# Patient Record
Sex: FEMALE | Race: WHITE | NOT HISPANIC OR LATINO | Employment: OTHER | URBAN - METROPOLITAN AREA
[De-identification: names, ages, dates, MRNs, and addresses within clinical notes are randomized per-mention and may not be internally consistent; named-entity substitution may affect disease eponyms.]

---

## 2017-01-02 ENCOUNTER — APPOINTMENT (OUTPATIENT)
Dept: LAB | Age: 57
End: 2017-01-02
Payer: MEDICARE

## 2017-01-02 DIAGNOSIS — M25.562 PAIN IN LEFT KNEE: ICD-10-CM

## 2017-01-02 DIAGNOSIS — M70.52 OTHER BURSITIS OF KNEE, LEFT KNEE: ICD-10-CM

## 2017-01-02 LAB
ABO GROUP BLD: NORMAL
ALBUMIN SERPL BCP-MCNC: 3.9 G/DL (ref 3.5–5)
ALP SERPL-CCNC: 56 U/L (ref 46–116)
ALT SERPL W P-5'-P-CCNC: 21 U/L (ref 12–78)
ANION GAP SERPL CALCULATED.3IONS-SCNC: 7 MMOL/L (ref 4–13)
AST SERPL W P-5'-P-CCNC: 13 U/L (ref 5–45)
BASOPHILS # BLD AUTO: 0.02 THOUSANDS/ΜL (ref 0–0.1)
BASOPHILS NFR BLD AUTO: 0 % (ref 0–1)
BILIRUB SERPL-MCNC: 0.77 MG/DL (ref 0.2–1)
BILIRUB UR QL STRIP: NEGATIVE
BLD GP AB SCN SERPL QL: NEGATIVE
BUN SERPL-MCNC: 18 MG/DL (ref 5–25)
CALCIUM SERPL-MCNC: 8.8 MG/DL (ref 8.3–10.1)
CHLORIDE SERPL-SCNC: 103 MMOL/L (ref 100–108)
CLARITY UR: NORMAL
CO2 SERPL-SCNC: 31 MMOL/L (ref 21–32)
COLOR UR: YELLOW
CREAT SERPL-MCNC: 0.88 MG/DL (ref 0.6–1.3)
EOSINOPHIL # BLD AUTO: 0.15 THOUSAND/ΜL (ref 0–0.61)
EOSINOPHIL NFR BLD AUTO: 3 % (ref 0–6)
ERYTHROCYTE [DISTWIDTH] IN BLOOD BY AUTOMATED COUNT: 13.6 % (ref 11.6–15.1)
EST. AVERAGE GLUCOSE BLD GHB EST-MCNC: 105 MG/DL
GFR SERPL CREATININE-BSD FRML MDRD: >60 ML/MIN/1.73SQ M
GLUCOSE SERPL-MCNC: 100 MG/DL (ref 65–140)
GLUCOSE UR STRIP-MCNC: NEGATIVE MG/DL
HBA1C MFR BLD: 5.3 % (ref 4.2–6.3)
HCT VFR BLD AUTO: 43.1 % (ref 34.8–46.1)
HGB BLD-MCNC: 14.8 G/DL (ref 11.5–15.4)
HGB UR QL STRIP.AUTO: NEGATIVE
KETONES UR STRIP-MCNC: NEGATIVE MG/DL
LEUKOCYTE ESTERASE UR QL STRIP: NEGATIVE
LYMPHOCYTES # BLD AUTO: 1.81 THOUSANDS/ΜL (ref 0.6–4.47)
LYMPHOCYTES NFR BLD AUTO: 31 % (ref 14–44)
MCH RBC QN AUTO: 30.1 PG (ref 26.8–34.3)
MCHC RBC AUTO-ENTMCNC: 34.3 G/DL (ref 31.4–37.4)
MCV RBC AUTO: 88 FL (ref 82–98)
MONOCYTES # BLD AUTO: 0.4 THOUSAND/ΜL (ref 0.17–1.22)
MONOCYTES NFR BLD AUTO: 7 % (ref 4–12)
NEUTROPHILS # BLD AUTO: 3.49 THOUSANDS/ΜL (ref 1.85–7.62)
NEUTS SEG NFR BLD AUTO: 59 % (ref 43–75)
NITRITE UR QL STRIP: NEGATIVE
NRBC BLD AUTO-RTO: 0 /100 WBCS
PH UR STRIP.AUTO: 6 [PH] (ref 4.5–8)
PLATELET # BLD AUTO: 140 THOUSANDS/UL (ref 149–390)
PMV BLD AUTO: 12.3 FL (ref 8.9–12.7)
POTASSIUM SERPL-SCNC: 3.3 MMOL/L (ref 3.5–5.3)
PROT SERPL-MCNC: 6.7 G/DL (ref 6.4–8.2)
PROT UR STRIP-MCNC: NEGATIVE MG/DL
RBC # BLD AUTO: 4.92 MILLION/UL (ref 3.81–5.12)
RH BLD: NEGATIVE
SODIUM SERPL-SCNC: 141 MMOL/L (ref 136–145)
SP GR UR STRIP.AUTO: 1.02 (ref 1–1.03)
UROBILINOGEN UR QL STRIP.AUTO: 0.2 E.U./DL
WBC # BLD AUTO: 5.88 THOUSAND/UL (ref 4.31–10.16)

## 2017-01-02 PROCEDURE — 85025 COMPLETE CBC W/AUTO DIFF WBC: CPT

## 2017-01-02 PROCEDURE — 86900 BLOOD TYPING SEROLOGIC ABO: CPT

## 2017-01-02 PROCEDURE — 36415 COLL VENOUS BLD VENIPUNCTURE: CPT

## 2017-01-02 PROCEDURE — 86850 RBC ANTIBODY SCREEN: CPT

## 2017-01-02 PROCEDURE — 81003 URINALYSIS AUTO W/O SCOPE: CPT

## 2017-01-02 PROCEDURE — 80053 COMPREHEN METABOLIC PANEL: CPT

## 2017-01-02 PROCEDURE — 83036 HEMOGLOBIN GLYCOSYLATED A1C: CPT

## 2017-01-02 PROCEDURE — 86901 BLOOD TYPING SEROLOGIC RH(D): CPT

## 2017-01-06 DIAGNOSIS — R79.9 ABNORMAL FINDING OF BLOOD CHEMISTRY: ICD-10-CM

## 2017-01-06 DIAGNOSIS — M25.562 PAIN IN LEFT KNEE: ICD-10-CM

## 2017-01-09 ENCOUNTER — TRANSCRIBE ORDERS (OUTPATIENT)
Dept: URGENT CARE | Age: 57
End: 2017-01-09

## 2017-01-09 ENCOUNTER — APPOINTMENT (OUTPATIENT)
Dept: LAB | Age: 57
End: 2017-01-09
Payer: MEDICARE

## 2017-01-09 DIAGNOSIS — M25.562 PAIN IN BOTH KNEES, UNSPECIFIED CHRONICITY: ICD-10-CM

## 2017-01-09 DIAGNOSIS — M25.561 PAIN IN BOTH KNEES, UNSPECIFIED CHRONICITY: ICD-10-CM

## 2017-01-09 DIAGNOSIS — R79.9 ABNORMAL BLOOD CHEMISTRY: ICD-10-CM

## 2017-01-09 DIAGNOSIS — R79.9 ABNORMAL BLOOD CHEMISTRY: Primary | ICD-10-CM

## 2017-01-09 LAB
HCT VFR BLD AUTO: 45 % (ref 34.8–46.1)
HGB BLD-MCNC: 15.8 G/DL (ref 11.5–15.4)
POTASSIUM SERPL-SCNC: 3.8 MMOL/L (ref 3.5–5.3)

## 2017-01-09 PROCEDURE — 84132 ASSAY OF SERUM POTASSIUM: CPT

## 2017-01-09 PROCEDURE — 36415 COLL VENOUS BLD VENIPUNCTURE: CPT

## 2017-01-09 PROCEDURE — 85014 HEMATOCRIT: CPT

## 2017-01-09 PROCEDURE — 85018 HEMOGLOBIN: CPT

## 2017-01-10 ENCOUNTER — GENERIC CONVERSION - ENCOUNTER (OUTPATIENT)
Dept: OTHER | Facility: OTHER | Age: 57
End: 2017-01-10

## 2017-01-17 ENCOUNTER — ANESTHESIA EVENT (OUTPATIENT)
Dept: PERIOP | Facility: HOSPITAL | Age: 57
DRG: 470 | End: 2017-01-17
Payer: MEDICARE

## 2017-01-18 ENCOUNTER — ALLSCRIPTS OFFICE VISIT (OUTPATIENT)
Dept: OTHER | Facility: OTHER | Age: 57
End: 2017-01-18

## 2017-01-23 ENCOUNTER — APPOINTMENT (OUTPATIENT)
Dept: LAB | Age: 57
End: 2017-01-23
Payer: MEDICARE

## 2017-01-23 ENCOUNTER — GENERIC CONVERSION - ENCOUNTER (OUTPATIENT)
Dept: OTHER | Facility: OTHER | Age: 57
End: 2017-01-23

## 2017-01-23 DIAGNOSIS — M25.562 PAIN IN LEFT KNEE: ICD-10-CM

## 2017-01-23 DIAGNOSIS — R79.9 ABNORMAL FINDING OF BLOOD CHEMISTRY: ICD-10-CM

## 2017-01-23 LAB
HCT VFR BLD AUTO: 44.2 % (ref 34.8–46.1)
HGB BLD-MCNC: 15.2 G/DL (ref 11.5–15.4)
PLATELET # BLD AUTO: 175 THOUSANDS/UL (ref 149–390)
PMV BLD AUTO: 12.4 FL (ref 8.9–12.7)

## 2017-01-23 PROCEDURE — 85014 HEMATOCRIT: CPT

## 2017-01-23 PROCEDURE — 85018 HEMOGLOBIN: CPT

## 2017-01-23 PROCEDURE — 85049 AUTOMATED PLATELET COUNT: CPT

## 2017-01-23 PROCEDURE — 36415 COLL VENOUS BLD VENIPUNCTURE: CPT

## 2017-02-08 ENCOUNTER — ANESTHESIA (OUTPATIENT)
Dept: PERIOP | Facility: HOSPITAL | Age: 57
DRG: 470 | End: 2017-02-08
Payer: MEDICARE

## 2017-02-08 ENCOUNTER — HOSPITAL ENCOUNTER (INPATIENT)
Facility: HOSPITAL | Age: 57
LOS: 3 days | Discharge: NURSING, REHAB OR LONG-TERM CARE | DRG: 470 | End: 2017-02-11
Attending: ORTHOPAEDIC SURGERY | Admitting: ORTHOPAEDIC SURGERY
Payer: MEDICARE

## 2017-02-08 DIAGNOSIS — I15.9 SECONDARY HYPERTENSION: Primary | ICD-10-CM

## 2017-02-08 DIAGNOSIS — K59.00 CONSTIPATION, UNSPECIFIED CONSTIPATION TYPE: ICD-10-CM

## 2017-02-08 DIAGNOSIS — F41.9 ANXIETY: ICD-10-CM

## 2017-02-08 LAB
ABO GROUP BLD: NORMAL
BLD GP AB SCN SERPL QL: NEGATIVE
RH BLD: NEGATIVE

## 2017-02-08 PROCEDURE — C1776 JOINT DEVICE (IMPLANTABLE): HCPCS | Performed by: ORTHOPAEDIC SURGERY

## 2017-02-08 PROCEDURE — 86900 BLOOD TYPING SEROLOGIC ABO: CPT | Performed by: ORTHOPAEDIC SURGERY

## 2017-02-08 PROCEDURE — 86850 RBC ANTIBODY SCREEN: CPT | Performed by: ORTHOPAEDIC SURGERY

## 2017-02-08 PROCEDURE — 0SRD0J9 REPLACEMENT OF LEFT KNEE JOINT WITH SYNTHETIC SUBSTITUTE, CEMENTED, OPEN APPROACH: ICD-10-PCS | Performed by: ORTHOPAEDIC SURGERY

## 2017-02-08 PROCEDURE — 86901 BLOOD TYPING SEROLOGIC RH(D): CPT | Performed by: ORTHOPAEDIC SURGERY

## 2017-02-08 PROCEDURE — C1713 ANCHOR/SCREW BN/BN,TIS/BN: HCPCS | Performed by: ORTHOPAEDIC SURGERY

## 2017-02-08 DEVICE — ATTUNE KNEE SYSTEM TIBIAL BASE FIXED BEARING SIZE 3 CEMENTED
Type: IMPLANTABLE DEVICE | Site: KNEE | Status: FUNCTIONAL
Brand: ATTUNE

## 2017-02-08 DEVICE — ATTUNE KNEE SYSTEM TIBIAL INSERT FIXED BEARING POSTERIOR STABILIZED 4 6MM AOX
Type: IMPLANTABLE DEVICE | Site: KNEE | Status: FUNCTIONAL
Brand: ATTUNE

## 2017-02-08 DEVICE — ATTUNE PATELLA MEDIALIZED DOME 32MM CEMENTED AOX
Type: IMPLANTABLE DEVICE | Site: KNEE | Status: FUNCTIONAL
Brand: ATTUNE

## 2017-02-08 DEVICE — SMARTSET HV HIGH VISCOSITY BONE CEMENT 40G
Type: IMPLANTABLE DEVICE | Site: KNEE | Status: FUNCTIONAL
Brand: SMARTSET

## 2017-02-08 DEVICE — ATTUNE KNEE SYSTEM FEMORAL POSTERIOR STABILIZED NARROW SIZE 4N LEFT CEMENTED
Type: IMPLANTABLE DEVICE | Site: KNEE | Status: FUNCTIONAL
Brand: ATTUNE

## 2017-02-08 RX ORDER — SENNOSIDES 8.6 MG
2 TABLET ORAL DAILY
Status: DISCONTINUED | OUTPATIENT
Start: 2017-02-08 | End: 2017-02-11 | Stop reason: HOSPADM

## 2017-02-08 RX ORDER — METOCLOPRAMIDE HYDROCHLORIDE 5 MG/ML
10 INJECTION INTRAMUSCULAR; INTRAVENOUS ONCE AS NEEDED
Status: DISCONTINUED | OUTPATIENT
Start: 2017-02-08 | End: 2017-02-08 | Stop reason: HOSPADM

## 2017-02-08 RX ORDER — LORAZEPAM 0.5 MG/1
0.5 TABLET ORAL EVERY 8 HOURS PRN
Status: DISCONTINUED | OUTPATIENT
Start: 2017-02-08 | End: 2017-02-11 | Stop reason: HOSPADM

## 2017-02-08 RX ORDER — ASCORBIC ACID 500 MG
500 TABLET ORAL 2 TIMES DAILY
COMMUNITY
End: 2018-02-16

## 2017-02-08 RX ORDER — FENTANYL CITRATE/PF 50 MCG/ML
25 SYRINGE (ML) INJECTION
Status: DISCONTINUED | OUTPATIENT
Start: 2017-02-08 | End: 2017-02-08 | Stop reason: HOSPADM

## 2017-02-08 RX ORDER — SODIUM CHLORIDE, SODIUM LACTATE, POTASSIUM CHLORIDE, CALCIUM CHLORIDE 600; 310; 30; 20 MG/100ML; MG/100ML; MG/100ML; MG/100ML
75 INJECTION, SOLUTION INTRAVENOUS CONTINUOUS
Status: DISCONTINUED | OUTPATIENT
Start: 2017-02-08 | End: 2017-02-11 | Stop reason: HOSPADM

## 2017-02-08 RX ORDER — CLINDAMYCIN PHOSPHATE 900 MG/50ML
900 INJECTION INTRAVENOUS EVERY 8 HOURS
Status: COMPLETED | OUTPATIENT
Start: 2017-02-08 | End: 2017-02-08

## 2017-02-08 RX ORDER — OXYCODONE HYDROCHLORIDE 10 MG/1
10 TABLET ORAL EVERY 4 HOURS PRN
Status: DISCONTINUED | OUTPATIENT
Start: 2017-02-08 | End: 2017-02-09

## 2017-02-08 RX ORDER — ACETAMINOPHEN 325 MG/1
975 TABLET ORAL ONCE
Status: COMPLETED | OUTPATIENT
Start: 2017-02-08 | End: 2017-02-08

## 2017-02-08 RX ORDER — FOLIC ACID 1 MG/1
1 TABLET ORAL DAILY
Status: DISCONTINUED | OUTPATIENT
Start: 2017-02-08 | End: 2017-02-11 | Stop reason: HOSPADM

## 2017-02-08 RX ORDER — PROPOFOL 10 MG/ML
INJECTION, EMULSION INTRAVENOUS CONTINUOUS PRN
Status: DISCONTINUED | OUTPATIENT
Start: 2017-02-08 | End: 2017-02-08 | Stop reason: SURG

## 2017-02-08 RX ORDER — ONDANSETRON 2 MG/ML
4 INJECTION INTRAMUSCULAR; INTRAVENOUS ONCE AS NEEDED
Status: DISCONTINUED | OUTPATIENT
Start: 2017-02-08 | End: 2017-02-08 | Stop reason: HOSPADM

## 2017-02-08 RX ORDER — MAGNESIUM HYDROXIDE 1200 MG/15ML
LIQUID ORAL AS NEEDED
Status: DISCONTINUED | OUTPATIENT
Start: 2017-02-08 | End: 2017-02-08 | Stop reason: HOSPADM

## 2017-02-08 RX ORDER — SODIUM CHLORIDE, SODIUM LACTATE, POTASSIUM CHLORIDE, CALCIUM CHLORIDE 600; 310; 30; 20 MG/100ML; MG/100ML; MG/100ML; MG/100ML
INJECTION, SOLUTION INTRAVENOUS CONTINUOUS PRN
Status: DISCONTINUED | OUTPATIENT
Start: 2017-02-08 | End: 2017-02-08 | Stop reason: SURG

## 2017-02-08 RX ORDER — ONDANSETRON 2 MG/ML
4 INJECTION INTRAMUSCULAR; INTRAVENOUS EVERY 4 HOURS PRN
Status: DISCONTINUED | OUTPATIENT
Start: 2017-02-08 | End: 2017-02-11 | Stop reason: HOSPADM

## 2017-02-08 RX ORDER — ACETAMINOPHEN 325 MG/1
650 TABLET ORAL EVERY 6 HOURS SCHEDULED
Status: DISCONTINUED | OUTPATIENT
Start: 2017-02-08 | End: 2017-02-11 | Stop reason: HOSPADM

## 2017-02-08 RX ORDER — CELECOXIB 200 MG/1
400 CAPSULE ORAL ONCE
Status: COMPLETED | OUTPATIENT
Start: 2017-02-08 | End: 2017-02-08

## 2017-02-08 RX ORDER — FOLIC ACID 1 MG/1
1 TABLET ORAL DAILY
COMMUNITY
End: 2018-02-16

## 2017-02-08 RX ORDER — ASCORBIC ACID 500 MG
500 TABLET ORAL 2 TIMES DAILY
Status: DISCONTINUED | OUTPATIENT
Start: 2017-02-08 | End: 2017-02-11 | Stop reason: HOSPADM

## 2017-02-08 RX ORDER — CLINDAMYCIN PHOSPHATE 900 MG/50ML
900 INJECTION INTRAVENOUS ONCE
Status: COMPLETED | OUTPATIENT
Start: 2017-02-08 | End: 2017-02-08

## 2017-02-08 RX ORDER — TRIAMTERENE AND HYDROCHLOROTHIAZIDE 37.5; 25 MG/1; MG/1
1 TABLET ORAL DAILY
Status: DISCONTINUED | OUTPATIENT
Start: 2017-02-09 | End: 2017-02-11 | Stop reason: HOSPADM

## 2017-02-08 RX ORDER — POLYETHYLENE GLYCOL 3350 17 G/17G
17 POWDER, FOR SOLUTION ORAL DAILY
Status: DISCONTINUED | OUTPATIENT
Start: 2017-02-08 | End: 2017-02-09

## 2017-02-08 RX ORDER — PROMETHAZINE HYDROCHLORIDE 25 MG/ML
25 INJECTION, SOLUTION INTRAMUSCULAR; INTRAVENOUS ONCE AS NEEDED
Status: DISCONTINUED | OUTPATIENT
Start: 2017-02-08 | End: 2017-02-08 | Stop reason: HOSPADM

## 2017-02-08 RX ORDER — TRAMADOL HYDROCHLORIDE 50 MG/1
50 TABLET ORAL EVERY 6 HOURS SCHEDULED
Status: DISCONTINUED | OUTPATIENT
Start: 2017-02-08 | End: 2017-02-09

## 2017-02-08 RX ORDER — BUPIVACAINE HYDROCHLORIDE 7.5 MG/ML
INJECTION, SOLUTION EPIDURAL; RETROBULBAR AS NEEDED
Status: DISCONTINUED | OUTPATIENT
Start: 2017-02-08 | End: 2017-02-08 | Stop reason: SURG

## 2017-02-08 RX ORDER — SODIUM CHLORIDE, SODIUM LACTATE, POTASSIUM CHLORIDE, CALCIUM CHLORIDE 600; 310; 30; 20 MG/100ML; MG/100ML; MG/100ML; MG/100ML
125 INJECTION, SOLUTION INTRAVENOUS CONTINUOUS
Status: DISCONTINUED | OUTPATIENT
Start: 2017-02-08 | End: 2017-02-11 | Stop reason: HOSPADM

## 2017-02-08 RX ORDER — FENTANYL CITRATE 50 UG/ML
INJECTION, SOLUTION INTRAMUSCULAR; INTRAVENOUS
Status: COMPLETED
Start: 2017-02-08 | End: 2017-02-08

## 2017-02-08 RX ORDER — MIDAZOLAM HYDROCHLORIDE 1 MG/ML
INJECTION INTRAMUSCULAR; INTRAVENOUS
Status: COMPLETED
Start: 2017-02-08 | End: 2017-02-08

## 2017-02-08 RX ORDER — OXYCODONE HYDROCHLORIDE 5 MG/1
TABLET ORAL
Qty: 60 TABLET | Refills: 0 | Status: SHIPPED | OUTPATIENT
Start: 2017-02-08 | End: 2018-02-16

## 2017-02-08 RX ORDER — POTASSIUM CHLORIDE 750 MG/1
10 TABLET, FILM COATED, EXTENDED RELEASE ORAL 2 TIMES DAILY
Status: DISCONTINUED | OUTPATIENT
Start: 2017-02-08 | End: 2017-02-08

## 2017-02-08 RX ORDER — POTASSIUM CHLORIDE 750 MG/1
10 TABLET, EXTENDED RELEASE ORAL 2 TIMES DAILY
Status: DISCONTINUED | OUTPATIENT
Start: 2017-02-08 | End: 2017-02-11 | Stop reason: HOSPADM

## 2017-02-08 RX ORDER — PANTOPRAZOLE SODIUM 40 MG/1
40 TABLET, DELAYED RELEASE ORAL
Status: DISCONTINUED | OUTPATIENT
Start: 2017-02-09 | End: 2017-02-11 | Stop reason: HOSPADM

## 2017-02-08 RX ORDER — PROPOFOL 10 MG/ML
INJECTION, EMULSION INTRAVENOUS AS NEEDED
Status: DISCONTINUED | OUTPATIENT
Start: 2017-02-08 | End: 2017-02-08 | Stop reason: SURG

## 2017-02-08 RX ORDER — LEVOTHYROXINE SODIUM 0.07 MG/1
75 TABLET ORAL
Status: DISCONTINUED | OUTPATIENT
Start: 2017-02-09 | End: 2017-02-11 | Stop reason: HOSPADM

## 2017-02-08 RX ORDER — GABAPENTIN 300 MG/1
300 CAPSULE ORAL ONCE
Status: COMPLETED | OUTPATIENT
Start: 2017-02-08 | End: 2017-02-08

## 2017-02-08 RX ORDER — OXYCODONE HYDROCHLORIDE 5 MG/1
5 TABLET ORAL EVERY 4 HOURS PRN
Status: DISCONTINUED | OUTPATIENT
Start: 2017-02-08 | End: 2017-02-09

## 2017-02-08 RX ADMIN — PROPOFOL 50 MG: 10 INJECTION, EMULSION INTRAVENOUS at 08:44

## 2017-02-08 RX ADMIN — OXYCODONE HYDROCHLORIDE 10 MG: 10 TABLET ORAL at 18:14

## 2017-02-08 RX ADMIN — LORAZEPAM 0.5 MG: 0.5 TABLET ORAL at 21:52

## 2017-02-08 RX ADMIN — HYDROMORPHONE HYDROCHLORIDE 1 MG: 1 INJECTION, SOLUTION INTRAMUSCULAR; INTRAVENOUS; SUBCUTANEOUS at 19:34

## 2017-02-08 RX ADMIN — BUPIVACAINE HYDROCHLORIDE 1.6 ML: 7.5 INJECTION, SOLUTION EPIDURAL; RETROBULBAR at 08:41

## 2017-02-08 RX ADMIN — TRAMADOL HYDROCHLORIDE 50 MG: 50 TABLET, COATED ORAL at 19:34

## 2017-02-08 RX ADMIN — ACETAMINOPHEN 650 MG: 325 TABLET ORAL at 23:24

## 2017-02-08 RX ADMIN — GABAPENTIN 300 MG: 300 CAPSULE ORAL at 07:04

## 2017-02-08 RX ADMIN — CLINDAMYCIN PHOSPHATE 900 MG: 18 INJECTION, SOLUTION INTRAMUSCULAR; INTRAVENOUS at 15:53

## 2017-02-08 RX ADMIN — CELECOXIB 400 MG: 200 CAPSULE ORAL at 07:14

## 2017-02-08 RX ADMIN — POTASSIUM CHLORIDE 10 MEQ: 750 TABLET, EXTENDED RELEASE ORAL at 19:38

## 2017-02-08 RX ADMIN — OXYCODONE HYDROCHLORIDE 5 MG: 5 TABLET ORAL at 14:20

## 2017-02-08 RX ADMIN — MIDAZOLAM HYDROCHLORIDE 2 MG: 1 INJECTION, SOLUTION INTRAMUSCULAR; INTRAVENOUS at 07:30

## 2017-02-08 RX ADMIN — ACETAMINOPHEN 650 MG: 325 TABLET ORAL at 19:34

## 2017-02-08 RX ADMIN — FENTANYL CITRATE 50 MCG: 50 INJECTION INTRAMUSCULAR; INTRAVENOUS at 08:46

## 2017-02-08 RX ADMIN — SODIUM CHLORIDE, SODIUM LACTATE, POTASSIUM CHLORIDE, AND CALCIUM CHLORIDE: .6; .31; .03; .02 INJECTION, SOLUTION INTRAVENOUS at 09:25

## 2017-02-08 RX ADMIN — OXYCODONE HYDROCHLORIDE AND ACETAMINOPHEN 500 MG: 500 TABLET ORAL at 21:52

## 2017-02-08 RX ADMIN — FENTANYL CITRATE 25 MCG: 50 INJECTION INTRAMUSCULAR; INTRAVENOUS at 10:59

## 2017-02-08 RX ADMIN — SODIUM CHLORIDE, SODIUM LACTATE, POTASSIUM CHLORIDE, AND CALCIUM CHLORIDE: .6; .31; .03; .02 INJECTION, SOLUTION INTRAVENOUS at 08:11

## 2017-02-08 RX ADMIN — PROPOFOL 100 MCG/KG/MIN: 10 INJECTION, EMULSION INTRAVENOUS at 08:45

## 2017-02-08 RX ADMIN — HYDROMORPHONE HYDROCHLORIDE 1 MG: 1 INJECTION, SOLUTION INTRAMUSCULAR; INTRAVENOUS; SUBCUTANEOUS at 23:24

## 2017-02-08 RX ADMIN — MIDAZOLAM HYDROCHLORIDE 2 MG: 1 INJECTION, SOLUTION INTRAMUSCULAR; INTRAVENOUS at 08:20

## 2017-02-08 RX ADMIN — HYDROMORPHONE HYDROCHLORIDE 1 MG: 1 INJECTION, SOLUTION INTRAMUSCULAR; INTRAVENOUS; SUBCUTANEOUS at 15:44

## 2017-02-08 RX ADMIN — CLINDAMYCIN PHOSPHATE 900 MG: 18 INJECTION, SOLUTION INTRAMUSCULAR; INTRAVENOUS at 23:24

## 2017-02-08 RX ADMIN — FENTANYL CITRATE 100 MCG: 50 INJECTION INTRAMUSCULAR; INTRAVENOUS at 07:30

## 2017-02-08 RX ADMIN — ACETAMINOPHEN 975 MG: 325 TABLET, FILM COATED ORAL at 07:04

## 2017-02-08 RX ADMIN — LORAZEPAM 0.5 MG: 0.5 TABLET ORAL at 13:41

## 2017-02-08 RX ADMIN — TRAMADOL HYDROCHLORIDE 50 MG: 50 TABLET, COATED ORAL at 23:24

## 2017-02-08 RX ADMIN — SODIUM CHLORIDE, SODIUM LACTATE, POTASSIUM CHLORIDE, AND CALCIUM CHLORIDE 125 ML/HR: .6; .31; .03; .02 INJECTION, SOLUTION INTRAVENOUS at 13:36

## 2017-02-08 RX ADMIN — CLINDAMYCIN PHOSPHATE 900 MG: 18 INJECTION, SOLUTION INTRAVENOUS at 08:44

## 2017-02-09 LAB
ANION GAP SERPL CALCULATED.3IONS-SCNC: 9 MMOL/L (ref 4–13)
BUN SERPL-MCNC: 16 MG/DL (ref 5–25)
CALCIUM SERPL-MCNC: 8.3 MG/DL (ref 8.3–10.1)
CHLORIDE SERPL-SCNC: 101 MMOL/L (ref 100–108)
CO2 SERPL-SCNC: 29 MMOL/L (ref 21–32)
CREAT SERPL-MCNC: 0.84 MG/DL (ref 0.6–1.3)
ERYTHROCYTE [DISTWIDTH] IN BLOOD BY AUTOMATED COUNT: 13.5 % (ref 11.6–15.1)
GFR SERPL CREATININE-BSD FRML MDRD: >60 ML/MIN/1.73SQ M
GLUCOSE SERPL-MCNC: 122 MG/DL (ref 65–140)
HCT VFR BLD AUTO: 35.5 % (ref 34.8–46.1)
HGB BLD-MCNC: 12 G/DL (ref 11.5–15.4)
MCH RBC QN AUTO: 29.5 PG (ref 26.8–34.3)
MCHC RBC AUTO-ENTMCNC: 33.8 G/DL (ref 31.4–37.4)
MCV RBC AUTO: 87 FL (ref 82–98)
PLATELET # BLD AUTO: 111 THOUSANDS/UL (ref 149–390)
PMV BLD AUTO: 11.2 FL (ref 8.9–12.7)
POTASSIUM SERPL-SCNC: 3.5 MMOL/L (ref 3.5–5.3)
RBC # BLD AUTO: 4.07 MILLION/UL (ref 3.81–5.12)
SODIUM SERPL-SCNC: 139 MMOL/L (ref 136–145)
WBC # BLD AUTO: 6.36 THOUSAND/UL (ref 4.31–10.16)

## 2017-02-09 PROCEDURE — 80048 BASIC METABOLIC PNL TOTAL CA: CPT | Performed by: ORTHOPAEDIC SURGERY

## 2017-02-09 PROCEDURE — G8988 SELF CARE GOAL STATUS: HCPCS

## 2017-02-09 PROCEDURE — G8979 MOBILITY GOAL STATUS: HCPCS

## 2017-02-09 PROCEDURE — 97163 PT EVAL HIGH COMPLEX 45 MIN: CPT

## 2017-02-09 PROCEDURE — 97530 THERAPEUTIC ACTIVITIES: CPT

## 2017-02-09 PROCEDURE — 85027 COMPLETE CBC AUTOMATED: CPT | Performed by: ORTHOPAEDIC SURGERY

## 2017-02-09 PROCEDURE — G8978 MOBILITY CURRENT STATUS: HCPCS

## 2017-02-09 PROCEDURE — 97166 OT EVAL MOD COMPLEX 45 MIN: CPT

## 2017-02-09 PROCEDURE — G8987 SELF CARE CURRENT STATUS: HCPCS

## 2017-02-09 RX ORDER — OXYCODONE HYDROCHLORIDE 10 MG/1
10 TABLET ORAL EVERY 4 HOURS PRN
Status: DISCONTINUED | OUTPATIENT
Start: 2017-02-09 | End: 2017-02-10

## 2017-02-09 RX ORDER — METHOCARBAMOL 750 MG/1
750 TABLET, FILM COATED ORAL EVERY 6 HOURS SCHEDULED
Status: DISCONTINUED | OUTPATIENT
Start: 2017-02-09 | End: 2017-02-11 | Stop reason: HOSPADM

## 2017-02-09 RX ORDER — POLYETHYLENE GLYCOL 3350 17 G/17G
17 POWDER, FOR SOLUTION ORAL 2 TIMES DAILY
Status: DISCONTINUED | OUTPATIENT
Start: 2017-02-09 | End: 2017-02-11 | Stop reason: HOSPADM

## 2017-02-09 RX ORDER — GABAPENTIN 300 MG/1
300 CAPSULE ORAL
Status: DISCONTINUED | OUTPATIENT
Start: 2017-02-09 | End: 2017-02-11 | Stop reason: HOSPADM

## 2017-02-09 RX ADMIN — OXYCODONE HYDROCHLORIDE 15 MG: 5 TABLET ORAL at 13:13

## 2017-02-09 RX ADMIN — OXYCODONE HYDROCHLORIDE AND ACETAMINOPHEN 500 MG: 500 TABLET ORAL at 09:11

## 2017-02-09 RX ADMIN — LEVOTHYROXINE SODIUM 75 MCG: 75 TABLET ORAL at 05:35

## 2017-02-09 RX ADMIN — TRAMADOL HYDROCHLORIDE 50 MG: 50 TABLET, COATED ORAL at 05:35

## 2017-02-09 RX ADMIN — POTASSIUM CHLORIDE 10 MEQ: 750 TABLET, EXTENDED RELEASE ORAL at 17:10

## 2017-02-09 RX ADMIN — METHOCARBAMOL 750 MG: 750 TABLET ORAL at 17:10

## 2017-02-09 RX ADMIN — POLYETHYLENE GLYCOL 3350 17 G: 17 POWDER, FOR SOLUTION ORAL at 09:17

## 2017-02-09 RX ADMIN — SENNOSIDES 17.2 MG: 8.6 TABLET, FILM COATED ORAL at 09:11

## 2017-02-09 RX ADMIN — OXYCODONE HYDROCHLORIDE 15 MG: 5 TABLET ORAL at 09:11

## 2017-02-09 RX ADMIN — POLYETHYLENE GLYCOL 3350 17 G: 17 POWDER, FOR SOLUTION ORAL at 17:10

## 2017-02-09 RX ADMIN — OXYCODONE HYDROCHLORIDE 15 MG: 5 TABLET ORAL at 20:42

## 2017-02-09 RX ADMIN — HYDROMORPHONE HYDROCHLORIDE 1 MG: 1 INJECTION, SOLUTION INTRAMUSCULAR; INTRAVENOUS; SUBCUTANEOUS at 17:10

## 2017-02-09 RX ADMIN — HYDROMORPHONE HYDROCHLORIDE 1 MG: 1 INJECTION, SOLUTION INTRAMUSCULAR; INTRAVENOUS; SUBCUTANEOUS at 07:15

## 2017-02-09 RX ADMIN — METHOCARBAMOL 750 MG: 750 TABLET ORAL at 23:53

## 2017-02-09 RX ADMIN — ONDANSETRON 4 MG: 2 INJECTION INTRAMUSCULAR; INTRAVENOUS at 12:36

## 2017-02-09 RX ADMIN — ACETAMINOPHEN 650 MG: 325 TABLET ORAL at 23:53

## 2017-02-09 RX ADMIN — HYDROMORPHONE HYDROCHLORIDE 1 MG: 1 INJECTION, SOLUTION INTRAMUSCULAR; INTRAVENOUS; SUBCUTANEOUS at 03:16

## 2017-02-09 RX ADMIN — PSYLLIUM HUSK 1 PACKET: 6 GRANULE ORAL at 09:17

## 2017-02-09 RX ADMIN — HYDROMORPHONE HYDROCHLORIDE 1 MG: 1 INJECTION, SOLUTION INTRAMUSCULAR; INTRAVENOUS; SUBCUTANEOUS at 11:04

## 2017-02-09 RX ADMIN — ACETAMINOPHEN 650 MG: 325 TABLET ORAL at 05:35

## 2017-02-09 RX ADMIN — TRAMADOL HYDROCHLORIDE 50 MG: 50 TABLET, COATED ORAL at 11:04

## 2017-02-09 RX ADMIN — OXYCODONE HYDROCHLORIDE 10 MG: 10 TABLET ORAL at 04:31

## 2017-02-09 RX ADMIN — OXYCODONE HYDROCHLORIDE 10 MG: 10 TABLET ORAL at 00:28

## 2017-02-09 RX ADMIN — GABAPENTIN 300 MG: 300 CAPSULE ORAL at 21:44

## 2017-02-09 RX ADMIN — SERTRALINE HYDROCHLORIDE 50 MG: 50 TABLET, FILM COATED ORAL at 09:14

## 2017-02-09 RX ADMIN — OXYCODONE HYDROCHLORIDE AND ACETAMINOPHEN 500 MG: 500 TABLET ORAL at 17:10

## 2017-02-09 RX ADMIN — TRIAMTERENE AND HYDROCHLOROTHIAZIDE 1 TABLET: 37.5; 25 TABLET ORAL at 11:04

## 2017-02-09 RX ADMIN — ACETAMINOPHEN 650 MG: 325 TABLET ORAL at 17:10

## 2017-02-09 RX ADMIN — OXYCODONE HYDROCHLORIDE 15 MG: 5 TABLET ORAL at 16:15

## 2017-02-09 RX ADMIN — HYDROMORPHONE HYDROCHLORIDE 1 MG: 1 INJECTION, SOLUTION INTRAMUSCULAR; INTRAVENOUS; SUBCUTANEOUS at 14:04

## 2017-02-09 RX ADMIN — ACETAMINOPHEN 650 MG: 325 TABLET ORAL at 11:04

## 2017-02-09 RX ADMIN — ENOXAPARIN SODIUM 40 MG: 40 INJECTION SUBCUTANEOUS at 09:11

## 2017-02-09 RX ADMIN — SODIUM CHLORIDE, SODIUM LACTATE, POTASSIUM CHLORIDE, AND CALCIUM CHLORIDE 125 ML/HR: .6; .31; .03; .02 INJECTION, SOLUTION INTRAVENOUS at 05:36

## 2017-02-09 RX ADMIN — FOLIC ACID 1 MG: 1 TABLET ORAL at 09:11

## 2017-02-09 RX ADMIN — PANTOPRAZOLE SODIUM 40 MG: 40 TABLET, DELAYED RELEASE ORAL at 05:35

## 2017-02-09 RX ADMIN — METHOCARBAMOL 750 MG: 750 TABLET ORAL at 14:04

## 2017-02-09 RX ADMIN — POTASSIUM CHLORIDE 10 MEQ: 750 TABLET, EXTENDED RELEASE ORAL at 09:11

## 2017-02-10 PROBLEM — Z96.659 S/P KNEE REPLACEMENT: Status: ACTIVE | Noted: 2017-02-10

## 2017-02-10 PROCEDURE — 97530 THERAPEUTIC ACTIVITIES: CPT

## 2017-02-10 PROCEDURE — 97116 GAIT TRAINING THERAPY: CPT

## 2017-02-10 RX ORDER — METOCLOPRAMIDE HYDROCHLORIDE 5 MG/ML
10 INJECTION INTRAMUSCULAR; INTRAVENOUS EVERY 6 HOURS PRN
Status: DISCONTINUED | OUTPATIENT
Start: 2017-02-10 | End: 2017-02-11 | Stop reason: HOSPADM

## 2017-02-10 RX ORDER — OXYCODONE HYDROCHLORIDE 10 MG/1
10 TABLET ORAL
Status: DISCONTINUED | OUTPATIENT
Start: 2017-02-10 | End: 2017-02-11 | Stop reason: HOSPADM

## 2017-02-10 RX ORDER — BISACODYL 10 MG
10 SUPPOSITORY, RECTAL RECTAL DAILY
Status: DISCONTINUED | OUTPATIENT
Start: 2017-02-10 | End: 2017-02-11 | Stop reason: HOSPADM

## 2017-02-10 RX ORDER — POLYETHYLENE GLYCOL 3350 17 G/17G
17 POWDER, FOR SOLUTION ORAL DAILY PRN
Status: DISCONTINUED | OUTPATIENT
Start: 2017-02-10 | End: 2017-02-11 | Stop reason: HOSPADM

## 2017-02-10 RX ORDER — OXYCODONE HYDROCHLORIDE 5 MG/1
5 TABLET ORAL
Status: DISCONTINUED | OUTPATIENT
Start: 2017-02-10 | End: 2017-02-11 | Stop reason: HOSPADM

## 2017-02-10 RX ADMIN — METHOCARBAMOL 750 MG: 750 TABLET ORAL at 17:57

## 2017-02-10 RX ADMIN — PANTOPRAZOLE SODIUM 40 MG: 40 TABLET, DELAYED RELEASE ORAL at 05:23

## 2017-02-10 RX ADMIN — ACETAMINOPHEN 650 MG: 325 TABLET ORAL at 05:23

## 2017-02-10 RX ADMIN — SERTRALINE HYDROCHLORIDE 50 MG: 50 TABLET, FILM COATED ORAL at 10:15

## 2017-02-10 RX ADMIN — ACETAMINOPHEN 650 MG: 325 TABLET ORAL at 17:57

## 2017-02-10 RX ADMIN — POLYETHYLENE GLYCOL 3350 17 G: 17 POWDER, FOR SOLUTION ORAL at 16:05

## 2017-02-10 RX ADMIN — POTASSIUM CHLORIDE 10 MEQ: 750 TABLET, EXTENDED RELEASE ORAL at 10:15

## 2017-02-10 RX ADMIN — METOCLOPRAMIDE 10 MG: 5 INJECTION, SOLUTION INTRAMUSCULAR; INTRAVENOUS at 09:22

## 2017-02-10 RX ADMIN — OXYCODONE HYDROCHLORIDE 15 MG: 5 TABLET ORAL at 05:23

## 2017-02-10 RX ADMIN — GABAPENTIN 300 MG: 300 CAPSULE ORAL at 21:27

## 2017-02-10 RX ADMIN — FOLIC ACID 1 MG: 1 TABLET ORAL at 10:15

## 2017-02-10 RX ADMIN — POLYETHYLENE GLYCOL 3350 17 G: 17 POWDER, FOR SOLUTION ORAL at 17:57

## 2017-02-10 RX ADMIN — SENNOSIDES 17.2 MG: 8.6 TABLET, FILM COATED ORAL at 10:15

## 2017-02-10 RX ADMIN — PSYLLIUM HUSK 1 PACKET: 6 GRANULE ORAL at 10:15

## 2017-02-10 RX ADMIN — ACETAMINOPHEN 650 MG: 325 TABLET ORAL at 23:54

## 2017-02-10 RX ADMIN — TRIAMTERENE AND HYDROCHLOROTHIAZIDE 1 TABLET: 37.5; 25 TABLET ORAL at 10:16

## 2017-02-10 RX ADMIN — METHOCARBAMOL 750 MG: 750 TABLET ORAL at 23:54

## 2017-02-10 RX ADMIN — METHOCARBAMOL 750 MG: 750 TABLET ORAL at 12:47

## 2017-02-10 RX ADMIN — BISACODYL 10 MG: 10 SUPPOSITORY RECTAL at 12:52

## 2017-02-10 RX ADMIN — ENOXAPARIN SODIUM 40 MG: 40 INJECTION SUBCUTANEOUS at 10:15

## 2017-02-10 RX ADMIN — OXYCODONE HYDROCHLORIDE 10 MG: 10 TABLET ORAL at 10:16

## 2017-02-10 RX ADMIN — OXYCODONE HYDROCHLORIDE AND ACETAMINOPHEN 500 MG: 500 TABLET ORAL at 17:57

## 2017-02-10 RX ADMIN — POLYETHYLENE GLYCOL 3350 17 G: 17 POWDER, FOR SOLUTION ORAL at 10:15

## 2017-02-10 RX ADMIN — METHOCARBAMOL 750 MG: 750 TABLET ORAL at 05:23

## 2017-02-10 RX ADMIN — OXYCODONE HYDROCHLORIDE AND ACETAMINOPHEN 500 MG: 500 TABLET ORAL at 10:15

## 2017-02-10 RX ADMIN — POTASSIUM CHLORIDE 10 MEQ: 750 TABLET, EXTENDED RELEASE ORAL at 17:57

## 2017-02-10 RX ADMIN — LEVOTHYROXINE SODIUM 75 MCG: 75 TABLET ORAL at 05:23

## 2017-02-10 RX ADMIN — ONDANSETRON 4 MG: 2 INJECTION INTRAMUSCULAR; INTRAVENOUS at 12:47

## 2017-02-10 RX ADMIN — ACETAMINOPHEN 650 MG: 325 TABLET ORAL at 12:46

## 2017-02-10 RX ADMIN — ONDANSETRON 4 MG: 2 INJECTION INTRAMUSCULAR; INTRAVENOUS at 05:28

## 2017-02-11 VITALS
SYSTOLIC BLOOD PRESSURE: 100 MMHG | RESPIRATION RATE: 18 BRPM | BODY MASS INDEX: 37.21 KG/M2 | TEMPERATURE: 99.5 F | DIASTOLIC BLOOD PRESSURE: 51 MMHG | OXYGEN SATURATION: 95 % | HEART RATE: 100 BPM | HEIGHT: 63 IN | WEIGHT: 210 LBS

## 2017-02-11 PROCEDURE — G8978 MOBILITY CURRENT STATUS: HCPCS

## 2017-02-11 PROCEDURE — G8979 MOBILITY GOAL STATUS: HCPCS

## 2017-02-11 PROCEDURE — G8980 MOBILITY D/C STATUS: HCPCS

## 2017-02-11 PROCEDURE — 97110 THERAPEUTIC EXERCISES: CPT

## 2017-02-11 PROCEDURE — 97530 THERAPEUTIC ACTIVITIES: CPT

## 2017-02-11 RX ORDER — METHOCARBAMOL 750 MG/1
750 TABLET, FILM COATED ORAL EVERY 6 HOURS SCHEDULED
Qty: 120 TABLET | Refills: 0
Start: 2017-02-11 | End: 2018-02-16

## 2017-02-11 RX ORDER — OXYCODONE HYDROCHLORIDE 5 MG/1
5 TABLET ORAL EVERY 4 HOURS PRN
Qty: 30 TABLET | Refills: 0
Start: 2017-02-11 | End: 2017-02-21

## 2017-02-11 RX ADMIN — PANTOPRAZOLE SODIUM 40 MG: 40 TABLET, DELAYED RELEASE ORAL at 05:07

## 2017-02-11 RX ADMIN — PSYLLIUM HUSK 1 PACKET: 6 GRANULE ORAL at 08:15

## 2017-02-11 RX ADMIN — ACETAMINOPHEN 650 MG: 325 TABLET ORAL at 11:55

## 2017-02-11 RX ADMIN — METHOCARBAMOL 750 MG: 750 TABLET ORAL at 11:55

## 2017-02-11 RX ADMIN — LEVOTHYROXINE SODIUM 75 MCG: 75 TABLET ORAL at 05:07

## 2017-02-11 RX ADMIN — POLYETHYLENE GLYCOL 3350 17 G: 17 POWDER, FOR SOLUTION ORAL at 08:11

## 2017-02-11 RX ADMIN — ONDANSETRON 4 MG: 2 INJECTION INTRAMUSCULAR; INTRAVENOUS at 05:04

## 2017-02-11 RX ADMIN — POTASSIUM CHLORIDE 10 MEQ: 750 TABLET, EXTENDED RELEASE ORAL at 08:11

## 2017-02-11 RX ADMIN — METHOCARBAMOL 750 MG: 750 TABLET ORAL at 05:07

## 2017-02-11 RX ADMIN — TRIAMTERENE AND HYDROCHLOROTHIAZIDE 1 TABLET: 37.5; 25 TABLET ORAL at 08:12

## 2017-02-11 RX ADMIN — SENNOSIDES 17.2 MG: 8.6 TABLET, FILM COATED ORAL at 08:11

## 2017-02-11 RX ADMIN — ENOXAPARIN SODIUM 40 MG: 40 INJECTION SUBCUTANEOUS at 08:11

## 2017-02-11 RX ADMIN — ACETAMINOPHEN 650 MG: 325 TABLET ORAL at 05:07

## 2017-02-11 RX ADMIN — OXYCODONE HYDROCHLORIDE AND ACETAMINOPHEN 500 MG: 500 TABLET ORAL at 08:11

## 2017-02-11 RX ADMIN — SERTRALINE HYDROCHLORIDE 50 MG: 50 TABLET, FILM COATED ORAL at 08:11

## 2017-02-11 RX ADMIN — FOLIC ACID 1 MG: 1 TABLET ORAL at 08:11

## 2017-02-13 ENCOUNTER — APPOINTMENT (EMERGENCY)
Dept: RADIOLOGY | Facility: HOSPITAL | Age: 57
End: 2017-02-13
Payer: MEDICARE

## 2017-02-13 ENCOUNTER — HOSPITAL ENCOUNTER (EMERGENCY)
Facility: HOSPITAL | Age: 57
Discharge: NURSING, REHAB OR LONG-TERM CARE | End: 2017-02-14
Attending: EMERGENCY MEDICINE | Admitting: EMERGENCY MEDICINE
Payer: MEDICARE

## 2017-02-13 ENCOUNTER — GENERIC CONVERSION - ENCOUNTER (OUTPATIENT)
Dept: OTHER | Facility: OTHER | Age: 57
End: 2017-02-13

## 2017-02-13 VITALS
DIASTOLIC BLOOD PRESSURE: 63 MMHG | WEIGHT: 210 LBS | TEMPERATURE: 98.7 F | OXYGEN SATURATION: 97 % | SYSTOLIC BLOOD PRESSURE: 117 MMHG | RESPIRATION RATE: 18 BRPM | HEART RATE: 97 BPM | BODY MASS INDEX: 37.2 KG/M2

## 2017-02-13 DIAGNOSIS — M25.569 KNEE PAIN: Primary | ICD-10-CM

## 2017-02-13 DIAGNOSIS — M25.562 LEFT KNEE PAIN: ICD-10-CM

## 2017-02-13 LAB
ANION GAP SERPL CALCULATED.3IONS-SCNC: 8 MMOL/L (ref 4–13)
BASOPHILS # BLD AUTO: 0.01 THOUSANDS/ΜL (ref 0–0.1)
BASOPHILS NFR BLD AUTO: 0 % (ref 0–1)
BUN SERPL-MCNC: 16 MG/DL (ref 5–25)
CALCIUM SERPL-MCNC: 8.8 MG/DL (ref 8.3–10.1)
CHLORIDE SERPL-SCNC: 101 MMOL/L (ref 100–108)
CO2 SERPL-SCNC: 29 MMOL/L (ref 21–32)
CREAT SERPL-MCNC: 0.84 MG/DL (ref 0.6–1.3)
CRP SERPL QL: >90 MG/L
EOSINOPHIL # BLD AUTO: 0.19 THOUSAND/ΜL (ref 0–0.61)
EOSINOPHIL NFR BLD AUTO: 3 % (ref 0–6)
ERYTHROCYTE [DISTWIDTH] IN BLOOD BY AUTOMATED COUNT: 13.3 % (ref 11.6–15.1)
ERYTHROCYTE [SEDIMENTATION RATE] IN BLOOD: 81 MM/HOUR (ref 0–20)
GFR SERPL CREATININE-BSD FRML MDRD: >60 ML/MIN/1.73SQ M
GLUCOSE SERPL-MCNC: 116 MG/DL (ref 65–140)
HCT VFR BLD AUTO: 30.5 % (ref 34.8–46.1)
HGB BLD-MCNC: 10.4 G/DL (ref 11.5–15.4)
LYMPHOCYTES # BLD AUTO: 1.41 THOUSANDS/ΜL (ref 0.6–4.47)
LYMPHOCYTES NFR BLD AUTO: 21 % (ref 14–44)
MCH RBC QN AUTO: 29.3 PG (ref 26.8–34.3)
MCHC RBC AUTO-ENTMCNC: 34.1 G/DL (ref 31.4–37.4)
MCV RBC AUTO: 86 FL (ref 82–98)
MONOCYTES # BLD AUTO: 0.48 THOUSAND/ΜL (ref 0.17–1.22)
MONOCYTES NFR BLD AUTO: 7 % (ref 4–12)
NEUTROPHILS # BLD AUTO: 4.65 THOUSANDS/ΜL (ref 1.85–7.62)
NEUTS SEG NFR BLD AUTO: 69 % (ref 43–75)
NRBC BLD AUTO-RTO: 0 /100 WBCS
PLATELET # BLD AUTO: 182 THOUSANDS/UL (ref 149–390)
PMV BLD AUTO: 10.7 FL (ref 8.9–12.7)
POTASSIUM SERPL-SCNC: 4.2 MMOL/L (ref 3.5–5.3)
RBC # BLD AUTO: 3.55 MILLION/UL (ref 3.81–5.12)
SODIUM SERPL-SCNC: 138 MMOL/L (ref 136–145)
WBC # BLD AUTO: 6.82 THOUSAND/UL (ref 4.31–10.16)

## 2017-02-13 PROCEDURE — 86140 C-REACTIVE PROTEIN: CPT | Performed by: EMERGENCY MEDICINE

## 2017-02-13 PROCEDURE — 96376 TX/PRO/DX INJ SAME DRUG ADON: CPT

## 2017-02-13 PROCEDURE — 36415 COLL VENOUS BLD VENIPUNCTURE: CPT | Performed by: EMERGENCY MEDICINE

## 2017-02-13 PROCEDURE — 85025 COMPLETE CBC W/AUTO DIFF WBC: CPT | Performed by: ORTHOPAEDIC SURGERY

## 2017-02-13 PROCEDURE — 80048 BASIC METABOLIC PNL TOTAL CA: CPT | Performed by: EMERGENCY MEDICINE

## 2017-02-13 PROCEDURE — 96375 TX/PRO/DX INJ NEW DRUG ADDON: CPT

## 2017-02-13 PROCEDURE — 85652 RBC SED RATE AUTOMATED: CPT | Performed by: EMERGENCY MEDICINE

## 2017-02-13 PROCEDURE — 96374 THER/PROPH/DIAG INJ IV PUSH: CPT

## 2017-02-13 PROCEDURE — 93971 EXTREMITY STUDY: CPT

## 2017-02-13 RX ORDER — LORAZEPAM 2 MG/ML
0.5 INJECTION INTRAMUSCULAR ONCE
Status: COMPLETED | OUTPATIENT
Start: 2017-02-13 | End: 2017-02-13

## 2017-02-13 RX ORDER — KETOROLAC TROMETHAMINE 30 MG/ML
15 INJECTION, SOLUTION INTRAMUSCULAR; INTRAVENOUS ONCE
Status: COMPLETED | OUTPATIENT
Start: 2017-02-13 | End: 2017-02-13

## 2017-02-13 RX ADMIN — LORAZEPAM 0.5 MG: 2 INJECTION INTRAMUSCULAR; INTRAVENOUS at 22:27

## 2017-02-13 RX ADMIN — LORAZEPAM 0.5 MG: 2 INJECTION INTRAMUSCULAR; INTRAVENOUS at 19:00

## 2017-02-13 RX ADMIN — KETOROLAC TROMETHAMINE 15 MG: 30 INJECTION, SOLUTION INTRAMUSCULAR at 18:59

## 2017-02-14 PROCEDURE — 99284 EMERGENCY DEPT VISIT MOD MDM: CPT

## 2017-02-16 ENCOUNTER — ALLSCRIPTS OFFICE VISIT (OUTPATIENT)
Dept: OTHER | Facility: OTHER | Age: 57
End: 2017-02-16

## 2017-02-16 ENCOUNTER — HOSPITAL ENCOUNTER (OUTPATIENT)
Dept: RADIOLOGY | Facility: HOSPITAL | Age: 57
Discharge: HOME | End: 2017-02-16
Attending: ORTHOPAEDIC SURGERY
Payer: COMMERCIAL

## 2017-02-16 ENCOUNTER — GENERIC CONVERSION - ENCOUNTER (OUTPATIENT)
Dept: OTHER | Facility: OTHER | Age: 57
End: 2017-02-16

## 2017-02-16 DIAGNOSIS — Z47.1 AFTERCARE FOLLOWING JOINT REPLACEMENT SURGERY: ICD-10-CM

## 2017-02-16 PROCEDURE — 73560 X-RAY EXAM OF KNEE 1 OR 2: CPT

## 2017-02-23 ENCOUNTER — ALLSCRIPTS OFFICE VISIT (OUTPATIENT)
Dept: OTHER | Facility: OTHER | Age: 57
End: 2017-02-23

## 2017-03-03 ENCOUNTER — GENERIC CONVERSION - ENCOUNTER (OUTPATIENT)
Dept: OTHER | Facility: OTHER | Age: 57
End: 2017-03-03

## 2017-03-13 ENCOUNTER — APPOINTMENT (OUTPATIENT)
Dept: PHYSICAL THERAPY | Age: 57
End: 2017-03-13
Payer: MEDICARE

## 2017-03-13 ENCOUNTER — GENERIC CONVERSION - ENCOUNTER (OUTPATIENT)
Dept: OTHER | Facility: OTHER | Age: 57
End: 2017-03-13

## 2017-03-13 DIAGNOSIS — Z47.1 AFTERCARE FOLLOWING JOINT REPLACEMENT SURGERY: ICD-10-CM

## 2017-03-13 PROCEDURE — G8990 OTHER PT/OT CURRENT STATUS: HCPCS

## 2017-03-13 PROCEDURE — 97162 PT EVAL MOD COMPLEX 30 MIN: CPT

## 2017-03-13 PROCEDURE — G8991 OTHER PT/OT GOAL STATUS: HCPCS

## 2017-03-16 ENCOUNTER — APPOINTMENT (OUTPATIENT)
Dept: PHYSICAL THERAPY | Age: 57
End: 2017-03-16
Payer: MEDICARE

## 2017-03-16 PROCEDURE — 97140 MANUAL THERAPY 1/> REGIONS: CPT

## 2017-03-16 PROCEDURE — 97110 THERAPEUTIC EXERCISES: CPT

## 2017-03-20 ENCOUNTER — APPOINTMENT (OUTPATIENT)
Dept: PHYSICAL THERAPY | Age: 57
End: 2017-03-20
Payer: MEDICARE

## 2017-03-20 PROCEDURE — 97140 MANUAL THERAPY 1/> REGIONS: CPT

## 2017-03-20 PROCEDURE — 97110 THERAPEUTIC EXERCISES: CPT

## 2017-03-22 ENCOUNTER — APPOINTMENT (OUTPATIENT)
Dept: PHYSICAL THERAPY | Age: 57
End: 2017-03-22
Payer: MEDICARE

## 2017-03-22 PROCEDURE — 97140 MANUAL THERAPY 1/> REGIONS: CPT

## 2017-03-22 PROCEDURE — 97010 HOT OR COLD PACKS THERAPY: CPT

## 2017-03-22 PROCEDURE — 97110 THERAPEUTIC EXERCISES: CPT

## 2017-03-24 ENCOUNTER — ALLSCRIPTS OFFICE VISIT (OUTPATIENT)
Dept: OTHER | Facility: OTHER | Age: 57
End: 2017-03-24

## 2017-03-27 ENCOUNTER — APPOINTMENT (OUTPATIENT)
Dept: PHYSICAL THERAPY | Age: 57
End: 2017-03-27
Payer: MEDICARE

## 2017-03-27 PROCEDURE — 97140 MANUAL THERAPY 1/> REGIONS: CPT

## 2017-03-27 PROCEDURE — 97110 THERAPEUTIC EXERCISES: CPT

## 2017-03-29 ENCOUNTER — APPOINTMENT (OUTPATIENT)
Dept: PHYSICAL THERAPY | Age: 57
End: 2017-03-29
Payer: MEDICARE

## 2017-03-29 PROCEDURE — 97140 MANUAL THERAPY 1/> REGIONS: CPT

## 2017-03-29 PROCEDURE — 97110 THERAPEUTIC EXERCISES: CPT

## 2017-03-31 DIAGNOSIS — Z12.31 ENCOUNTER FOR SCREENING MAMMOGRAM FOR MALIGNANT NEOPLASM OF BREAST: ICD-10-CM

## 2017-03-31 DIAGNOSIS — Z12.39 ENCOUNTER FOR OTHER SCREENING FOR MALIGNANT NEOPLASM OF BREAST: ICD-10-CM

## 2017-04-03 ENCOUNTER — APPOINTMENT (OUTPATIENT)
Dept: PHYSICAL THERAPY | Age: 57
End: 2017-04-03
Payer: MEDICARE

## 2017-04-03 PROCEDURE — 97140 MANUAL THERAPY 1/> REGIONS: CPT

## 2017-04-03 PROCEDURE — 97110 THERAPEUTIC EXERCISES: CPT

## 2017-04-05 ENCOUNTER — APPOINTMENT (OUTPATIENT)
Dept: PHYSICAL THERAPY | Age: 57
End: 2017-04-05
Payer: MEDICARE

## 2017-04-05 PROCEDURE — 97110 THERAPEUTIC EXERCISES: CPT

## 2017-04-05 PROCEDURE — 97140 MANUAL THERAPY 1/> REGIONS: CPT

## 2017-04-10 ENCOUNTER — APPOINTMENT (OUTPATIENT)
Dept: PHYSICAL THERAPY | Age: 57
End: 2017-04-10
Payer: MEDICARE

## 2017-04-10 PROCEDURE — 97140 MANUAL THERAPY 1/> REGIONS: CPT

## 2017-04-10 PROCEDURE — 97110 THERAPEUTIC EXERCISES: CPT

## 2017-04-12 ENCOUNTER — APPOINTMENT (OUTPATIENT)
Dept: PHYSICAL THERAPY | Age: 57
End: 2017-04-12
Payer: MEDICARE

## 2017-04-12 ENCOUNTER — GENERIC CONVERSION - ENCOUNTER (OUTPATIENT)
Dept: OTHER | Facility: OTHER | Age: 57
End: 2017-04-12

## 2017-04-12 PROCEDURE — 97110 THERAPEUTIC EXERCISES: CPT

## 2017-04-12 PROCEDURE — G8991 OTHER PT/OT GOAL STATUS: HCPCS

## 2017-04-12 PROCEDURE — 97140 MANUAL THERAPY 1/> REGIONS: CPT

## 2017-04-12 PROCEDURE — G8990 OTHER PT/OT CURRENT STATUS: HCPCS

## 2017-04-13 ENCOUNTER — LAB REQUISITION (OUTPATIENT)
Dept: LAB | Facility: HOSPITAL | Age: 57
End: 2017-04-13
Payer: MEDICARE

## 2017-04-13 ENCOUNTER — ALLSCRIPTS OFFICE VISIT (OUTPATIENT)
Dept: OTHER | Facility: OTHER | Age: 57
End: 2017-04-13

## 2017-04-13 DIAGNOSIS — Z01.419 ENCOUNTER FOR GYNECOLOGICAL EXAMINATION WITHOUT ABNORMAL FINDING: ICD-10-CM

## 2017-04-13 PROCEDURE — G0145 SCR C/V CYTO,THINLAYER,RESCR: HCPCS | Performed by: PHYSICIAN ASSISTANT

## 2017-04-19 LAB
LAB AP GYN PRIMARY INTERPRETATION: NORMAL
Lab: NORMAL

## 2017-04-20 ENCOUNTER — APPOINTMENT (OUTPATIENT)
Dept: PHYSICAL THERAPY | Age: 57
End: 2017-04-20
Payer: MEDICARE

## 2017-04-20 PROCEDURE — 97140 MANUAL THERAPY 1/> REGIONS: CPT

## 2017-04-20 PROCEDURE — 97110 THERAPEUTIC EXERCISES: CPT

## 2017-04-24 ENCOUNTER — APPOINTMENT (OUTPATIENT)
Dept: PHYSICAL THERAPY | Age: 57
End: 2017-04-24
Payer: MEDICARE

## 2017-04-24 PROCEDURE — 97140 MANUAL THERAPY 1/> REGIONS: CPT

## 2017-04-24 PROCEDURE — 97110 THERAPEUTIC EXERCISES: CPT

## 2017-04-26 ENCOUNTER — APPOINTMENT (OUTPATIENT)
Dept: PHYSICAL THERAPY | Age: 57
End: 2017-04-26
Payer: MEDICARE

## 2017-04-26 PROCEDURE — 97140 MANUAL THERAPY 1/> REGIONS: CPT

## 2017-04-26 PROCEDURE — 97110 THERAPEUTIC EXERCISES: CPT

## 2017-04-27 ENCOUNTER — HOSPITAL ENCOUNTER (OUTPATIENT)
Dept: RADIOLOGY | Age: 57
Discharge: HOME | End: 2017-04-27
Payer: MEDICARE

## 2017-04-27 DIAGNOSIS — Z12.31 ENCOUNTER FOR SCREENING MAMMOGRAM FOR MALIGNANT NEOPLASM OF BREAST: ICD-10-CM

## 2017-04-27 PROCEDURE — G0202 SCR MAMMO BI INCL CAD: HCPCS

## 2017-05-01 ENCOUNTER — APPOINTMENT (OUTPATIENT)
Dept: PHYSICAL THERAPY | Age: 57
End: 2017-05-01
Payer: MEDICARE

## 2017-05-01 PROCEDURE — 97110 THERAPEUTIC EXERCISES: CPT

## 2017-05-01 PROCEDURE — 97140 MANUAL THERAPY 1/> REGIONS: CPT

## 2017-05-02 ENCOUNTER — ALLSCRIPTS OFFICE VISIT (OUTPATIENT)
Dept: OTHER | Facility: OTHER | Age: 57
End: 2017-05-02

## 2017-05-02 DIAGNOSIS — M25.569 PAIN IN KNEE: ICD-10-CM

## 2017-05-02 DIAGNOSIS — D64.9 ANEMIA: ICD-10-CM

## 2017-05-03 ENCOUNTER — APPOINTMENT (OUTPATIENT)
Dept: PHYSICAL THERAPY | Age: 57
End: 2017-05-03
Payer: MEDICARE

## 2017-05-03 ENCOUNTER — GENERIC CONVERSION - ENCOUNTER (OUTPATIENT)
Dept: OTHER | Facility: OTHER | Age: 57
End: 2017-05-03

## 2017-05-03 PROCEDURE — G8992 OTHER PT/OT  D/C STATUS: HCPCS

## 2017-05-03 PROCEDURE — G8991 OTHER PT/OT GOAL STATUS: HCPCS

## 2017-05-03 PROCEDURE — 97010 HOT OR COLD PACKS THERAPY: CPT

## 2017-05-03 PROCEDURE — 97110 THERAPEUTIC EXERCISES: CPT

## 2017-05-05 ENCOUNTER — ALLSCRIPTS OFFICE VISIT (OUTPATIENT)
Dept: OTHER | Facility: OTHER | Age: 57
End: 2017-05-05

## 2017-05-05 ENCOUNTER — HOSPITAL ENCOUNTER (OUTPATIENT)
Dept: RADIOLOGY | Facility: MEDICAL CENTER | Age: 57
Discharge: HOME | End: 2017-05-05
Payer: MEDICARE

## 2017-05-05 ENCOUNTER — GENERIC CONVERSION - ENCOUNTER (OUTPATIENT)
Dept: OTHER | Facility: OTHER | Age: 57
End: 2017-05-05

## 2017-05-05 DIAGNOSIS — M25.569 PAIN IN KNEE: ICD-10-CM

## 2017-05-05 PROCEDURE — 73560 X-RAY EXAM OF KNEE 1 OR 2: CPT

## 2017-05-23 ENCOUNTER — APPOINTMENT (OUTPATIENT)
Dept: LAB | Age: 57
End: 2017-05-23
Payer: MEDICARE

## 2017-05-23 ENCOUNTER — GENERIC CONVERSION - ENCOUNTER (OUTPATIENT)
Dept: OTHER | Facility: OTHER | Age: 57
End: 2017-05-23

## 2017-05-23 ENCOUNTER — TRANSCRIBE ORDERS (OUTPATIENT)
Dept: ADMINISTRATIVE | Age: 57
End: 2017-05-23

## 2017-05-23 DIAGNOSIS — E03.9 UNSPECIFIED HYPOTHYROIDISM: ICD-10-CM

## 2017-05-23 DIAGNOSIS — D64.9 ANEMIA: ICD-10-CM

## 2017-05-23 DIAGNOSIS — E03.9 UNSPECIFIED HYPOTHYROIDISM: Primary | ICD-10-CM

## 2017-05-23 LAB
ALBUMIN SERPL BCP-MCNC: 4 G/DL (ref 3.5–5)
ALP SERPL-CCNC: 71 U/L (ref 46–116)
ALT SERPL W P-5'-P-CCNC: 24 U/L (ref 12–78)
ANION GAP SERPL CALCULATED.3IONS-SCNC: 6 MMOL/L (ref 4–13)
AST SERPL W P-5'-P-CCNC: 15 U/L (ref 5–45)
BILIRUB SERPL-MCNC: 0.56 MG/DL (ref 0.2–1)
BUN SERPL-MCNC: 14 MG/DL (ref 5–25)
CALCIUM SERPL-MCNC: 9.3 MG/DL (ref 8.3–10.1)
CHLORIDE SERPL-SCNC: 103 MMOL/L (ref 100–108)
CO2 SERPL-SCNC: 30 MMOL/L (ref 21–32)
CREAT SERPL-MCNC: 0.8 MG/DL (ref 0.6–1.3)
ERYTHROCYTE [DISTWIDTH] IN BLOOD BY AUTOMATED COUNT: 13.4 % (ref 11.6–15.1)
FERRITIN SERPL-MCNC: 24 NG/ML (ref 8–388)
GFR SERPL CREATININE-BSD FRML MDRD: >60 ML/MIN/1.73SQ M
GLUCOSE P FAST SERPL-MCNC: 116 MG/DL (ref 65–99)
HCT VFR BLD AUTO: 44.7 % (ref 34.8–46.1)
HGB BLD-MCNC: 14.8 G/DL (ref 11.5–15.4)
IRON SERPL-MCNC: 66 UG/DL (ref 50–170)
MCH RBC QN AUTO: 27.7 PG (ref 26.8–34.3)
MCHC RBC AUTO-ENTMCNC: 33.1 G/DL (ref 31.4–37.4)
MCV RBC AUTO: 84 FL (ref 82–98)
PLATELET # BLD AUTO: 179 THOUSANDS/UL (ref 149–390)
PMV BLD AUTO: 12.6 FL (ref 8.9–12.7)
POTASSIUM SERPL-SCNC: 3.5 MMOL/L (ref 3.5–5.3)
PROT SERPL-MCNC: 6.9 G/DL (ref 6.4–8.2)
RBC # BLD AUTO: 5.34 MILLION/UL (ref 3.81–5.12)
SODIUM SERPL-SCNC: 139 MMOL/L (ref 136–145)
TSH SERPL DL<=0.05 MIU/L-ACNC: 1.75 UIU/ML (ref 0.36–3.74)
WBC # BLD AUTO: 5.96 THOUSAND/UL (ref 4.31–10.16)

## 2017-05-23 PROCEDURE — 82728 ASSAY OF FERRITIN: CPT

## 2017-05-23 PROCEDURE — 83540 ASSAY OF IRON: CPT

## 2017-05-23 PROCEDURE — 36415 COLL VENOUS BLD VENIPUNCTURE: CPT

## 2017-05-23 PROCEDURE — 85027 COMPLETE CBC AUTOMATED: CPT

## 2017-05-23 PROCEDURE — 80053 COMPREHEN METABOLIC PANEL: CPT

## 2017-05-23 PROCEDURE — 84443 ASSAY THYROID STIM HORMONE: CPT

## 2017-05-24 ENCOUNTER — GENERIC CONVERSION - ENCOUNTER (OUTPATIENT)
Dept: OTHER | Facility: OTHER | Age: 57
End: 2017-05-24

## 2017-08-04 ENCOUNTER — APPOINTMENT (OUTPATIENT)
Dept: RADIOLOGY | Facility: MEDICAL CENTER | Age: 57
End: 2017-08-04
Payer: MEDICARE

## 2017-08-04 ENCOUNTER — ALLSCRIPTS OFFICE VISIT (OUTPATIENT)
Dept: OTHER | Facility: OTHER | Age: 57
End: 2017-08-04

## 2017-08-04 DIAGNOSIS — Z47.1 AFTERCARE FOLLOWING JOINT REPLACEMENT SURGERY: ICD-10-CM

## 2017-08-04 PROCEDURE — 73560 X-RAY EXAM OF KNEE 1 OR 2: CPT

## 2017-09-19 ENCOUNTER — GENERIC CONVERSION - ENCOUNTER (OUTPATIENT)
Dept: OTHER | Facility: OTHER | Age: 57
End: 2017-09-19

## 2017-10-12 ENCOUNTER — ALLSCRIPTS OFFICE VISIT (OUTPATIENT)
Dept: OTHER | Facility: OTHER | Age: 57
End: 2017-10-12

## 2017-10-12 DIAGNOSIS — M25.552 PAIN IN LEFT HIP: ICD-10-CM

## 2017-10-12 DIAGNOSIS — Z00.00 ENCOUNTER FOR GENERAL ADULT MEDICAL EXAMINATION WITHOUT ABNORMAL FINDINGS: ICD-10-CM

## 2017-10-12 DIAGNOSIS — E03.9 HYPOTHYROIDISM: ICD-10-CM

## 2017-10-12 DIAGNOSIS — E66.9 OBESITY: ICD-10-CM

## 2017-10-12 DIAGNOSIS — E87.6 HYPOKALEMIA: ICD-10-CM

## 2017-10-14 ENCOUNTER — APPOINTMENT (OUTPATIENT)
Dept: RADIOLOGY | Age: 57
End: 2017-10-14
Payer: MEDICARE

## 2017-10-14 ENCOUNTER — TRANSCRIBE ORDERS (OUTPATIENT)
Dept: ADMINISTRATIVE | Age: 57
End: 2017-10-14

## 2017-10-14 DIAGNOSIS — M25.552 PAIN IN LEFT HIP: ICD-10-CM

## 2017-10-14 PROCEDURE — 73502 X-RAY EXAM HIP UNI 2-3 VIEWS: CPT

## 2017-10-17 ENCOUNTER — APPOINTMENT (OUTPATIENT)
Dept: LAB | Age: 57
End: 2017-10-17
Payer: MEDICARE

## 2017-10-17 ENCOUNTER — GENERIC CONVERSION - ENCOUNTER (OUTPATIENT)
Dept: OTHER | Facility: OTHER | Age: 57
End: 2017-10-17

## 2017-10-17 ENCOUNTER — TRANSCRIBE ORDERS (OUTPATIENT)
Dept: ADMINISTRATIVE | Age: 57
End: 2017-10-17

## 2017-10-17 DIAGNOSIS — E03.9 HYPOTHYROIDISM: ICD-10-CM

## 2017-10-17 DIAGNOSIS — E87.6 HYPOKALEMIA: ICD-10-CM

## 2017-10-17 DIAGNOSIS — E66.9 OBESITY: ICD-10-CM

## 2017-10-17 DIAGNOSIS — Z00.00 ENCOUNTER FOR GENERAL ADULT MEDICAL EXAMINATION WITHOUT ABNORMAL FINDINGS: ICD-10-CM

## 2017-10-17 LAB
CHOLEST SERPL-MCNC: 187 MG/DL (ref 50–200)
HDLC SERPL-MCNC: 64 MG/DL (ref 40–60)
LDLC SERPL CALC-MCNC: 101 MG/DL (ref 0–100)
TRIGL SERPL-MCNC: 109 MG/DL

## 2017-10-17 PROCEDURE — 36415 COLL VENOUS BLD VENIPUNCTURE: CPT

## 2017-10-17 PROCEDURE — 80061 LIPID PANEL: CPT

## 2017-10-18 ENCOUNTER — GENERIC CONVERSION - ENCOUNTER (OUTPATIENT)
Dept: OTHER | Facility: OTHER | Age: 57
End: 2017-10-18

## 2017-10-29 ENCOUNTER — OFFICE VISIT (OUTPATIENT)
Dept: URGENT CARE | Age: 57
End: 2017-10-29
Payer: MEDICARE

## 2017-10-29 PROCEDURE — 99213 OFFICE O/P EST LOW 20 MIN: CPT

## 2017-10-29 PROCEDURE — G0463 HOSPITAL OUTPT CLINIC VISIT: HCPCS

## 2017-10-31 ENCOUNTER — GENERIC CONVERSION - ENCOUNTER (OUTPATIENT)
Dept: OTHER | Facility: OTHER | Age: 57
End: 2017-10-31

## 2017-10-31 NOTE — PROGRESS NOTES
Assessment  1  Encounter for preventive health examination (V70 0) (Z00 00)   2  Arthralgia of hip, left (719 45) (U70 031)    Plan   Anxiety    · Sertraline HCl - 50 MG Oral Tablet  Constipation    · MiraLax Oral Powder (Polyethylene Glycol 3350)  Esophageal reflux    · Omeprazole 20 MG Oral Capsule Delayed Release  Need for influenza vaccination    · Fluzone Quadrivalent 0 5 ML Intramuscular Suspension Prefilled Syringe    * XR HIP/PELV 2-3 VWS RIGHT W PELVIS IF PERFORMED; Status:Resulted - Requires Verification;   Done: 98NBH6934 12:00AM  Due:12Oct2018; Ordered; For:Arthralgia of hip, left; Ordered By:Janak Grider;   (1) LIPID PANEL FASTING W DIRECT LDL REFLEX; Status:Resulted - Requires Verification;   Done: 61UPZ8209 12:00AM  Due:12Oct2018; Ordered;    For:Health Maintenance, Hypokalemia, Hypothyroidism, Obesity; Ordered By:Janak Grider;      Discussion/Summary  Discussion Summary:   Rto in six monthsorderedof hipshot given today  Chief Complaint  Chief Complaint Chronic Condition [facility]: Patient is here today for follow up of chronic conditions described in HPI  History of Present Illness  HPI: patient is here for a regular follow uppressure under controlprotonix for gastritis but not working as well as priloseccolonoscopy  left hip painboth knees replacedright hip pain      Review of Systems  Complete-Female:   Constitutional: no fever,-- not feeling poorly,-- no chills-- and-- not feeling tired  Eyes: no eye pain,-- no eyesight problems,-- eyes not red-- and-- no purulent discharge from the eyes  ENT: no earache,-- no nosebleeds,-- no hearing loss-- and-- no nasal discharge  Cardiovascular: the heart rate was not slow,-- no chest pain,-- the heart rate was not fast-- and-- no palpitations  Respiratory: no shortness of breath,-- no cough,-- no wheezing-- and-- no shortness of breath during exertion  Gastrointestinal: no abdominal pain,-- no nausea,-- no constipation-- and-- no diarrhea  Integumentary: no rashes,-- no itching,-- no skin lesions-- and-- no skin wound  Neurological: no headache,-- no numbness,-- no confusion-- and-- no dizziness  Psychiatric: no anxiety,-- no sleep disturbances-- and-- no depression  Endocrine: no proptosis-- and-- no muscle weakness  Hematologic/Lymphatic: no swollen glands-- and-- no tendency for easy bleeding  Active Problems  1  Abnormal blood chemistry (790 6) (R79 9)   2  Acute medial meniscal tear, left, initial encounter (836 0) (S83 242A)   3  Aftercare following left knee joint replacement surgery (V54 81,V43 65) (Z47 1,Z96 652)   4  Anemia (285 9) (D64 9)   5  Anxiety (300 00) (F41 9)   6  Arthralgia of hip, left (719 45) (M25 552)   7  Atrophic vaginitis (627 3) (N95 2)   8  Benign essential hypertension (401 1) (I10)   9  Bursitis of knee (726 60) (M70 50)   10  Bursitis of left hip (726 5) (M70 72)   11  Bursitis of left knee (726 60) (M70 52)   12  Constipation (564 00) (K59 00)   13  Depression (311) (F32 9)   14  Diarrhea (787 91) (R19 7)   15  Eczema (692 9) (L30 9)   16  Edema (782 3) (R60 9)   17  Encounter for other screening for malignant neoplasm of breast (V76 19) (Z12 39)   18  Encounter for routine gynecological examination (V72 31) (Z01 419)   19  Epidermal inclusion cyst (706 2) (L72 0)   20  Esophageal reflux (530 81) (K21 9)   21  Facet arthritis of lumbosacral region (721 3) (M46 97)   22  Hypokalemia (276 8) (E87 6)   23  Hypothyroidism (244 9) (E03 9)   24  Iliotibial band syndrome of left side (728 89) (M76 32)   25  Iliotibial band tendinitis of left side (728 89) (M76 32)   26  Joint Pain In Both Knees (719 46)   27  Knee pain (719 46) (M25 569)   28  Lateral joint line tenderness of knee (719 46) (M25 569)   29  Left knee pain (719 46) (M25 562)   30  Left low back pain (724 2) (M54 5)   31  Low gammaglobulin level (279 00) (D80 1)   32  L-S radiculopathy (724 4) (M54 17)   33   Need for shingles vaccine (V04 89) (Z23)   34  Obesity (278 00) (E66 9)   35  Osteoarthritis of knee (715 36) (M17 10)   36  Osteoporosis screening (V82 81) (Z13 820)   37  Pain of left calf (729 5) (M79 662)   38  Pap smear, as part of routine gynecological examination (V76 2) (Z01 419)   39  Popliteal pain (729 5) (M79 609)   40  Preop cardiovascular exam (V72 81) (Z01 810)   41  Preop examination (V72 84) (Z01 818)   42  Primary localized osteoarthrosis of left hip (715 15) (M16 12)   43  Primary osteoarthritis of left knee (715 16) (M17 12)   44  Primary osteoarthritis of right knee (715 16) (M17 11)   45  Right knee pain (719 46) (M25 561)   46  Status post total left knee replacement (V43 65) (Z96 652)   47  Thrombocytopenia (287 5) (D69 6)   48  Thyroid disease (246 9) (E07 9)   49  Vaginal dryness, menopausal (627 2) (N95 1)   50  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)   51  Visit for screening mammogram (V76 12) (Z12 31)    Past Medical History  1  History of Abnormal electrocardiogram (794 31) (R94 31)   2  History of Acute bacterial conjunctivitis (372 03) (H10 30)   3  History of Acute URI (465 9) (J06 9)   4  History of Cough (786 2) (R05)   5  History of Encounter for routine gynecological examination (V72 31) (Z01 419)   6  History of Encounter for screening mammogram for malignant neoplasm of breast (V76 12) (Z12 31)   7  History of Encounter for screening mammogram for malignant neoplasm of breast (V76 12) (Z12 31)   8  History of acute bronchitis (V12 69) (Z87 09)   9  History of diverticulitis of colon (V12 79) (Z87 19)   10  History of sinusitis (V12 69) (Z87 09)   11  History of streptococcal pharyngitis (V12 09) (Z87 09)   12  History of urinary tract infection (V13 02) (Z87 440)   13  History of Need for influenza vaccination (V04 81) (Z23)   14  History of Other bursitis of left knee (726 60) (M70 52)   15  History of Right knee pain (429 46) (M28 811)   16   History of Upper respiratory infection with cough and congestion (465 9) (J06 9)   17  History of Urinary Tract Infection (V13 02)   18  History of Visit for pre-operative examination (V72 84) (E84 004)  Active Problems And Past Medical History Reviewed: The active problems and past medical history were reviewed and updated today  Surgical History  1  History of Appendectomy   2  History of Biopsy Vulvar   3  History of Knee Replacement   4  History of Neuroplasty Decompression Median Nerve At Carpal Tunnel   5  History of Oral Surgery Tooth Extraction   6  History of Partial Colectomy   7  History of Tonsillectomy With Adenoidectomy  Surgical History Reviewed: The surgical history was reviewed and updated today  Family History  Sister    1  Family history of Breast Cancer (V16 3)  Paternal Grandmother    2  Family history of Breast Cancer (V16 3)  Family History    3  Family history of Arthritis (V17 7)   4  Family history of Breast Cancer (V16 3)   5  Family history of hypertension (V17 49) (Z82 49)   6  Family history of malignant neoplasm (V16 9) (Z80 9)  Family History Reviewed: The family history was reviewed and updated today  Social History   · Caffeine Use   · Daily Tea Consumption (___ Cups/Day)   · Denied: History of Home Environment Domestic Violence   · Never a smoker   · Never Drank Alcohol   · Non-smoker (V49 89) (Z78 9)  Social History Reviewed: The social history was reviewed and updated today  The social history was reviewed and is unchanged  Current Meds   1  Levothyroxine Sodium 75 MCG Oral Tablet; Take 1 tablet daily as directed; Therapy: 23DZL9862 to (PLJZDHAJ:89THM5487)  Requested for: 20HRP6013; Last Rx:18Jan2017   Ordered   2  MiraLax Oral Powder; mix 17 gm in 8 ounces of liquid and drink 1 to 2 times daily for constipation; Therapy: 63ZDL7974 to (Evaluate:40Osv1436) Recorded   3  Omeprazole 20 MG Oral Capsule Delayed Release; take 1 capsule daily;    Therapy: 34URM7961 to [phone])  Requested for: 39OIJ8164; Last Rx:18Jan2017   Ordered   4  Protonix 40 MG Oral Tablet Delayed Release; Therapy: 32WYM0473 to Recorded   5  Sertraline HCl - 50 MG Oral Tablet; Take 1 tablet by mouth  daily; Therapy: 15JJY4309 to (Evaluate:07Jan2018)  Requested for: 12Apr2017; Last Rx:12Apr2017   Ordered   6  Sertraline HCl - 50 MG Oral Tablet; TAKE 1 TABLET DAILY AS DIRECTED; Therapy: 05DVR0324 to (Evaluate:17Jul2017)  Requested for: 23ZMO6660; Last Rx:18Jan2017   Ordered   7  Triamterene-HCTZ 37 5-25 MG Oral Capsule; Take 1 capsule daily as needed; Therapy: 22YUR8184 to (Evaluate:17Jul2017)  Requested for: 26IZA5448; Last Rx:18Jan2017   Ordered  Medication List Reviewed: The medication list was reviewed and updated today  Allergies  1  Penicillins   2  Flagyl CAPS   3  Sulfa Drugs  4  No Known Environmental Allergies   5  No Known Food Allergies    Vitals  Vital Signs    Recorded: 79MQL9891 07:49AM   Heart Rate 73   Respiration 16   Systolic 524   Diastolic 80   Height 5 ft 2 5 in   Weight 210 lb    BMI Calculated 37 8   BSA Calculated 1 96   O2 Saturation 97     Physical Exam    Constitutional   General appearance: No acute distress, well appearing and well nourished  Eyes   Conjunctiva and lids: No swelling, erythema or discharge  Pupils and irises: Equal, round and reactive to light  Ears, Nose, Mouth, and Throat   External inspection of ears and nose: Normal     Otoscopic examination: Tympanic membranes translucent with normal light reflex  Canals patent without erythema  Nasal mucosa, septum, and turbinates: Normal without edema or erythema  Oropharynx: Normal with no erythema, edema, exudate or lesions  Pulmonary   Respiratory effort: No increased work of breathing or signs of respiratory distress  Auscultation of lungs: Clear to auscultation  Cardiovascular   Palpation of heart: Normal PMI, no thrills  Auscultation of heart: Normal rate and rhythm, normal S1 and S2, without murmurs  Examination of extremities for edema and/or varicosities: Normal     Carotid pulses: Normal     Abdomen   Abdomen: Non-tender, no masses  Liver and spleen: No hepatomegaly or splenomegaly  Lymphatic   Palpation of lymph nodes in neck: No lymphadenopathy  Musculoskeletal   Gait and station: Normal     Digits and nails: Normal without clubbing or cyanosis  Inspection/palpation of joints, bones, and muscles: Normal     Skin   Skin and subcutaneous tissue: Normal without rashes or lesions  Psychiatric   Orientation to person, place, and time: Normal     Mood and affect: Normal          Results/Data  PHQ-9 Adult Depression Screening 12Oct2017 07:49AM User, [employer]     Test Name Result Flag Reference   PHQ-9 Adult Depression Score 1     Over the last two weeks, how often have you been bothered by any of the following problems? Little interest or pleasure in doing things: Not at all - 0  Feeling down, depressed, or hopeless: Not at all - 0  Trouble falling or staying asleep, or sleeping too much: Several days - 1  Feeling tired or having little energy: Not at all - 0  Poor appetite or over eating: Not at all - 0  Feeling bad about yourself - or that you are a failure or have let yourself or your family down: Not at all - 0  Trouble concentrating on things, such as reading the newspaper or watching television: Not at all - 0  Moving or speaking so slowly that other people could have noticed  Or the opposite -  being so fidgety or restless that you have been moving around a lot more than usual: Not at all - 0  Thoughts that you would be better off dead, or of hurting yourself in some way: Not at all - 0   PHQ-9 Adult Depression Screening Negative     PHQ-9 Difficulty Level Not difficult at all     PHQ-9 Severity Minimal Depression         Health Management  Health Maintenance   COLONOSCOPY; every 5 years; Last 22GFZ1418; Next Due: 12Mar2019;  Active    Future Appointments    Date/Time Provider Specialty Site 02/02/2018 08:30 AM LUIS Golden   Orthopedic Surgery [facility]   04/16/2018 08:00 AM Jorge Luis Valle DO Internal Medicine [facility]     Signatures   Electronically signed by : GISSELLE Adame; Oct 25 2017  4:33PM EST                       (Author)    Electronically signed by : LUIS Pina ; Oct 30 2017  9:40AM EST                       (Review)

## 2017-11-03 ENCOUNTER — GENERIC CONVERSION - ENCOUNTER (OUTPATIENT)
Dept: OTHER | Facility: OTHER | Age: 57
End: 2017-11-03

## 2018-01-10 NOTE — PROGRESS NOTES
Assessment    1  Bursitis of knee (726 60) (M70 50)   2  Bursitis of left knee (726 60) (M70 52)   3  Left knee pain (719 46) (M25 562)   4  Primary osteoarthritis of left knee (715 16) ()     73-year-old female who has not only osteoarthritis of the left main in addition to this because bursitis an injection of viscoelastic supplementation as indicated for the osteoarthritis an injection of corticosteroid is indicated for the worse these are advised accented and administered I would welcome the opportunity to see her back in the office next week for injection 2 of 3 Orthovisc to the left knee     Plan  Bursitis of left knee    · Betamethasone Sod Phos & Acet 6 (3-3) MG/ML Injection Suspension  (Celestone Soluspan)  Osteoarthritis of knee    · OrthoVisc 30 MG/2ML Intra-articular Solution Prefilled Syringe  Primary osteoarthritis of left knee    · Follow-up visit in 1 week Evaluation and Treatment  Follow-up  Status: Hold For -  Scheduling  Requested for: 93RYP1951    Chief Complaint    1  Knee Pain    History of Present Illness  HPI: 73-year-old female presents for reevaluation she has return of pain in the left knee joint is pain at the level joint pain is made worse weightbearing pain that worsens activities in addition she reports pain that is slightly distal to the medial aspect of the left knee joint in the area of the bursa      Review of Systems    Constitutional: No fever, no chills, feels well, no tiredness, no recent weight gain or loss  Eyes: No complaints of eyesight problems, no red eyes  ENT: no loss of hearing, no nosebleeds, no sore throat  Cardiovascular: No complaints of chest pain, no palpitations, no leg claudication or lower extremity edema  Respiratory: no compliants of shortness of breath, no wheezing, no cough  Gastrointestinal: no complaints of abdominal pain, no constipation, no nausea or diarrhea, no vomiting, no bloody stools     Genitourinary: no complaints of dysuria, no incontinence  Musculoskeletal: as noted in HPI  Integumentary: no complaints of skin rash or lesion, no itching or dry skin, no skin wounds  Neurological: no complaints of headache, no confusion, no numbness or tingling, no dizziness  Endocrine: No complaints of muscle weakness, no feelings of weakness, no frequent urination, no excessive thirst    Psychiatric: No suicidal thoughts, no anxiety, no feelings of depression  Active Problems    1  Abnormal blood chemistry (790 6) (R79 9)   2  Abnormal electrocardiogram (794 31) (R94 31)   3  Acute bronchitis (466 0) (J20 9)   4  Acute URI (465 9) (J06 9)   5  Aftercare following joint replacement surgery (V54 81) (Z47 1)   6  Aftercare following joint replacement surgery (V54 81) (Z47 1)   7  Aftercare following joint replacement surgery (V54 81) (Z47 1)   8  Anxiety (300 00) (F41 9)   9  Arthralgia of hip, left (719 45) (M25 552)   10  Atrophic vaginitis (627 3) (N95 2)   11  Benign essential hypertension (401 1) (I10)   12  Bursitis of knee (726 60) (M70 50)   13  Bursitis of left hip (726 5) (M70 72)   14  Bursitis of left knee (726 60) (M70 52)   15  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)   16  Constipation (564 00) (K59 00)   17  Depression (311) (F32 9)   18  Edema (782 3) (R60 9)   19  Encounter for routine gynecological examination (V72 31) (Z01 419)   20  Encounter for screening mammogram for malignant neoplasm of breast (V76 12)    (Z12 31)   21  Epidermal inclusion cyst (706 2) (L72 0)   22  Esophageal reflux (530 81) (K21 9)   23  Facet arthritis of lumbosacral region (721 3) (M47 817)   24  Hypokalemia (276 8) (E87 6)   25  Hypothyroidism (244 9) (E03 9)   26  Iliotibial band tendinitis of left side (728 89) (M76 32)   27  Joint Pain In Both Knees (719 46)   28  Left knee pain (719 46) (M25 562)   29  Left knee pain (719 46) (M25 562)   30  Low gammaglobulin level (279 00) (D80 1)   31  L-S radiculopathy (724 4) (M54 17)   32  Need for prophylactic vaccination and inoculation against influenza (V04 81) (Z23)   33  Need for shingles vaccine (V04 89) (Z23)   34  Obesity (278 00) (E66 9)   35  Osteoarthritis of knee (715 36) (M17 9)   36  Other bursitis of left knee (726 60) (M70 52)   37  Pap smear, as part of routine gynecological examination (V76 2) (Z12 4)   38  Primary localized osteoarthrosis of left hip (715 15) (M16 12)   39  Primary osteoarthritis of left knee (715 16) (M17 12)   40  Primary osteoarthritis of left knee (715 16) (M17 12)   41  Primary osteoarthritis of right knee (715 16) (M17 11)   42  Right knee pain (719 46) (M25 561)   43  Right knee pain (719 46) (M25 561)   44  Sinusitis (473 9) (J32 9)   45  Thrombocytopenia (287 5) (D69 6)   46  Urinary tract infection (599 0) (N39 0)   47  Visit for pre-operative examination (V72 84) (Z01 818)   48  Visit for screening mammogram (V76 12) (Z12 31)    Past Medical History    · History of Acute bacterial conjunctivitis (372 03) (H10 30)   · Aftercare following joint replacement surgery (V54 81) (Z47 1)   · Aftercare following joint replacement surgery (V54 81) (Z47 1)   · Aftercare following joint replacement surgery (V54 81) (Z47 1)   · History of Cough (786 2) (R05)   · History of Encounter for screening mammogram for malignant neoplasm of breast  (V76 12) (Z12 31)   · History of diverticulitis of colon (V12 79) (Z87 19)   · History of streptococcal pharyngitis (V12 09) (Z87 09)   · Need for prophylactic vaccination and inoculation against influenza (V04 81) (Z23)   · History of Urinary Tract Infection (V13 02)    The active problems and past medical history were reviewed and updated today        Surgical History    · History of Appendectomy   · History of Biopsy Vulvar   · History of Knee Replacement   · History of Neuroplasty Decompression Median Nerve At Carpal Tunnel   · History of Oral Surgery Tooth Extraction   · History of Partial Colectomy   · History of Tonsillectomy With Adenoidectomy    The surgical history was reviewed and updated today  Family History    · Family history of Breast Cancer (V16 3)    · Family history of Breast Cancer (V16 3)    · Family history of Arthritis (V17 7)   · Family history of Breast Cancer (V16 3)   · Family history of hypertension (V17 49) (Z82 49)   · Family history of malignant neoplasm (V16 9) (Z80 9)    Social History    · Caffeine Use   · Daily Tea Consumption (___ Cups/Day)   · Denied: History of Home Environment Domestic Violence   · Never A Smoker   · Never Drank Alcohol    Current Meds   1  Bromfed DM 30-2-10 MG/5ML Oral Syrup; TAKE 5 ML EVERY 4 TO 6 HOURS AS   NEEDED; Therapy: 50RJL3328 to (Evaluate:11Jan2016)  Requested for: 70OAF1886; Last   Rx:07Jan2016 Ordered   2  Estrace 0 1 MG/GM Vaginal Cream; APPLY A PEA-SIZED AMOUNT TO VAGINAL   OPENING EVERY MONDAY, WEDNESDAY, AND FRIDAY EVENING; Therapy: 16Cet0450 to (Evaluate:20Oct2015) Recorded   3  Guaifenesin-Codeine 100-10 MG/5ML Oral Syrup; TAKE 5 ML DAILY AT BEDTIME; Therapy: 87VLX7255 to (Evaluate:12Jan2016); Last WY:55OJN9215 Ordered   4  Klor-Con 10 10 MEQ Oral Tablet Extended Release; TAKE 1 TABLET TWICE DAILY; Therapy: 34PAX7026 to (Evaluate:18Jun2016)  Requested for: 33SIJ4007; Last   Rx:24Udd1744 Ordered   5  Levothyroxine Sodium 75 MCG Oral Tablet (Synthroid); Take 1 tablet daily as directed; Therapy: 12DMR6734 to (Gary Slay)  Requested for: 24Qdx8042; Last   Rx:83Tln3050 Ordered   6  Metamucil 0 52 GM Oral Capsule; TAKE 1 CAPSULE Daily; Therapy: (Recorded:20Nov2014) to Recorded   7  Omeprazole 20 MG Oral Capsule Delayed Release (PriLOSEC); take 1 capsule daily; Therapy: 44OGY1949 to (Patrizia Cervantes)  Requested for: 92ESV6584; Last   Rx:34Tpi1038 Ordered   8  Sertraline HCl - 100 MG Oral Tablet; Take 1 tablet daily as directed; Therapy: 38RRG6541 to (Itzel Valladares)  Requested for: 00UOD2475; Last   Rx:30Nov2015 Ordered   9   Triamcinolone Acetonide 0 1 % External Cream;   Therapy: 92CJN0872 to (ERZCCWGT:87ROP8369) Recorded   10  Triamterene-HCTZ 37 5-25 MG Oral Capsule; Take 1 capsule daily as needed; Therapy: 97THR1542 to (Jessica Magana)  Requested for: 24GWS6532; Last    Rx:30Nov2015 Ordered   11  Zoster (Zostavax) (Zoster (Zostavax)); as directed; Therapy: 23Tye7404 to (Last Rx:21Aug2015) Ordered    Allergies    1  Penicillins   2  Flagyl CAPS   3  Sulfa Drugs    4  No Known Environmental Allergies   5  No Known Food Allergies    Vitals  Signs [Data Includes: Current Encounter]    Heart Rate: 89  Systolic: 040  Diastolic: 74  Height: 5 ft 2 in  Weight: 219 lb   BMI Calculated: 40 06  BSA Calculated: 1 99    Physical Exam   Gait pattern is without significant antalgia or left eye is devoid of atrophy left knee is in varus there is on enlargement tenderness medially there is no effusion there is tenderness palpation the proximal medial tibia this occurs without erythema occurs the ecchymotic standing        Procedure    Procedure: Injection of the left knee joint  Indication:  Joint pain and Osteoarthritis  Risk, benefits and alternatives were discussed with the patient  Verbal consent was obtained prior to the procedure  Alcohol was used to prep the area  ethyl chloride spray was used as a topical anesthetic  Using sterile technique, the aspiration/injection needle was then directed from a Anterolateral aspect  A 22-gauge was used to inject 2mL Orthovisc   A bandage was applied  the patient tolerated the procedure well  Complications: none  Follow-up in the office in 1 week(s)  With her permission follow sterile prep and drape      Future Appointments    Date/Time Provider Specialty Site   01/29/2016 08:30 AM LUIS Wu  Orthopedic Surgery [facility]   02/05/2016 08:45 AM LUIS Wu   Orthopedic Surgery [facility]   02/15/2016 08:00 AM Chapincito Zapata DO Internal Medicine [facility]     Signatures   Electronically signed by : LUIS Villalobos ; Jan 22 2016 10:24AM EST                       (Author)

## 2018-01-10 NOTE — MISCELLANEOUS
Chief Complaint  Chief Complaint Free Text Note Form: No TABATHA was made for this patient because she was discharged to a skilled nursing facility  Active Problems     1  Abnormal blood chemistry (790 6) (R79 9)   2  Acute medial meniscal tear, left, initial encounter (836 0) (S83 242A)   3  Anxiety (300 00) (F41 9)   4  Arthralgia of hip, left (719 45) (M25 552)   5  Atrophic vaginitis (627 3) (N95 2)   6  Benign essential hypertension (401 1) (I10)   7  Bursitis of knee (726 60) (M70 50)   8  Bursitis of left hip (726 5) (M70 72)   9  Bursitis of left knee (726 60) (M70 52)   10  Constipation (564 00) (K59 00)   11  Depression (311) (F32 9)   12  Diarrhea (787 91) (R19 7)   13  Eczema (692 9) (L30 9)   14  Edema (782 3) (R60 9)   15  Epidermal inclusion cyst (706 2) (L72 0)   16  Esophageal reflux (530 81) (K21 9)   17  Facet arthritis of lumbosacral region (721 3) (M46 97)   18  Hypokalemia (276 8) (E87 6)   19  Hypothyroidism (244 9) (E03 9)   20  Iliotibial band tendinitis of left side (728 89) (M76 32)   21  Joint Pain In Both Knees (719 46)   22  Lateral joint line tenderness of knee (719 46) (M25 569)   23  Left low back pain (724 2) (M54 5)   24  Low gammaglobulin level (279 00) (D80 1)   25  L-S radiculopathy (724 4) (M54 17)   26  Need for shingles vaccine (V04 89) (Z23)   27  Obesity (278 00) (E66 9)   28  Osteoarthritis of knee (715 36) (M17 9)   29  Osteoporosis screening (V82 81) (Z13 820)   30  Pain of left calf (729 5) (M79 662)   31  Pap smear, as part of routine gynecological examination (V76 2) (Z01 419)   32  Popliteal pain (729 5) (M79 609)   33  Preop cardiovascular exam (V72 81) (Z01 810)   34  Preop examination (V72 84) (Z01 818)   35  Primary localized osteoarthrosis of left hip (715 15) (M16 12)   36  Primary osteoarthritis of right knee (715 16) (M17 11)   37  Right knee pain (719 46) (M25 561)   38  Thrombocytopenia (287 5) (D69 6)   39  Thyroid disease (246 9) (E07 9)   40   Vaginal dryness, menopausal (627 2) (N95 1)   41  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)   42  Visit for screening mammogram (V76 12) (Z12 31)    Left knee pain (719 46) (M25 562)       Primary osteoarthritis of left knee (715 16) (M17 12)       Aftercare following joint replacement surgery (V54 81) (Z47 1)       Aftercare following joint replacement surgery Right (V54 81) (Z47 1)       Aftercare following joint replacement surgery (V54 81) (Z47 1)       Left knee pain (719 46) (M25 562)       Primary osteoarthritis of left knee (715 16) (M17 12)          Past Medical History     1  History of Abnormal electrocardiogram (794 31) (R94 31)   2  History of Acute bacterial conjunctivitis (372 03) (H10 30)   3  History of Acute URI (465 9) (J06 9)   4  History of Cough (786 2) (R05)   5  History of Encounter for routine gynecological examination (V72 31) (Z01 419)   6  History of Encounter for screening mammogram for malignant neoplasm of breast   (V76 12) (Z12 31)   7  History of Encounter for screening mammogram for malignant neoplasm of breast   (V76 12) (Z12 31)   8  History of acute bronchitis (V12 69) (Z87 09)   9  History of diverticulitis of colon (V12 79) (Z87 19)   10  History of sinusitis (V12 69) (Z87 09)   11  History of streptococcal pharyngitis (V12 09) (Z87 09)   12  History of urinary tract infection (V13 02) (Z87 440)   13  History of Need for influenza vaccination (V04 81) (Z23)   14  History of Other bursitis of left knee (726 60) (M70 52)   15  History of Right knee pain (719 46) (M25 561)   16  History of Upper respiratory infection with cough and congestion (465 9) (J06 9)   17  History of Urinary Tract Infection (V13 02)   18   History of Visit for pre-operative examination (V72 84) (U78 347)    Aftercare following joint replacement surgery (V54 81) (Z47 1)       Aftercare following joint replacement surgery Right (V54 81) (Z47 1)       Aftercare following joint replacement surgery (V54 81) (Z47 1)          Surgical History    1  History of Appendectomy   2  History of Biopsy Vulvar   3  History of Knee Replacement   4  History of Neuroplasty Decompression Median Nerve At Carpal Tunnel   5  History of Oral Surgery Tooth Extraction   6  History of Partial Colectomy   7  History of Tonsillectomy With Adenoidectomy    Family History  Sister    1  Family history of Breast Cancer (V16 3)  Paternal Grandmother    2  Family history of Breast Cancer (V16 3)  Family History    3  Family history of Arthritis (V17 7)   4  Family history of Breast Cancer (V16 3)   5  Family history of hypertension (V17 49) (Z82 49)   6  Family history of malignant neoplasm (V16 9) (Z80 9)    Social History    · Caffeine Use   · Daily Tea Consumption (___ Cups/Day)   · Denied: History of Home Environment Domestic Violence   · Never Drank Alcohol   · Non-smoker (V49 89) (Z78 9)    Current Meds   1  Levothyroxine Sodium 75 MCG Oral Tablet; Take 1 tablet daily as directed; Therapy: 10TNC6923 to ([facility]:36ITE1140)  Requested for: 59YQH9937; Last   Rx:18Jan2017 Ordered   2  MiraLax Oral Powder; mix 17 gm in 8 ounces of liquid and drink 1 to 2 times daily for   constipation; Therapy: 08TTZ6737 to (Evaluate:67Jvu4095) Recorded   3  Mupirocin 2 % External Ointment; Therapy: 79KGS8933 to Recorded   4  Omeprazole 20 MG Oral Capsule Delayed Release; take 1 capsule daily; Therapy: 86SKI0771 to (21 )  Requested for: 22RLL5560; Last   Rx:18Jan2017 Ordered   5  PriLOSEC OTC 20 MG Oral Tablet Delayed Release; TAKE 1 TABLET DAILY; Therapy: 78QSR5548 to Recorded   6  Proctosol HC 2 5 % Rectal Cream;   Therapy: 77IGP3396 to Recorded   7  Sertraline HCl - 50 MG Oral Tablet; Take 1 tablet by mouth  daily; Therapy: 73ACA3713 to (Evaluate:15Mar2017)  Requested for: 84JEU4276; Last   Rx:63Evd2533 Ordered   8  Sertraline HCl - 50 MG Oral Tablet; TAKE 1 TABLET DAILY AS DIRECTED;    Therapy: 69HWH8701 to (Evaluate:63Smj4153) Requested for: 65QOC0325; Last   Rx:18Jan2017 Ordered   9  Triamterene-HCTZ 37 5-25 MG Oral Capsule; Take 1 capsule daily as needed; Therapy: 27POU9698 to (Evaluate:63Lvm8780)  Requested for: 65POL9629; Last   Rx:18Jan2017 Ordered    Allergies    1  Penicillins   2  Flagyl CAPS   3  Sulfa Drugs    4  No Known Environmental Allergies   5  No Known Food Allergies    Health Management  Health Maintenance   COLONOSCOPY; every 5 years; Last 65FFJ5159; Next Due: 12Mar2019; Active    Future Appointments    Date/Time Provider Specialty Site   02/23/2017 01:00 PM LUIS Diana   Orthopedic Surgery [address]   04/11/2017 09:00 AM Nabor Lee [facility] Ob[facility]etric[facility]Gynecology [address] [place] [address] OB/GYN   02/22/2017 11:00 AM Liv Mckeon DO [facility]   04/25/2017 09:00 AM Liv Mckeon DO [facility]     Signatures   Electronically signed by : Joaquina Perdomo, ; Feb 13 2017  8:17AM EST                       (Author)    Electronically signed by : Sunday Melendrez DO; Feb 16 2017  5:54PM EST                       (Author)

## 2018-01-11 NOTE — RESULT NOTES
Verified Results  * XR HIP/PELV 2-3 VWS RIGHT W PELVIS IF PERFORMED 14Oct2017 01:06PM Michelle Ashford   TW Order Number: [id number]     Test Name Result Flag Reference   * XR HIP/PELV 2-3 VWS RIGHT (Report)     RIGHT HIP     INDICATION: Right hip pain  COMPARISON: None     VIEWS: AP pelvis and 2 coned down views of the hip     IMAGES: 3     FINDINGS:     There is no acute fracture or dislocation  There is no significant right hip joint space narrowing  There is arthritis of the SI joints, right greater than left  There is rotatory levoscoliosis of the lumbar spine, and hypertrophic facet joint osteoarthritis at L5-S1 on the right  No lytic or blastic lesions are seen  Soft tissues are unremarkable  IMPRESSION:       1   No acute osseous abnormality or significant right hip joint space narrowing   2  SI joint osteoarthritis right greater than left, right L5-S1 facet joint osteoarthritis and rotatory levoscoliosis of lumbar spine       Workstation performed: [id number]     Signed by:   Linda Masters MD   10/17/17

## 2018-01-12 VITALS
HEART RATE: 87 BPM | OXYGEN SATURATION: 96 % | BODY MASS INDEX: 40.13 KG/M2 | WEIGHT: 218.05 LBS | HEIGHT: 62 IN | RESPIRATION RATE: 18 BRPM | TEMPERATURE: 98.8 F | SYSTOLIC BLOOD PRESSURE: 110 MMHG | DIASTOLIC BLOOD PRESSURE: 80 MMHG

## 2018-01-12 VITALS
HEIGHT: 63 IN | RESPIRATION RATE: 16 BRPM | SYSTOLIC BLOOD PRESSURE: 120 MMHG | OXYGEN SATURATION: 97 % | WEIGHT: 210 LBS | BODY MASS INDEX: 37.21 KG/M2 | HEART RATE: 73 BPM | DIASTOLIC BLOOD PRESSURE: 80 MMHG

## 2018-01-13 VITALS
WEIGHT: 215.06 LBS | SYSTOLIC BLOOD PRESSURE: 106 MMHG | BODY MASS INDEX: 39.58 KG/M2 | DIASTOLIC BLOOD PRESSURE: 71 MMHG | HEIGHT: 62 IN | HEART RATE: 123 BPM

## 2018-01-13 VITALS
BODY MASS INDEX: 36.86 KG/M2 | WEIGHT: 208 LBS | DIASTOLIC BLOOD PRESSURE: 70 MMHG | SYSTOLIC BLOOD PRESSURE: 112 MMHG | HEIGHT: 63 IN

## 2018-01-13 NOTE — RESULT NOTES
Message   Notify the patient normal platelet and hemoglobin/hematocrit levels follow up as scheduled        Verified Results  (1) PLATELET COUNT 35FII5632 12:44PM Denia Heys Order Number: [id number]     Test Name Result Flag Reference   PLATELET COUNT 984 Thousands/uL  149-390   MPV 12 4 fL  8 9-12 7     (1) HGB AND HCT, BLOOD 03RYY3177 12:44PM Denia Heys Order Number: [id number]     Test Name Result Flag Reference   HEMATOCRIT 44 2 %  34 8-46 1   HEMOGLOBIN 15 2 g/dL  11 5-15 4       Signatures   Electronically signed by : Sunday Melendrez DO; Jan 23 2017  8:12PM EST                       (Author)

## 2018-01-13 NOTE — MISCELLANEOUS
Message  Spoke with patient regarding mammogram results - benign  Follow-up in 1 year        Signatures   Electronically signed by : Alis Brice, [facility]; May  5 2017  1:13PM EST                       (Author)

## 2018-01-13 NOTE — RESULT NOTES
Message   notify the patient mammogram is negative, see OB/GYN for routine examination follow up as scheduled        Verified Results  * MAMMO SCREENING BILATERAL W CAD 21Apr2016 07:24AM Carry Georgina   TW Order Number: [id number]     Test Name Result Flag Reference   MAMMO SCREENING BILATERAL W CAD (Report)     Patient History:   Patient is postmenopausal    Family history of breast cancer in paternal grandmother at age    50, breast cancer in paternal aunt at age 79, and breast cancer    in sister at age 54  Took hormonal contraceptives for 5 years  Taking estrogen for 5    years  Patient has never smoked  Patient's BMI is 39 0  Reason for exam: screening (asymptomatic)  Mammo Screening Bilateral W CAD: April 21, 2016 - Check In #:    [de-identified]   Bilateral CC and MLO view(s) were taken  Technologist: RT Claude(R)(M)   Prior study comparison: April 16, 2015, digital bilateral    screening mammogram performed at [address] [place] [address]  April 3, 2014, digital bilateral screening mammogram performed at   [address] [place] [address]  March 25, 2011, bilateral WB    digitl bilat conrad, performed at [address] [place] [address]  There are scattered fibroglandular densities  No dominant soft tissue mass, architectural distortion or    suspicious calcifications are noted  The skin and nipple    contours are within normal limits  No evidence of malignancy  No significant changes   when compared with prior studies  ASSESSMENT: BiRad:1 - Negative     Recommendation:   Routine screening mammogram of both breasts in 1 year  A reminder letter will be scheduled  Analyzed by CAD     8-10% of cancers will be missed on mammography  Management of a    palpable abnormality must be based on clinical grounds  Patients   will be notified of their results via letter from our facility  Accredited by [employer] of [employer] and FDA       Transcription Location: [address] Signing Station: [id number]     Risk Value(s):   Tyrer-Cuzick 10 Year: 5 672%, Tyrer-Cuzick Lifetime: 15 901%,    Myriad Table: 2 6%, ISAK 5 Year: 2 5%, NCI Lifetime: 14 5%, MRS    : Based on personal and/or family history,    consideration of hereditary risk assessment may be warranted  Signed by:   Audley Najjar, MD   4/21/16       Signatures   Electronically signed by : Marquez Dill DO;  Apr 21 2016  7:54PM EST                       (Author)

## 2018-01-13 NOTE — RESULT NOTES
Verified Results  (1) LIPID PANEL FASTING W DIRECT LDL REFLEX 17Koj2123 07:04AM Vipul Catherine    Order Number: [id number]     Test Name Result Flag Reference   CHOLESTEROL 187 mg/dL     LDL CHOLESTEROL CALCULATED 101 mg/dL H 0-100   Triglyceride:        Normal <150 mg/dl   Borderline High 150-199 mg/dl   High 200-499 mg/dl   Very High >499 mg/dl      Cholesterol:       Desirable <200 mg/dl    Borderline High 200-239 mg/dl    High >239 mg/dl      HDL Cholesterol:       High>59 mg/dL    Low <41 mg/dL      HDL Cholesterol:       High>59 mg/dL    Low <41 mg/dL      This screening LDL is a calculated result  It does not have the accuracy of the Direct Measured LDL in the monitoring of patients with hyperlipidemia and/or statin therapy  Direct Measure LDL (IIN737) must be ordered separately in these patients  TRIGLYCERIDES 109 mg/dL  <=150   Specimen collection should occur prior to N-Acetylcysteine or Metamizole administration due to the potential for falsely depressed results  HDL,DIRECT 64 mg/dL H 40-60   Specimen collection should occur prior to Metamizole administration due to the potential for falsley depressed results

## 2018-01-14 VITALS
WEIGHT: 207 LBS | HEIGHT: 63 IN | DIASTOLIC BLOOD PRESSURE: 73 MMHG | SYSTOLIC BLOOD PRESSURE: 114 MMHG | BODY MASS INDEX: 36.68 KG/M2 | HEART RATE: 76 BPM

## 2018-01-14 VITALS
BODY MASS INDEX: 38.02 KG/M2 | DIASTOLIC BLOOD PRESSURE: 72 MMHG | OXYGEN SATURATION: 95 % | TEMPERATURE: 99.8 F | SYSTOLIC BLOOD PRESSURE: 130 MMHG | HEART RATE: 87 BPM | WEIGHT: 211.25 LBS

## 2018-01-14 VITALS — SYSTOLIC BLOOD PRESSURE: 92 MMHG | HEIGHT: 62 IN | HEART RATE: 98 BPM | DIASTOLIC BLOOD PRESSURE: 66 MMHG

## 2018-01-14 VITALS
HEIGHT: 62 IN | BODY MASS INDEX: 38.65 KG/M2 | DIASTOLIC BLOOD PRESSURE: 81 MMHG | SYSTOLIC BLOOD PRESSURE: 122 MMHG | WEIGHT: 210.06 LBS | HEART RATE: 80 BPM

## 2018-01-14 NOTE — RESULT NOTES
Message   Notify the patient normal CBC except a slightly RBC count, no clinical concern follow up as scheduled to discuss        Verified Results  (1) CBC/ PLT (NO DIFF) 83VYP1447 07:15AM Niurka Ortiz Order Number: [id number]     Test Name Result Flag Reference   HEMATOCRIT 44 7 %  34 8-46 1   HEMOGLOBIN 14 8 g/dL  11 5-15 4   MCHC 33 1 g/dL  31 4-37 4   MCH 27 7 pg  26 8-34 3   MCV 84 fL  82-98   PLATELET COUNT 760 Thousands/uL  149-390   RBC COUNT 5 34 Million/uL H 3 81-5 12   RDW 13 4 %  11 6-15 1   WBC COUNT 5 96 Thousand/uL  4 31-10 16   MPV 12 6 fL  8 9-12 7       Signatures   Electronically signed by : Colton Bella DO; May 23 2017  5:42PM EST                       (Author)

## 2018-01-14 NOTE — PROGRESS NOTES
Assessment    1  Left knee pain (719 46) (M25 562)   2  Primary osteoarthritis of left knee (715 16) (M17 12)    Plan  Aftercare following joint replacement surgery    · Clindamycin HCl - 300 MG Oral Capsule; TAKE 2 CAPSULES 1 HOUR PRIOR TO  DENTAL APPOINTMENT  Osteoarthritis of knee    · OrthoVisc 30 MG/2ML Intra-articular Solution Prefilled Syringe    Discussion/Summary    She has concluded  The 3 shot series  We will see her in 3 additional months in follow-up    She was given additional clindamycin for dental antibody prophylaxis  Chief Complaint    1  Knee Pain  Ongoing series of Orthofix Injections, left knee      History of Present Illness  HPI: A she presents for injection #3 of the 3 shot series to her left knee      Active Problems    1  Abnormal blood chemistry (790 6) (R79 9)   2  Abnormal electrocardiogram (794 31) (R94 31)   3  Acute bronchitis (466 0) (J20 9)   4  Acute URI (465 9) (J06 9)   5  Aftercare following joint replacement surgery (V54 81) (Z47 1)   6  Aftercare following joint replacement surgery (V54 81) (Z47 1)   7  Aftercare following joint replacement surgery (V54 81) (Z47 1)   8  Anxiety (300 00) (F41 9)   9  Arthralgia of hip, left (719 45) (M25 552)   10  Atrophic vaginitis (627 3) (N95 2)   11  Benign essential hypertension (401 1) (I10)   12  Bursitis of knee (726 60) (M70 50)   13  Bursitis of left hip (726 5) (M70 72)   14  Bursitis of left knee (726 60) (M70 52)   15  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)   16  Constipation (564 00) (K59 00)   17  Depression (311) (F32 9)   18  Edema (782 3) (R60 9)   19  Encounter for routine gynecological examination (V72 31) (Z01 419)   20  Encounter for screening mammogram for malignant neoplasm of breast (V76 12)    (Z12 31)   21  Epidermal inclusion cyst (706 2) (L72 0)   22  Esophageal reflux (530 81) (K21 9)   23  Facet arthritis of lumbosacral region (721 3) (M47 817)   24  Hypokalemia (276 8) (E87 6)   25  Hypothyroidism (244 9) (E03 9)   26  Iliotibial band tendinitis of left side (728 89) (M76 32)   27  Joint Pain In Both Knees (719 46)   28  Left knee pain (719 46) (M25 562)   29  Left knee pain (719 46) (M25 562)   30  Low gammaglobulin level (279 00) (D80 1)   31  L-S radiculopathy (724 4) (M54 17)   32  Need for prophylactic vaccination and inoculation against influenza (V04 81) (Z23)   33  Need for shingles vaccine (V04 89) (Z23)   34  Obesity (278 00) (E66 9)   35  Osteoarthritis of knee (715 36) (M17 9)   36  Other bursitis of left knee (726 60) (M70 52)   37  Pap smear, as part of routine gynecological examination (V76 2) (Z12 4)   38  Primary localized osteoarthrosis of left hip (715 15) (M16 12)   39  Primary osteoarthritis of left knee (715 16) (M17 12)   40  Primary osteoarthritis of left knee (715 16) (M17 12)   41  Primary osteoarthritis of right knee (715 16) (M17 11)   42  Right knee pain (719 46) (M25 561)   43  Right knee pain (719 46) (M25 561)   44  Sinusitis (473 9) (J32 9)   45  Thrombocytopenia (287 5) (D69 6)   46  Urinary tract infection (599 0) (N39 0)   47  Visit for pre-operative examination (V72 84) (Z01 818)   48   Visit for screening mammogram (V76 12) (Z12 31)    Past Medical History    · History of Acute bacterial conjunctivitis (372 03) (H10 30)   · Aftercare following joint replacement surgery (V54 81) (Z47 1)   · Aftercare following joint replacement surgery (V54 81) (Z47 1)   · Aftercare following joint replacement surgery (V54 81) (Z47 1)   · History of Cough (786 2) (R05)   · History of Encounter for screening mammogram for malignant neoplasm of breast  (V76 12) (Z12 31)   · History of diverticulitis of colon (V12 79) (Z87 19)   · History of streptococcal pharyngitis (V12 09) (Z87 09)   · Need for prophylactic vaccination and inoculation against influenza (V04 81) (Z23)   · History of Urinary Tract Infection (V13 02)    Surgical History    · History of Appendectomy   · History of Biopsy Vulvar   · History of Knee Replacement   · History of Neuroplasty Decompression Median Nerve At Carpal Tunnel   · History of Oral Surgery Tooth Extraction   · History of Partial Colectomy   · History of Tonsillectomy With Adenoidectomy    Family History    · Family history of Breast Cancer (V16 3)    · Family history of Breast Cancer (V16 3)    · Family history of Arthritis (V17 7)   · Family history of Breast Cancer (V16 3)   · Family history of hypertension (V17 49) (Z82 49)   · Family history of malignant neoplasm (V16 9) (Z80 9)    Social History    · Caffeine Use   · Daily Tea Consumption (___ Cups/Day)   · Denied: History of Home Environment Domestic Violence   · Never A Smoker   · Never Drank Alcohol    Current Meds   1  Bromfed DM 30-2-10 MG/5ML Oral Syrup; TAKE 5 ML EVERY 4 TO 6 HOURS AS   NEEDED; Therapy: 39IDC4540 to (Evaluate:11Jan2016)  Requested for: 50HXV2135; Last   Rx:07Jan2016 Ordered   2  Estrace 0 1 MG/GM Vaginal Cream; APPLY A PEA-SIZED AMOUNT TO VAGINAL   OPENING EVERY MONDAY, WEDNESDAY, AND FRIDAY EVENING; Therapy: 66Ybn1395 to (Evaluate:20Oct2015) Recorded   3  Guaifenesin-Codeine 100-10 MG/5ML Oral Syrup; TAKE 5 ML DAILY AT BEDTIME; Therapy: 83IUZ6439 to (Evaluate:12Jan2016); Last CX:90TED9299 Ordered   4  Klor-Con 10 10 MEQ Oral Tablet Extended Release; TAKE 1 TABLET TWICE DAILY; Therapy: 95ZZU7347 to (Evaluate:18Jun2016)  Requested for: 70WEJ0382; Last   Rx:20Jer2612 Ordered   5  Levothyroxine Sodium 75 MCG Oral Tablet; Take 1 tablet daily as directed; Therapy: 71VRO7599 to (Isidoro Branch)  Requested for: 98Ovf7985; Last   Rx:14Nxz0972 Ordered   6  Metamucil 0 52 GM Oral Capsule; TAKE 1 CAPSULE Daily; Therapy: (Recorded:20Nov2014) to Recorded   7  Omeprazole 20 MG Oral Capsule Delayed Release; take 1 capsule daily; Therapy: 94EUM3845 to (Evaluate:28Oct2016)  Requested for: 79UDA3072; Last   Rx:76Bkp8123 Ordered   8  Sertraline HCl - 100 MG Oral Tablet;  Take 1 tablet daily as directed; Therapy: 32HVO8689 to (Thacker Dopp)  Requested for: 33PGO3964; Last   Rx:30Nov2015 Ordered   9  Triamcinolone Acetonide 0 1 % External Cream;   Therapy: 09XAB3963 to (SFLITINL:01SIQ6701) Recorded   10  Triamterene-HCTZ 37 5-25 MG Oral Capsule; Take 1 capsule daily as needed; Therapy: 26JJW5861 to (Thacker Dopp)  Requested for: 84ALT4973; Last    Rx:30Nov2015 Ordered   11  Zoster (Zostavax); as directed; Therapy: 97Pax1803 to (Last Rx:16Btm6021) Ordered    Allergies    1  Penicillins   2  Flagyl CAPS   3  Sulfa Drugs    4  No Known Environmental Allergies   5  No Known Food Allergies    Vitals  Signs [Data Includes: Current Encounter]    Heart Rate: 84  Systolic: 710  Diastolic: 66  Height: 5 ft 2 in  Weight: 215 lb 4 00 oz  BMI Calculated: 39 37  BSA Calculated: 1 97    Physical Exam   Her left knee has no overlying discoloration or color that would prevent safe administration of medicine  She has no effusion        Procedure    Procedure: Injection knee joint  Were discussed with the patient  Alcohol was used to prep the area  ethyl chloride spray was used as a topical anesthetic  Using sterile technique, the aspiration/injection needle was then directed from a Anterolateral aspect  A 22-gauge was used to inject 5 mL of 1% Lidocaine and 2mL Orthovisc   the patient tolerated the procedure well  Complications: none  Follow-up in the office in 3 month(s)  Attending Note  Collaborating Physician Note: Collaborating Note: I interviewed and examined the patient, I supervised the Advanced Practitioner, I supervised the procedure performed by the Advanced Practitioner and I agree with the Advanced Practitioner note  I discussed the case with the Advanced Practitioner and reviewed the AP note      Future Appointments    Date/Time Provider Specialty Site   05/13/2016 08:30 AM LUIS Kelly   Orthopedic Surgery [facility] [place]   08/12/2016 08:30 AM LUIS Gold  Orthopedic Surgery [facility]   08/19/2016 08:30 AM LUIS Gold  Orthopedic Surgery [facility]   08/26/2016 08:30 AM LUIS Gold   Orthopedic Surgery [facility]   02/15/2016 08:00 AM Cameron Vincent DO Internal Medicine [facility]     Signatures   Electronically signed by : Germania Cruz [facility]; Feb 5 2016  8:37AM EST                       (Author)    Electronically signed by : LUIS Reyes ; Feb 5 2016 11:02AM EST                       (Author)

## 2018-01-14 NOTE — MISCELLANEOUS
History of Present Illness  TCM Communication [facility]: The patient is being contacted for follow-up after hospitalization  Hospital records were not available  She was hospitalized at [facility]  The date of admission: 02/11/2017, date of discharge: 02/18/2017  Diagnosis: Status post total knee replacement  She was discharged to home  Medications were not reviewed today  She scheduled a follow up appointment  The patient is currently asymptomatic  Counseling was provided to patient's caretaker  Kia Maher from [facility]  Communication performed and completed by Elvi Kaufman      Active Problems     1  Abnormal blood chemistry (790 6) (R79 9)   2  Acute medial meniscal tear, left, initial encounter (836 0) (S83 242A)   3  Anxiety (300 00) (F41 9)   4  Arthralgia of hip, left (719 45) (M25 552)   5  Atrophic vaginitis (627 3) (N95 2)   6  Benign essential hypertension (401 1) (I10)   7  Bursitis of knee (726 60) (M70 50)   8  Bursitis of left hip (726 5) (M70 72)   9  Bursitis of left knee (726 60) (M70 52)   10  Constipation (564 00) (K59 00)   11  Depression (311) (F32 9)   12  Diarrhea (787 91) (R19 7)   13  Eczema (692 9) (L30 9)   14  Edema (782 3) (R60 9)   15  Epidermal inclusion cyst (706 2) (L72 0)   16  Esophageal reflux (530 81) (K21 9)   17  Facet arthritis of lumbosacral region (721 3) (M46 97)   18  Hypokalemia (276 8) (E87 6)   19  Hypothyroidism (244 9) (E03 9)   20  Iliotibial band tendinitis of left side (728 89) (M76 32)   21  Joint Pain In Both Knees (719 46)   22  Lateral joint line tenderness of knee (719 46) (M25 569)   23  Left knee pain (719 46) (M25 562)   24  Left low back pain (724 2) (M54 5)   25  Low gammaglobulin level (279 00) (D80 1)   26  L-S radiculopathy (724 4) (M54 17)   27  Need for shingles vaccine (V04 89) (Z23)   28  Obesity (278 00) (E66 9)   29  Osteoarthritis of knee (715 36) (M17 9)   30  Osteoporosis screening (V82 81) (Z13 820)   31   Pain of left calf (729 5) (M79 662)   32  Pap smear, as part of routine gynecological examination (V76 2) (Z01 419)   33  Popliteal pain (729 5) (M79 609)   34  Preop cardiovascular exam (V72 81) (Z01 810)   35  Preop examination (V72 84) (Z01 818)   36  Primary localized osteoarthrosis of left hip (715 15) (M16 12)   37  Primary osteoarthritis of left knee (715 16) (M17 12)   38  Primary osteoarthritis of right knee (715 16) (M17 11)   39  Right knee pain (719 46) (M25 561)   40  Status post total left knee replacement (V43 65) (Z96 652)   41  Thrombocytopenia (287 5) (D69 6)   42  Thyroid disease (246 9) (E07 9)   43  Vaginal dryness, menopausal (627 2) (N95 1)   44  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)   45  Visit for screening mammogram (V76 12) (Z12 31)    Aftercare following left knee joint replacement surgery (V54 81) (Z47 1)          Past Medical History    1  History of Abnormal electrocardiogram (794 31) (R94 31)   2  History of Acute bacterial conjunctivitis (372 03) (H10 30)   3  History of Acute URI (465 9) (J06 9)   4  History of Cough (786 2) (R05)   5  History of Encounter for routine gynecological examination (V72 31) (Z01 419)   6  History of Encounter for screening mammogram for malignant neoplasm of breast   (V76 12) (Z12 31)   7  History of Encounter for screening mammogram for malignant neoplasm of breast   (V76 12) (Z12 31)   8  History of acute bronchitis (V12 69) (Z87 09)   9  History of diverticulitis of colon (V12 79) (Z87 19)   10  History of sinusitis (V12 69) (Z87 09)   11  History of streptococcal pharyngitis (V12 09) (Z87 09)   12  History of urinary tract infection (V13 02) (Z87 440)   13  History of Need for influenza vaccination (V04 81) (Z23)   14  History of Other bursitis of left knee (726 60) (M70 52)   15  History of Right knee pain (719 46) (M25 561)   16  History of Upper respiratory infection with cough and congestion (465 9) (J06 9)   17   History of Urinary Tract Infection (V13 02)   18  History of Visit for pre-operative examination (V72 84) (L4 470)    Surgical History    1  History of Appendectomy   2  History of Biopsy Vulvar   3  History of Knee Replacement   4  History of Neuroplasty Decompression Median Nerve At Carpal Tunnel   5  History of Oral Surgery Tooth Extraction   6  History of Partial Colectomy   7  History of Tonsillectomy With Adenoidectomy    Family History  Sister    1  Family history of Breast Cancer (V16 3)  Paternal Grandmother    2  Family history of Breast Cancer (V16 3)  Family History    3  Family history of Arthritis (V17 7)   4  Family history of Breast Cancer (V16 3)   5  Family history of hypertension (V17 49) (Z82 49)   6  Family history of malignant neoplasm (V16 9) (Z80 9)    Social History    · Caffeine Use   · Daily Tea Consumption (___ Cups/Day)   · Denied: History of Home Environment Domestic Violence   · Never Drank Alcohol   · Non-smoker (V49 89) (Z78 9)    Current Meds   1  Levothyroxine Sodium 75 MCG Oral Tablet; Take 1 tablet daily as directed; Therapy: 84UEV8221 to (PJGTTR:30ABL9772)  Requested for: 92CEJ1683; Last   Rx:18Jan2017 Ordered   2  MiraLax Oral Powder; mix 17 gm in 8 ounces of liquid and drink 1 to 2 times daily for   constipation; Therapy: 11TZQ5576 to (Evaluate:10Dbn0185) Recorded   3  Mupirocin 2 % External Ointment; Therapy: 78RPY8585 to Recorded   4  Omeprazole 20 MG Oral Capsule Delayed Release; take 1 capsule daily; Therapy: 00IJZ6740 to (21 )  Requested for: 67OWL5845; Last   Rx:18Jan2017 Ordered   5  PriLOSEC OTC 20 MG Oral Tablet Delayed Release; TAKE 1 TABLET DAILY; Therapy: 73JES3523 to Recorded   6  Proctosol HC 2 5 % Rectal Cream;   Therapy: 36VRJ8626 to Recorded   7  Sertraline HCl - 50 MG Oral Tablet; Take 1 tablet by mouth  daily; Therapy: 96AYA6580 to (Evaluate:15Mar2017)  Requested for: 79RCF7608; Last   Rx:25Iht6838 Ordered   8   Sertraline HCl - 50 MG Oral Tablet; TAKE 1 TABLET DAILY AS DIRECTED; Therapy: 61EUM1966 to (Evaluate:18Qzo5045)  Requested for: 57ZMI7076; Last   Rx:18Jan2017 Ordered   9  Triamterene-HCTZ 37 5-25 MG Oral Capsule; Take 1 capsule daily as needed; Therapy: 22OAV4875 to (Evaluate:55Yvr3708)  Requested for: 58IMC9519; Last   Rx:18Jan2017 Ordered    Allergies    1  Penicillins   2  Flagyl CAPS   3  Sulfa Drugs    4  No Known Environmental Allergies   5  No Known Food Allergies    Health Management  Health Maintenance   COLONOSCOPY; every 5 years; Last 72CJI8223; Next Due: 12Mar2019; Active    Future Appointments    Date/Time Provider Specialty Site   03/24/2017 09:00 AM LUIS Dobbs   Orthopedic Surgery [place] [address] [address] [facility]   04/13/2017 09:00 AM Kd Mathis [facility] Obstetrics/Gynecology [address] [place] [address] OB/GYN   04/25/2017 09:00 AM Elizabeth Mc DO Internal Medicine [facility]     Signatures   Electronically signed by : Goldy Dobson, ; Feb 16 2017 10:49AM EST                       (Author)    Electronically signed by : Vahe Gomes DO; Mar  8 2017 10:28PM EST                       (Author)

## 2018-01-14 NOTE — CONSULTS
Therapy  Rehabilitation Services Referral:   Patient Status: post-operative  Diagnosis: Status post left total knee replacement  Rehabilitation Services: evaluate and treat patient as needed  Precautions/Comments: Now outpatient PT, left total knee replacement protocol,  Avoid contractures  Frequency: 3 times per week, for 10 weeks  Please send progress report         Signatures   Electronically signed by : Clay Kenney, [facility]; Mar  7 2017 12:18PM EST                       (Author)

## 2018-01-15 VITALS
BODY MASS INDEX: 37.43 KG/M2 | HEART RATE: 92 BPM | DIASTOLIC BLOOD PRESSURE: 78 MMHG | SYSTOLIC BLOOD PRESSURE: 118 MMHG | WEIGHT: 211.25 LBS | HEIGHT: 63 IN

## 2018-01-15 NOTE — RESULT NOTES
Message   Notify the patient normal labs except a mild elevation of the blood sugar in the prediabetic range please have patient reduce carbohydrates and sweets and follow up as scheduled        Verified Results  (1) CBC/ PLT (NO DIFF) 08XHI1760 07:15AM [place] Hernandez Order Number: [id number]     Test Name Result Flag Reference   HEMATOCRIT 44 7 %  34 8-46 1   HEMOGLOBIN 14 8 g/dL  11 5-15 4   MCHC 33 1 g/dL  31 4-37 4   MCH 27 7 pg  26 8-34 3   MCV 84 fL  82-98   PLATELET COUNT 595 Thousands/uL  149-390   RBC COUNT 5 34 Million/uL H 3 81-5 12   RDW 13 4 %  11 6-15 1   WBC COUNT 5 96 Thousand/uL  4 31-10 16   MPV 12 6 fL  8 9-12 7     (1) FERRITIN 66QIQ4633 07:15AM Enoc Hernandez Order Number: [id number]     Test Name Result Flag Reference   FERRITIN 24 ng/mL  8-388     (1) IRON 57AMK8780 07:15AM Appling Cowman Order Number: [id number]     Test Name Result Flag Reference   IRON 66 ug/dL     Patients treated with metal-binding drugs (ie  Deferoxamine) may have depressed iron values  (1) COMPREHENSIVE METABOLIC PANEL 04PMM5506 66:86ZX Gissel Pal   TW Order Number: [id number]     Test Name Result Flag Reference   SODIUM 139 mmol/L  136-145   POTASSIUM 3 5 mmol/L  3 5-5 3   CHLORIDE 103 mmol/L  100-108   CARBON DIOXIDE 30 mmol/L  21-32   ANION GAP (CALC) 6 mmol/L  4-13   BLOOD UREA NITROGEN 14 mg/dL  5-25   CREATININE 0 80 mg/dL  0 60-1 30   Standardized to IDMS reference method   CALCIUM 9 3 mg/dL  8 3-10 1   BILI, TOTAL 0 56 mg/dL  0 20-1 00   ALK PHOSPHATAS 71 U/L     ALT (SGPT) 24 U/L  12-78   AST(SGOT) 15 U/L  5-45   ALBUMIN 4 0 g/dL  3 5-5 0   TOTAL PROTEIN 6 9 g/dL  6 4-8 2   eGFR Non-African American      >60 0 ml/min/1 73sq m   [employer] Disease Education Program recommendations are as follows:  GFR calculation is accurate only with a steady state creatinine  Chronic Kidney disease less than 60 ml/min/1 73 sq  meters  Kidney failure less than 15 ml/min/1 73 sq  meters     GLUCOSE FASTING 116 mg/dL H 65-99       Signatures   Electronically signed by : Milan Oglesby DO; May 23 2017  5:44PM EST                       (Author)

## 2018-01-15 NOTE — RESULT NOTES
Message   notify the pt venous doppler normal f/u as scheduled        Verified Results  VAS LOWER LIMB VENOUS DUPLEX STUDY, UNILATERAL/LIMITED [id number] 11:29AM Edwar Sequeira Order Number: [id number]    - Patient Instructions: To schedule this appointment, please contact Central Scheduling at [phone]   Order Number: [id number]    - Patient Instructions: To schedule this appointment, please contact Central Scheduling at [phone]  Test Name Result Flag Reference   VAS LOWER LIMB VENOUS DUPLEX STUDY, UNILATERAL/LIMITED (Report)     [facility] REPORT   CLINICAL:   Indications: Left Limb Pain [M79 609]  Pt  complaining of pain behind the right knee for the past three months  No   history of DVT  Clinical:         CONCLUSION:   Impression:   RIGHT LOWER LIMB LIMITED: NORMAL   Evaluation shows no evidence of thrombus in the common femoral vein  Doppler evaluation shows a normal response to augmentation maneuvers  LEFT LOWER LIMB: NORMAL   No evidence of acute or chronic deep vein thrombosis   No evidence of superficial thrombophlebitis noted  Doppler evaluation shows a normal response to augmentation maneuvers  Popliteal, posterior tibial and anterior tibial arterial Doppler waveforms are   triphasic  SIGNATURE:   Electronically Signed by: Christie Anderson MD, RPVI on 2016-11-01 03:30:56 PM     * MRI KNEE LEFT  WO CONTRAST 31Oct2016 02:18PM Naeem Agosto     Test Name Result Flag Reference   MRI KNEE LEFT  WO CONTRAST (Report)     This is a summary report  The complete report is available in the patient's medical record  If you cannot access the medical record, please contact the sending organization for a detailed fax or copy  MRI LEFT KNEE     INDICATION: Posterior left knee pain  COMPARISON: Left knee plain films from 8/24/2015       TECHNIQUE:  MR sequences were obtained of the left knee including: Localizer, axial T2 fat sat, coronal T1/T2 fat sat, sagittal PD/T2 fat sat  Images were acquired on a 1 5 Simin unit  Gadolinium was not used  FINDINGS:     SUBCUTANEOUS TISSUES: Normal     JOINT EFFUSION: None  BAKER'S CYST: None  MENISCI: There is a large complex tear of the medial meniscus posterior horn (series 3 images 5 through 10 ) The lateral meniscus is normal      CRUCIATE LIGAMENTS: Intact  EXTENSOR APPARATUS: Intact  COLLATERAL LIGAMENTS: Intact  ARTICULAR SURFACES: There is severe medial tibiofemoral compartment osteoarthritis with full-thickness cartilage loss over large portions of the medial femoral condyle and medial tibial plateau  There are also large marginal osteophytes in this    compartment  There is also moderate patellofemoral compartment and lateral tibiofemoral compartment osteoarthritis  BONE MARROW: Normal signal without fracture  MUSCULATURE: Intact  IMPRESSION:     Tricompartmental osteoarthritis, severe in the medial tibiofemoral compartment, and moderate elsewhere       There is a large complex tear of the medial meniscus posterior horn (series 3 images 5 through 10 )       Workstation performed: [id number]     Signed by:   Selma Moreno MD   11/1/16       Signatures   Electronically signed by : Sunday Melendrez DO; Nov 1 2016  7:09PM EST                       (Author)

## 2018-01-15 NOTE — RESULT NOTES
Message   notify the pt mri shows osteoarthritis and large comlex tear of the medial meniscus posterior horn and have pt see ortho Dr Korey Agosto and f/u to discuss        Verified Results  * MRI KNEE LEFT  WO CONTRAST 31Oct2016 02:18PM Gissel Pal     Test Name Result Flag Reference   MRI KNEE LEFT  WO CONTRAST (Report)     This is a summary report  The complete report is available in the patient's medical record  If you cannot access the medical record, please contact the sending organization for a detailed fax or copy  MRI LEFT KNEE     INDICATION: Posterior left knee pain  COMPARISON: Left knee plain films from 8/24/2015  TECHNIQUE:  MR sequences were obtained of the left knee including: Localizer, axial T2 fat sat, coronal T1/T2 fat sat, sagittal PD/T2 fat sat  Images were acquired on a 1 5 Simin unit  Gadolinium was not used  FINDINGS:     SUBCUTANEOUS TISSUES: Normal     JOINT EFFUSION: None  BAKER'S CYST: None  MENISCI: There is a large complex tear of the medial meniscus posterior horn (series 3 images 5 through 10 ) The lateral meniscus is normal      CRUCIATE LIGAMENTS: Intact  EXTENSOR APPARATUS: Intact  COLLATERAL LIGAMENTS: Intact  ARTICULAR SURFACES: There is severe medial tibiofemoral compartment osteoarthritis with full-thickness cartilage loss over large portions of the medial femoral condyle and medial tibial plateau  There are also large marginal osteophytes in this    compartment  There is also moderate patellofemoral compartment and lateral tibiofemoral compartment osteoarthritis  BONE MARROW: Normal signal without fracture  MUSCULATURE: Intact  IMPRESSION:     Tricompartmental osteoarthritis, severe in the medial tibiofemoral compartment, and moderate elsewhere       There is a large complex tear of the medial meniscus posterior horn (series 3 images 5 through 10 )       Workstation performed: [id number]     Signed by:   Olu Granda Suze Yap MD   11/1/16       Signatures   Electronically signed by : Bernadette Glover DO; Nov 1 2016  7:06PM EST                       (Author)

## 2018-01-15 NOTE — RESULT NOTES
Message   notify the patient x-ray of the spine does show  degenerative and postsurgical changes of the lower spine  along with a levoscoliosis please have the patient see orthopedics Dr Marquis Crain follow up  as scheduled        Verified Results  * XR SPINE LUMBAR MINIMUM 4 VIEWS [id number] 12:38PM Sonu Grayson Order Number: [id number]     Test Name Result Flag Reference   XR SPINE LUMBAR MINIMUM 4 VIEWS (Report)     LUMBAR SPINE     INDICATION: Lower back pain  COMPARISON: None     VIEWS: AP, lateral, bilateral oblique and coned down projections; 5 images     FINDINGS:     Mild lumbar levoscoliosis  Postsurgical changes noted the posterior elements at L45  Surgical sutures are noted to the left of L5  Surgical clips overlie the right iliac crest      There is no radiographic evidence of acute fracture or destructive osseous lesion  Moderate degenerative changes, posterior elements, L4-5 and L5-S1  Anterior marginal osteophyte formation at the superior endplate of L2 and L1  An ingested pill is noted in the stomach  IMPRESSION:     Degenerative and postsurgical changes, lower lumbar spine  Lumbar levoscoliosis         Workstation performed: [id number]     Signed by:   Lien Skelton MD   2/16/16       Signatures   Electronically signed by : Milan Oglesby DO; Feb 16 2016  7:27PM EST                       (Author)

## 2018-01-16 NOTE — RESULT NOTES
Message   Notify the patient the DEXA scan is normal f/u as  scheduled        Verified Results  * DXA BONE DENSITY SPINE HIP AND PELVIS 12Oct2016 07:47AM Lyanne Phalen Order Number: [id number]    - Patient Instructions: To schedule this appointment, please contact Central Scheduling at [phone]   Order Number: [id number]    - Patient Instructions: To schedule this appointment, please contact Central Scheduling at [phone]  Test Name Result Flag Reference   DXA BONE DENSITY SPINE HIP AND PELVIS (Report)     CENTRAL DXA SCAN     CLINICAL HISTORY:  60year old post-menopausal  female with history of hypothyroidism, degenerative arthritis and anemia  There is a stated loss in height of 2 1/4 inches  The patient exercises and does not take calcium or vitamin D    supplements  TECHNIQUE: Bone densitometry was performed using a Hologic Horizon A bone densitometer  Regions of interest appear properly placed  There are degenerative changes of the lumbar spine, associated with levoscoliosis, which can artificially elevate bone    mineral density results  COMPARISON: April 3, 2014 and before     RESULTS:    LUMBAR SPINE: L1-L4:   BMD 1 074 gm/cm2   T-score 0 2   Z-score 1 5     LEFT TOTAL HIP:   BMD 1 089 gm/cm2   T-score 1 2   Z-score 2 1     LEFT FEMORAL NECK:   BMD 0 793 gm/cm2   T-score -0 5   Z-score 0 7             IMPRESSION:   1  Based on the [facility] classification, this study is normal and the patient is considered at low risk for fracture  2  Since the prior study, there has been a significant decrease in BMD in the lumbar spine of 0 036 Gm/cm2 or 3 2%, with no significant change in the left hip  This change exceeds our own least significant change and, therefore, is statistically    significant within 95% confidence level     3  A daily intake of calcium of at least 1200 mg and vitamin D, 800-1000 IU, as well as weight bearing and muscle strengthening exercise, fall prevention and avoidance of tobacco and excessive alcohol intake  4  Repeat DXA in 18 - 24 months, on the same machine, as clinically indicated  The 10 year risk of hip fracture is 0 2%, with the 10 year risk of major osteoporotic fracture being 5 5%, as calculated by the [facility] fracture risk assessment tool (FRAX)  The current NOF guidelines recommend treating patients with FRAX 10 year risk score   of >3% for hip fracture and >20% for major osteoporotic fracture        WHO CLASSIFICATION:   Normal (a T-score of -1 0 or higher)   Low bone mineral density (a T-score of less than -1 0 but higher than -2 5)   Osteoporosis (a T-score of -2 5 or less)   Severe osteoporosis (a T-score of -2 5 or less with a fragility fracture)             Workstation performed: [id number]     Signed by:   Keith Nunn MD   10/12/16       Signatures   Electronically signed by : Pepe Paul DO; Oct 12 2016  8:23PM EST                       (Author)

## 2018-01-16 NOTE — PROGRESS NOTES
Assessment    1  Primary osteoarthritis of left knee (715 16) (M17 12)     Osteoarthritis left knee that requires ongoing viscoelastic supplementation it is advised accepted and administered as outlined above I would welcome the opportunity see her next week for injection 3 if 3 over the visit to the left knee     Plan  Osteoarthritis of knee    · OrthoVisc 30 MG/2ML Intra-articular Solution Prefilled Syringe  Primary osteoarthritis of left knee    · Follow-up visit in 1 week Evaluation and Treatment  Follow-up  Status: Hold For -  Scheduling  Requested for: 25ATL1408    Chief Complaint    1  Knee Pain    Post-Op  HPI: 57-year-old female presents for injection 2 of 3 of Orthofix to the left knee      Active Problems    1  Abnormal blood chemistry (790 6) (R79 9)   2  Abnormal electrocardiogram (794 31) (R94 31)   3  Acute bronchitis (466 0) (J20 9)   4  Acute URI (465 9) (J06 9)   5  Aftercare following joint replacement surgery (V54 81) (Z47 1)   6  Aftercare following joint replacement surgery (V54 81) (Z47 1)   7  Aftercare following joint replacement surgery (V54 81) (Z47 1)   8  Anxiety (300 00) (F41 9)   9  Arthralgia of hip, left (719 45) (M25 552)   10  Atrophic vaginitis (627 3) (N95 2)   11  Benign essential hypertension (401 1) (I10)   12  Bursitis of knee (726 60) (M70 50)   13  Bursitis of left hip (726 5) (M70 72)   14  Bursitis of left knee (726 60) (M70 52)   15  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)   16  Constipation (564 00) (K59 00)   17  Depression (311) (F32 9)   18  Edema (782 3) (R60 9)   19  Encounter for routine gynecological examination (V72 31) (Z01 419)   20  Encounter for screening mammogram for malignant neoplasm of breast (V76 12)    (Z12 31)   21  Epidermal inclusion cyst (706 2) (L72 0)   22  Esophageal reflux (530 81) (K21 9)   23  Facet arthritis of lumbosacral region (721 3) (M47 817)   24  Hypokalemia (276 8) (E87 6)   25  Hypothyroidism (244 9) (E03 9)   26  Iliotibial band tendinitis of left side (728 89) (M76 32)   27  Joint Pain In Both Knees (719 46)   28  Left knee pain (719 46) (M25 562)   29  Left knee pain (719 46) (M25 562)   30  Low gammaglobulin level (279 00) (D80 1)   31  L-S radiculopathy (724 4) (M54 17)   32  Need for prophylactic vaccination and inoculation against influenza (V04 81) (Z23)   33  Need for shingles vaccine (V04 89) (Z23)   34  Obesity (278 00) (E66 9)   35  Osteoarthritis of knee (715 36) (M17 9)   36  Other bursitis of left knee (726 60) (M70 52)   37  Pap smear, as part of routine gynecological examination (V76 2) (Z12 4)   38  Primary localized osteoarthrosis of left hip (715 15) (M16 12)   39  Primary osteoarthritis of left knee (715 16) (M17 12)   40  Primary osteoarthritis of left knee (715 16) (M17 12)   41  Primary osteoarthritis of right knee (715 16) (M17 11)   42  Right knee pain (719 46) (M25 561)   43  Right knee pain (719 46) (M25 561)   44  Sinusitis (473 9) (J32 9)   45  Thrombocytopenia (287 5) (D69 6)   46  Urinary tract infection (599 0) (N39 0)   47  Visit for pre-operative examination (V72 84) (Z01 818)   48  Visit for screening mammogram (V76 12) (Z12 31)    Social History    · Caffeine Use   · Daily Tea Consumption (___ Cups/Day)   · Denied: History of Home Environment Domestic Violence   · Never A Smoker   · Never Drank Alcohol    Current Meds   1  Bromfed DM 30-2-10 MG/5ML Oral Syrup; TAKE 5 ML EVERY 4 TO 6 HOURS AS   NEEDED; Therapy: 79DJU3067 to (Evaluate:11Jan2016)  Requested for: 73KQE7424; Last   Rx:07Jan2016 Ordered   2  Estrace 0 1 MG/GM Vaginal Cream; APPLY A PEA-SIZED AMOUNT TO VAGINAL   OPENING EVERY MONDAY, WEDNESDAY, AND FRIDAY EVENING; Therapy: 57Ocn0179 to (Evaluate:20Oct2015) Recorded   3  Guaifenesin-Codeine 100-10 MG/5ML Oral Syrup; TAKE 5 ML DAILY AT BEDTIME; Therapy: 65VOL3130 to (Evaluate:12Jan2016); Last YH:01SZY7224 Ordered   4   Klor-Con 10 10 MEQ Oral Tablet Extended Release; TAKE 1 TABLET TWICE DAILY; Therapy: 85IWI2700 to (Evaluate:18Jun2016)  Requested for: 73PTI1244; Last   Rx:04Kbl0745 Ordered   5  Levothyroxine Sodium 75 MCG Oral Tablet (Synthroid); Take 1 tablet daily as directed; Therapy: 32SWI8809 to (Silvestre Aba)  Requested for: 13Uig2479; Last   Rx:55Jvi8089 Ordered   6  Metamucil 0 52 GM Oral Capsule; TAKE 1 CAPSULE Daily; Therapy: (Recorded:20Nov2014) to Recorded   7  Omeprazole 20 MG Oral Capsule Delayed Release (PriLOSEC); take 1 capsule daily; Therapy: 17YHM1461 to (June Feast)  Requested for: 55NUB3508; Last   Rx:88Rcz5449 Ordered   8  Sertraline HCl - 100 MG Oral Tablet; Take 1 tablet daily as directed; Therapy: 88HHH8865 to (Suni Clark)  Requested for: 85JGS1872; Last   Rx:30Nov2015 Ordered   9  Triamcinolone Acetonide 0 1 % External Cream;   Therapy: 36YHU8965 to (AGWMJHQ:55HFU8788) Recorded   10  Triamterene-HCTZ 37 5-25 MG Oral Capsule; Take 1 capsule daily as needed; Therapy: 59TTK3488 to (Suni Clark)  Requested for: 99NCF2846; Last    Rx:30Nov2015 Ordered   11  Zoster (Zostavax) (Zoster (Zostavax)); as directed; Therapy: 83Szp6265 to (Last Rx:06Dhk0089) Ordered    Allergies    1  Penicillins   2  Flagyl CAPS   3  Sulfa Drugs    4  No Known Environmental Allergies   5  No Known Food Allergies    Vitals   Recorded: 34RIF1618 08:34AM   Heart Rate 79   Systolic 017   Diastolic 73   Height 5 ft 2 in   Weight 215 lb 4 00 oz   BMI Calculated 39 37   BSA Calculated 1 97     Physical Exam   Left knee in varus is no effusion there is tenderness to palpation along the medial joint line there is crepitation with motion        Procedure    Procedure: Injection of the left knee joint  Indication:  Joint pain and Osteoarthritis  Risk, benefits and alternatives were discussed with the patient  Verbal consent was obtained prior to the procedure  Alcohol was used to prep the area  ethyl chloride spray was used as a topical anesthetic  Using sterile technique, the aspiration/injection needle was then directed from a Anteromedial aspect  A 22-gauge was used to inject 2mL Orthovisc   A bandage was applied  the patient tolerated the procedure well  Complications: none  Follow-up in the office in 1 week(s)  Future Appointments    Date/Time Provider Specialty Site   02/05/2016 08:45 AM LUIS Kelly  Orthopedic Surgery [facility]   05/13/2016 08:30 AM LUIS Kelly  Orthopedic Surgery [facility]   08/12/2016 08:30 AM LUIS Kelly  Orthopedic Surgery [facility]   08/19/2016 08:30 AM LUIS Kelly  Orthopedic Surgery [facility]   08/26/2016 08:30 AM LUIS Kelly   Orthopedic Surgery [facility]   02/15/2016 08:00 AM Bernardo Navarro DO Internal Medicine [facility]     Signatures   Electronically signed by : LUIS Holley ; Jan 29 2016  8:47AM EST                       (Author)

## 2018-01-16 NOTE — RESULT NOTES
Message   Notify the patient normal TSH follow up as scheduled        Verified Results  (1) TSH [id number] 07:15AM Rupa Mcrae     Test Name Result Flag Reference   TSH 1 750 uIU/mL  0 358-3 740   This bloodwork is non-fasting  Please drink two glasses of water morning of  bloodwork  Patients undergoing fluorescein dye angiography may retain small amounts of fluorescein in the body for 48-72 hours post procedure  Samples containing fluorescein can produce falsely depressed TSH values  If the patient had this procedure,a specimen should be resubmitted post fluorescein clearance            The recommended reference ranges for TSH during pregnancy are as follows:  First trimester 0 1 to 2 5 uIU/mL  Second trimester  0 2 to 3 0 uIU/mL  Third trimester 0 3 to 3 0 uIU/m       Signatures   Electronically signed by : Milan Oglesby DO; May 24 2017 10:10PM EST                       (Author)

## 2018-01-17 NOTE — RESULT NOTES
Message      notify the patient normal lipid level f/u as scheduled        Verified Results  (1) LIPID PANEL, FASTING [id number] 07:00AM Moe SCHAFER Order Number: [id number]     Test Name Result Flag Reference   CHOLESTEROL 189 mg/dL     HDL,DIRECT 56 mg/dL  40-60   Specimen collection should occur prior to Metamizole administration due to the potential for falsely depressed results  LDL CHOLESTEROL CALCULATED 109 mg/dL H 0-100   - Patient Instructions: This is a fasting blood test  Water,black tea or black  coffee only after 9:00pm the night before test   Drink 2 glasses of water the morning of test     - Patient Instructions: This is a fasting blood test  Water,black tea or black  coffee only after 9:00pm the night before test Drink 2 glasses of water the morning of test   Triglyceride:         Normal              <150 mg/dl       Borderline High    150-199 mg/dl       High               200-499 mg/dl       Very High          >499 mg/dl  Cholesterol:         Desirable        <200 mg/dl      Borderline High  200-239 mg/dl      High             >239 mg/dl  HDL Cholesterol:        High    >59 mg/dL      Low     <41 mg/dL  LDL CALCULATED:    This screening LDL is a calculated result  It does not have the accuracy of the Direct Measured LDL in the monitoring of patients with hyperlipidemia and/or statin therapy  Direct Measure LDL (WCA237) must be ordered separately in these patients  TRIGLYCERIDES 119 mg/dL  <=150   Specimen collection should occur prior to N-Acetylcysteine or Metamizole administration due to the potential for falsely depressed results         Signatures   Electronically signed by : Candy George DO; Oct  6 2016  7:07PM EST                       (Author)

## 2018-01-17 NOTE — RESULT NOTES
Message   Pamela Leader notify the patient which is mild recheck a hemoglobin level in 2 weeks also normal potassium level please have the patient follow up as scheduled        Verified Results  (1) HGB AND HCT, BLOOD [id number] 10:50AM Martha Salazar     Test Name Result Flag Reference   HEMATOCRIT 45 0 %  34 8-46 1   HEMOGLOBIN 15 8 g/dL H 11 5-15 4     (1) POTASSIUM 19UDI5189 10:50AM Martha Salazar     Test Name Result Flag Reference   POTASSIUM 3 8 mmol/L  3 5-5 3       Signatures   Electronically signed by : Jaron Wheatley DO; Lew 10 2017  7:17PM EST                       (Author)

## 2018-01-17 NOTE — RESULT NOTES
Message   Notify the patient normal labs except a mild elevation of blood sugar in the prediabetic range and also a mild elevation of the cholesterol level please have the patient follow a healthy balanced diet reducing carbohydrates and sweets and follow-up as scheduled        Verified Results  (1) COMPREHENSIVE METABOLIC PANEL 24PKD4070 95:49MO Dominga SCHAFER Order Number: [id number]      National Kidney Disease Education Program recommendations are as follows:  GFR calculation is accurate only with a steady state creatinine  Chronic Kidney disease less than 60 ml/min/1 73 sq  meters  Kidney failure less than 15 ml/min/1 73 sq  meters       Test Name Result Flag Reference   GLUCOSE,RANDM 112 mg/dL     SODIUM 139 mmol/L  136-145   POTASSIUM 3 5 mmol/L  3 5-5 3   CHLORIDE 104 mmol/L  100-108   CARBON DIOXIDE 29 mmol/L  21-32   ANION GAP (CALC) 6 mmol/L  4-13   BLOOD UREA NITROGEN 16 mg/dL  5-25   CREATININE 0 91 mg/dL  0 60-1 30   Standardized to IDMS reference method   CALCIUM 8 6 mg/dL  8 3-10 1   BILI, TOTAL 0 62 mg/dL  0 20-1 00   ALK PHOSPHATAS 66 U/L     ALT (SGPT) 29 U/L  12-78   AST(SGOT) 17 U/L  5-45   ALBUMIN 4 0 g/dL  3 5-5 0   TOTAL PROTEIN 6 6 g/dL  6 4-8 2   eGFR Non-African American      >60 0 ml/min/1 73sq m     (1) LIPID PANEL, FASTING [id number] 06:48AM José Jeanette Order Number: [id number]      Triglyceride:         Normal              <150 mg/dl       Borderline High    150-199 mg/dl       High               200-499 mg/dl       Very High          >499 mg/dl  Cholesterol:         Desirable        <200 mg/dl      Borderline High  200-239 mg/dl      High             >239 mg/dl  HDL Cholesterol:        High    >59 mg/dL      Low     <41 mg/dL     Test Name Result Flag Reference   CHOLESTEROL 212 mg/dL H    HDL,DIRECT 56 mg/dL  40-60   LDL CHOLESTEROL CALCULATED 132 mg/dL H 0-100   TRIGLYCERIDES 120 mg/dL  <=150     (1) CBC/ PLT (NO DIFF) [id number] 06:48AM Komal Lopez Order Number: [id number]     Order Number: [id number]     Test Name Result Flag Reference   HEMATOCRIT 44 0 %  34 8-46 1   HEMOGLOBIN 15 1 g/dL  11 5-15 4   MCHC 34 3 g/dL  31 4-37 4   MCH 29 0 pg  26 8-34 3   MCV 85 fL  82-98   PLATELET COUNT 458 Thousands/uL  149-390   RBC COUNT 5 21 Million/uL H 3 81-5 12   RDW 13 8 %  11 6-15 1   WBC COUNT 5 72 Thousand/uL  4 31-10 16   MPV 12 5 fL  8 9-12 7     (1) TSH [id number] 06:48AM Leticia Abby   Patients undergoing fluorescein dye angiography may retain small amounts of fluorescein in the body for 48-72 hours post procedure  Samples containing fluorescein can produce falsely depressed TSH values  If the patient had this procedure,a specimen should be resubmitted post fluorescein clearance          The recommended reference ranges for TSH during pregnancy are as follows:  First trimester 0 1 to 2 5 uIU/mL  Second trimester  0 2 to 3 0 uIU/mL  Third trimester 0 3 to 3 0 uIU/m     Test Name Result Flag Reference   TSH 1 350 uIU/mL  0 358-3 740       Signatures   Electronically signed by : Nadiya Linn DO; Mar 11 2016  1:11PM EST                       (Author)

## 2018-01-18 NOTE — PROGRESS NOTES
Assessment    1  Acute bronchitis (466 0) (J20 9)    Plan  Acute bronchitis    · Azithromycin 250 MG Oral Tablet (Zithromax Z-Kurtis); TAKE 2 TABLETS ON DAY 1  THEN TAKE 1 TABLET A DAY FOR 4 DAYS   · Promethazine HCl - 6 25 MG/5ML Oral Solution; Take 5 mL every 4-6 hours as  needed for cough  Home use only    Discussion/Summary  Discussion Summary:   Take antibiotic as directed  Recommend daily probiotic or eating yogurt with live culture while on antibiotic  May continue plain Mucinex or Mucinex D 1/2 to 1 tablet twice a day with full glass of fluid  May use cough syrup at bedtime as needed  Rest  Follow with family doctor if not improving over the next 3-5 days  Medication Side Effects Reviewed: Possible side effects of new medications were reviewed with the patient/guardian today  Understands and agrees with treatment plan: The treatment plan was reviewed with the patient/guardian  The patient/guardian understands and agrees with the treatment plan   Counseling Documentation With Imm: The patient was counseled regarding instructions for management  Chief Complaint    1  Cough  Chief Complaint Free Text Note Form: c/o of cough that is keeping her up at [facility]- she is also having some wheezing at time denies any lung disease      History of Present Illness  HPI: 49-year-old female with persistent cough for the last 3-4 days  Worse at night  Unable to sleep  Tried over-the-counter relief without comfort  When she breathes she feels this in her chest  Denies any history of asthma or pneumonia  History of hypogammaglobulinemia  Review of Systems  Focused-Female:   Constitutional: No fever, no chills, feels well, no tiredness, no recent weight gain or loss  ENT: Throat clearing, but no ear ache, no loss of hearing, no nosebleeds or nasal discharge, no sore throat or hoarseness     Cardiovascular: no complaints of slow or fast heart rate, no chest pain, no palpitations, no leg claudication or lower extremity edema  Respiratory: cough and wheezing, but no complaints of shortness of breath, no wheezing, no dyspnea on exertion, no orthopnea or PND  Active Problems    1  Abnormal blood chemistry (790 6) (R79 9)   2  Acute medial meniscal tear, left, initial encounter (836 0) (S83 242A)   3  Aftercare following left knee joint replacement surgery (V54 81,V43 65) (Z47 1,Z96 652)   4  Anemia (285 9) (D64 9)   5  Anxiety (300 00) (F41 9)   6  Arthralgia of hip, left (719 45) (M25 552)   7  Atrophic vaginitis (627 3) (N95 2)   8  Benign essential hypertension (401 1) (I10)   9  Bursitis of knee (726 60) (M70 50)   10  Bursitis of left hip (726 5) (M70 72)   11  Bursitis of left knee (726 60) (M70 52)   12  Constipation (564 00) (K59 00)   13  Depression (311) (F32 9)   14  Diarrhea (787 91) (R19 7)   15  Eczema (692 9) (L30 9)   16  Edema (782 3) (R60 9)   17  Encounter for other screening for malignant neoplasm of breast (V76 19) (Z12 39)   18  Encounter for routine gynecological examination (V72 31) (Z01 419)   19  Epidermal inclusion cyst (706 2) (L72 0)   20  Esophageal reflux (530 81) (K21 9)   21  Facet arthritis of lumbosacral region (721 3) (M46 97)   22  Hypokalemia (276 8) (E87 6)   23  Hypothyroidism (244 9) (E03 9)   24  Iliotibial band syndrome of left side (728 89) (M76 32)   25  Iliotibial band tendinitis of left side (728 89) (M76 32)   26  Joint Pain In Both Knees (719 46)   27  Knee pain (719 46) (M25 569)   28  Lateral joint line tenderness of knee (719 46) (M25 569)   29  Left knee pain (719 46) (M25 562)   30  Left low back pain (724 2) (M54 5)   31  Low gammaglobulin level (279 00) (D80 1)   32  L-S radiculopathy (724 4) (M54 17)   33  Need for influenza vaccination (V04 81) (Z23)   34  Need for shingles vaccine (V04 89) (Z23)   35  Obesity (278 00) (E66 9)   36  Osteoarthritis of knee (715 36) (M17 10)   37  Osteoporosis screening (V82 81) (Z13 820)   38   Pain of left calf (729 5) (M79 662)   39  Pap smear, as part of routine gynecological examination (V76 2) (Z01 419)   40  Popliteal pain (729 5) (M79 609)   41  Preop cardiovascular exam (V72 81) (Z01 810)   42  Preop examination (V72 84) (Z01 818)   43  Primary localized osteoarthrosis of left hip (715 15) (M16 12)   44  Primary osteoarthritis of left knee (715 16) (M17 12)   45  Primary osteoarthritis of right knee (715 16) (M17 11)   46  Right knee pain (719 46) (M25 561)   47  Status post total left knee replacement (V43 65) (Z96 652)   48  Thrombocytopenia (287 5) (D69 6)   49  Thyroid disease (246 9) (E07 9)   50  Vaginal dryness, menopausal (627 2) (N95 1)   51  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)   52  Visit for screening mammogram (V76 12) (Z12 31)    Past Medical History    1  History of Abnormal electrocardiogram (794 31) (R94 31)   2  History of Acute bacterial conjunctivitis (372 03) (H10 30)   3  History of Acute URI (465 9) (J06 9)   4  History of Cough (786 2) (R05)   5  History of Encounter for routine gynecological examination (V72 31) (Z01 419)   6  History of Encounter for screening mammogram for malignant neoplasm of breast   (V76 12) (Z12 31)   7  History of Encounter for screening mammogram for malignant neoplasm of breast   (V76 12) (Z12 31)   8  History of acute bronchitis (V12 69) (Z87 09)   9  History of diverticulitis of colon (V12 79) (Z87 19)   10  History of sinusitis (V12 69) (Z87 09)   11  History of streptococcal pharyngitis (V12 09) (Z87 09)   12  History of urinary tract infection (V13 02) (Z87 440)   13  History of Need for influenza vaccination (V04 81) (Z23)   14  History of Other bursitis of left knee (726 60) (M70 52)   15  History of Right knee pain (719 46) (M25 561)   16  History of Upper respiratory infection with cough and congestion (465 9) (J06 9)   17  History of Urinary Tract Infection (V13 02)   18   History of Visit for pre-operative examination (V72 84) (C31 923)  Active Problems And Past Medical History Reviewed: The active problems and past medical history were reviewed and updated today  Family History  Sister    1  Family history of Breast Cancer (V16 3)  Paternal Grandmother    2  Family history of Breast Cancer (V16 3)  Family History    3  Family history of Arthritis (V17 7)   4  Family history of Breast Cancer (V16 3)   5  Family history of hypertension (V17 49) (Z82 49)   6  Family history of malignant neoplasm (V16 9) (Z80 9)  Family History Reviewed: The family history was reviewed and updated today  Social History    · Caffeine Use   · Daily Tea Consumption (___ Cups/Day)   · Denied: History of Home Environment Domestic Violence   · Never a smoker   · Never Drank Alcohol   · Non-smoker (V49 89) (Z78 9)  Social History Reviewed: The social history was reviewed and updated today  Surgical History    1  History of Appendectomy   2  History of Biopsy Vulvar   3  History of Knee Replacement   4  History of Neuroplasty Decompression Median Nerve At Carpal Tunnel   5  History of Oral Surgery Tooth Extraction   6  History of Partial Colectomy   7  History of Tonsillectomy With Adenoidectomy    Current Meds   1  Levothyroxine Sodium 75 MCG Oral Tablet; Take 1 tablet daily as directed; Therapy: 09JEA3919 to (ANSIUZRE:63VXS3294)  Requested for: 97ENN7243; Last   Rx:18Jan2017 Ordered   2  Protonix 40 MG Oral Tablet Delayed Release; Therapy: 91NRV6701 to Recorded   3  Sertraline HCl - 50 MG Oral Tablet; Take 1 tablet by mouth  daily; Therapy: 36FGD7493 to (Evaluate:07Jan2018)  Requested for: 12Apr2017; Last   Rx:12Apr2017 Ordered   4  Triamterene-HCTZ 37 5-25 MG Oral Capsule; Take 1 capsule daily as needed; Therapy: 77KET2274 to (Evaluate:96Zgm5385)  Requested for: 93WMS9008; Last   Rx:18Jan2017 Ordered  Medication List Reviewed: The medication list was reviewed and updated today  Allergies    1  Penicillins   2  Flagyl CAPS   3  Sulfa Drugs    4  No Known Environmental Allergies   5  No Known Food Allergies    Vitals  Signs   Recorded: 78CBD6066 12:31PM   Temperature: 98 F  Heart Rate: 66  Respiration: 16  Systolic: 146  Diastolic: 66  Height: 5 ft 2 5 in  Weight: 210 lb   BMI Calculated: 37 8  BSA Calculated: 1 96  O2 Saturation: 94  Pain Scale: 5    Physical Exam    Constitutional Well-developed well-nourished female appears mildly ill with occasional cough  Eyes   Conjunctiva and lids: No swelling, erythema or discharge  Pupils and irises: Equal, round and reactive to light  Ears, Nose, Mouth, and Throat   External inspection of ears and nose: Normal     Otoscopic examination: Tympanic membranes translucent with normal light reflex  Canals patent without erythema  Nasal mucosa, septum, and turbinates: Normal without edema or erythema  cobblestoning posterior pharynx  Pulmonary   Respiratory effort: No increased work of breathing or signs of respiratory distress  no wheezes rales or rhonchi but cough with deep breath  Cardiovascular   Palpation of heart: Normal PMI, no thrills  Auscultation of heart: Normal rate and rhythm, normal S1 and S2, without murmurs  Psychiatric   Orientation to person, place, and time: Normal     Mood and affect: Normal        Future Appointments    Date/Time Provider Specialty Site   02/02/2018 08:30 AM LUIS Steinberg  Orthopedic Surgery [facility] INPATIENT [facility]   04/16/2018 08:00 AM Evelyn Pruett DO Internal Medicine [facility]     Signatures   Electronically signed by :  Martha Daniels [facility]; Oct 29 2017  1:17PM EST                       (Author)

## 2018-01-18 NOTE — RESULT NOTES
Message   notify the pt normal TSH f/u as scheduled        Verified Results  (1) TSH [id number] 07:00AM Radha Ty     Test Name Result Flag Reference   TSH 1 300 uIU/mL  0 358-3 740   - Patient Instructions: This is a fasting blood test  Water,black tea or black  coffee only after 9:00pm the night before test Drink 2 glasses of water the morning of test   Patients undergoing fluorescein dye angiography may retain small amounts of fluorescein in the body for 48-72 hours post procedure  Samples containing fluorescein can produce falsely depressed TSH values  If the patient had this procedure,a specimen should be resubmitted post fluorescein clearance            The recommended reference ranges for TSH during pregnancy are as follows:  First trimester 0 1 to 2 5 uIU/mL  Second trimester  0 2 to 3 0 uIU/mL  Third trimester 0 3 to 3 0 uIU/m       Signatures   Electronically signed by : Wicho Rose DO; Oct  9 2016 11:23AM EST                       (Author)

## 2018-01-22 VITALS
WEIGHT: 210.04 LBS | RESPIRATION RATE: 18 BRPM | DIASTOLIC BLOOD PRESSURE: 70 MMHG | SYSTOLIC BLOOD PRESSURE: 122 MMHG | BODY MASS INDEX: 37.21 KG/M2 | HEART RATE: 68 BPM | OXYGEN SATURATION: 98 % | HEIGHT: 63 IN

## 2018-02-16 ENCOUNTER — OFFICE VISIT (OUTPATIENT)
Dept: OBGYN CLINIC | Facility: MEDICAL CENTER | Age: 58
End: 2018-02-16
Payer: MEDICARE

## 2018-02-16 ENCOUNTER — APPOINTMENT (OUTPATIENT)
Dept: RADIOLOGY | Facility: MEDICAL CENTER | Age: 58
End: 2018-02-16
Payer: MEDICARE

## 2018-02-16 VITALS
DIASTOLIC BLOOD PRESSURE: 66 MMHG | SYSTOLIC BLOOD PRESSURE: 98 MMHG | HEIGHT: 63 IN | BODY MASS INDEX: 37.21 KG/M2 | WEIGHT: 210 LBS | HEART RATE: 83 BPM

## 2018-02-16 DIAGNOSIS — Z96.652 STATUS POST LEFT KNEE REPLACEMENT: ICD-10-CM

## 2018-02-16 DIAGNOSIS — Z48.89 AFTERCARE FOLLOWING SURGERY: Primary | ICD-10-CM

## 2018-02-16 PROCEDURE — 99213 OFFICE O/P EST LOW 20 MIN: CPT | Performed by: ORTHOPAEDIC SURGERY

## 2018-02-16 PROCEDURE — 73560 X-RAY EXAM OF KNEE 1 OR 2: CPT

## 2018-02-16 NOTE — PROGRESS NOTES
Assessment/Plan:    S/P knee replacement  68-year-old woman 1 year status post left total knee  She is doing very well  She is resuming all of her activities of daily life without any issues  She is overall very happy with the result  Discussed with the patient with regards to need of antibiotics prior to dental work  Plan is as follows:    Weightbearing as tolerated left lower extremity  Follow-up p r n  Problem List Items Addressed This Visit        Other    S/P knee replacement     68-year-old woman 1 year status post left total knee  She is doing very well  She is resuming all of her activities of daily life without any issues  She is overall very happy with the result  Discussed with the patient with regards to need of antibiotics prior to dental work  Plan is as follows:    Weightbearing as tolerated left lower extremity  Follow-up p r n  Other Visit Diagnoses     Aftercare following surgery    -  Primary    Relevant Orders    XR knee 1 or 2 vw left            Subjective:      Patient ID: Marsha Long is a 59 y o  female  68-year-old woman 1 year status post left total knee arthroplasty  She is doing very well  She offers no complaints  She has returned to doing all of her activities of daily living  She has no limitations  She denies any numbness or tingling  The following portions of the patient's history were reviewed and updated as appropriate: allergies, current medications, past family history, past medical history, past social history, past surgical history and problem list     Review of Systems   Constitutional: Negative  HENT: Negative  Eyes: Negative  Respiratory: Negative  Cardiovascular: Negative  Gastrointestinal: Negative  Endocrine: Negative  Genitourinary: Negative  Musculoskeletal: Negative  Skin: Negative  Allergic/Immunologic: Negative  Neurological: Negative  Hematological: Negative  Psychiatric/Behavioral: Negative  Objective:    Vitals:    02/16/18 0905   BP: 98/66   Pulse: 83        Physical Exam   Constitutional: She is oriented to person, place, and time  She appears well-developed and well-nourished  No distress  HENT:   Head: Normocephalic and atraumatic  Eyes: Conjunctivae are normal  Right eye exhibits no discharge  Left eye exhibits no discharge  No scleral icterus  Neck: Normal range of motion  Cardiovascular: Intact distal pulses  Pulmonary/Chest: Effort normal    Musculoskeletal: She exhibits no tenderness or deformity  Neurological: She is alert and oriented to person, place, and time  Left lower extremity:  Incision is well-healed  There is no erythema  Stable to varus and valgus stress at 0° and 30° of flexion at knee  Sensation is intact L2 through S1  Motor intact L2 through S1  Toes are warm and well perfused   Skin: Skin is warm and dry  She is not diaphoretic  Psychiatric: She has a normal mood and affect  Vitals reviewed  X-rays left knee show appropriate implant status post total knee  There is no evidence of barrington implant lucency

## 2018-02-16 NOTE — ASSESSMENT & PLAN NOTE
78-year-old woman 1 year status post left total knee  She is doing very well  She is resuming all of her activities of daily life without any issues  She is overall very happy with the result  Discussed with the patient with regards to need of antibiotics prior to dental work  Plan is as follows:    Weightbearing as tolerated left lower extremity  Follow-up p r n

## 2018-03-22 DIAGNOSIS — E03.9 HYPOTHYROIDISM, UNSPECIFIED TYPE: Primary | ICD-10-CM

## 2018-03-22 DIAGNOSIS — I10 HYPERTENSION, UNSPECIFIED TYPE: Primary | ICD-10-CM

## 2018-03-22 RX ORDER — TRIAMTERENE AND HYDROCHLOROTHIAZIDE 37.5; 25 MG/1; MG/1
CAPSULE ORAL
Qty: 90 CAPSULE | Refills: 1 | Status: SHIPPED | OUTPATIENT
Start: 2018-03-22 | End: 2018-06-19 | Stop reason: SDUPTHER

## 2018-03-22 RX ORDER — LEVOTHYROXINE SODIUM 0.07 MG/1
TABLET ORAL
Qty: 90 TABLET | Refills: 3 | OUTPATIENT
Start: 2018-03-22

## 2018-03-22 RX ORDER — POTASSIUM CHLORIDE 750 MG/1
TABLET, EXTENDED RELEASE ORAL
Qty: 180 TABLET | Refills: 1 | Status: SHIPPED | OUTPATIENT
Start: 2018-03-22 | End: 2018-04-16

## 2018-03-22 RX ORDER — LEVOTHYROXINE SODIUM 0.07 MG/1
75 TABLET ORAL
Qty: 30 TABLET | Refills: 2 | Status: SHIPPED | OUTPATIENT
Start: 2018-03-22 | End: 2018-04-02 | Stop reason: SDUPTHER

## 2018-03-29 DIAGNOSIS — E03.9 HYPOTHYROIDISM, UNSPECIFIED TYPE: ICD-10-CM

## 2018-03-29 RX ORDER — LEVOTHYROXINE SODIUM 0.07 MG/1
TABLET ORAL
Qty: 90 TABLET | Refills: 3 | OUTPATIENT
Start: 2018-03-29

## 2018-04-02 DIAGNOSIS — E03.9 HYPOTHYROIDISM, UNSPECIFIED TYPE: ICD-10-CM

## 2018-04-02 RX ORDER — LEVOTHYROXINE SODIUM 0.07 MG/1
75 TABLET ORAL
Qty: 90 TABLET | Refills: 2 | Status: SHIPPED | OUTPATIENT
Start: 2018-04-02 | End: 2018-09-18 | Stop reason: SDUPTHER

## 2018-04-16 ENCOUNTER — OFFICE VISIT (OUTPATIENT)
Dept: INTERNAL MEDICINE CLINIC | Facility: CLINIC | Age: 58
End: 2018-04-16
Payer: MEDICARE

## 2018-04-16 ENCOUNTER — LAB REQUISITION (OUTPATIENT)
Dept: LAB | Facility: HOSPITAL | Age: 58
End: 2018-04-16
Payer: MEDICARE

## 2018-04-16 VITALS
OXYGEN SATURATION: 97 % | SYSTOLIC BLOOD PRESSURE: 116 MMHG | HEIGHT: 63 IN | DIASTOLIC BLOOD PRESSURE: 82 MMHG | RESPIRATION RATE: 16 BRPM | WEIGHT: 214 LBS | BODY MASS INDEX: 37.92 KG/M2 | HEART RATE: 82 BPM

## 2018-04-16 DIAGNOSIS — Z96.652 STATUS POST LEFT KNEE REPLACEMENT: ICD-10-CM

## 2018-04-16 DIAGNOSIS — I10 HYPERTENSION, UNSPECIFIED TYPE: ICD-10-CM

## 2018-04-16 DIAGNOSIS — R73.9 ELEVATED BLOOD SUGAR LEVEL: ICD-10-CM

## 2018-04-16 DIAGNOSIS — B37.9 CANDIDIASIS: ICD-10-CM

## 2018-04-16 DIAGNOSIS — E03.9 HYPOTHYROIDISM, UNSPECIFIED TYPE: Primary | ICD-10-CM

## 2018-04-16 DIAGNOSIS — F41.9 ANXIETY: ICD-10-CM

## 2018-04-16 DIAGNOSIS — Z11.59 ENCOUNTER FOR HEPATITIS C SCREENING TEST FOR LOW RISK PATIENT: ICD-10-CM

## 2018-04-16 PROCEDURE — 87102 FUNGUS ISOLATION CULTURE: CPT | Performed by: PHYSICIAN ASSISTANT

## 2018-04-16 PROCEDURE — 99214 OFFICE O/P EST MOD 30 MIN: CPT | Performed by: INTERNAL MEDICINE

## 2018-04-16 PROCEDURE — 87070 CULTURE OTHR SPECIMN AEROBIC: CPT | Performed by: PHYSICIAN ASSISTANT

## 2018-04-16 PROCEDURE — 87205 SMEAR GRAM STAIN: CPT | Performed by: PHYSICIAN ASSISTANT

## 2018-04-16 NOTE — PROGRESS NOTES
Assessment/Plan:         Diagnoses and all orders for this visit:    Hypothyroidism, unspecified type  Comments:  Hypothyroidism controlled the patient is currently euthyroid I will be ordering a TSH   Orders:  -     TSH, 3rd generation; Future    Hypertension, unspecified type  Comments:  Hypertension - controlled, I have counseled patient following healthy balance diet, I would like the patient reduce sodium, exercise routinely  Orders:  -     Comprehensive metabolic panel; Future  -     HEMOGLOBIN A1C W/ EAG ESTIMATION; Future  -     Lipid Panel with Direct LDL reflex; Future    Status post left knee replacement  Comments:  Doing excellently patient plans to liberate her walking  Encounter for hepatitis C screening test for low risk patient  Comments:  I have counseled patient why we check hepatitis C antibody  Orders:  -     Hepatitis C antibody; Future    Elevated blood sugar level   Comments: Will check hemoglobin A1c and fasting blood sugar  Reviewed laboratories with the patient   Orders:  -     HEMOGLOBIN A1C W/ EAG ESTIMATION; Future    Anxiety  Comments:  Currently stable doing well on low-dose of Zoloft will continue    Other orders  -     Hm Colonoscopy        Return to office 6  months  call if any problems  Subjective:      Patient ID: Moe Washington is a 59 y o  female  HPI 62-year old female coming in for a follow up office visit regarding hypothyroidism, hypertension, status post knee replacement, elevated blood sugar, anxiety  The patient reports me compliant taking medications without untoward side effects the  The patient is here to review his medical condition, update me on the medical condition and the patient reports me no hospitalizations and no ER visits  She reports me doing these are doing excellently great range of motion she is now liberating her walking and plans to do more walking during the spring summer months    Patient does report me she will get a shingles vaccine at the pharmacy  Patient reports me she uses Mobic intermittently every 3 days for arthritic like pains of the shoulders and back which has been chronic  Overall she feels well  She is not due for colonoscopy till 2019    The following portions of the patient's history were reviewed and updated as appropriate: allergies, current medications, past family history, past medical history, past surgical history and problem list     Review of Systems   Constitutional: Negative for activity change, appetite change, chills, fever and unexpected weight change  HENT: Negative for hearing loss and postnasal drip  Eyes: Negative for pain  Respiratory: Negative for cough and shortness of breath  Cardiovascular: Negative for chest pain  Gastrointestinal: Negative for abdominal distention, abdominal pain, diarrhea, nausea and vomiting  Neurological: Negative for dizziness, light-headedness and headaches  Objective:      /82 (BP Location: Right arm, Patient Position: Sitting, Cuff Size: Large)   Pulse 82   Resp 16   Ht 5' 3" (1 6 m)   Wt 97 1 kg (214 lb)   SpO2 97%   BMI 37 91 kg/m²                       Return in about 6 months (around 10/16/2018)  Allergies   Allergen Reactions    Penicillins Anaphylaxis     Anaphylaxis  Other reaction(s): Anaphylaxis    Acetazolamide Itching and Swelling     With eye drops - takes  oral sulfa w/o side effects    Metronidazole GI Intolerance and Nausea Only    Morphine Other (See Comments)     hallucinations    Sulfa Antibiotics        Past Medical History:   Diagnosis Date    Abnormal electrocardiogram     Last assessed 10/04/13    Anemia     pt states hypogammaglobulinemia, needs washed RBCs if transfused - Rx by Dr Arash Fishman [facility])     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  - Last assessed 11/02/17    Disease of thyroid gland     Diverticulitis of colon     GERD (gastroesophageal reflux disease)     Hypertension     Hypoglobulinemia     Slow transit constipation      Past Surgical History:   Procedure Laterality Date    APPENDECTOMY      BOWEL RESECTION      CARPAL TUNNEL RELEASE      COLECTOMY      Resolved 08/27/09    JOINT REPLACEMENT Right     OTHER SURGICAL HISTORY      Biopsy Vulvar    [place] TOTAL KNEE ARTHROPLASTY Left 2/8/2017    Procedure: TOTAL KNEE REPLACEMENT ARTHROPLASTY;  Surgeon: Mike Santana MD;  Location: [facility];  Service: Orthopedics    REFRACTIVE SURGERY      REPLACEMENT TOTAL KNEE Right 10/2013    TONSILLECTOMY      With Adenoidectomy    TOOTH EXTRACTION         potassium chloride  Family History   Problem Relation Age of Onset    Breast cancer Sister     Breast cancer Family     Arthritis Family     Hypertension Family     Cancer Family     Breast cancer Paternal Grandmother      Social History     Social History    Marital status: /Civil Union     Spouse name: N/A    Number of children: N/A    Years of education: N/A     Occupational History    Not on file       Social History Main Topics    Smoking status: Never Smoker    Smokeless tobacco: Never Used    Alcohol use No    Drug use: No    Sexual activity: Not on file     Other Topics Concern    Not on file     Social History Narrative    Denied: Home environment Domestic Violence        Daily Tea consumption        Caffeine use     Vitals:    04/16/18 0756   BP: 116/82   BP Location: Right arm   Patient Position: Sitting   Cuff Size: Large   Pulse: 82   Resp: 16   SpO2: 97%   Weight: 97 1 kg (214 lb)   Height: 5' 3" (1 6 m)     Results for orders placed or performed in visit on 10/17/17   Lipid Panel with Direct LDL reflex   Result Value Ref Range    Cholesterol 187 50 - 200 mg/dL    Triglycerides 109 <=150 mg/dL    HDL, Direct 64 (H) 40 - 60 mg/dL    LDL Calculated 101 (H) 0 - 100 mg/dL     Weight (last 2 days)     Date/Time   Weight    04/16/18 0756  97 1 (214)            Body mass index is 37 91 kg/m²  /82 (04/16/18 0756)    Temp      Pulse 82 (04/16/18 0756)   Resp 16 (04/16/18 0756)    SpO2 97 % (04/16/18 0756)      Vitals:    04/16/18 0756   Weight: 97 1 kg (214 lb)     Vitals:    04/16/18 0756   Weight: 97 1 kg (214 lb)      Physical Exam   Constitutional: She appears well-developed and well-nourished  HENT:   Head: Normocephalic  Mouth/Throat: Oropharynx is clear and moist    Eyes: Conjunctivae are normal  Pupils are equal, round, and reactive to light  Right eye exhibits no discharge  Left eye exhibits no discharge  No scleral icterus  Neck: Neck supple  Cardiovascular: Normal rate, regular rhythm, normal heart sounds and intact distal pulses  Exam reveals no gallop and no friction rub  No murmur heard  Pulmonary/Chest: Breath sounds normal  No respiratory distress  She has no wheezes  She has no rales  Abdominal: Soft  Bowel sounds are normal  She exhibits no distension and no mass  There is no tenderness  There is no rebound and no guarding  Musculoskeletal: She exhibits no edema or deformity  Lymphadenopathy:     She has no cervical adenopathy  Neurological: She is alert   Coordination normal

## 2018-04-18 LAB
BACTERIA WND AEROBE CULT: NORMAL
GRAM STN SPEC: NORMAL

## 2018-05-03 ENCOUNTER — HOSPITAL ENCOUNTER (OUTPATIENT)
Dept: RADIOLOGY | Age: 58
Discharge: HOME | End: 2018-05-03
Payer: MEDICARE

## 2018-05-03 DIAGNOSIS — Z12.39 ENCOUNTER FOR OTHER SCREENING FOR MALIGNANT NEOPLASM OF BREAST: ICD-10-CM

## 2018-05-03 PROCEDURE — 77067 SCR MAMMO BI INCL CAD: CPT

## 2018-05-03 PROCEDURE — 77063 BREAST TOMOSYNTHESIS BI: CPT

## 2018-05-21 LAB — FUNGUS SPEC CULT: NORMAL

## 2018-06-06 ENCOUNTER — TRANSCRIBE ORDERS (OUTPATIENT)
Dept: ADMINISTRATIVE | Age: 58
End: 2018-06-06

## 2018-06-06 ENCOUNTER — APPOINTMENT (OUTPATIENT)
Dept: LAB | Age: 58
End: 2018-06-06
Payer: MEDICARE

## 2018-06-06 DIAGNOSIS — I10 HYPERTENSION, UNSPECIFIED TYPE: ICD-10-CM

## 2018-06-06 DIAGNOSIS — Z11.59 ENCOUNTER FOR HEPATITIS C SCREENING TEST FOR LOW RISK PATIENT: ICD-10-CM

## 2018-06-06 DIAGNOSIS — R73.9 ELEVATED BLOOD SUGAR LEVEL: ICD-10-CM

## 2018-06-06 DIAGNOSIS — E03.9 HYPOTHYROIDISM, UNSPECIFIED TYPE: ICD-10-CM

## 2018-06-06 LAB
ALBUMIN SERPL BCP-MCNC: 3.9 G/DL (ref 3.5–5)
ALP SERPL-CCNC: 61 U/L (ref 46–116)
ALT SERPL W P-5'-P-CCNC: 26 U/L (ref 12–78)
ANION GAP SERPL CALCULATED.3IONS-SCNC: 6 MMOL/L (ref 4–13)
AST SERPL W P-5'-P-CCNC: 17 U/L (ref 5–45)
BILIRUB SERPL-MCNC: 0.7 MG/DL (ref 0.2–1)
BUN SERPL-MCNC: 20 MG/DL (ref 5–25)
CALCIUM SERPL-MCNC: 9.1 MG/DL (ref 8.3–10.1)
CHLORIDE SERPL-SCNC: 100 MMOL/L (ref 100–108)
CHOLEST SERPL-MCNC: 173 MG/DL (ref 50–200)
CO2 SERPL-SCNC: 32 MMOL/L (ref 21–32)
CREAT SERPL-MCNC: 0.87 MG/DL (ref 0.6–1.3)
EST. AVERAGE GLUCOSE BLD GHB EST-MCNC: 120 MG/DL
GFR SERPL CREATININE-BSD FRML MDRD: 73 ML/MIN/1.73SQ M
GLUCOSE P FAST SERPL-MCNC: 93 MG/DL (ref 65–99)
HBA1C MFR BLD: 5.8 % (ref 4.2–6.3)
HCV AB SER QL: NORMAL
HDLC SERPL-MCNC: 52 MG/DL (ref 40–60)
LDLC SERPL CALC-MCNC: 97 MG/DL (ref 0–100)
POTASSIUM SERPL-SCNC: 3.4 MMOL/L (ref 3.5–5.3)
PROT SERPL-MCNC: 6.9 G/DL (ref 6.4–8.2)
SODIUM SERPL-SCNC: 138 MMOL/L (ref 136–145)
TRIGL SERPL-MCNC: 118 MG/DL
TSH SERPL DL<=0.05 MIU/L-ACNC: 1.11 UIU/ML (ref 0.36–3.74)

## 2018-06-06 PROCEDURE — 86803 HEPATITIS C AB TEST: CPT

## 2018-06-06 PROCEDURE — 80061 LIPID PANEL: CPT

## 2018-06-06 PROCEDURE — 80053 COMPREHEN METABOLIC PANEL: CPT

## 2018-06-06 PROCEDURE — 36415 COLL VENOUS BLD VENIPUNCTURE: CPT

## 2018-06-06 PROCEDURE — 84443 ASSAY THYROID STIM HORMONE: CPT

## 2018-06-06 PROCEDURE — 83036 HEMOGLOBIN GLYCOSYLATED A1C: CPT

## 2018-06-07 DIAGNOSIS — E87.6 LOW SERUM POTASSIUM: Primary | ICD-10-CM

## 2018-06-14 DIAGNOSIS — F41.9 ANXIETY: Primary | ICD-10-CM

## 2018-06-15 ENCOUNTER — APPOINTMENT (OUTPATIENT)
Dept: LAB | Age: 58
End: 2018-06-15
Payer: MEDICARE

## 2018-06-15 DIAGNOSIS — E87.6 LOW SERUM POTASSIUM: ICD-10-CM

## 2018-06-15 LAB — POTASSIUM SERPL-SCNC: 3.5 MMOL/L (ref 3.5–5.3)

## 2018-06-15 PROCEDURE — 84132 ASSAY OF SERUM POTASSIUM: CPT

## 2018-06-15 PROCEDURE — 36415 COLL VENOUS BLD VENIPUNCTURE: CPT

## 2018-06-19 DIAGNOSIS — I10 HYPERTENSION, UNSPECIFIED TYPE: ICD-10-CM

## 2018-06-19 RX ORDER — TRIAMTERENE AND HYDROCHLOROTHIAZIDE 37.5; 25 MG/1; MG/1
CAPSULE ORAL
Qty: 90 CAPSULE | Refills: 1 | Status: SHIPPED | OUTPATIENT
Start: 2018-06-19 | End: 2018-10-18 | Stop reason: SDUPTHER

## 2018-09-18 DIAGNOSIS — E87.6 HYPOKALEMIA: ICD-10-CM

## 2018-09-18 DIAGNOSIS — E03.9 HYPOTHYROIDISM, UNSPECIFIED TYPE: ICD-10-CM

## 2018-09-18 DIAGNOSIS — K21.9 GASTROESOPHAGEAL REFLUX DISEASE, ESOPHAGITIS PRESENCE NOT SPECIFIED: Primary | ICD-10-CM

## 2018-09-18 RX ORDER — POTASSIUM CHLORIDE 750 MG/1
TABLET, EXTENDED RELEASE ORAL
Qty: 180 TABLET | Refills: 1 | Status: SHIPPED | OUTPATIENT
Start: 2018-09-18 | End: 2018-10-18 | Stop reason: SDUPTHER

## 2018-09-18 RX ORDER — LEVOTHYROXINE SODIUM 0.07 MG/1
TABLET ORAL
Qty: 90 TABLET | Refills: 2 | Status: SHIPPED | OUTPATIENT
Start: 2018-09-18 | End: 2018-09-27 | Stop reason: SDUPTHER

## 2018-09-18 RX ORDER — OMEPRAZOLE 20 MG/1
CAPSULE, DELAYED RELEASE ORAL
Qty: 90 CAPSULE | Refills: 2 | Status: SHIPPED | OUTPATIENT
Start: 2018-09-18 | End: 2019-11-07 | Stop reason: ALTCHOICE

## 2018-09-27 DIAGNOSIS — E03.9 HYPOTHYROIDISM, UNSPECIFIED TYPE: ICD-10-CM

## 2018-09-27 RX ORDER — LEVOTHYROXINE SODIUM 0.07 MG/1
TABLET ORAL
Qty: 90 TABLET | Refills: 2 | Status: SHIPPED | OUTPATIENT
Start: 2018-09-27 | End: 2018-10-18 | Stop reason: SDUPTHER

## 2018-10-08 ENCOUNTER — TRANSCRIBE ORDERS (OUTPATIENT)
Dept: ADMINISTRATIVE | Age: 58
End: 2018-10-08

## 2018-10-08 ENCOUNTER — APPOINTMENT (OUTPATIENT)
Dept: LAB | Age: 58
End: 2018-10-08
Payer: MEDICARE

## 2018-10-08 DIAGNOSIS — D83.9 COMMON VARIABLE IMMUNODEFICIENCY (HCC): ICD-10-CM

## 2018-10-08 DIAGNOSIS — D69.6 THROMBOCYTOPENIA (HCC): ICD-10-CM

## 2018-10-08 LAB
ALBUMIN SERPL BCP-MCNC: 3.8 G/DL (ref 3.5–5)
ALP SERPL-CCNC: 61 U/L (ref 46–116)
ALT SERPL W P-5'-P-CCNC: 27 U/L (ref 12–78)
ANION GAP SERPL CALCULATED.3IONS-SCNC: 5 MMOL/L (ref 4–13)
AST SERPL W P-5'-P-CCNC: 21 U/L (ref 5–45)
BASOPHILS # BLD AUTO: 0.04 THOUSANDS/ΜL (ref 0–0.1)
BASOPHILS NFR BLD AUTO: 1 % (ref 0–1)
BILIRUB SERPL-MCNC: 0.55 MG/DL (ref 0.2–1)
BUN SERPL-MCNC: 14 MG/DL (ref 5–25)
CALCIUM SERPL-MCNC: 9.2 MG/DL (ref 8.3–10.1)
CHLORIDE SERPL-SCNC: 103 MMOL/L (ref 100–108)
CO2 SERPL-SCNC: 32 MMOL/L (ref 21–32)
CREAT SERPL-MCNC: 0.91 MG/DL (ref 0.6–1.3)
EOSINOPHIL # BLD AUTO: 0.12 THOUSAND/ΜL (ref 0–0.61)
EOSINOPHIL NFR BLD AUTO: 2 % (ref 0–6)
ERYTHROCYTE [DISTWIDTH] IN BLOOD BY AUTOMATED COUNT: 13.4 % (ref 11.6–15.1)
GFR SERPL CREATININE-BSD FRML MDRD: 69 ML/MIN/1.73SQ M
GLUCOSE P FAST SERPL-MCNC: 108 MG/DL (ref 65–99)
HCT VFR BLD AUTO: 44.3 % (ref 34.8–46.1)
HGB BLD-MCNC: 14.6 G/DL (ref 11.5–15.4)
IGA SERPL-MCNC: 34 MG/DL (ref 70–400)
IGG SERPL-MCNC: 628 MG/DL (ref 700–1600)
IGM SERPL-MCNC: 49 MG/DL (ref 40–230)
IMM GRANULOCYTES # BLD AUTO: 0.02 THOUSAND/UL (ref 0–0.2)
IMM GRANULOCYTES NFR BLD AUTO: 0 % (ref 0–2)
LYMPHOCYTES # BLD AUTO: 1.58 THOUSANDS/ΜL (ref 0.6–4.47)
LYMPHOCYTES NFR BLD AUTO: 31 % (ref 14–44)
MCH RBC QN AUTO: 29 PG (ref 26.8–34.3)
MCHC RBC AUTO-ENTMCNC: 33 G/DL (ref 31.4–37.4)
MCV RBC AUTO: 88 FL (ref 82–98)
MONOCYTES # BLD AUTO: 0.37 THOUSAND/ΜL (ref 0.17–1.22)
MONOCYTES NFR BLD AUTO: 7 % (ref 4–12)
NEUTROPHILS # BLD AUTO: 2.94 THOUSANDS/ΜL (ref 1.85–7.62)
NEUTS SEG NFR BLD AUTO: 59 % (ref 43–75)
NRBC BLD AUTO-RTO: 0 /100 WBCS
PLATELET # BLD AUTO: 143 THOUSANDS/UL (ref 149–390)
PMV BLD AUTO: 12.1 FL (ref 8.9–12.7)
POTASSIUM SERPL-SCNC: 3.8 MMOL/L (ref 3.5–5.3)
PROT SERPL-MCNC: 6.8 G/DL (ref 6.4–8.2)
RBC # BLD AUTO: 5.03 MILLION/UL (ref 3.81–5.12)
SODIUM SERPL-SCNC: 140 MMOL/L (ref 136–145)
WBC # BLD AUTO: 5.07 THOUSAND/UL (ref 4.31–10.16)

## 2018-10-08 PROCEDURE — 36415 COLL VENOUS BLD VENIPUNCTURE: CPT

## 2018-10-08 PROCEDURE — 85025 COMPLETE CBC W/AUTO DIFF WBC: CPT

## 2018-10-08 PROCEDURE — 82784 ASSAY IGA/IGD/IGG/IGM EACH: CPT

## 2018-10-08 PROCEDURE — 84165 PROTEIN E-PHORESIS SERUM: CPT | Performed by: PATHOLOGY

## 2018-10-08 PROCEDURE — 84165 PROTEIN E-PHORESIS SERUM: CPT

## 2018-10-08 PROCEDURE — 80053 COMPREHEN METABOLIC PANEL: CPT

## 2018-10-09 LAB
ALBUMIN SERPL ELPH-MCNC: 4.3 G/DL (ref 3.5–5)
ALBUMIN SERPL ELPH-MCNC: 65.1 % (ref 52–65)
ALPHA1 GLOB SERPL ELPH-MCNC: 0.31 G/DL (ref 0.1–0.4)
ALPHA1 GLOB SERPL ELPH-MCNC: 4.7 % (ref 2.5–5)
ALPHA2 GLOB SERPL ELPH-MCNC: 0.75 G/DL (ref 0.4–1.2)
ALPHA2 GLOB SERPL ELPH-MCNC: 11.3 % (ref 7–13)
BETA GLOB ABNORMAL SERPL ELPH-MCNC: 0.43 G/DL (ref 0.4–0.8)
BETA1 GLOB SERPL ELPH-MCNC: 6.5 % (ref 5–13)
BETA2 GLOB SERPL ELPH-MCNC: 3.4 % (ref 2–8)
BETA2+GAMMA GLOB SERPL ELPH-MCNC: 0.22 G/DL (ref 0.2–0.5)
GAMMA GLOB ABNORMAL SERPL ELPH-MCNC: 0.59 G/DL (ref 0.5–1.6)
GAMMA GLOB SERPL ELPH-MCNC: 9 % (ref 12–22)
IGG/ALB SER: 1.87 {RATIO} (ref 1.1–1.8)
PROT PATTERN SERPL ELPH-IMP: ABNORMAL
PROT SERPL-MCNC: 6.6 G/DL (ref 6.4–8.2)

## 2018-10-18 ENCOUNTER — OFFICE VISIT (OUTPATIENT)
Dept: INTERNAL MEDICINE CLINIC | Facility: CLINIC | Age: 58
End: 2018-10-18
Payer: MEDICARE

## 2018-10-18 VITALS
DIASTOLIC BLOOD PRESSURE: 84 MMHG | RESPIRATION RATE: 16 BRPM | HEIGHT: 63 IN | OXYGEN SATURATION: 96 % | WEIGHT: 202.8 LBS | HEART RATE: 71 BPM | BODY MASS INDEX: 35.93 KG/M2 | SYSTOLIC BLOOD PRESSURE: 128 MMHG

## 2018-10-18 DIAGNOSIS — Z23 NEED FOR INFLUENZA VACCINATION: ICD-10-CM

## 2018-10-18 DIAGNOSIS — R73.03 PREDIABETES: ICD-10-CM

## 2018-10-18 DIAGNOSIS — I10 HYPERTENSION, UNSPECIFIED TYPE: ICD-10-CM

## 2018-10-18 DIAGNOSIS — K21.9 GASTROESOPHAGEAL REFLUX DISEASE WITHOUT ESOPHAGITIS: Primary | ICD-10-CM

## 2018-10-18 DIAGNOSIS — E03.9 HYPOTHYROIDISM, UNSPECIFIED TYPE: ICD-10-CM

## 2018-10-18 DIAGNOSIS — F41.9 ANXIETY: ICD-10-CM

## 2018-10-18 DIAGNOSIS — Z12.31 ENCOUNTER FOR SCREENING MAMMOGRAM FOR BREAST CANCER: ICD-10-CM

## 2018-10-18 DIAGNOSIS — Z13.820 SCREENING FOR OSTEOPOROSIS: ICD-10-CM

## 2018-10-18 PROCEDURE — 99214 OFFICE O/P EST MOD 30 MIN: CPT | Performed by: INTERNAL MEDICINE

## 2018-10-18 PROCEDURE — G0008 ADMIN INFLUENZA VIRUS VAC: HCPCS | Performed by: INTERNAL MEDICINE

## 2018-10-18 PROCEDURE — 90682 RIV4 VACC RECOMBINANT DNA IM: CPT

## 2018-10-18 RX ORDER — SERTRALINE HYDROCHLORIDE 100 MG/1
100 TABLET, FILM COATED ORAL DAILY
Qty: 90 TABLET | Refills: 3 | Status: SHIPPED | OUTPATIENT
Start: 2018-10-18 | End: 2019-07-20 | Stop reason: SDUPTHER

## 2018-10-18 RX ORDER — FAMOTIDINE 20 MG/1
20 TABLET, FILM COATED ORAL DAILY
Qty: 90 TABLET | Refills: 3 | Status: SHIPPED | OUTPATIENT
Start: 2018-10-18 | End: 2018-12-03 | Stop reason: ALTCHOICE

## 2018-10-18 RX ORDER — POTASSIUM CHLORIDE 750 MG/1
10 TABLET, FILM COATED, EXTENDED RELEASE ORAL 2 TIMES DAILY
Qty: 180 TABLET | Refills: 3 | Status: SHIPPED | OUTPATIENT
Start: 2018-10-18 | End: 2019-07-20 | Stop reason: SDUPTHER

## 2018-10-18 RX ORDER — TRIAMTERENE AND HYDROCHLOROTHIAZIDE 37.5; 25 MG/1; MG/1
1 CAPSULE ORAL DAILY
Qty: 90 CAPSULE | Refills: 3 | Status: SHIPPED | OUTPATIENT
Start: 2018-10-18 | End: 2019-07-20 | Stop reason: SDUPTHER

## 2018-10-18 RX ORDER — LEVOTHYROXINE SODIUM 0.07 MG/1
75 TABLET ORAL
Qty: 90 TABLET | Refills: 3 | Status: SHIPPED | OUTPATIENT
Start: 2018-10-18 | End: 2020-01-02 | Stop reason: SDUPTHER

## 2018-10-19 NOTE — ASSESSMENT & PLAN NOTE
Currently stable doing well no history of Benavides's will discontinue PPI try Pepcid 20 mg once a day continue with ulcer free diet, weight loss

## 2018-10-19 NOTE — ASSESSMENT & PLAN NOTE
Stable and doing well the patient has increased Zoloft 100 mg once a day she is tolerating the dose and doing well no suicidal ideation    She does request a refill of medication

## 2018-10-19 NOTE — PROGRESS NOTES
Assessment/Plan:    Hypertension  Hypertension - controlled, I have counseled patient following healthy balance diet, I would like the patient reduce sodium, exercise routinely, I would like the patient continued the med current medical regiment and we will continue to monitor  Gastroesophageal reflux disease without esophagitis  Currently stable doing well no history of Benavides's will discontinue PPI try Pepcid 20 mg once a day continue with ulcer free diet, weight loss    Hypothyroidism  Hypothyroidism controlled the patient is currently euthyroid I will be ordering a TSH prior to the next office visit and the patient will continue with current medical regiment; we will continue to monitor the patient's progress  Screening for osteoporosis  Will check screening DEXA scan    Prediabetes  Pre diabetes -I have counseled the patient to follow a healthy balanced diet, I have counseled patient reduce carbohydrates and sweets in the diet, I would like the patient exercise routinely  I will be checking hemoglobin A1c and comprehensive metabolic panel  Have counseled patient about the prevention of diabetes, and the risk of progression to type 2 diabetes  Need for influenza vaccination  Counseled, patient will receive a flu vaccine today  Encounter for screening mammogram for breast cancer  I have ordered the screening mammogram    Anxiety  Stable and doing well the patient has increased Zoloft 100 mg once a day she is tolerating the dose and doing well no suicidal ideation    She does request a refill of medication         Problem List Items Addressed This Visit        Digestive    Gastroesophageal reflux disease without esophagitis - Primary     Currently stable doing well no history of Benavides's will discontinue PPI try Pepcid 20 mg once a day continue with ulcer free diet, weight loss         Relevant Medications    famotidine (PEPCID) 20 mg tablet       Endocrine    Hypothyroidism     Hypothyroidism controlled the patient is currently euthyroid I will be ordering a TSH prior to the next office visit and the patient will continue with current medical regiment; we will continue to monitor the patient's progress  Relevant Medications    levothyroxine 75 mcg tablet    Other Relevant Orders    TSH, 3rd generation       Cardiovascular and Mediastinum    Hypertension     Hypertension - controlled, I have counseled patient following healthy balance diet, I would like the patient reduce sodium, exercise routinely, I would like the patient continued the med current medical regiment and we will continue to monitor  Relevant Medications    triamterene-hydrochlorothiazide (DYAZIDE) 37 5-25 mg per capsule    potassium chloride (KLOR-CON 10) 10 mEq tablet    Other Relevant Orders    Comprehensive metabolic panel    Lipid Panel with Direct LDL reflex       Other    Anxiety     Stable and doing well the patient has increased Zoloft 100 mg once a day she is tolerating the dose and doing well no suicidal ideation  She does request a refill of medication         Relevant Medications    sertraline (ZOLOFT) 100 mg tablet    Encounter for screening mammogram for breast cancer     I have ordered the screening mammogram         Relevant Orders    Mammo screening bilateral w cad    Screening for osteoporosis     Will check screening DEXA scan         Relevant Orders    DXA bone density spine hip and pelvis    Need for influenza vaccination     Counseled, patient will receive a flu vaccine today  Relevant Orders    influenza vaccine, 1715-3782, quadrivalent, recombinant, PF, 0 5 mL, for patients 18 yr+ (FLUBLOK) (Completed)    Prediabetes     Pre diabetes -I have counseled the patient to follow a healthy balanced diet, I have counseled patient reduce carbohydrates and sweets in the diet, I would like the patient exercise routinely  I will be checking hemoglobin A1c and comprehensive metabolic panel    Have counseled patient about the prevention of diabetes, and the risk of progression to type 2 diabetes  Relevant Orders    Comprehensive metabolic panel    Hemoglobin A1C    Lipid Panel with Direct LDL reflex          Return to office 6  months  call if any problems  Subjective:      Patient ID: Natalio Crump is a 59 y o  female  HPI 62-year old female coming in for a follow up office visit regarding hypertension, anxiety, hypothyroidism, GERD, prediabetes; The patient reports me compliant taking medications without untoward side effects the  The patient is here to review his medical condition, update me on the medical condition and the patient reports me no hospitalizations and no ER visits  Patient is here to review her laboratories  She reports me she is doing well she is ambulating more since the knee replacement  Reports me that reflux is well controlled  She reports me about a month ago she had been more anxious and therefore increase her Zoloft to 100 mg once a day she is tolerating the medication and mood is doing very well now no suicidal ideation no anxiety    The following portions of the patient's history were reviewed and updated as appropriate: allergies, current medications, past family history, past medical history, past social history, past surgical history and problem list     Review of Systems   Constitutional: Negative for activity change, appetite change and unexpected weight change  HENT: Negative for congestion and postnasal drip  Eyes: Negative for visual disturbance  Respiratory: Negative for cough and shortness of breath  Cardiovascular: Negative for chest pain  Gastrointestinal: Negative for abdominal pain, diarrhea, nausea and vomiting  Neurological: Negative for dizziness, light-headedness and headaches  Hematological: Negative for adenopathy           Objective:      /84 (BP Location: Right arm, Patient Position: Sitting, Cuff Size: Standard) Pulse 71   Resp 16   Ht 5' 3" (1 6 m)   Wt 92 kg (202 lb 12 8 oz)   SpO2 96%   BMI 35 92 kg/m²          Physical Exam   Constitutional: She appears well-developed and well-nourished  HENT:   Head: Normocephalic  Mouth/Throat: Oropharynx is clear and moist    Eyes: Pupils are equal, round, and reactive to light  Conjunctivae are normal  Right eye exhibits no discharge  Left eye exhibits no discharge  No scleral icterus  Neck: Neck supple  Cardiovascular: Normal rate, regular rhythm, normal heart sounds and intact distal pulses  Exam reveals no gallop and no friction rub  No murmur heard  Pulmonary/Chest: Breath sounds normal  No respiratory distress  She has no wheezes  She has no rales  Abdominal: Soft  Bowel sounds are normal  She exhibits no distension and no mass  There is no tenderness  There is no rebound and no guarding  Musculoskeletal: She exhibits no edema or deformity  Lymphadenopathy:     She has no cervical adenopathy  Neurological: She is alert   Coordination normal

## 2018-11-08 ENCOUNTER — HOSPITAL ENCOUNTER (OUTPATIENT)
Dept: RADIOLOGY | Age: 58
Discharge: HOME | End: 2018-11-08
Payer: MEDICARE

## 2018-11-08 DIAGNOSIS — Z13.820 SCREENING FOR OSTEOPOROSIS: ICD-10-CM

## 2018-11-08 PROCEDURE — 77080 DXA BONE DENSITY AXIAL: CPT

## 2018-11-12 ENCOUNTER — TELEPHONE (OUTPATIENT)
Dept: INTERNAL MEDICINE CLINIC | Facility: CLINIC | Age: 58
End: 2018-11-12

## 2018-11-12 NOTE — TELEPHONE ENCOUNTER
When calling patient regarding dexa scan results, pt asked about whether or not we received latest endoscopy report from [facility]  I thought I had found it but reports was from 11/3/17  LM with [facility] to send copy

## 2018-11-13 NOTE — TELEPHONE ENCOUNTER
Spoke with VIRIDIANA from [facility], she had incorrect fax #  She is faxing results over to our correct fax #[phone] now

## 2018-11-30 ENCOUNTER — OFFICE VISIT (OUTPATIENT)
Dept: HEMATOLOGY ONCOLOGY | Facility: CLINIC | Age: 58
End: 2018-11-30
Payer: MEDICARE

## 2018-11-30 VITALS
HEIGHT: 63 IN | SYSTOLIC BLOOD PRESSURE: 112 MMHG | BODY MASS INDEX: 34.27 KG/M2 | HEART RATE: 73 BPM | DIASTOLIC BLOOD PRESSURE: 78 MMHG | WEIGHT: 193.4 LBS | RESPIRATION RATE: 18 BRPM | OXYGEN SATURATION: 98 % | TEMPERATURE: 98.5 F

## 2018-11-30 DIAGNOSIS — D80.3 IGG DEFICIENCY (HCC): Primary | ICD-10-CM

## 2018-11-30 DIAGNOSIS — D69.6 THROMBOCYTOPENIA (HCC): ICD-10-CM

## 2018-11-30 DIAGNOSIS — D80.2 IGA DEFICIENCY (HCC): ICD-10-CM

## 2018-11-30 PROCEDURE — 99213 OFFICE O/P EST LOW 20 MIN: CPT | Performed by: INTERNAL MEDICINE

## 2018-11-30 NOTE — PROGRESS NOTES
HPI:   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 without any change over last few years  No history of frequent or severe infections  She had knee surgery last year and there was no postop infection  She will be going for endoscopic hiatal hernia repair sometime next year  She has  arthritic symptoms  No other symptoms  She is up to date with her medical check ups, gynecological checkups, mammography and colonoscopy etc     Current Outpatient Prescriptions:     levothyroxine 75 mcg tablet, Take 1 tablet (75 mcg total) by mouth daily in the early morning, Disp: 90 tablet, Rfl: 3    omeprazole (PriLOSEC) 20 mg delayed release capsule, TAKE 1 CAPSULE EVERY DAY, Disp: 90 capsule, Rfl: 2    potassium chloride (KLOR-CON 10) 10 mEq tablet, Take 1 tablet (10 mEq total) by mouth 2 (two) times a day, Disp: 180 tablet, Rfl: 3    Psyllium (METAMUCIL PO), Take 1 Dose by mouth daily  , Disp: , Rfl:     senna (SENOKOT) 8 6 MG tablet, Take 2 tablets by mouth daily, Disp: , Rfl:     sertraline (ZOLOFT) 100 mg tablet, Take 1 tablet (100 mg total) by mouth daily, Disp: 90 tablet, Rfl: 3    triamterene-hydrochlorothiazide (DYAZIDE) 37 5-25 mg per capsule, Take 1 capsule by mouth daily, Disp: 90 capsule, Rfl: 3    famotidine (PEPCID) 20 mg tablet, Take 1 tablet (20 mg total) by mouth daily (Patient not taking: Reported on 11/30/2018 ), Disp: 90 tablet, Rfl: 3    Allergies   Allergen Reactions    Penicillins Anaphylaxis     Anaphylaxis  Anaphylaxis  Other reaction(s): Anaphylaxis    Acetazolamide Itching and Swelling     With eye drops - takes  oral sulfa w/o side effects    Metronidazole GI Intolerance and Nausea Only    Morphine Other (See Comments)     hallucinations         ROS:  11/30/18 Reviewed 13 systems:  Presently no neurological, cardiac, pulmonary, GI and  symptoms  No other symptoms other than arthritis and from hiatal hernia      No fever, chills, bleeding, bone pains, skin rash, weight loss,  tiredness ,  weakness, numbness,  claudication and gait problem  No frequent infections  Not unusually sensitive to heat or cold  No swelling of the ankles  No swollen glands  Patient is not anxious  Other symptoms are in HPI        /78 (BP Location: Left arm, Patient Position: Sitting, Cuff Size: Adult)   Pulse 73   Temp 98 5 °F (36 9 °C)   Resp 18   Ht 5' 3" (1 6 m)   Wt 87 7 kg (193 lb 6 4 oz)   SpO2 98%   BMI 34 26 kg/m²     Physical Exam:     Vital signs are stable  Patient is alert and oriented  She is not in distress  There is no scleral icterus  Oral cavity  does not have thrush  There is no palpable neck mass  Lung fields are clear to percussion and auscultation  Heart rate is regular  Abdomen is  soft and non tender, no palpable abdominal mass, no ascites, no edema of ankles, no calf tenderness, no focal neurological deficit, no skin rash, no palpable lymphadenopathy, good arterial pulses, no clubbing  Patient is not anxious  Performance status 0      IMAGING:  No orders to display       LABS:  Results for orders placed or performed in visit on 10/08/18   CBC and differential   Result Value Ref Range    WBC 5 07 4 31 - 10 16 Thousand/uL    RBC 5 03 3 81 - 5 12 Million/uL    Hemoglobin 14 6 11 5 - 15 4 g/dL    Hematocrit 44 3 34 8 - 46 1 %    MCV 88 82 - 98 fL    MCH 29 0 26 8 - 34 3 pg    MCHC 33 0 31 4 - 37 4 g/dL    RDW 13 4 11 6 - 15 1 %    MPV 12 1 8 9 - 12 7 fL    Platelets 468 (L) 589 - 390 Thousands/uL    nRBC 0 /100 WBCs    Neutrophils Relative 59 43 - 75 %    Immat GRANS % 0 0 - 2 %    Lymphocytes Relative 31 14 - 44 %    Monocytes Relative 7 4 - 12 %    Eosinophils Relative 2 0 - 6 %    Basophils Relative 1 0 - 1 %    Neutrophils Absolute 2 94 1 85 - 7 62 Thousands/µL    Immature Grans Absolute 0 02 0 00 - 0 20 Thousand/uL    Lymphocytes Absolute 1 58 0 60 - 4 47 Thousands/µL    Monocytes Absolute 0 37 0 17 - 1 22 Thousand/µL    Eosinophils Absolute 0 12 0 00 - 0 61 Thousand/µL Basophils Absolute 0 04 0 00 - 0 10 Thousands/µL   Comprehensive metabolic panel   Result Value Ref Range    Sodium 140 136 - 145 mmol/L    Potassium 3 8 3 5 - 5 3 mmol/L    Chloride 103 100 - 108 mmol/L    CO2 32 21 - 32 mmol/L    ANION GAP 5 4 - 13 mmol/L    BUN 14 5 - 25 mg/dL    Creatinine 0 91 0 60 - 1 30 mg/dL    Glucose, Fasting 108 (H) 65 - 99 mg/dL    Calcium 9 2 8 3 - 10 1 mg/dL    AST 21 5 - 45 U/L    ALT 27 12 - 78 U/L    Alkaline Phosphatase 61 46 - 116 U/L    Total Protein 6 8 6 4 - 8 2 g/dL    Albumin 3 8 3 5 - 5 0 g/dL    Total Bilirubin 0 55 0 20 - 1 00 mg/dL    eGFR 69 ml/min/1 73sq m   IgA   Result Value Ref Range    IGA 34 0 (L) 70 0 - 400 0 mg/dL   IgG   Result Value Ref Range     0 (L) 700 0-1,600 0 mg/dL   IgM   Result Value Ref Range    IGM 49 0 40 0 - 230 0 mg/dL   Protein electrophoresis, serum   Result Value Ref Range    A/G Ratio 1 87 (H) 1 10 - 1 80    Albumin Electrophoresis 65 1 (H) 52 0 - 65 0 %    Albumin CONC 4 30 3 50 - 5 00 g/dl    Alpha 1 4 7 2 5 - 5 0 %    ALPHA 1 CONC 0 31 0 10 - 0 40 g/dL    Alpha 2 11 3 7 0 - 13 0 %    ALPHA 2 CONC 0 75 0 40 - 1 20 g/dL    Beta-1 6 5 5 0 - 13 0 %    BETA 1 CONC 0 43 0 40 - 0 80 g/dL    Beta-2 3 4 2 0 - 8 0 %    BETA 2 CONC 0 22 0 20 - 0 50 g/dL    Gamma Globulin 9 0 (L) 12 0 - 22 0 %    GAMMA CONC 0 59 0 50 - 1 60 g/dL    SPEP Interpretation       The serum total protein, albumin and electrophoresis are within normal limits  No monoclonal bands noted  Reviewed by: Aris Redd DO  ([id number])  **Electronic Signature**    Total Protein 6 6 6 4 - 8 2 g/dL     Labs, Imaging, & Other studies:   All pertinent labs and imaging studies were personally reviewed      Reviewed and discussed with patient  Assessment and plan: 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 without any change over last few years  No history of frequent or severe infections  She had knee surgery last year and there was no postop infection    She will be going for endoscopic hiatal hernia repair sometime next year  She has  arthritic symptoms  No other symptoms  She is up to date with her medical check ups, gynecological checkups, mammography and colonoscopy etc   Physical examination and test results are as recorded and discussed  Hematologically stable with low but stable IgA and IgG levels and borderline thrombocytopenia  Condition discussed and explained  Questions answered  Also discussed eating healthy foods, and exercises as tolerated and self breast examination and health screening tests  Patient is capable of self-care  She will like to be discharged from our service and follow with us p r n  and that seems reasonable to me        1  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[facility] [address] )      2  [facility] ([facility]    3  Thrombocytopenia          Patient voiced understanding and agreement in the discussion  Counseling / Coordination of Care   Greater than 50% of total time was spent with the patient and / or family counseling and / or coordination of care

## 2018-11-30 NOTE — LETTER
November 30, 2018     Martin Bill  [address] [facility] [address]    Patient: Baron Underwood   YOB: 1960   Date of Visit: 11/30/2018       Dear Dr Amalia Pal: Thank you for referring Barbara Almeida to me for evaluation  Below are my notes for this consultation  If you have questions, please do not hesitate to call me  I look forward to following your patient along with you  Sincerely,        Fitz Rogers MD        CC: MD Fitz Domingo MD  11/30/2018  9:12 AM  Sign at close encounter  HPI:   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 without any change over last few years  No history of frequent or severe infections  She had knee surgery last year and there was no postop infection  She will be going for endoscopic hiatal hernia repair sometime next year  She has  arthritic symptoms  No other symptoms    She is up to date with her medical check ups, gynecological checkups, mammography and colonoscopy etc     Current Outpatient Prescriptions:     levothyroxine 75 mcg tablet, Take 1 tablet (75 mcg total) by mouth daily in the early morning, Disp: 90 tablet, Rfl: 3    omeprazole (PriLOSEC) 20 mg delayed release capsule, TAKE 1 CAPSULE EVERY DAY, Disp: 90 capsule, Rfl: 2    potassium chloride (KLOR-CON 10) 10 mEq tablet, Take 1 tablet (10 mEq total) by mouth 2 (two) times a day, Disp: 180 tablet, Rfl: 3    Psyllium (METAMUCIL PO), Take 1 Dose by mouth daily  , Disp: , Rfl:     senna (SENOKOT) 8 6 MG tablet, Take 2 tablets by mouth daily, Disp: , Rfl:     sertraline (ZOLOFT) 100 mg tablet, Take 1 tablet (100 mg total) by mouth daily, Disp: 90 tablet, Rfl: 3    triamterene-hydrochlorothiazide (DYAZIDE) 37 5-25 mg per capsule, Take 1 capsule by mouth daily, Disp: 90 capsule, Rfl: 3    famotidine (PEPCID) 20 mg tablet, Take 1 tablet (20 mg total) by mouth daily (Patient not taking: Reported on 11/30/2018 ), Disp: 90 tablet, Rfl: 3    Allergies Allergen Reactions    Penicillins Anaphylaxis     Anaphylaxis  Anaphylaxis  Other reaction(s): Anaphylaxis    Acetazolamide Itching and Swelling     With eye drops - takes  oral sulfa w/o side effects    Metronidazole GI Intolerance and Nausea Only    Morphine Other (See Comments)     hallucinations         ROS:  11/30/18 Reviewed 13 systems:  Presently no neurological, cardiac, pulmonary, GI and  symptoms  No other symptoms other than arthritis and from hiatal hernia     No fever, chills, bleeding, bone pains, skin rash, weight loss,  tiredness ,  weakness, numbness,  claudication and gait problem  No frequent infections  Not unusually sensitive to heat or cold  No swelling of the ankles  No swollen glands  Patient is not anxious  Other symptoms are in HPI        /78 (BP Location: Left arm, Patient Position: Sitting, Cuff Size: Adult)   Pulse 73   Temp 98 5 °F (36 9 °C)   Resp 18   Ht 5' 3" (1 6 m)   Wt 87 7 kg (193 lb 6 4 oz)   SpO2 98%   BMI 34 26 kg/m²      Physical Exam:     Vital signs are stable  Patient is alert and oriented  She is not in distress  There is no scleral icterus  Oral cavity  does not have thrush  There is no palpable neck mass  Lung fields are clear to percussion and auscultation  Heart rate is regular  Abdomen is  soft and non tender, no palpable abdominal mass, no ascites, no edema of ankles, no calf tenderness, no focal neurological deficit, no skin rash, no palpable lymphadenopathy, good arterial pulses, no clubbing  Patient is not anxious  Performance status 0      IMAGING:  No orders to display       LABS:  Results for orders placed or performed in visit on 10/08/18   CBC and differential   Result Value Ref Range    WBC 5 07 4 31 - 10 16 Thousand/uL    RBC 5 03 3 81 - 5 12 Million/uL    Hemoglobin 14 6 11 5 - 15 4 g/dL    Hematocrit 44 3 34 8 - 46 1 %    MCV 88 82 - 98 fL    MCH 29 0 26 8 - 34 3 pg    MCHC 33 0 31 4 - 37 4 g/dL    RDW 13 4 11 6 - 15 1 %    MPV 12 1 8 9 - 12 7 fL    Platelets 630 (L) 383 - 390 Thousands/uL    nRBC 0 /100 WBCs    Neutrophils Relative 59 43 - 75 %    Immat GRANS % 0 0 - 2 %    Lymphocytes Relative 31 14 - 44 %    Monocytes Relative 7 4 - 12 %    Eosinophils Relative 2 0 - 6 %    Basophils Relative 1 0 - 1 %    Neutrophils Absolute 2 94 1 85 - 7 62 Thousands/µL    Immature Grans Absolute 0 02 0 00 - 0 20 Thousand/uL    Lymphocytes Absolute 1 58 0 60 - 4 47 Thousands/µL    Monocytes Absolute 0 37 0 17 - 1 22 Thousand/µL    Eosinophils Absolute 0 12 0 00 - 0 61 Thousand/µL    Basophils Absolute 0 04 0 00 - 0 10 Thousands/µL   Comprehensive metabolic panel   Result Value Ref Range    Sodium 140 136 - 145 mmol/L    Potassium 3 8 3 5 - 5 3 mmol/L    Chloride 103 100 - 108 mmol/L    CO2 32 21 - 32 mmol/L    ANION GAP 5 4 - 13 mmol/L    BUN 14 5 - 25 mg/dL    Creatinine 0 91 0 60 - 1 30 mg/dL    Glucose, Fasting 108 (H) 65 - 99 mg/dL    Calcium 9 2 8 3 - 10 1 mg/dL    AST 21 5 - 45 U/L    ALT 27 12 - 78 U/L    Alkaline Phosphatase 61 46 - 116 U/L    Total Protein 6 8 6 4 - 8 2 g/dL    Albumin 3 8 3 5 - 5 0 g/dL    Total Bilirubin 0 55 0 20 - 1 00 mg/dL    eGFR 69 ml/min/1 73sq m   IgA   Result Value Ref Range    IGA 34 0 (L) 70 0 - 400 0 mg/dL   IgG   Result Value Ref Range     0 (L) 700 0-1,600 0 mg/dL   IgM   Result Value Ref Range    IGM 49 0 40 0 - 230 0 mg/dL   Protein electrophoresis, serum   Result Value Ref Range    A/G Ratio 1 87 (H) 1 10 - 1 80    Albumin Electrophoresis 65 1 (H) 52 0 - 65 0 %    Albumin CONC 4 30 3 50 - 5 00 g/dl    Alpha 1 4 7 2 5 - 5 0 %    ALPHA 1 CONC 0 31 0 10 - 0 40 g/dL    Alpha 2 11 3 7 0 - 13 0 %    ALPHA 2 CONC 0 75 0 40 - 1 20 g/dL    Beta-1 6 5 5 0 - 13 0 %    BETA 1 CONC 0 43 0 40 - 0 80 g/dL    Beta-2 3 4 2 0 - 8 0 %    BETA 2 CONC 0 22 0 20 - 0 50 g/dL    Gamma Globulin 9 0 (L) 12 0 - 22 0 %    GAMMA CONC 0 59 0 50 - 1 60 g/dL    SPEP Interpretation       The serum total protein, albumin and electrophoresis are within normal limits  No monoclonal bands noted  Reviewed by: Dioni Dee DO  ([id number])  **Electronic Signature**    Total Protein 6 6 6 4 - 8 2 g/dL     Labs, Imaging, & Other studies:   All pertinent labs and imaging studies were personally reviewed      Reviewed and discussed with patient  Assessment and plan: 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 without any change over last few years  No history of frequent or severe infections  She had knee surgery last year and there was no postop infection  She will be going for endoscopic hiatal hernia repair sometime next year  She has  arthritic symptoms  No other symptoms  She is up to date with her medical check ups, gynecological checkups, mammography and colonoscopy etc   Physical examination and test results are as recorded and discussed  Hematologically stable with low but stable IgA and IgG levels and borderline thrombocytopenia  Condition discussed and explained  Questions answered  Also discussed eating healthy foods, and exercises as tolerated and self breast examination and health screening tests  Patient is capable of self-care  She will like to be discharged from our service and follow with us p r n  and that seems reasonable to me        1  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[facility] )      2  IgA deficiency ([facility] )    3  Thrombocytopenia          Patient voiced understanding and agreement in the discussion  Counseling / Coordination of Care   Greater than 50% of total time was spent with the patient and / or family counseling and / or coordination of care

## 2018-12-06 ENCOUNTER — OFFICE VISIT (OUTPATIENT)
Dept: INTERNAL MEDICINE CLINIC | Facility: CLINIC | Age: 58
End: 2018-12-06
Payer: MEDICARE

## 2018-12-06 VITALS
OXYGEN SATURATION: 97 % | DIASTOLIC BLOOD PRESSURE: 76 MMHG | HEART RATE: 81 BPM | WEIGHT: 197.6 LBS | BODY MASS INDEX: 35.01 KG/M2 | SYSTOLIC BLOOD PRESSURE: 114 MMHG | HEIGHT: 63 IN

## 2018-12-06 DIAGNOSIS — R05.9 COUGH: Primary | ICD-10-CM

## 2018-12-06 PROCEDURE — 99213 OFFICE O/P EST LOW 20 MIN: CPT | Performed by: INTERNAL MEDICINE

## 2018-12-06 RX ORDER — AZITHROMYCIN 250 MG/1
TABLET, FILM COATED ORAL
Qty: 6 TABLET | Refills: 0 | Status: SHIPPED | OUTPATIENT
Start: 2018-12-06 | End: 2018-12-10

## 2018-12-06 RX ORDER — CODEINE PHOSPHATE AND GUAIFENESIN 10; 100 MG/5ML; MG/5ML
5 SOLUTION ORAL
Qty: 118 ML | Refills: 0 | Status: SHIPPED | OUTPATIENT
Start: 2018-12-06 | End: 2019-04-15 | Stop reason: ALTCHOICE

## 2018-12-09 PROBLEM — R05.9 COUGH: Status: ACTIVE | Noted: 2018-12-09

## 2018-12-09 NOTE — ASSESSMENT & PLAN NOTE
Bronchitis will treat with Z-Kurtis 1  As directed, Robitussin AC 1 tsp p o  Q h s  P r n  No alcohol or driving after taking medication during the day patient may use Mucinex as directed p r n

## 2019-04-16 ENCOUNTER — ANNUAL EXAM (OUTPATIENT)
Dept: OBGYN CLINIC | Facility: CLINIC | Age: 59
End: 2019-04-16
Payer: MEDICARE

## 2019-04-16 VITALS
SYSTOLIC BLOOD PRESSURE: 110 MMHG | BODY MASS INDEX: 34.94 KG/M2 | WEIGHT: 197.2 LBS | HEIGHT: 63 IN | DIASTOLIC BLOOD PRESSURE: 80 MMHG

## 2019-04-16 DIAGNOSIS — Z01.419 PAP SMEAR, AS PART OF ROUTINE GYNECOLOGICAL EXAMINATION: ICD-10-CM

## 2019-04-16 DIAGNOSIS — Z01.411 ENCOUNTER FOR GYNECOLOGICAL EXAMINATION WITH ABNORMAL FINDING: Primary | ICD-10-CM

## 2019-04-16 DIAGNOSIS — N95.1 VAGINAL DRYNESS, MENOPAUSAL: ICD-10-CM

## 2019-04-16 PROBLEM — Z12.31 ENCOUNTER FOR SCREENING MAMMOGRAM FOR BREAST CANCER: Status: RESOLVED | Noted: 2018-10-18 | Resolved: 2019-04-16

## 2019-04-16 PROBLEM — Z23 NEED FOR INFLUENZA VACCINATION: Status: RESOLVED | Noted: 2018-10-18 | Resolved: 2019-04-16

## 2019-04-16 PROBLEM — Z13.820 SCREENING FOR OSTEOPOROSIS: Status: RESOLVED | Noted: 2018-10-18 | Resolved: 2019-04-16

## 2019-04-16 PROCEDURE — G0101 CA SCREEN;PELVIC/BREAST EXAM: HCPCS | Performed by: PHYSICIAN ASSISTANT

## 2019-04-16 PROCEDURE — G0145 SCR C/V CYTO,THINLAYER,RESCR: HCPCS | Performed by: PHYSICIAN ASSISTANT

## 2019-04-16 RX ORDER — ESTRADIOL 0.1 MG/G
CREAM VAGINAL
Qty: 42.5 G | Refills: 2 | Status: SHIPPED | OUTPATIENT
Start: 2019-04-16 | End: 2019-04-25 | Stop reason: ALTCHOICE

## 2019-04-18 ENCOUNTER — APPOINTMENT (OUTPATIENT)
Dept: LAB | Age: 59
End: 2019-04-18
Payer: MEDICARE

## 2019-04-18 ENCOUNTER — TRANSCRIBE ORDERS (OUTPATIENT)
Dept: ADMINISTRATIVE | Age: 59
End: 2019-04-18

## 2019-04-18 DIAGNOSIS — R73.03 PREDIABETES: ICD-10-CM

## 2019-04-18 DIAGNOSIS — E03.9 HYPOTHYROIDISM, UNSPECIFIED TYPE: ICD-10-CM

## 2019-04-18 DIAGNOSIS — I10 HYPERTENSION, UNSPECIFIED TYPE: ICD-10-CM

## 2019-04-18 LAB
ALBUMIN SERPL BCP-MCNC: 3.9 G/DL (ref 3.5–5)
ALP SERPL-CCNC: 67 U/L (ref 46–116)
ALT SERPL W P-5'-P-CCNC: 20 U/L (ref 12–78)
ANION GAP SERPL CALCULATED.3IONS-SCNC: 0 MMOL/L (ref 4–13)
AST SERPL W P-5'-P-CCNC: 15 U/L (ref 5–45)
BILIRUB SERPL-MCNC: 0.68 MG/DL (ref 0.2–1)
BUN SERPL-MCNC: 20 MG/DL (ref 5–25)
CALCIUM SERPL-MCNC: 8.8 MG/DL (ref 8.3–10.1)
CHLORIDE SERPL-SCNC: 104 MMOL/L (ref 100–108)
CHOLEST SERPL-MCNC: 202 MG/DL (ref 50–200)
CO2 SERPL-SCNC: 33 MMOL/L (ref 21–32)
CREAT SERPL-MCNC: 0.93 MG/DL (ref 0.6–1.3)
EST. AVERAGE GLUCOSE BLD GHB EST-MCNC: 117 MG/DL
GFR SERPL CREATININE-BSD FRML MDRD: 67 ML/MIN/1.73SQ M
GLUCOSE P FAST SERPL-MCNC: 104 MG/DL (ref 65–99)
HBA1C MFR BLD: 5.7 % (ref 4.2–6.3)
HDLC SERPL-MCNC: 58 MG/DL (ref 40–60)
LDLC SERPL CALC-MCNC: 126 MG/DL (ref 0–100)
POTASSIUM SERPL-SCNC: 3.7 MMOL/L (ref 3.5–5.3)
PROT SERPL-MCNC: 6.7 G/DL (ref 6.4–8.2)
SODIUM SERPL-SCNC: 137 MMOL/L (ref 136–145)
TRIGL SERPL-MCNC: 89 MG/DL
TSH SERPL DL<=0.05 MIU/L-ACNC: 1.49 UIU/ML (ref 0.36–3.74)

## 2019-04-18 PROCEDURE — 84443 ASSAY THYROID STIM HORMONE: CPT

## 2019-04-18 PROCEDURE — 80053 COMPREHEN METABOLIC PANEL: CPT

## 2019-04-18 PROCEDURE — 36415 COLL VENOUS BLD VENIPUNCTURE: CPT

## 2019-04-18 PROCEDURE — 80061 LIPID PANEL: CPT

## 2019-04-18 PROCEDURE — 83036 HEMOGLOBIN GLYCOSYLATED A1C: CPT

## 2019-04-19 PROBLEM — K63.89 MELANOSIS COLI: Status: ACTIVE | Noted: 2019-04-19

## 2019-04-19 PROBLEM — Z90.49 HISTORY OF COLON RESECTION: Status: ACTIVE | Noted: 2019-04-19

## 2019-04-22 LAB
LAB AP GYN PRIMARY INTERPRETATION: NORMAL
Lab: NORMAL

## 2019-04-26 ENCOUNTER — OFFICE VISIT (OUTPATIENT)
Dept: INTERNAL MEDICINE CLINIC | Facility: CLINIC | Age: 59
End: 2019-04-26
Payer: MEDICARE

## 2019-04-26 ENCOUNTER — APPOINTMENT (OUTPATIENT)
Dept: RADIOLOGY | Age: 59
End: 2019-04-26
Payer: MEDICARE

## 2019-04-26 VITALS
DIASTOLIC BLOOD PRESSURE: 64 MMHG | OXYGEN SATURATION: 98 % | HEIGHT: 63 IN | BODY MASS INDEX: 34.69 KG/M2 | HEART RATE: 76 BPM | WEIGHT: 195.8 LBS | SYSTOLIC BLOOD PRESSURE: 114 MMHG

## 2019-04-26 DIAGNOSIS — K21.9 GASTROESOPHAGEAL REFLUX DISEASE WITHOUT ESOPHAGITIS: ICD-10-CM

## 2019-04-26 DIAGNOSIS — Z12.11 SCREENING FOR COLON CANCER: Primary | ICD-10-CM

## 2019-04-26 DIAGNOSIS — R89.9 ABNORMAL LABORATORY TEST: ICD-10-CM

## 2019-04-26 DIAGNOSIS — Z00.00 MEDICARE ANNUAL WELLNESS VISIT, SUBSEQUENT: ICD-10-CM

## 2019-04-26 DIAGNOSIS — M54.5 LOW BACK PAIN, UNSPECIFIED BACK PAIN LATERALITY, UNSPECIFIED CHRONICITY, WITH SCIATICA PRESENCE UNSPECIFIED: ICD-10-CM

## 2019-04-26 DIAGNOSIS — E03.9 HYPOTHYROIDISM, UNSPECIFIED TYPE: ICD-10-CM

## 2019-04-26 DIAGNOSIS — Z12.11 SCREENING FOR MALIGNANT NEOPLASM OF COLON: ICD-10-CM

## 2019-04-26 DIAGNOSIS — Z13.6 SCREENING FOR CARDIOVASCULAR CONDITION: ICD-10-CM

## 2019-04-26 DIAGNOSIS — R73.03 PREDIABETES: ICD-10-CM

## 2019-04-26 PROCEDURE — 99214 OFFICE O/P EST MOD 30 MIN: CPT | Performed by: INTERNAL MEDICINE

## 2019-04-26 PROCEDURE — 72110 X-RAY EXAM L-2 SPINE 4/>VWS: CPT

## 2019-04-26 PROCEDURE — G0439 PPPS, SUBSEQ VISIT: HCPCS | Performed by: INTERNAL MEDICINE

## 2019-04-26 RX ORDER — RANITIDINE 150 MG/1
150 CAPSULE ORAL EVERY EVENING
Qty: 90 CAPSULE | Refills: 2 | Status: SHIPPED | OUTPATIENT
Start: 2019-04-26 | End: 2019-11-08

## 2019-04-28 PROBLEM — Z13.6 SCREENING FOR CARDIOVASCULAR CONDITION: Status: ACTIVE | Noted: 2019-04-28

## 2019-04-28 PROBLEM — Z00.00 MEDICARE ANNUAL WELLNESS VISIT, SUBSEQUENT: Status: ACTIVE | Noted: 2018-10-18

## 2019-04-28 PROBLEM — Z12.11 SCREENING FOR MALIGNANT NEOPLASM OF COLON: Status: ACTIVE | Noted: 2018-10-18

## 2019-04-28 PROBLEM — R89.9 ABNORMAL LABORATORY TEST: Status: ACTIVE | Noted: 2019-04-28

## 2019-04-28 PROBLEM — M54.50 LOW BACK PAIN: Status: ACTIVE | Noted: 2019-04-28

## 2019-05-03 ENCOUNTER — APPOINTMENT (OUTPATIENT)
Dept: LAB | Age: 59
End: 2019-05-03
Payer: MEDICARE

## 2019-05-03 DIAGNOSIS — M41.9 SCOLIOSIS OF LUMBAR SPINE, UNSPECIFIED SCOLIOSIS TYPE: Primary | ICD-10-CM

## 2019-05-03 DIAGNOSIS — R89.9 ABNORMAL LABORATORY TEST: ICD-10-CM

## 2019-05-03 LAB
ANION GAP SERPL CALCULATED.3IONS-SCNC: 6 MMOL/L (ref 4–13)
BUN SERPL-MCNC: 16 MG/DL (ref 5–25)
CALCIUM SERPL-MCNC: 8.7 MG/DL (ref 8.3–10.1)
CHLORIDE SERPL-SCNC: 106 MMOL/L (ref 100–108)
CO2 SERPL-SCNC: 30 MMOL/L (ref 21–32)
CREAT SERPL-MCNC: 0.88 MG/DL (ref 0.6–1.3)
GFR SERPL CREATININE-BSD FRML MDRD: 72 ML/MIN/1.73SQ M
GLUCOSE P FAST SERPL-MCNC: 103 MG/DL (ref 65–99)
POTASSIUM SERPL-SCNC: 3.6 MMOL/L (ref 3.5–5.3)
SODIUM SERPL-SCNC: 142 MMOL/L (ref 136–145)

## 2019-05-03 PROCEDURE — 84165 PROTEIN E-PHORESIS SERUM: CPT

## 2019-05-03 PROCEDURE — 36415 COLL VENOUS BLD VENIPUNCTURE: CPT

## 2019-05-03 PROCEDURE — 84165 PROTEIN E-PHORESIS SERUM: CPT | Performed by: PATHOLOGY

## 2019-05-03 PROCEDURE — 80048 BASIC METABOLIC PNL TOTAL CA: CPT

## 2019-05-05 ENCOUNTER — ANESTHESIA EVENT (OUTPATIENT)
Dept: GASTROENTEROLOGY | Facility: HOSPITAL | Age: 59
End: 2019-05-05
Payer: MEDICARE

## 2019-05-06 ENCOUNTER — ANESTHESIA (OUTPATIENT)
Dept: GASTROENTEROLOGY | Facility: HOSPITAL | Age: 59
End: 2019-05-06
Payer: MEDICARE

## 2019-05-06 ENCOUNTER — HOSPITAL ENCOUNTER (OUTPATIENT)
Facility: HOSPITAL | Age: 59
Setting detail: OUTPATIENT SURGERY
Discharge: HOME | End: 2019-05-06
Attending: SURGERY | Admitting: SURGERY
Payer: MEDICARE

## 2019-05-06 VITALS
WEIGHT: 195 LBS | RESPIRATION RATE: 14 BRPM | SYSTOLIC BLOOD PRESSURE: 113 MMHG | BODY MASS INDEX: 34.55 KG/M2 | HEART RATE: 76 BPM | OXYGEN SATURATION: 99 % | DIASTOLIC BLOOD PRESSURE: 62 MMHG | TEMPERATURE: 98.3 F | HEIGHT: 63 IN

## 2019-05-06 LAB
ALBUMIN SERPL ELPH-MCNC: 4.16 G/DL (ref 3.5–5)
ALBUMIN SERPL ELPH-MCNC: 66.1 % (ref 52–65)
ALPHA1 GLOB SERPL ELPH-MCNC: 0.28 G/DL (ref 0.1–0.4)
ALPHA1 GLOB SERPL ELPH-MCNC: 4.4 % (ref 2.5–5)
ALPHA2 GLOB SERPL ELPH-MCNC: 0.7 G/DL (ref 0.4–1.2)
ALPHA2 GLOB SERPL ELPH-MCNC: 11.1 % (ref 7–13)
BETA GLOB ABNORMAL SERPL ELPH-MCNC: 0.41 G/DL (ref 0.4–0.8)
BETA1 GLOB SERPL ELPH-MCNC: 6.5 % (ref 5–13)
BETA2 GLOB SERPL ELPH-MCNC: 3.4 % (ref 2–8)
BETA2+GAMMA GLOB SERPL ELPH-MCNC: 0.21 G/DL (ref 0.2–0.5)
GAMMA GLOB ABNORMAL SERPL ELPH-MCNC: 0.54 G/DL (ref 0.5–1.6)
GAMMA GLOB SERPL ELPH-MCNC: 8.5 % (ref 12–22)
IGG/ALB SER: 1.95 {RATIO} (ref 1.1–1.8)
PROT PATTERN SERPL ELPH-IMP: ABNORMAL
PROT SERPL-MCNC: 6.3 G/DL (ref 6.4–8.2)

## 2019-05-06 PROCEDURE — 45378 DIAGNOSTIC COLONOSCOPY: CPT | Performed by: SURGERY

## 2019-05-06 RX ORDER — KETAMINE HYDROCHLORIDE 50 MG/ML
INJECTION, SOLUTION, CONCENTRATE INTRAMUSCULAR; INTRAVENOUS AS NEEDED
Status: DISCONTINUED | OUTPATIENT
Start: 2019-05-06 | End: 2019-05-06 | Stop reason: SURG

## 2019-05-06 RX ORDER — SODIUM CHLORIDE 9 MG/ML
INJECTION, SOLUTION INTRAVENOUS CONTINUOUS PRN
Status: DISCONTINUED | OUTPATIENT
Start: 2019-05-06 | End: 2019-05-06 | Stop reason: SURG

## 2019-05-06 RX ORDER — SODIUM CHLORIDE 9 MG/ML
125 INJECTION, SOLUTION INTRAVENOUS CONTINUOUS
Status: CANCELLED | OUTPATIENT
Start: 2019-05-06

## 2019-05-06 RX ORDER — PROPOFOL 10 MG/ML
INJECTION, EMULSION INTRAVENOUS CONTINUOUS PRN
Status: DISCONTINUED | OUTPATIENT
Start: 2019-05-06 | End: 2019-05-06 | Stop reason: SURG

## 2019-05-06 RX ORDER — PROPOFOL 10 MG/ML
INJECTION, EMULSION INTRAVENOUS AS NEEDED
Status: DISCONTINUED | OUTPATIENT
Start: 2019-05-06 | End: 2019-05-06 | Stop reason: SURG

## 2019-05-06 RX ORDER — LIDOCAINE HYDROCHLORIDE 10 MG/ML
0.5 INJECTION, SOLUTION EPIDURAL; INFILTRATION; INTRACAUDAL; PERINEURAL ONCE AS NEEDED
Status: CANCELLED | OUTPATIENT
Start: 2019-05-06

## 2019-05-06 RX ADMIN — PROPOFOL 20 MG: 10 INJECTION, EMULSION INTRAVENOUS at 09:20

## 2019-05-06 RX ADMIN — PROPOFOL 100 MG: 10 INJECTION, EMULSION INTRAVENOUS at 09:18

## 2019-05-06 RX ADMIN — PROPOFOL 50 MG: 10 INJECTION, EMULSION INTRAVENOUS at 09:31

## 2019-05-06 RX ADMIN — KETAMINE HYDROCHLORIDE 20 MG: 50 INJECTION, SOLUTION INTRAMUSCULAR; INTRAVENOUS at 09:21

## 2019-05-06 RX ADMIN — PROPOFOL 100 MCG/KG/MIN: 10 INJECTION, EMULSION INTRAVENOUS at 09:25

## 2019-05-06 RX ADMIN — SODIUM CHLORIDE: 0.9 INJECTION, SOLUTION INTRAVENOUS at 09:14

## 2019-05-09 ENCOUNTER — HOSPITAL ENCOUNTER (OUTPATIENT)
Dept: RADIOLOGY | Age: 59
Discharge: HOME | End: 2019-05-09
Payer: MEDICARE

## 2019-05-09 VITALS — BODY MASS INDEX: 34.55 KG/M2 | WEIGHT: 195 LBS | HEIGHT: 63 IN

## 2019-05-09 DIAGNOSIS — Z12.31 ENCOUNTER FOR SCREENING MAMMOGRAM FOR BREAST CANCER: ICD-10-CM

## 2019-05-09 PROCEDURE — 77063 BREAST TOMOSYNTHESIS BI: CPT

## 2019-05-09 PROCEDURE — 77067 SCR MAMMO BI INCL CAD: CPT

## 2019-07-20 DIAGNOSIS — I10 HYPERTENSION, UNSPECIFIED TYPE: ICD-10-CM

## 2019-07-20 DIAGNOSIS — F41.9 ANXIETY: ICD-10-CM

## 2019-07-20 RX ORDER — POTASSIUM CHLORIDE 750 MG/1
TABLET, FILM COATED, EXTENDED RELEASE ORAL
Qty: 180 TABLET | Refills: 3 | Status: SHIPPED | OUTPATIENT
Start: 2019-07-20 | End: 2020-01-02 | Stop reason: SDUPTHER

## 2019-07-20 RX ORDER — SERTRALINE HYDROCHLORIDE 100 MG/1
TABLET, FILM COATED ORAL
Qty: 90 TABLET | Refills: 3 | Status: SHIPPED | OUTPATIENT
Start: 2019-07-20 | End: 2020-01-02 | Stop reason: SDUPTHER

## 2019-07-20 RX ORDER — TRIAMTERENE AND HYDROCHLOROTHIAZIDE 37.5; 25 MG/1; MG/1
CAPSULE ORAL
Qty: 90 CAPSULE | Refills: 1 | Status: SHIPPED | OUTPATIENT
Start: 2019-07-20 | End: 2020-01-02 | Stop reason: SDUPTHER

## 2019-10-26 ENCOUNTER — APPOINTMENT (OUTPATIENT)
Dept: LAB | Age: 59
End: 2019-10-26
Payer: MEDICARE

## 2019-10-26 ENCOUNTER — TRANSCRIBE ORDERS (OUTPATIENT)
Dept: ADMINISTRATIVE | Age: 59
End: 2019-10-26

## 2019-10-26 DIAGNOSIS — R89.9 ABNORMAL LABORATORY TEST: Primary | ICD-10-CM

## 2019-10-26 DIAGNOSIS — Z13.6 SCREENING FOR CARDIOVASCULAR CONDITION: ICD-10-CM

## 2019-10-26 DIAGNOSIS — R73.03 PREDIABETES: ICD-10-CM

## 2019-10-26 DIAGNOSIS — R89.9 ABNORMAL LABORATORY TEST: ICD-10-CM

## 2019-10-26 DIAGNOSIS — E03.9 HYPOTHYROIDISM, UNSPECIFIED TYPE: ICD-10-CM

## 2019-10-26 LAB
ALBUMIN SERPL BCP-MCNC: 3.7 G/DL (ref 3.5–5)
ALP SERPL-CCNC: 65 U/L (ref 46–116)
ALT SERPL W P-5'-P-CCNC: 17 U/L (ref 12–78)
ANION GAP SERPL CALCULATED.3IONS-SCNC: 4 MMOL/L (ref 4–13)
AST SERPL W P-5'-P-CCNC: 11 U/L (ref 5–45)
BILIRUB SERPL-MCNC: 0.59 MG/DL (ref 0.2–1)
BUN SERPL-MCNC: 18 MG/DL (ref 5–25)
CALCIUM SERPL-MCNC: 9 MG/DL (ref 8.3–10.1)
CHLORIDE SERPL-SCNC: 106 MMOL/L (ref 100–108)
CHOLEST SERPL-MCNC: 196 MG/DL (ref 50–200)
CO2 SERPL-SCNC: 30 MMOL/L (ref 21–32)
CREAT SERPL-MCNC: 0.87 MG/DL (ref 0.6–1.3)
EST. AVERAGE GLUCOSE BLD GHB EST-MCNC: 103 MG/DL
GFR SERPL CREATININE-BSD FRML MDRD: 72 ML/MIN/1.73SQ M
GLUCOSE P FAST SERPL-MCNC: 94 MG/DL (ref 65–99)
HBA1C MFR BLD: 5.2 % (ref 4.2–6.3)
HDLC SERPL-MCNC: 65 MG/DL
LDLC SERPL CALC-MCNC: 116 MG/DL (ref 0–100)
POTASSIUM SERPL-SCNC: 3.4 MMOL/L (ref 3.5–5.3)
PROT SERPL-MCNC: 6.5 G/DL (ref 6.4–8.2)
SODIUM SERPL-SCNC: 140 MMOL/L (ref 136–145)
TRIGL SERPL-MCNC: 75 MG/DL
TSH SERPL DL<=0.05 MIU/L-ACNC: 1.75 UIU/ML (ref 0.36–3.74)

## 2019-10-26 PROCEDURE — 80053 COMPREHEN METABOLIC PANEL: CPT

## 2019-10-26 PROCEDURE — 36415 COLL VENOUS BLD VENIPUNCTURE: CPT

## 2019-10-26 PROCEDURE — 84166 PROTEIN E-PHORESIS/URINE/CSF: CPT | Performed by: PATHOLOGY

## 2019-10-26 PROCEDURE — 84443 ASSAY THYROID STIM HORMONE: CPT

## 2019-10-26 PROCEDURE — 84166 PROTEIN E-PHORESIS/URINE/CSF: CPT

## 2019-10-26 PROCEDURE — 80061 LIPID PANEL: CPT

## 2019-10-26 PROCEDURE — 83036 HEMOGLOBIN GLYCOSYLATED A1C: CPT

## 2019-10-28 DIAGNOSIS — E87.6 HYPOKALEMIA: Primary | ICD-10-CM

## 2019-10-29 LAB
ALBUMIN UR ELPH-MCNC: 100 %
ALPHA1 GLOB MFR UR ELPH: 0 %
ALPHA2 GLOB MFR UR ELPH: 0 %
B-GLOBULIN MFR UR ELPH: 0 %
GAMMA GLOB MFR UR ELPH: 0 %
PROT PATTERN UR ELPH-IMP: NORMAL
PROT UR-MCNC: 16 MG/DL

## 2019-11-01 ENCOUNTER — APPOINTMENT (OUTPATIENT)
Dept: LAB | Age: 59
End: 2019-11-01
Payer: MEDICARE

## 2019-11-01 DIAGNOSIS — E87.6 HYPOKALEMIA: ICD-10-CM

## 2019-11-01 LAB — POTASSIUM SERPL-SCNC: 4 MMOL/L (ref 3.5–5.3)

## 2019-11-01 PROCEDURE — 84132 ASSAY OF SERUM POTASSIUM: CPT

## 2019-11-01 PROCEDURE — 36415 COLL VENOUS BLD VENIPUNCTURE: CPT

## 2019-11-08 ENCOUNTER — DOCUMENTATION (OUTPATIENT)
Dept: INTERNAL MEDICINE CLINIC | Facility: CLINIC | Age: 59
End: 2019-11-08

## 2019-11-08 ENCOUNTER — OFFICE VISIT (OUTPATIENT)
Dept: INTERNAL MEDICINE CLINIC | Facility: CLINIC | Age: 59
End: 2019-11-08
Payer: MEDICARE

## 2019-11-08 VITALS
DIASTOLIC BLOOD PRESSURE: 68 MMHG | HEIGHT: 63 IN | BODY MASS INDEX: 33.91 KG/M2 | RESPIRATION RATE: 16 BRPM | WEIGHT: 191.4 LBS | OXYGEN SATURATION: 96 % | SYSTOLIC BLOOD PRESSURE: 114 MMHG | HEART RATE: 74 BPM

## 2019-11-08 DIAGNOSIS — K21.9 GASTROESOPHAGEAL REFLUX DISEASE WITHOUT ESOPHAGITIS: ICD-10-CM

## 2019-11-08 DIAGNOSIS — Z12.31 ENCOUNTER FOR SCREENING MAMMOGRAM FOR BREAST CANCER: ICD-10-CM

## 2019-11-08 DIAGNOSIS — I10 HYPERTENSION, UNSPECIFIED TYPE: ICD-10-CM

## 2019-11-08 DIAGNOSIS — E66.09 CLASS 1 OBESITY DUE TO EXCESS CALORIES WITHOUT SERIOUS COMORBIDITY WITH BODY MASS INDEX (BMI) OF 33.0 TO 33.9 IN ADULT: ICD-10-CM

## 2019-11-08 DIAGNOSIS — Z23 NEED FOR INFLUENZA VACCINATION: Primary | ICD-10-CM

## 2019-11-08 DIAGNOSIS — R73.03 PREDIABETES: ICD-10-CM

## 2019-11-08 DIAGNOSIS — R60.9 EDEMA, UNSPECIFIED TYPE: ICD-10-CM

## 2019-11-08 DIAGNOSIS — E03.9 HYPOTHYROIDISM, UNSPECIFIED TYPE: ICD-10-CM

## 2019-11-08 PROCEDURE — 99214 OFFICE O/P EST MOD 30 MIN: CPT | Performed by: INTERNAL MEDICINE

## 2019-11-08 PROCEDURE — 90682 RIV4 VACC RECOMBINANT DNA IM: CPT

## 2019-11-08 PROCEDURE — G0008 ADMIN INFLUENZA VIRUS VAC: HCPCS

## 2019-11-08 RX ORDER — FAMOTIDINE 20 MG/1
20 TABLET, FILM COATED ORAL DAILY
Qty: 90 TABLET | Refills: 3 | Status: SHIPPED | OUTPATIENT
Start: 2019-11-08 | End: 2020-01-02 | Stop reason: SDUPTHER

## 2019-11-08 RX ORDER — FAMOTIDINE 20 MG/1
20 TABLET, FILM COATED ORAL 2 TIMES DAILY
COMMUNITY
End: 2019-11-08 | Stop reason: SDUPTHER

## 2019-11-08 RX ORDER — FUROSEMIDE 20 MG/1
20 TABLET ORAL DAILY PRN
Qty: 30 TABLET | Refills: 0 | Status: SHIPPED | OUTPATIENT
Start: 2019-11-08 | End: 2020-05-27 | Stop reason: ALTCHOICE

## 2019-11-08 NOTE — PROGRESS NOTES
[address] [facility]  [place], PharmD      The following is per review of patient's pertinent medical/medication history:    Reason for documentation: Per PCP request, patient's chart reviewed to assess safety of using furosemide given acetazolamide allergy  Patient's insurance coverage: Payor: MEDICARE / Plan: MEDICARE A AND B / Product Type: Medicare A & B Fee for Service /     Findings:   Acetazolamide reaction per chart review is itching/swelling however pt is able to tolerate sulfa abx without ADR  Furosemide, Sulfa abx and acetazolamide contain a sulfonamide moiety  There is a potential for cross-reactivity between members of a specific class (ex two antibiotic sulfonamides)  However cross-reactivity between antibiotic sulfonamides and nonantibiotic sulfonamides have extremely low occurrence risk  Cross-reaction anaphylaxis is unlikely to occur in nonantibiotic sulfonamide  In cases where prior reaction to sulfonamide moiety were SJS/TEN, consider assessing risk/benefit of rx  Recommendations: Would not anticipate patient to experience reaction to furosemide in the presence of acetazolamide allergy      Demographics  Interaction Method: Virtual    Topic(s) Addressed  Other    Intervention(s) Made  Pharmacologic: Prevent or Manage Adverse Drug Reaction    Tool(s) Used  Not Applicable    Time Spent in Direct Patient Care Activities and Care Coordination: 0-15 Minutes    Recommendation(s) Accepted by the Patient/Caregiver: Not Applicable

## 2019-11-08 NOTE — PROGRESS NOTES
[address] [facility]  [place], [place]      The following is per review of patient's pertinent medical/medication history:    Reason for documentation: Per PCP request, patient's chart reviewed to assess safety of using furosemide given acetazolamide allergy  Patient's insurance coverage: Payor: MEDICARE / Plan: MEDICARE A AND B / Product Type: Medicare A & B Fee for Service /     Findings:   Acetazolamide reaction per chart review is itching/swelling however pt is able to tolerate sulfa abx without ADR  Furosemide, Sulfa abx and acetazolamide contain a sulfonamide moiety  There is a potential for cross-reactivity between members of a specific class (ex two antibiotic sulfonamides)  However cross-reactivity between antibiotic sulfonamides and nonantibiotic sulfonamides have extremely low occurrence risk  Cross-reaction anaphylaxis is unlikely to occur in nonantibiotic sulfonamide  In cases where prior reaction to sulfonamide moiety were SJS/TEN, consider assessing risk/benefit of rx  Recommendations: Would not anticipate patient to experience reaction to furosemide in the presence of acetazolamide allergy      Pharmacist Tracking Tool

## 2019-11-08 NOTE — PROGRESS NOTES
BMI Counseling: Body mass index is 33 9 kg/m²  Discussed the patient's BMI with her  The BMI is above normal  Nutrition recommendations include reducing portion sizes  Assessment/Plan:    Class 1 obesity due to excess calories with body mass index (BMI) of 33 0 to 33 9 in adult  Obesity -I have counseled patient following healthy and balanced diet, I would like the patient to lose weight, I would like the patient exercise routinely; we will continue monitor the patient's progress  Edema  Mild intermittent edema secondary to over consumption for sodium currently asymptomatic she request a prescription for Lasix 20 mg once a day as needed she will not take the Dyazide on that day, I will have the pharmacist check to see if there is any allergy or any concern his of the previous allergy to acetazolomide    Encounter for screening mammogram for breast cancer  Counseled, will check a screening mammogram    Need for influenza vaccination  Counseled, patient will receive a influenza vaccine    Prediabetes  Pre diabetes -I have counseled the patient to follow a healthy balanced diet, I have counseled patient reduce carbohydrates and sweets in the diet, I would like the patient exercise routinely  I will be checking hemoglobin A1c and comprehensive metabolic panel  Have counseled patient about the prevention of diabetes, and the risk of progression to type 2 diabetes           Problem List Items Addressed This Visit        Digestive    Gastroesophageal reflux disease without esophagitis    Relevant Medications    famotidine (PEPCID) 20 mg tablet       Endocrine    Hypothyroidism       Cardiovascular and Mediastinum    Hypertension    Relevant Medications    furosemide (LASIX) 20 mg tablet    furosemide (LASIX) 20 mg tablet       Other    Prediabetes     Pre diabetes -I have counseled the patient to follow a healthy balanced diet, I have counseled patient reduce carbohydrates and sweets in the diet, I would like the patient exercise routinely  I will be checking hemoglobin A1c and comprehensive metabolic panel  Have counseled patient about the prevention of diabetes, and the risk of progression to type 2 diabetes  Relevant Orders    Hemoglobin A1C    Need for influenza vaccination - Primary     Counseled, patient will receive a influenza vaccine         Relevant Orders    influenza vaccine, [id number], quadrivalent, recombinant, PF, 0 5 mL, for patients 18 yr+ (FLUBLOK) (Completed)    Encounter for screening mammogram for breast cancer     Counseled, will check a screening mammogram         Relevant Orders    Mammo screening bilateral w cad    Edema     Mild intermittent edema secondary to over consumption for sodium currently asymptomatic she request a prescription for Lasix 20 mg once a day as needed she will not take the Dyazide on that day, I will have the pharmacist check to see if there is any allergy or any concern his of the previous allergy to acetazolomide         Relevant Medications    furosemide (LASIX) 20 mg tablet    furosemide (LASIX) 20 mg tablet    Other Relevant Orders    Comprehensive metabolic panel    Lipid Panel with Direct LDL reflex    TSH, 3rd generation    Class 1 obesity due to excess calories with body mass index (BMI) of 33 0 to 33 9 in adult     Obesity -I have counseled patient following healthy and balanced diet, I would like the patient to lose weight, I would like the patient exercise routinely; we will continue monitor the patient's progress  Return to office 6  months  call if any problems  Subjective:      Patient ID: Swati Chong is a 62 y o  female  HPI  58-year old female coming in for a follow up office visit regarding GERD, edema, prediabetes, hypothyroidism, hypertension, obesity; The patient reports me compliant taking medications without untoward side effects the    The patient is here to review his medical condition, update me on the medical condition and the patient reports me no hospitalizations and no ER visits  She is here to review her laboratories  Overall she is doing well she has been doing very well with her diet and has lost weight  She reports me intermittent swelling in her hands when she eats too much salt she is requesting a stronger diuretic  The following portions of the patient's history were reviewed and updated as appropriate: allergies, current medications, past family history, past medical history, past social history, past surgical history and problem list     Review of Systems   Constitutional: Negative for activity change, appetite change and unexpected weight change  HENT: Negative for congestion and postnasal drip  Eyes: Negative for visual disturbance  Respiratory: Negative for cough and shortness of breath  Cardiovascular: Negative for chest pain  Gastrointestinal: Negative for abdominal pain, diarrhea, nausea and vomiting  Neurological: Negative for dizziness, light-headedness and headaches  Hematological: Negative for adenopathy  Objective:    No follow-ups on file  No results found  Recent Results (from the past 20817 hour(s))   ECG 12 lead   Result Value    Ventricular Rate 72    Atrial Rate 72    MN Interval 162    QRSD Interval 92    QT Interval 414    QTC Interval 453    P Axis 49    QRS Axis -4    T Wave Axis 34    Narrative    Normal sinus rhythm  Incomplete right bundle branch block  Inferior infarct , age undetermined  Abnormal ECG  When compared with ECG of 07-OCT-2013 09:24,  Inferior infarct is now Present  Confirmed by Sharonda Mcfarland ([id number]) on 12/31/2016 6:50:56 AM       Allergies   Allergen Reactions    Penicillins Anaphylaxis     Anaphylaxis  Anaphylaxis  Other reaction(s):  Anaphylaxis    Acetazolamide Itching and Swelling     With eye drops - takes  oral sulfa w/o side effects    Metronidazole GI Intolerance and Nausea Only    Morphine Other (See Comments) hallucinations       Past Medical History:   Diagnosis Date    Abnormal electrocardiogram     Last assessed 10/04/13    Anemia     pt states hypogammaglobulinemia, needs washed RBCs if transfused - Rx by Dr Levon [facility])     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  - Last assessed 11/02/17    Disease of thyroid gland     Diverticulitis of colon     GERD (gastroesophageal reflux disease)     Hypertension     Hypoglobulinemia     Slow transit constipation      Past Surgical History:   Procedure Laterality Date    APPENDECTOMY      BOWEL RESECTION      CARPAL TUNNEL RELEASE      COLECTOMY      Resolved 08/27/09    JOINT REPLACEMENT Right     LASIK      OTHER SURGICAL HISTORY      Biopsy Vulvar    NV COLONOSCOPY FLX DX W/COLLJ SPEC WHEN PFRMD N/A 5/6/2019    Procedure: COLONOSCOPY;  Surgeon: Stephanie Hinojosa MD;  [facility];   Service: General    NV TOTAL KNEE ARTHROPLASTY Left 2/8/2017    Procedure: TOTAL KNEE REPLACEMENT ARTHROPLASTY;  Surgeon: Vena Blizzard, MD;  [facility];  Service: Orthopedics    REFRACTIVE SURGERY      REPLACEMENT TOTAL KNEE Right 10/2013    TONSILLECTOMY      With Adenoidectomy    TOOTH EXTRACTION      VAGINA SURGERY      mesh     Current Outpatient Medications on File Prior to Visit   Medication Sig Dispense Refill    levothyroxine 75 mcg tablet Take 1 tablet (75 mcg total) by mouth daily in the early morning 90 tablet 3    potassium chloride (K-DUR) 10 mEq tablet TAKE 1 TABLET TWICE DAILY 180 tablet 3    Psyllium (METAMUCIL PO) Take 1 Dose by mouth daily        senna (SENOKOT) 8 6 MG tablet Take 2 tablets by mouth daily      sertraline (ZOLOFT) 100 mg tablet TAKE 1 TABLET EVERY DAY 90 tablet 3    triamterene-hydrochlorothiazide (DYAZIDE) 37 5-25 mg per capsule TAKE 1 CAPSULE EVERY DAY AS NEEDED 90 capsule 1     No current facility-administered medications on file prior to visit        Family History   Problem Relation Age of Onset    Breast cancer Sister 54    Breast cancer Family     Arthritis Family     Hypertension Family     Cancer Family     Breast cancer Paternal Grandmother 50    No Known Problems Daughter     Breast cancer Paternal Aunt 79    No Known Problems Sister     No Known Problems Daughter     No Known Problems Daughter      Social History     Socioeconomic History    Marital status: /Civil Union     Spouse name: Not on file    Number of children: Not on file    Years of education: Not on file    Highest education level: Not on file   Occupational History    Not on file   Social Needs    Financial resource strain: Not on file    Food insecurity:     Worry: Not on file     Inability: Not on file    Transportation needs:     Medical: Not on file     Non-medical: Not on file   Tobacco Use    Smoking status: Never Smoker    Smokeless tobacco: Never Used   Substance and Sexual Activity    Alcohol use: No    Drug use: No    Sexual activity: Not on file   Lifestyle    Physical activity:     Days per week: Not on file     Minutes per session: Not on file    Stress: Not on file   Relationships    Social connections:     Talks on phone: Not on file     Gets together: Not on file     Attends Holiness service: Not on file     Active member of club or organization: Not on file     Attends meetings of clubs or organizations: Not on file     Relationship status: Not on file    Intimate partner violence:     Fear of current or ex partner: Not on file     Emotionally abused: Not on file     Physically abused: Not on file     Forced sexual activity: Not on file   Other Topics Concern    Not on file   Social History Narrative    Denied: Home environment Domestic Violence        Daily Tea consumption        Caffeine use     Vitals:    11/08/19 0756   BP: 114/68   Pulse: 74   Resp: 16   SpO2: 96%   Weight: 86 8 kg (191 lb 6 4 oz)   Height: 5' 3" (1 6 m)     Results for orders placed or performed in visit on 11/01/19   Potassium   Result Value Ref Range    Potassium 4 0 3 5 - 5 3 mmol/L     Weight (last 2 days)     Date/Time   Weight    11/08/19 0756   86 8 (191 4)            Body mass index is 33 9 kg/m²  BP      Temp      Pulse     Resp      SpO2        Vitals:    11/08/19 0756   Weight: 86 8 kg (191 lb 6 4 oz)     Vitals:    11/08/19 0756   Weight: 86 8 kg (191 lb 6 4 oz)       /68   Pulse 74   Resp 16   Ht 5' 3" (1 6 m)   Wt 86 8 kg (191 lb 6 4 oz)   SpO2 96%   BMI 33 90 kg/m²          Physical Exam   Constitutional: She appears well-developed and well-nourished  HENT:   Head: Normocephalic  Mouth/Throat: Oropharynx is clear and moist    Eyes: Pupils are equal, round, and reactive to light  Conjunctivae are normal  Right eye exhibits no discharge  Left eye exhibits no discharge  No scleral icterus  Neck: Neck supple  Cardiovascular: Normal rate, regular rhythm, normal heart sounds and intact distal pulses  Exam reveals no gallop and no friction rub  No murmur heard  Pulmonary/Chest: Breath sounds normal  No respiratory distress  She has no wheezes  She has no rales  Abdominal: Soft  Bowel sounds are normal  She exhibits no distension and no mass  There is no tenderness  There is no rebound and no guarding  Musculoskeletal: She exhibits no edema or deformity  Lymphadenopathy:     She has no cervical adenopathy  Neurological: She is alert   Coordination normal

## 2019-11-10 PROBLEM — E66.811 CLASS 1 OBESITY DUE TO EXCESS CALORIES WITH BODY MASS INDEX (BMI) OF 33.0 TO 33.9 IN ADULT: Status: ACTIVE | Noted: 2019-11-10

## 2019-11-10 PROBLEM — R60.9 EDEMA: Status: ACTIVE | Noted: 2019-11-10

## 2019-11-10 PROBLEM — E66.09 CLASS 1 OBESITY DUE TO EXCESS CALORIES WITH BODY MASS INDEX (BMI) OF 33.0 TO 33.9 IN ADULT: Status: ACTIVE | Noted: 2019-11-10

## 2019-11-10 PROBLEM — Z23 NEED FOR INFLUENZA VACCINATION: Status: ACTIVE | Noted: 2019-11-10

## 2019-11-10 PROBLEM — Z12.31 ENCOUNTER FOR SCREENING MAMMOGRAM FOR BREAST CANCER: Status: ACTIVE | Noted: 2019-11-10

## 2019-11-10 RX ORDER — FUROSEMIDE 20 MG/1
20 TABLET ORAL 2 TIMES DAILY
Qty: 90 TABLET | Refills: 1 | Status: SHIPPED | OUTPATIENT
Start: 2019-11-10 | End: 2020-12-04 | Stop reason: ALTCHOICE

## 2019-11-10 NOTE — ASSESSMENT & PLAN NOTE
Mild intermittent edema secondary to over consumption for sodium currently asymptomatic she request a prescription for Lasix 20 mg once a day as needed she will not take the Dyazide on that day, I will have the pharmacist check to see if there is any allergy or any concern his of the previous allergy to acetazolomide

## 2019-11-21 ENCOUNTER — TELEPHONE (OUTPATIENT)
Dept: INTERNAL MEDICINE CLINIC | Facility: CLINIC | Age: 59
End: 2019-11-21

## 2019-11-21 NOTE — TELEPHONE ENCOUNTER
Call placed to patient per PCP request on findings from 11/8  Pt voiced understanding and agreeance  Agrees to contact practice if allergy s/sx

## 2019-12-30 ENCOUNTER — APPOINTMENT (OUTPATIENT)
Dept: RADIOLOGY | Age: 59
End: 2019-12-30
Payer: MEDICARE

## 2019-12-30 ENCOUNTER — OFFICE VISIT (OUTPATIENT)
Dept: URGENT CARE | Age: 59
End: 2019-12-30
Payer: MEDICARE

## 2019-12-30 VITALS
SYSTOLIC BLOOD PRESSURE: 116 MMHG | DIASTOLIC BLOOD PRESSURE: 62 MMHG | WEIGHT: 190 LBS | TEMPERATURE: 97.5 F | HEIGHT: 63 IN | RESPIRATION RATE: 16 BRPM | HEART RATE: 82 BPM | OXYGEN SATURATION: 96 % | BODY MASS INDEX: 33.66 KG/M2

## 2019-12-30 DIAGNOSIS — R52 PAIN: ICD-10-CM

## 2019-12-30 DIAGNOSIS — R52 PAIN: Primary | ICD-10-CM

## 2019-12-30 DIAGNOSIS — S76.311A STRAIN OF RIGHT HAMSTRING MUSCLE, INITIAL ENCOUNTER: ICD-10-CM

## 2019-12-30 PROCEDURE — 73562 X-RAY EXAM OF KNEE 3: CPT

## 2019-12-30 PROCEDURE — 99213 OFFICE O/P EST LOW 20 MIN: CPT | Performed by: PHYSICIAN ASSISTANT

## 2019-12-30 PROCEDURE — G0463 HOSPITAL OUTPT CLINIC VISIT: HCPCS | Performed by: PHYSICIAN ASSISTANT

## 2019-12-30 RX ORDER — METHYLPREDNISOLONE 4 MG/1
TABLET ORAL
Qty: 21 TABLET | Refills: 0 | Status: SHIPPED | OUTPATIENT
Start: 2019-12-30 | End: 2020-05-27 | Stop reason: ALTCHOICE

## 2019-12-30 NOTE — PATIENT INSTRUCTIONS
-start Medrol Dosepak as directed  -start PT for right knee  -follow up with Dr Tiny Hammans  -ER if worsen

## 2019-12-30 NOTE — PROGRESS NOTES
[facility]        NAME: Luis A Stevenson is a 62 y o  female  : 1960    MRN: [id number]  DATE: 2019  TIME: 2:19 PM    Assessment and Plan   Pain [R52]  1  Pain  XR knee 3 vw right non injury   2  Strain of right hamstring muscle, initial encounter  methylPREDNISolone 4 MG tablet therapy pack    Ambulatory referral to Physical Therapy         Patient Instructions     -start Medrol Dosepak as directed  -start PT for right knee  -follow up with Dr Marivel Pastor  -ER if worsen       Chief Complaint     Chief Complaint   Patient presents with    Leg Pain     Pt complaining of pain to the back of her R knee x3 weeks  No injury  She has a pulling sensation when she extends her leg  History of Present Illness       Patient presents with posterior right knee pain for the past 3 weeks  She denies any injuries  She states it feels like a pulling sensation only when she flexes her leg in a certain position  Is not all the time  She states it feels like a pulled muscle  She does have a history of a right total knee replacement done years ago Dr Timur Watson go  She denies any issues with her total knee  She denies any numbness or tingling  She denies any history of DVTs or PEs  Review of Systems   Review of Systems   Constitutional: Negative  HENT: Negative  Respiratory: Negative  Cardiovascular: Negative  Gastrointestinal: Negative  Musculoskeletal: Positive for arthralgias  Skin: Negative  Neurological: Negative  Psychiatric/Behavioral: Negative            Current Medications       Current Outpatient Medications:     famotidine (PEPCID) 20 mg tablet, Take 1 tablet (20 mg total) by mouth daily, Disp: 90 tablet, Rfl: 3    levothyroxine 75 mcg tablet, Take 1 tablet (75 mcg total) by mouth daily in the early morning, Disp: 90 tablet, Rfl: 3    potassium chloride (K-DUR) 10 mEq tablet, TAKE 1 TABLET TWICE DAILY, Disp: 180 tablet, Rfl: 3    sertraline (ZOLOFT) 100 mg tablet, TAKE 1 TABLET EVERY DAY, Disp: 90 tablet, Rfl: 3    triamterene-hydrochlorothiazide (DYAZIDE) 37 5-25 mg per capsule, TAKE 1 CAPSULE EVERY DAY AS NEEDED, Disp: 90 capsule, Rfl: 1    furosemide (LASIX) 20 mg tablet, Take 1 tablet (20 mg total) by mouth daily as needed (edema) (Patient not taking: Reported on 12/30/2019), Disp: 30 tablet, Rfl: 0    furosemide (LASIX) 20 mg tablet, Take 1 tablet (20 mg total) by mouth 2 (two) times a day (Patient not taking: Reported on 12/30/2019), Disp: 90 tablet, Rfl: 1    methylPREDNISolone 4 MG tablet therapy pack, Use as directed on package, Disp: 21 tablet, Rfl: 0    Psyllium (METAMUCIL PO), Take 1 Dose by mouth daily  , Disp: , Rfl:     senna (SENOKOT) 8 6 MG tablet, Take 2 tablets by mouth daily, Disp: , Rfl:     Current Allergies     Allergies as of 12/30/2019 - Reviewed 12/30/2019   Allergen Reaction Noted    Penicillins Anaphylaxis 05/18/2012    Acetazolamide Itching and Swelling     Metronidazole GI Intolerance and Nausea Only 10/19/2012    Morphine Other (See Comments) 01/17/2017            The following portions of the patient's history were reviewed and updated as appropriate: allergies, current medications, past family history, past medical history, past social history, past surgical history and problem list      Past Medical History:   Diagnosis Date    Abnormal electrocardiogram     Last assessed 10/04/13    Anemia     pt states hypogammaglobulinemia, needs washed RBCs if transfused - Rx by Dr Ankur Vazquez [facility] ([facility] [address] )     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  - Last assessed 11/02/17    Disease of thyroid gland     Diverticulitis of colon     GERD (gastroesophageal reflux disease)     Hypertension     Hypoglobulinemia     Slow transit constipation        Past Surgical History:   Procedure Laterality Date    APPENDECTOMY      BOWEL RESECTION      CARPAL TUNNEL RELEASE      COLECTOMY      Resolved 08/27/09    JOINT REPLACEMENT Right     LASIK      OTHER SURGICAL HISTORY      Biopsy Vulvar    WA COLONOSCOPY FLX DX W/COLLJ SPEC WHEN PFRMD N/A 5/6/2019    Procedure: COLONOSCOPY;  Surgeon: Marizol Godoy MD;  [facility]; Service: General    WA TOTAL KNEE ARTHROPLASTY Left 2/8/2017    Procedure: TOTAL KNEE REPLACEMENT ARTHROPLASTY;  Surgeon: Sarah Leyva MD;  [facility];  Service: Orthopedics    REFRACTIVE SURGERY      REPLACEMENT TOTAL KNEE Right 10/2013    TONSILLECTOMY      With Adenoidectomy    TOOTH EXTRACTION      VAGINA SURGERY      mesh       Family History   Problem Relation Age of Onset    Breast cancer Sister 54    Breast cancer Family     Arthritis Family     Hypertension Family     Cancer Family     Breast cancer Paternal Grandmother 50    No Known Problems Daughter     Breast cancer Paternal Aunt 79    No Known Problems Sister     No Known Problems Daughter     No Known Problems Daughter          Medications have been verified  Objective   /62 (BP Location: Left arm, Patient Position: Sitting)   Pulse 82   Temp 97 5 °F (36 4 °C) (Temporal)   Resp 16   Ht 5' 3" (1 6 m)   Wt 86 2 kg (190 lb)   SpO2 96%   BMI 33 66 kg/m²        Physical Exam     Physical Exam   Constitutional: She is oriented to person, place, and time  She appears well-developed and well-nourished  No distress  HENT:   Head: Normocephalic and atraumatic  Pulmonary/Chest: Effort normal    Musculoskeletal:   Negative Abimbola sign  No swelling  Right knee still stable to varus and valgus stress  No tenderness palpation posterior knee  Pain not reproducible  Neurological: She is alert and oriented to person, place, and time  No sensory deficit  Skin: Skin is warm and dry  She is not diaphoretic  Psychiatric: She has a normal mood and affect  Her behavior is normal    Nursing note and vitals reviewed        I have personally reviewed pertinent films in PACS and my interpretation is Stable appearing right total knee replacement

## 2020-01-02 DIAGNOSIS — E03.9 HYPOTHYROIDISM, UNSPECIFIED TYPE: ICD-10-CM

## 2020-01-02 DIAGNOSIS — I10 HYPERTENSION, UNSPECIFIED TYPE: ICD-10-CM

## 2020-01-02 DIAGNOSIS — K21.9 GASTROESOPHAGEAL REFLUX DISEASE WITHOUT ESOPHAGITIS: ICD-10-CM

## 2020-01-02 DIAGNOSIS — F41.9 ANXIETY: ICD-10-CM

## 2020-01-02 RX ORDER — POTASSIUM CHLORIDE 750 MG/1
10 TABLET, FILM COATED, EXTENDED RELEASE ORAL 2 TIMES DAILY
Qty: 180 TABLET | Refills: 1 | Status: SHIPPED | OUTPATIENT
Start: 2020-01-02 | End: 2020-03-23 | Stop reason: SDUPTHER

## 2020-01-02 RX ORDER — TRIAMTERENE AND HYDROCHLOROTHIAZIDE 37.5; 25 MG/1; MG/1
1 CAPSULE ORAL EVERY MORNING
Qty: 90 CAPSULE | Refills: 1 | Status: SHIPPED | OUTPATIENT
Start: 2020-01-02 | End: 2020-03-23 | Stop reason: SDUPTHER

## 2020-01-02 RX ORDER — FAMOTIDINE 20 MG/1
20 TABLET, FILM COATED ORAL DAILY
Qty: 90 TABLET | Refills: 1 | Status: SHIPPED | OUTPATIENT
Start: 2020-01-02 | End: 2020-03-23 | Stop reason: SDUPTHER

## 2020-01-02 RX ORDER — SERTRALINE HYDROCHLORIDE 100 MG/1
100 TABLET, FILM COATED ORAL DAILY
Qty: 90 TABLET | Refills: 1 | Status: SHIPPED | OUTPATIENT
Start: 2020-01-02 | End: 2020-03-23 | Stop reason: SDUPTHER

## 2020-01-02 RX ORDER — LEVOTHYROXINE SODIUM 0.07 MG/1
75 TABLET ORAL
Qty: 90 TABLET | Refills: 1 | Status: SHIPPED | OUTPATIENT
Start: 2020-01-02 | End: 2020-03-23 | Stop reason: SDUPTHER

## 2020-03-23 DIAGNOSIS — I10 HYPERTENSION, UNSPECIFIED TYPE: ICD-10-CM

## 2020-03-23 DIAGNOSIS — F41.9 ANXIETY: ICD-10-CM

## 2020-03-23 DIAGNOSIS — E03.9 HYPOTHYROIDISM, UNSPECIFIED TYPE: ICD-10-CM

## 2020-03-23 DIAGNOSIS — K21.9 GASTROESOPHAGEAL REFLUX DISEASE WITHOUT ESOPHAGITIS: ICD-10-CM

## 2020-03-23 RX ORDER — TRIAMTERENE AND HYDROCHLOROTHIAZIDE 37.5; 25 MG/1; MG/1
1 CAPSULE ORAL EVERY MORNING
Qty: 90 CAPSULE | Refills: 0 | Status: SHIPPED | OUTPATIENT
Start: 2020-03-23 | End: 2020-05-29 | Stop reason: SDUPTHER

## 2020-03-23 RX ORDER — LEVOTHYROXINE SODIUM 0.07 MG/1
75 TABLET ORAL
Qty: 90 TABLET | Refills: 0 | Status: SHIPPED | OUTPATIENT
Start: 2020-03-23 | End: 2020-05-29 | Stop reason: SDUPTHER

## 2020-03-23 RX ORDER — FAMOTIDINE 20 MG/1
20 TABLET, FILM COATED ORAL DAILY
Qty: 90 TABLET | Refills: 0 | Status: SHIPPED | OUTPATIENT
Start: 2020-03-23 | End: 2020-05-29 | Stop reason: SDUPTHER

## 2020-03-23 RX ORDER — SERTRALINE HYDROCHLORIDE 100 MG/1
100 TABLET, FILM COATED ORAL DAILY
Qty: 90 TABLET | Refills: 0 | Status: SHIPPED | OUTPATIENT
Start: 2020-03-23 | End: 2020-05-29 | Stop reason: SDUPTHER

## 2020-03-23 RX ORDER — POTASSIUM CHLORIDE 750 MG/1
10 TABLET, FILM COATED, EXTENDED RELEASE ORAL 2 TIMES DAILY
Qty: 180 TABLET | Refills: 0 | Status: SHIPPED | OUTPATIENT
Start: 2020-03-23 | End: 2020-05-11 | Stop reason: SDUPTHER

## 2020-05-08 ENCOUNTER — APPOINTMENT (OUTPATIENT)
Dept: LAB | Age: 60
End: 2020-05-08
Payer: COMMERCIAL

## 2020-05-08 DIAGNOSIS — R60.9 EDEMA, UNSPECIFIED TYPE: ICD-10-CM

## 2020-05-08 DIAGNOSIS — R73.03 PREDIABETES: ICD-10-CM

## 2020-05-08 LAB
ALBUMIN SERPL BCP-MCNC: 3.6 G/DL (ref 3.5–5)
ALP SERPL-CCNC: 54 U/L (ref 46–116)
ALT SERPL W P-5'-P-CCNC: 18 U/L (ref 12–78)
ANION GAP SERPL CALCULATED.3IONS-SCNC: 5 MMOL/L (ref 4–13)
AST SERPL W P-5'-P-CCNC: 13 U/L (ref 5–45)
BILIRUB SERPL-MCNC: 0.6 MG/DL (ref 0.2–1)
BUN SERPL-MCNC: 16 MG/DL (ref 5–25)
CALCIUM SERPL-MCNC: 8.8 MG/DL (ref 8.3–10.1)
CHLORIDE SERPL-SCNC: 105 MMOL/L (ref 100–108)
CHOLEST SERPL-MCNC: 217 MG/DL (ref 50–200)
CO2 SERPL-SCNC: 30 MMOL/L (ref 21–32)
CREAT SERPL-MCNC: 0.94 MG/DL (ref 0.6–1.3)
EST. AVERAGE GLUCOSE BLD GHB EST-MCNC: 100 MG/DL
GFR SERPL CREATININE-BSD FRML MDRD: 66 ML/MIN/1.73SQ M
GLUCOSE P FAST SERPL-MCNC: 96 MG/DL (ref 65–99)
HBA1C MFR BLD: 5.1 %
HDLC SERPL-MCNC: 58 MG/DL
LDLC SERPL CALC-MCNC: 134 MG/DL (ref 0–100)
POTASSIUM SERPL-SCNC: 3.3 MMOL/L (ref 3.5–5.3)
PROT SERPL-MCNC: 6.7 G/DL (ref 6.4–8.2)
SODIUM SERPL-SCNC: 140 MMOL/L (ref 136–145)
TRIGL SERPL-MCNC: 125 MG/DL
TSH SERPL DL<=0.05 MIU/L-ACNC: 2.03 UIU/ML (ref 0.36–3.74)

## 2020-05-08 PROCEDURE — 80053 COMPREHEN METABOLIC PANEL: CPT

## 2020-05-08 PROCEDURE — 84443 ASSAY THYROID STIM HORMONE: CPT

## 2020-05-08 PROCEDURE — 36415 COLL VENOUS BLD VENIPUNCTURE: CPT

## 2020-05-08 PROCEDURE — 80061 LIPID PANEL: CPT

## 2020-05-08 PROCEDURE — 83036 HEMOGLOBIN GLYCOSYLATED A1C: CPT

## 2020-05-11 DIAGNOSIS — I10 HYPERTENSION, UNSPECIFIED TYPE: ICD-10-CM

## 2020-05-11 DIAGNOSIS — E87.6 HYPOKALEMIA: Primary | ICD-10-CM

## 2020-05-11 RX ORDER — POTASSIUM CHLORIDE 750 MG/1
10 TABLET, FILM COATED, EXTENDED RELEASE ORAL 2 TIMES DAILY
Qty: 180 TABLET | Refills: 0 | Status: SHIPPED | OUTPATIENT
Start: 2020-05-11 | End: 2020-05-29 | Stop reason: SDUPTHER

## 2020-05-12 DIAGNOSIS — E87.6 HYPOKALEMIA: Primary | ICD-10-CM

## 2020-05-12 RX ORDER — POTASSIUM CHLORIDE 20 MEQ/1
20 TABLET, EXTENDED RELEASE ORAL 2 TIMES DAILY
Qty: 180 TABLET | Refills: 1 | Status: SHIPPED | OUTPATIENT
Start: 2020-05-12 | End: 2020-05-28 | Stop reason: SDUPTHER

## 2020-05-14 ENCOUNTER — APPOINTMENT (OUTPATIENT)
Dept: LAB | Age: 60
End: 2020-05-14
Payer: COMMERCIAL

## 2020-05-14 ENCOUNTER — HOSPITAL ENCOUNTER (OUTPATIENT)
Dept: RADIOLOGY | Age: 60
Discharge: HOME | End: 2020-05-14
Payer: COMMERCIAL

## 2020-05-14 VITALS — BODY MASS INDEX: 33.66 KG/M2 | WEIGHT: 190 LBS | HEIGHT: 63 IN

## 2020-05-14 DIAGNOSIS — Z12.31 ENCOUNTER FOR SCREENING MAMMOGRAM FOR BREAST CANCER: ICD-10-CM

## 2020-05-14 DIAGNOSIS — E87.6 HYPOKALEMIA: ICD-10-CM

## 2020-05-14 LAB
ALBUMIN SERPL BCP-MCNC: 4.3 G/DL (ref 3.5–5)
ALP SERPL-CCNC: 64 U/L (ref 46–116)
ALT SERPL W P-5'-P-CCNC: 20 U/L (ref 12–78)
ANION GAP SERPL CALCULATED.3IONS-SCNC: 5 MMOL/L (ref 4–13)
AST SERPL W P-5'-P-CCNC: 14 U/L (ref 5–45)
BILIRUB SERPL-MCNC: 0.51 MG/DL (ref 0.2–1)
BUN SERPL-MCNC: 17 MG/DL (ref 5–25)
CALCIUM SERPL-MCNC: 9.6 MG/DL (ref 8.3–10.1)
CHLORIDE SERPL-SCNC: 108 MMOL/L (ref 100–108)
CO2 SERPL-SCNC: 25 MMOL/L (ref 21–32)
CREAT SERPL-MCNC: 0.87 MG/DL (ref 0.6–1.3)
GFR SERPL CREATININE-BSD FRML MDRD: 72 ML/MIN/1.73SQ M
GLUCOSE P FAST SERPL-MCNC: 105 MG/DL (ref 65–99)
POTASSIUM SERPL-SCNC: 4.1 MMOL/L (ref 3.5–5.3)
PROT SERPL-MCNC: 7.4 G/DL (ref 6.4–8.2)
SODIUM SERPL-SCNC: 138 MMOL/L (ref 136–145)

## 2020-05-14 PROCEDURE — 80053 COMPREHEN METABOLIC PANEL: CPT

## 2020-05-14 PROCEDURE — 77067 SCR MAMMO BI INCL CAD: CPT

## 2020-05-14 PROCEDURE — 77063 BREAST TOMOSYNTHESIS BI: CPT

## 2020-05-14 PROCEDURE — 36415 COLL VENOUS BLD VENIPUNCTURE: CPT

## 2020-05-27 ENCOUNTER — TELEPHONE (OUTPATIENT)
Dept: INTERNAL MEDICINE CLINIC | Facility: CLINIC | Age: 60
End: 2020-05-27

## 2020-05-29 ENCOUNTER — OFFICE VISIT (OUTPATIENT)
Dept: INTERNAL MEDICINE CLINIC | Facility: CLINIC | Age: 60
End: 2020-05-29
Payer: COMMERCIAL

## 2020-05-29 VITALS
OXYGEN SATURATION: 98 % | WEIGHT: 192.4 LBS | HEIGHT: 63 IN | HEART RATE: 76 BPM | TEMPERATURE: 98.4 F | RESPIRATION RATE: 16 BRPM | SYSTOLIC BLOOD PRESSURE: 112 MMHG | DIASTOLIC BLOOD PRESSURE: 74 MMHG | BODY MASS INDEX: 34.09 KG/M2

## 2020-05-29 DIAGNOSIS — F41.9 ANXIETY: ICD-10-CM

## 2020-05-29 DIAGNOSIS — M26.649 TMJ ARTHRITIS: ICD-10-CM

## 2020-05-29 DIAGNOSIS — D80.1 LOW GAMMAGLOBULIN LEVEL (HCC): ICD-10-CM

## 2020-05-29 DIAGNOSIS — R73.03 PREDIABETES: ICD-10-CM

## 2020-05-29 DIAGNOSIS — E66.09 CLASS 1 OBESITY DUE TO EXCESS CALORIES WITHOUT SERIOUS COMORBIDITY WITH BODY MASS INDEX (BMI) OF 33.0 TO 33.9 IN ADULT: ICD-10-CM

## 2020-05-29 DIAGNOSIS — Z00.00 MEDICARE ANNUAL WELLNESS VISIT, SUBSEQUENT: Primary | ICD-10-CM

## 2020-05-29 DIAGNOSIS — K21.9 GASTROESOPHAGEAL REFLUX DISEASE WITHOUT ESOPHAGITIS: ICD-10-CM

## 2020-05-29 DIAGNOSIS — Z13.6 SCREENING FOR CARDIOVASCULAR CONDITION: ICD-10-CM

## 2020-05-29 DIAGNOSIS — I10 HYPERTENSION, UNSPECIFIED TYPE: ICD-10-CM

## 2020-05-29 DIAGNOSIS — E03.9 HYPOTHYROIDISM, UNSPECIFIED TYPE: ICD-10-CM

## 2020-05-29 PROCEDURE — 3008F BODY MASS INDEX DOCD: CPT | Performed by: GENERAL ACUTE CARE HOSPITAL

## 2020-05-29 PROCEDURE — G0439 PPPS, SUBSEQ VISIT: HCPCS | Performed by: INTERNAL MEDICINE

## 2020-05-29 PROCEDURE — 3074F SYST BP LT 130 MM HG: CPT | Performed by: INTERNAL MEDICINE

## 2020-05-29 PROCEDURE — 1036F TOBACCO NON-USER: CPT | Performed by: INTERNAL MEDICINE

## 2020-05-29 PROCEDURE — 3078F DIAST BP <80 MM HG: CPT | Performed by: INTERNAL MEDICINE

## 2020-05-29 PROCEDURE — 3008F BODY MASS INDEX DOCD: CPT | Performed by: INTERNAL MEDICINE

## 2020-05-29 PROCEDURE — 99214 OFFICE O/P EST MOD 30 MIN: CPT | Performed by: INTERNAL MEDICINE

## 2020-05-29 RX ORDER — FAMOTIDINE 20 MG/1
20 TABLET, FILM COATED ORAL DAILY
Qty: 90 TABLET | Refills: 1 | Status: SHIPPED | OUTPATIENT
Start: 2020-05-29 | End: 2020-12-05

## 2020-05-29 RX ORDER — POTASSIUM CHLORIDE 750 MG/1
10 TABLET, FILM COATED, EXTENDED RELEASE ORAL 2 TIMES DAILY
Qty: 180 TABLET | Refills: 1 | Status: SHIPPED | OUTPATIENT
Start: 2020-05-29 | End: 2020-11-16

## 2020-05-29 RX ORDER — LEVOTHYROXINE SODIUM 0.07 MG/1
75 TABLET ORAL
Qty: 90 TABLET | Refills: 1 | Status: SHIPPED | OUTPATIENT
Start: 2020-05-29 | End: 2020-11-27

## 2020-05-29 RX ORDER — TRIAMTERENE AND HYDROCHLOROTHIAZIDE 37.5; 25 MG/1; MG/1
1 CAPSULE ORAL EVERY MORNING
Qty: 90 CAPSULE | Refills: 1 | Status: SHIPPED | OUTPATIENT
Start: 2020-05-29 | End: 2020-11-27

## 2020-05-29 RX ORDER — SERTRALINE HYDROCHLORIDE 100 MG/1
100 TABLET, FILM COATED ORAL DAILY
Qty: 90 TABLET | Refills: 1 | Status: SHIPPED | OUTPATIENT
Start: 2020-05-29 | End: 2020-11-27

## 2020-06-30 ENCOUNTER — TELEMEDICINE (OUTPATIENT)
Dept: INTERNAL MEDICINE CLINIC | Facility: CLINIC | Age: 60
End: 2020-06-30
Payer: COMMERCIAL

## 2020-06-30 ENCOUNTER — TELEPHONE (OUTPATIENT)
Dept: INTERNAL MEDICINE CLINIC | Facility: CLINIC | Age: 60
End: 2020-06-30

## 2020-06-30 DIAGNOSIS — Z20.828 EXPOSURE TO SARS-ASSOCIATED CORONAVIRUS: Primary | ICD-10-CM

## 2020-06-30 DIAGNOSIS — Z20.828 EXPOSURE TO SARS-ASSOCIATED CORONAVIRUS: ICD-10-CM

## 2020-06-30 DIAGNOSIS — G44.219 EPISODIC TENSION-TYPE HEADACHE, NOT INTRACTABLE: ICD-10-CM

## 2020-06-30 PROBLEM — G44.209 TENSION TYPE HEADACHE: Status: ACTIVE | Noted: 2020-06-30

## 2020-06-30 PROCEDURE — 3074F SYST BP LT 130 MM HG: CPT | Performed by: GENERAL ACUTE CARE HOSPITAL

## 2020-06-30 PROCEDURE — 99213 OFFICE O/P EST LOW 20 MIN: CPT | Performed by: GENERAL ACUTE CARE HOSPITAL

## 2020-06-30 PROCEDURE — 1036F TOBACCO NON-USER: CPT | Performed by: GENERAL ACUTE CARE HOSPITAL

## 2020-06-30 PROCEDURE — 3078F DIAST BP <80 MM HG: CPT | Performed by: GENERAL ACUTE CARE HOSPITAL

## 2020-06-30 PROCEDURE — U0003 INFECTIOUS AGENT DETECTION BY NUCLEIC ACID (DNA OR RNA); SEVERE ACUTE RESPIRATORY SYNDROME CORONAVIRUS 2 (SARS-COV-2) (CORONAVIRUS DISEASE [COVID-19]), AMPLIFIED PROBE TECHNIQUE, MAKING USE OF HIGH THROUGHPUT TECHNOLOGIES AS DESCRIBED BY CMS-2020-01-R: HCPCS

## 2020-06-30 RX ORDER — NAPROXEN 500 MG/1
500 TABLET ORAL 2 TIMES DAILY WITH MEALS
Qty: 14 TABLET | Refills: 0 | Status: SHIPPED | OUTPATIENT
Start: 2020-06-30 | End: 2020-12-04 | Stop reason: ALTCHOICE

## 2020-06-30 RX ORDER — NAPROXEN 375 MG/1
375 TABLET, DELAYED RELEASE ORAL 2 TIMES DAILY WITH MEALS
Qty: 14 EACH | Refills: 0 | Status: SHIPPED | OUTPATIENT
Start: 2020-06-30 | End: 2020-06-30

## 2020-07-07 LAB — SARS-COV-2 RNA SPEC QL NAA+PROBE: NOT DETECTED

## 2020-09-04 ENCOUNTER — CLINICAL SUPPORT (OUTPATIENT)
Dept: INTERNAL MEDICINE CLINIC | Facility: CLINIC | Age: 60
End: 2020-09-04
Payer: COMMERCIAL

## 2020-09-04 DIAGNOSIS — Z23 NEED FOR VACCINATION: Primary | ICD-10-CM

## 2020-09-04 PROCEDURE — 90682 RIV4 VACC RECOMBINANT DNA IM: CPT

## 2020-09-04 PROCEDURE — G0008 ADMIN INFLUENZA VIRUS VAC: HCPCS

## 2020-10-14 DIAGNOSIS — Z13.820 SCREENING FOR OSTEOPOROSIS: Primary | ICD-10-CM

## 2020-10-14 DIAGNOSIS — M85.852 OTHER SPECIFIED DISORDERS OF BONE DENSITY AND STRUCTURE, LEFT THIGH: ICD-10-CM

## 2020-10-14 DIAGNOSIS — M19.90 OSTEOARTHRITIS, UNSPECIFIED OSTEOARTHRITIS TYPE, UNSPECIFIED SITE: ICD-10-CM

## 2020-11-16 DIAGNOSIS — I10 HYPERTENSION, UNSPECIFIED TYPE: ICD-10-CM

## 2020-11-16 RX ORDER — POTASSIUM CHLORIDE 750 MG/1
TABLET, EXTENDED RELEASE ORAL
Qty: 180 TABLET | Refills: 1 | Status: SHIPPED | OUTPATIENT
Start: 2020-11-16 | End: 2021-04-04

## 2020-11-27 DIAGNOSIS — I10 HYPERTENSION, UNSPECIFIED TYPE: ICD-10-CM

## 2020-11-27 DIAGNOSIS — E03.9 HYPOTHYROIDISM, UNSPECIFIED TYPE: ICD-10-CM

## 2020-11-27 DIAGNOSIS — F41.9 ANXIETY: ICD-10-CM

## 2020-11-27 RX ORDER — SERTRALINE HYDROCHLORIDE 100 MG/1
TABLET, FILM COATED ORAL
Qty: 90 TABLET | Refills: 1 | Status: SHIPPED | OUTPATIENT
Start: 2020-11-27 | End: 2021-05-21

## 2020-11-27 RX ORDER — TRIAMTERENE AND HYDROCHLOROTHIAZIDE 37.5; 25 MG/1; MG/1
CAPSULE ORAL
Qty: 90 CAPSULE | Refills: 1 | Status: SHIPPED | OUTPATIENT
Start: 2020-11-27 | End: 2021-05-21

## 2020-11-27 RX ORDER — LEVOTHYROXINE SODIUM 0.07 MG/1
TABLET ORAL
Qty: 90 TABLET | Refills: 1 | Status: SHIPPED | OUTPATIENT
Start: 2020-11-27 | End: 2021-05-21

## 2020-11-30 ENCOUNTER — LAB (OUTPATIENT)
Dept: LAB | Age: 60
End: 2020-11-30
Payer: COMMERCIAL

## 2020-11-30 DIAGNOSIS — E03.9 HYPOTHYROIDISM, UNSPECIFIED TYPE: ICD-10-CM

## 2020-11-30 DIAGNOSIS — R73.03 PREDIABETES: ICD-10-CM

## 2020-11-30 DIAGNOSIS — D80.1 LOW GAMMAGLOBULIN LEVEL (HCC): ICD-10-CM

## 2020-11-30 DIAGNOSIS — Z13.6 SCREENING FOR CARDIOVASCULAR CONDITION: ICD-10-CM

## 2020-11-30 DIAGNOSIS — I10 HYPERTENSION, UNSPECIFIED TYPE: ICD-10-CM

## 2020-11-30 LAB
ALBUMIN SERPL BCP-MCNC: 4.1 G/DL (ref 3.5–5)
ALP SERPL-CCNC: 50 U/L (ref 46–116)
ALT SERPL W P-5'-P-CCNC: 19 U/L (ref 12–78)
ANION GAP SERPL CALCULATED.3IONS-SCNC: 4 MMOL/L (ref 4–13)
AST SERPL W P-5'-P-CCNC: 13 U/L (ref 5–45)
BILIRUB SERPL-MCNC: 0.6 MG/DL (ref 0.2–1)
BUN SERPL-MCNC: 21 MG/DL (ref 5–25)
CALCIUM SERPL-MCNC: 9.6 MG/DL (ref 8.3–10.1)
CHLORIDE SERPL-SCNC: 104 MMOL/L (ref 100–108)
CHOLEST SERPL-MCNC: 214 MG/DL (ref 50–200)
CO2 SERPL-SCNC: 31 MMOL/L (ref 21–32)
CREAT SERPL-MCNC: 0.91 MG/DL (ref 0.6–1.3)
EST. AVERAGE GLUCOSE BLD GHB EST-MCNC: 103 MG/DL
GFR SERPL CREATININE-BSD FRML MDRD: 68 ML/MIN/1.73SQ M
GLUCOSE P FAST SERPL-MCNC: 88 MG/DL (ref 65–99)
HBA1C MFR BLD: 5.2 %
HDLC SERPL-MCNC: 64 MG/DL
IGA SERPL-MCNC: 34 MG/DL (ref 70–400)
IGG SERPL-MCNC: 582 MG/DL (ref 700–1600)
IGM SERPL-MCNC: 41 MG/DL (ref 40–230)
LDLC SERPL CALC-MCNC: 129 MG/DL (ref 0–100)
POTASSIUM SERPL-SCNC: 3.6 MMOL/L (ref 3.5–5.3)
PROT SERPL-MCNC: 6.8 G/DL (ref 6.4–8.2)
SODIUM SERPL-SCNC: 139 MMOL/L (ref 136–145)
TRIGL SERPL-MCNC: 104 MG/DL
TSH SERPL DL<=0.05 MIU/L-ACNC: 1.56 UIU/ML (ref 0.36–3.74)

## 2020-11-30 PROCEDURE — 80053 COMPREHEN METABOLIC PANEL: CPT

## 2020-11-30 PROCEDURE — 83036 HEMOGLOBIN GLYCOSYLATED A1C: CPT

## 2020-11-30 PROCEDURE — 84443 ASSAY THYROID STIM HORMONE: CPT

## 2020-11-30 PROCEDURE — 84165 PROTEIN E-PHORESIS SERUM: CPT

## 2020-11-30 PROCEDURE — 84165 PROTEIN E-PHORESIS SERUM: CPT | Performed by: PATHOLOGY

## 2020-11-30 PROCEDURE — 80061 LIPID PANEL: CPT

## 2020-11-30 PROCEDURE — 36415 COLL VENOUS BLD VENIPUNCTURE: CPT

## 2020-11-30 PROCEDURE — 82784 ASSAY IGA/IGD/IGG/IGM EACH: CPT

## 2020-12-01 LAB
ALBUMIN SERPL ELPH-MCNC: 4.13 G/DL (ref 3.5–5)
ALBUMIN SERPL ELPH-MCNC: 65.5 % (ref 52–65)
ALPHA1 GLOB SERPL ELPH-MCNC: 0.27 G/DL (ref 0.1–0.4)
ALPHA1 GLOB SERPL ELPH-MCNC: 4.3 % (ref 2.5–5)
ALPHA2 GLOB SERPL ELPH-MCNC: 0.71 G/DL (ref 0.4–1.2)
ALPHA2 GLOB SERPL ELPH-MCNC: 11.2 % (ref 7–13)
BETA GLOB ABNORMAL SERPL ELPH-MCNC: 0.37 G/DL (ref 0.4–0.8)
BETA1 GLOB SERPL ELPH-MCNC: 5.8 % (ref 5–13)
BETA2 GLOB SERPL ELPH-MCNC: 3.6 % (ref 2–8)
BETA2+GAMMA GLOB SERPL ELPH-MCNC: 0.23 G/DL (ref 0.2–0.5)
GAMMA GLOB ABNORMAL SERPL ELPH-MCNC: 0.6 G/DL (ref 0.5–1.6)
GAMMA GLOB SERPL ELPH-MCNC: 9.6 % (ref 12–22)
IGG/ALB SER: 1.9 {RATIO} (ref 1.1–1.8)
PROT PATTERN SERPL ELPH-IMP: ABNORMAL
PROT SERPL-MCNC: 6.3 G/DL (ref 6.4–8.2)

## 2020-12-04 ENCOUNTER — OFFICE VISIT (OUTPATIENT)
Dept: INTERNAL MEDICINE CLINIC | Facility: CLINIC | Age: 60
End: 2020-12-04
Payer: COMMERCIAL

## 2020-12-04 ENCOUNTER — APPOINTMENT (OUTPATIENT)
Dept: RADIOLOGY | Age: 60
End: 2020-12-04
Payer: COMMERCIAL

## 2020-12-04 VITALS
WEIGHT: 189.2 LBS | HEIGHT: 63 IN | BODY MASS INDEX: 33.52 KG/M2 | HEART RATE: 83 BPM | DIASTOLIC BLOOD PRESSURE: 68 MMHG | RESPIRATION RATE: 16 BRPM | SYSTOLIC BLOOD PRESSURE: 118 MMHG | OXYGEN SATURATION: 96 %

## 2020-12-04 DIAGNOSIS — M26.642 ARTHRITIS OF LEFT TEMPOROMANDIBULAR JOINT: ICD-10-CM

## 2020-12-04 DIAGNOSIS — E03.9 HYPOTHYROIDISM, UNSPECIFIED TYPE: Primary | ICD-10-CM

## 2020-12-04 DIAGNOSIS — R73.03 PREDIABETES: ICD-10-CM

## 2020-12-04 DIAGNOSIS — E66.09 CLASS 1 OBESITY DUE TO EXCESS CALORIES WITHOUT SERIOUS COMORBIDITY WITH BODY MASS INDEX (BMI) OF 33.0 TO 33.9 IN ADULT: ICD-10-CM

## 2020-12-04 DIAGNOSIS — Z13.6 SCREENING FOR CARDIOVASCULAR CONDITION: ICD-10-CM

## 2020-12-04 PROCEDURE — 99214 OFFICE O/P EST MOD 30 MIN: CPT | Performed by: INTERNAL MEDICINE

## 2020-12-04 PROCEDURE — 3078F DIAST BP <80 MM HG: CPT | Performed by: INTERNAL MEDICINE

## 2020-12-04 PROCEDURE — 1036F TOBACCO NON-USER: CPT | Performed by: INTERNAL MEDICINE

## 2020-12-04 PROCEDURE — 3074F SYST BP LT 130 MM HG: CPT | Performed by: INTERNAL MEDICINE

## 2020-12-04 PROCEDURE — 70330 X-RAY EXAM OF JAW JOINTS: CPT

## 2020-12-04 PROCEDURE — 3725F SCREEN DEPRESSION PERFORMED: CPT | Performed by: INTERNAL MEDICINE

## 2020-12-04 PROCEDURE — 3008F BODY MASS INDEX DOCD: CPT | Performed by: INTERNAL MEDICINE

## 2020-12-05 DIAGNOSIS — K21.9 GASTROESOPHAGEAL REFLUX DISEASE WITHOUT ESOPHAGITIS: ICD-10-CM

## 2020-12-05 RX ORDER — FAMOTIDINE 20 MG/1
TABLET, FILM COATED ORAL
Qty: 90 TABLET | Refills: 1 | Status: SHIPPED | OUTPATIENT
Start: 2020-12-05 | End: 2021-05-31

## 2020-12-23 ENCOUNTER — HOSPITAL ENCOUNTER (OUTPATIENT)
Dept: RADIOLOGY | Age: 60
Discharge: HOME | End: 2020-12-23
Payer: COMMERCIAL

## 2020-12-23 DIAGNOSIS — M85.852 OTHER SPECIFIED DISORDERS OF BONE DENSITY AND STRUCTURE, LEFT THIGH: ICD-10-CM

## 2020-12-23 PROCEDURE — 77080 DXA BONE DENSITY AXIAL: CPT

## 2021-02-08 ENCOUNTER — TRANSCRIBE ORDERS (OUTPATIENT)
Dept: ADMINISTRATIVE | Facility: HOSPITAL | Age: 61
End: 2021-02-08

## 2021-02-08 DIAGNOSIS — Z12.31 ENCOUNTER FOR SCREENING MAMMOGRAM FOR MALIGNANT NEOPLASM OF BREAST: Primary | ICD-10-CM

## 2021-02-09 ENCOUNTER — TELEPHONE (OUTPATIENT)
Dept: INTERNAL MEDICINE CLINIC | Facility: CLINIC | Age: 61
End: 2021-02-09

## 2021-02-09 DIAGNOSIS — Z12.31 ENCOUNTER FOR SCREENING MAMMOGRAM FOR MALIGNANT NEOPLASM OF BREAST: Primary | ICD-10-CM

## 2021-02-09 NOTE — TELEPHONE ENCOUNTER
----- Message from Jonh Vang sent at 2/9/2021 11:07 AM EST -----  Regarding: Non-Urgent Medical Question  Contact: [phone]  Can you please send me a script for my annual mammogram?    I forgot to ask for it at my last visit  I made an appointment for a mammogram in May  Thanks so much!

## 2021-03-09 ENCOUNTER — TELEPHONE (OUTPATIENT)
Dept: INTERNAL MEDICINE CLINIC | Facility: CLINIC | Age: 61
End: 2021-03-09

## 2021-03-09 DIAGNOSIS — Z29.8 NEED FOR SBE (SUBACUTE BACTERIAL ENDOCARDITIS) PROPHYLAXIS: Primary | ICD-10-CM

## 2021-03-10 RX ORDER — CLINDAMYCIN HYDROCHLORIDE 300 MG/1
CAPSULE ORAL
Qty: 2 CAPSULE | Refills: 0 | Status: SHIPPED | OUTPATIENT
Start: 2021-03-10 | End: 2021-03-10

## 2021-04-04 DIAGNOSIS — I10 HYPERTENSION, UNSPECIFIED TYPE: ICD-10-CM

## 2021-04-04 RX ORDER — POTASSIUM CHLORIDE 750 MG/1
TABLET, EXTENDED RELEASE ORAL
Qty: 180 TABLET | Refills: 1 | Status: SHIPPED | OUTPATIENT
Start: 2021-04-04 | End: 2021-09-20

## 2021-04-08 DIAGNOSIS — Z23 ENCOUNTER FOR IMMUNIZATION: ICD-10-CM

## 2021-04-12 ENCOUNTER — TELEPHONE (OUTPATIENT)
Dept: DERMATOLOGY | Facility: CLINIC | Age: 61
End: 2021-04-12

## 2021-05-04 ENCOUNTER — ANNUAL EXAM (OUTPATIENT)
Dept: OBGYN CLINIC | Facility: CLINIC | Age: 61
End: 2021-05-04
Payer: COMMERCIAL

## 2021-05-04 ENCOUNTER — OFFICE VISIT (OUTPATIENT)
Dept: DERMATOLOGY | Facility: CLINIC | Age: 61
End: 2021-05-04
Payer: COMMERCIAL

## 2021-05-04 VITALS
HEIGHT: 63 IN | DIASTOLIC BLOOD PRESSURE: 78 MMHG | BODY MASS INDEX: 33.84 KG/M2 | SYSTOLIC BLOOD PRESSURE: 120 MMHG | WEIGHT: 191 LBS

## 2021-05-04 VITALS — BODY MASS INDEX: 33.66 KG/M2 | TEMPERATURE: 97.2 F | HEIGHT: 63 IN | WEIGHT: 190 LBS

## 2021-05-04 DIAGNOSIS — Z01.411 ENCOUNTER FOR GYNECOLOGICAL EXAMINATION WITH ABNORMAL FINDING: Primary | ICD-10-CM

## 2021-05-04 DIAGNOSIS — L82.0 SEBORRHEIC KERATOSIS, INFLAMED: Primary | ICD-10-CM

## 2021-05-04 DIAGNOSIS — N95.1 VAGINAL DRYNESS, MENOPAUSAL: ICD-10-CM

## 2021-05-04 DIAGNOSIS — D22.9 MULTIPLE MELANOCYTIC NEVI: ICD-10-CM

## 2021-05-04 DIAGNOSIS — L57.8 ACTINIC SKIN DAMAGE: ICD-10-CM

## 2021-05-04 DIAGNOSIS — D18.01 CHERRY ANGIOMA: ICD-10-CM

## 2021-05-04 PROBLEM — Z13.6 SCREENING FOR CARDIOVASCULAR CONDITION: Status: RESOLVED | Noted: 2019-04-28 | Resolved: 2021-05-04

## 2021-05-04 PROBLEM — Z00.00 MEDICARE ANNUAL WELLNESS VISIT, SUBSEQUENT: Status: RESOLVED | Noted: 2020-05-29 | Resolved: 2021-05-04

## 2021-05-04 PROBLEM — Z12.11 SCREENING FOR MALIGNANT NEOPLASM OF COLON: Status: RESOLVED | Noted: 2018-10-18 | Resolved: 2021-05-04

## 2021-05-04 PROBLEM — Z12.31 ENCOUNTER FOR SCREENING MAMMOGRAM FOR BREAST CANCER: Status: RESOLVED | Noted: 2019-11-10 | Resolved: 2021-05-04

## 2021-05-04 PROBLEM — Z23 NEED FOR INFLUENZA VACCINATION: Status: RESOLVED | Noted: 2019-11-10 | Resolved: 2021-05-04

## 2021-05-04 PROBLEM — R05.9 COUGH: Status: RESOLVED | Noted: 2018-12-09 | Resolved: 2021-05-04

## 2021-05-04 PROCEDURE — 17110 DESTRUCTION B9 LES UP TO 14: CPT | Performed by: DERMATOLOGY

## 2021-05-04 PROCEDURE — 99204 OFFICE O/P NEW MOD 45 MIN: CPT | Performed by: DERMATOLOGY

## 2021-05-04 PROCEDURE — G0101 CA SCREEN;PELVIC/BREAST EXAM: HCPCS | Performed by: PHYSICIAN ASSISTANT

## 2021-05-04 PROCEDURE — 87624 HPV HI-RISK TYP POOLED RSLT: CPT | Performed by: PHYSICIAN ASSISTANT

## 2021-05-04 PROCEDURE — G0145 SCR C/V CYTO,THINLAYER,RESCR: HCPCS | Performed by: PHYSICIAN ASSISTANT

## 2021-05-04 RX ORDER — ESTRADIOL 0.1 MG/G
CREAM VAGINAL
Qty: 42.5 G | Refills: 1 | Status: SHIPPED | OUTPATIENT
Start: 2021-05-04 | End: 2021-09-13

## 2021-05-04 RX ORDER — CLINDAMYCIN HYDROCHLORIDE 300 MG/1
CAPSULE ORAL
COMMUNITY
Start: 2021-03-10 | End: 2021-06-15 | Stop reason: SDUPTHER

## 2021-05-04 NOTE — PATIENT INSTRUCTIONS
Assessment and Plan:  Based on a thorough discussion of this condition and the management approach to it (including a comprehensive discussion of the known risks, side effects and potential benefits of treatment), the patient (family) agrees to implement the following specific plan:   Liquid nitrogen was applied for 10-12 seconds to the skin lesion and the expected blistering or scabbing reaction explained  Do not pick at the area  Patient reminded to expect hypopigmented scars from the procedure  Return if lesion fails to fully resolve  INFLAMED Seborrheic Keratosis  A seborrheic keratosis is a harmless warty spot that appears during adult life as a common sign of skin aging  Seborrheic keratoses can arise on any area of skin, covered or uncovered, with the usual exception of the palms and soles  They do not arise from mucous membranes  Seborrheic keratoses can have highly variable appearance  Seborrheic keratoses are extremely common  It has been estimated that over 90% of adults over the age of 61 years have one or more of them  They occur in males and females of all races, typically beginning to erupt in the 35s or 45s  They are uncommon under the age of 21 years  The precise cause of seborrhoeic keratoses is not known  Seborrhoeic keratoses are considered degenerative in nature  As time goes by, seborrheic keratoses tend to become more numerous  Some people inherit a tendency to develop a very large number of them; some people may have hundreds of them  The name "seborrheic keratosis" is misleading, because these lesions are not limited to a seborrhoeic distribution (scalp, mid-face, chest, upper back), nor are they formed from sebaceous glands, nor are they associated with sebum -- which is greasy    Seborrheic keratosis may also be called "SK," "Seb K," "basal cell papilloma," "senile wart," or "barnacle "      Researchers have noted:   Eruptive seborrhoeic keratoses can follow sunburn or dermatitis   Skin friction may be the reason they appear in body folds   Viral cause (e g , human papillomavirus) seems unlikely   Stable and clonal mutations or activation of FRFR3, PIK3CA, IRENA, AKT1 and EGFR genes are found in seborrhoeic keratoses   Seborrhoeic keratosis can arise from solar lentigo   FRFR3 mutations also arise in solar lentigines  These mutations are associated with increased age and location on the head and neck, suggesting a role of ultraviolet radiation in these lesions   Seborrheic keratoses do not harbour tumour suppressor gene mutations   Epidermal growth factor receptor inhibitors, which are used to treat some cancers, often result in an increase in verrucal (warty) keratoses  There is no easy way to remove multiple lesions on a single occasion  Unless a specific lesion is "inflamed" and is causing pain or stinging/burning or is bleeding, most insurance companies do not authorize treatment

## 2021-05-04 NOTE — PATIENT INSTRUCTIONS
Go for mammogram as planned  Continue calcium and weight-bearing exercise  Rx for Estrace vaginal cream 1/4 applicator twice a week sent to pharmacy

## 2021-05-04 NOTE — PROGRESS NOTES
[facility] Note     Patient Name: Brodie Dance  Encounter Date: 05/04/2021     Have you been cared for by a [facility] Dermatologist in the last 3 years and, if so, which one? No    · Have you traveled outside of the [address] [place] [address] in the past 3 months or outside of the [facility] area in the last 2 weeks? No     May we call your Preferred Phone number to discuss your specific medical information? Yes     May we leave a detailed message that includes your specific medical information? Yes      Today's Chief Concerns:   Concern #1:  Full Body Exam    Concern #2:  Skin Lesion on chin     Past Medical History:  Have you personally ever had or currently have any of the following? · Skin cancer (such as Melanoma, Basal Cell Carcinoma, Squamous Cell Carcinoma? (If Yes, please provide more detail)- No  · Eczema: YES  · Psoriasis: No  · HIV/AIDS: No  · Hepatitis B or C: No  · Tuberculosis: No  · Systemic Immunosuppression such as Diabetes, Biologic or Immunotherapy, Chemotherapy, Organ Transplantation, Bone Marrow Transplantation (If YES, please provide more detail): No  · Radiation Treatment (If YES, please provide more detail): No  · Any other major medical conditions/concerns? (If Yes, which types)- No    Social History:     What is/was your primary occupation? Retired      What are your hobbies/past-times? Swimming     Family History:  Have any of your "first degree relatives" (parent, brother, sister, or child) had any of the following       · Skin cancer such as Melanoma or Merkel Cell Carcinoma or Pancreatic Cancer? YES, BCC father   · Eczema, Asthma, Hay Fever or Seasonal Allergies: No  · Psoriasis or Psoriatic Arthritis: No  · Do any other medical conditions seem to run in your family? If Yes, what condition and which relatives?   No    Current Medications:       Current Outpatient Medications:     clindamycin (CLEOCIN) 300 MG capsule, TAKE 2 CAPSULES [address] TO PROCEDURE, Disp: , Rfl:     estradiol (ESTRACE) 0 1 mg/g vaginal cream, Use 1/4 applicator vaginally twice a week , Disp: 42 5 g, Rfl: 1    famotidine (PEPCID) 20 mg tablet, TAKE ONE TABLET BY MOUTH EVERY DAY, Disp: 90 tablet, Rfl: 1    levothyroxine 75 mcg tablet, TAKE ONE TABLET BY MOUTH DAILY IN THE EARLY MORNING, Disp: 90 tablet, Rfl: 1    potassium chloride (K-DUR,KLOR-CON) 10 mEq tablet, TAKE ONE TABLET BY MOUTH TWICE A DAY, Disp: 180 tablet, Rfl: 1    Psyllium (METAMUCIL PO), Take 1 Dose by mouth daily  , Disp: , Rfl:     senna (SENOKOT) 8 6 MG tablet, Take 2 tablets by mouth daily, Disp: , Rfl:     sertraline (ZOLOFT) 100 mg tablet, TAKE ONE TABLET BY MOUTH EVERY DAY, Disp: 90 tablet, Rfl: 1    triamterene-hydrochlorothiazide (DYAZIDE) 37 5-25 mg per capsule, TAKE ONE CAPSULE BY MOUTH EVERY MORNING, Disp: 90 capsule, Rfl: 1      Review of Systems:  Have you recently had or currently have any of the following? If YES, what are you doing for the problem? · Fever, chills or unintended weight loss: No  · Sudden loss or change in your vision: No  · Nausea, vomiting or blood in your stool: No  · Painful or swollen joints: No  · Wheezing or cough: No  · Changing mole or non-healing wound: No  · Nosebleeds: No  · Excessive sweating: No  · Easy or prolonged bleeding? No  · Over the last 2 weeks, how often have you been bothered by the following problems? · Taking little interest or pleasure in doing things: 1 - Not at All  · Feeling down, depressed, or hopeless: 1 - Not at All  · Rapid heartbeat with epinephrine:  No    · FEMALES ONLY:    · Are you pregnant or planning to become pregnant? N/A  · Are you currently or planning to be nursing or breast feeding? N/A    · Any known allergies? Allergies   Allergen Reactions    Penicillins Anaphylaxis     Anaphylaxis  Anaphylaxis  Other reaction(s):  Anaphylaxis    Acetazolamide Itching and Swelling     With eye drops - takes  oral sulfa w/o side effects    Metronidazole GI Intolerance and Nausea Only   ·       Physical Exam:     Was a chaperone (Derm Clinical Assistant) present throughout the entire Physical Exam? Yes     Did the Dermatology Team specifically  the patient on the importance of a Full Skin Exam to be sure that nothing is missed clinically?  Yes}  o Did the patient ultimately request or accept a Full Skin Exam?  Yes  o Did the patient specifically refuse to have the areas "under-the-bra" examined by the Dermatologist? No  o Did the patient specifically refuse to have the areas "under-the-underwear" examined by the Dermatologist? No    CONSTITUTIONAL:   Vitals:    05/04/21 1413   Temp: (!) 97 2 °F (36 2 °C)   TempSrc: Tympanic   Weight: 86 2 kg (190 lb)   Height: 5' 3" (1 6 m)       PSYCH: Normal mood and affect  EYES: Normal conjunctiva  ENT: Normal lips and oral mucosa  CARDIOVASCULAR: No edema  RESPIRATORY: Normal respirations  HEME/LYMPH/IMMUNO:  No regional lymphadenopathy except as noted below in "ASSESSMENT AND PLAN BY DIAGNOSIS"    SKIN:  FULL ORGAN SYSTEM EXAM   Hair, Scalp, Ears, Face Normal except as noted below in Assessment   Neck, Cervical Chain Nodes Normal except as noted below in Assessment   Right Arm/Hand/Fingers Normal except as noted below in Assessment   Left Arm/Hand/Fingers Normal except as noted below in Assessment   Chest/Breasts/Axillae Viewed areas Normal except as noted below in Assessment   Abdomen, Umbilicus Normal except as noted below in Assessment   Back/Spine Normal except as noted below in Assessment   Groin/Genitalia/Buttocks Normal except as noted below in Assessment   Right Leg, Foot, Toes Normal except as noted below in Assessment   Left Leg, Foot, Toes Normal except as noted below in Assessment        Assessment and Plan by Diagnosis:    History of Present Condition:     Duration:  How long has this been an issue for you?    o  Years    Location Affected:  Where on the body is this affecting you?    o  Right chin    Quality:  Is there any bleeding, pain, itch, burning/irritation, or redness associated with the skin lesion?    o  Denies    Severity:  Describe any bleeding, pain, itch, burning/irritation, or redness on a scale of 1 to 10 (with 10 being the worst)  o  2   Timing:  Does this condition seem to be there pretty constantly or do you notice it more at specific times throughout the day?    o  Constantly    Context:  Have you ever noticed that this condition seems to be associated with specific activities you do?    o  Denies    Modifying Factors:    o Anything that seems to make the condition worse?    -  LN2  o What have you tried to do to make the condition better? -  Denies    Associated Signs and Symptoms:  Does this skin lesion seem to be associated with any of the following:  o Non healing       1  ACTINIC DAMAGE (Chronic Ultraviolet Radiation Exposure)    Physical Exam:   Anatomic Location Affected:  Trunk    Morphological Description:  Mottled (hyper- and hypo-pigmented), slightly atrophic skin with overlying telangiectasia   Pertinent Positives:   Pertinent Negatives: n/a    Assessment and Plan:  Based on a thorough discussion of this condition and the management approach to it (including a comprehensive discussion of the known risks, side effects and potential benefits of treatment), the patient (family) agrees to implement the following specific plan:   Start Neutrogena Daily Defence SPF 50+ at least 3 times a day      Photo-aging and actinic damage of skin is common on sites repeatedly exposed to the sun, especially the backs of the hands and the face, most often affecting the ears, nose, cheeks, upper lip, vermilion of the lower lip, temples, forehead and balding scalp  In severely chronically sun-exposed individuals, this condition may also be found on the upper trunk, upper and lower limbs, and dorsum of feet      Photo-aging induces cutaneous changes that vary among individuals, reflecting inherent differences in vulnerability to sun exposure and repair capacity  We discussed further steps to minimize or avoid UV exposure:     Be aware of daily UV index levels  In the [facility], this index is often reported on the [address]   Avoid outdoor activities during the middle of the day   Wear sun-protective clothing (e g , UPF-rated, broad-brimmed hats, long sleeves, and trousers or skirts)   Apply a high sun protection factor (60+) broad-spectrum sunscreen moisturizer at least three times a day to affected areas, year-round  I recommended Neutrogena Daily Defense or CeraVe AM or Aveeno   Do not smoke, and where possible, avoid exposure to pollutants   Get plenty of exercise -- active people appear younger than inactive people   Eat fruit and vegetables daily   Many oral supplements with antioxidant and anti-inflammatory properties have been advocated to mitigate skin aging and to improve skin health  These include carotenoids; polyphenols; chlorophyll; aloe vera; vitamins B, C, and E; red ginseng; squalene; and omega-3 fatty acids  Their role in combatting skin aging is unclear        2  MELANOCYTIC NEVI ("Moles")    Physical Exam:   Anatomic Location Affected:   Mostly on sun-exposed areas of the trunk    Morphological Description:  Scattered, 1-4mm round to ovoid, symmetrical-appearing, even bordered, skin colored to dark brown macules/papules, mostly in sun-exposed areas   Pertinent Positives:   Pertinent Negatives: n/a    Assessment and Plan:  Based on a thorough discussion of this condition and the management approach to it (including a comprehensive discussion of the known risks, side effects and potential benefits of treatment), the patient (family) agrees to implement the following specific plan:   Continue with yearly exam      Melanocytic Nevi  Melanocytic nevi ("moles") are tan or brown, raised or flat areas of the skin which have an increased number of melanocytes  Melanocytes are the cells in our body which make pigment and account for skin color  Some moles are present at birth (I e , "congenital nevi"), while others come up later in life (i e , "acquired nevi")  The sun can stimulate the body to make more moles  Sunburns are not the only thing that triggers more moles  Chronic sun exposure can do it too  Clinically distinguishing a healthy mole from melanoma may be difficult, even for experienced dermatologists  The "ABCDE's" of moles have been suggested as a means of helping to alert a person to a suspicious mole and the possible increased risk of melanoma  The suggestions for raising alert are as follows:    Asymmetry: Healthy moles tend to be symmetric, while melanomas are often asymmetric  Asymmetry means if you draw a line through the mole, the two halves do not match in color, size, shape, or surface texture  Asymmetry can be a result of rapid enlargement of a mole, the development of a raised area on a previously flat lesion, scaling, ulceration, bleeding or scabbing within the mole  Any mole that starts to demonstrate "asymmetry" should be examined promptly by a board certified dermatologist      Border: Healthy moles tend to have discrete, even borders  The border of a melanoma often blends into the normal skin and does not sharply delineate the mole from normal skin  Any mole that starts to demonstrate "uneven borders" should be examined promptly by a board certified dermatologist      Color: Healthy moles tend to be one color throughout  Melanomas tend to be made up of different colors ranging from dark black, blue, white, or red  Any mole that demonstrates a color change should be examined promptly by a board certified dermatologist      Diameter: Healthy moles tend to be smaller than 0 6 cm in size; an exception are "congenital nevi" that can be larger    Melanomas tend to grow and can often be greater than 0 6 cm (1/4 of an inch, or the size of a pencil eraser)  This is only a guideline, and many normal moles may be larger than 0 6 cm without being unhealthy  Any mole that starts to change in size (small to bigger or bigger to smaller) should be examined promptly by a board certified dermatologist      Evolving: Healthy moles tend to "stay the same "  Melanomas may often show signs of change or evolution such as a change in size, shape, color, or elevation  Any mole that starts to itch, bleed, crust, burn, hurt, or ulcerate or demonstrate a change or evolution should be examined promptly by a board certified dermatologist       Dysplastic Nevi  Dysplastic moles are moles that fit the ABCDE rules of melanoma but are not identified as melanomas when examined under the microscope  They may indicate an increased risk of melanoma in that person  If there is a family history of melanoma, most experts agree that the person may be at an increased risk for developing a melanoma  Experts still do not agree on what dysplastic moles mean in patients without a personal or family history of melanoma  Dysplastic moles are usually larger than common moles and have different colors within it with irregular borders  The appearance can be very similar to a melanoma  Biopsies of dysplastic moles may show abnormalities which are different from a regular mole  Melanoma  Malignant melanoma is a type of skin cancer that can be deadly if it spreads throughout the body  The incidence of melanoma in the [place] is growing faster than any other cancer  Melanoma usually grows near the surface of the skin for a period of time, and then begins to grow deeper into the skin  Once it grows deeper into the skin, the risk of spread to other organs greatly increases   Therefore, early detection and removal of a malignant melanoma may result in a better chance at a complete cure; removal after the tumor has spread may not be as effective, leading to worse clinical outcomes such as death  The true rate of nevus transformation into a melanoma is unknown  It has been estimated that the lifetime risk for any acquired melanocytic nevus on any 21year-old individual transforming into melanoma by age [de-identified] is 0 03% (1 in 3,164) for men and 0 009% (1 in 10,800) for women  The appearance of a "new mole" remains one of the most reliable methods for identifying a malignant melanoma  Occasionally, melanomas appear as rapidly growing, blue-black, dome-shaped bumps within a previous mole or previous area of normal skin  Other times, melanomas are suspected when a mole suddenly appears or changes  Itching, burning, or pain in a pigmented lesion should increase suspicion, but most patients with early melanoma have no skin discomfort whatsoever  Melanoma can occur anywhere on the skin, including areas that are difficult for self-examination  Many melanomas are first noticed by other family members  Suspicious-looking moles may be removed for microscopic examination  You may be able to prevent death from melanoma by doing two simple things:    1  Try to avoid unnecessary sun exposure and protect your skin when it is exposed to the sun  People who live near the equator, people who have intermittent exposures to large amounts of sun, and people who have had sunburns in childhood or adolescence have an increased risk for melanoma  Sun sense and vigilant sun protection may be keys to helping to prevent melanoma  We recommend wearing UPF-rated sun protective clothing and sunglasses whenever possible and applying a moisturizer-sunscreen combination product (SPF 50+) such as Neutrogena Daily Defense to sun exposed areas of skin at least three times a day  2  Have your moles regularly examined by a board certified dermatologist AND by yourself or a family member/friend at home    We recommend that you have your moles examined at least once a year by a board certified dermatologist   Use your birthday as an annual reminder to have your "Birthday Suit" (I e , your skin) examined; it is a nice birthday gift to yourself to know that your skin is healthy appearing! Additionally, at-home self examinations may be helpful for detecting a possible melanoma  Use the ABCDEs we discussed and check your moles once a month at home  3  CHERRY ANGIOMAS    Physical Exam:   Anatomic Location Affected:  Trunk    Morphological Description:  Scattered cherry red, 1-4 mm papules   Pertinent Positives:   Pertinent Negatives: n/a    Assessment and Plan:  Based on a thorough discussion of this condition and the management approach to it (including a comprehensive discussion of the known risks, side effects and potential benefits of treatment), the patient (family) agrees to implement the following specific plan:   Benign, no treatment necessary     Assessment and Plan:    Cherry angioma, also known as [employer] spots, are benign vascular skin lesions  A "cherry angioma" is a firm red, blue or purple papule, 0 1-1 cm in diameter  When thrombosed, they can appear black in colour until evaluated with a dermatoscope when the red or purple colour is more easily seen  Cherry angioma may develop on any part of the body but most often appear on the scalp, face, lips and trunk  An angioma is due to proliferating endothelial cells; these are the cells that line the inside of a blood vessel  Angiomas can arise in early life or later in life; the most common type of angioma is a cherry angioma  Cherry angiomas are very common in males and females of any age or race  They are more noticeable in white skin than in skin of colour  They markedly increase in number from about the age of 36  There may be a family history of similar lesions  Eruptive cherry angiomas have been rarely reported to be associated with internal malignancy  The cause of angiomas is unknown   Genetic analysis of cherry angiomas has shown that they frequently carry specific somatic missense mutations in the GNAQ and GNA11 (Q209H) genes, which are involved in other vascular and melanocytic proliferations  Cherry angioma is usually diagnosed clinically and no investigations are necessary for the majority of lesions  It has a characteristic red-clod or lobular pattern on dermatoscopy (called lacunar pattern using conventional pattern analysis)  When there is uncertainty about the diagnosis, a biopsy may be performed  The angioma is composed of venules in a thickened papillary dermis  Collagen bundles may be prominent between the lobules  Cherry angiomas are harmless, so they do not usually have to be treated  Occasionally, they are removed to exclude a malignant skin lesion such as a nodular melanoma or because they are irritated or bleeding (and a subsequent risk for infection)  To decrease friction over the lesions, we recommend Neutrogena Daily Defense SPF 50+ at least 3 times a day  4  SEBORRHEIC KERATOSIS; INFLAMED    Physical Exam:   Anatomic Location Affected:  Right chin   Morphological Description:  Flat and raised, waxy, smooth to warty textured, yellow to brownish-grey to dark brown to blackish, discrete, "stuck-on" appearing papules   Pertinent Positives:   Pertinent Negatives: Additional History of Present Condition:  Patient reports new bumps on the skin  Denies itch, burn, pain, bleeding or ulceration  Present constantly; nothing seems to make it worse or better  No prior treatment        Assessment and Plan:  Based on a thorough discussion of this condition and the management approach to it (including a comprehensive discussion of the known risks, side effects and potential benefits of treatment), the patient (family) agrees to implement the following specific plan:   Liquid nitrogen was applied for 10-12 seconds to the skin lesion and the expected blistering or scabbing reaction explained  Do not pick at the area  Patient reminded to expect hypopigmented scars from the procedure  Return if lesion fails to fully resolve  INFLAMED Seborrheic Keratosis  A seborrheic keratosis is a harmless warty spot that appears during adult life as a common sign of skin aging  Seborrheic keratoses can arise on any area of skin, covered or uncovered, with the usual exception of the palms and soles  They do not arise from mucous membranes  Seborrheic keratoses can have highly variable appearance  Seborrheic keratoses are extremely common  It has been estimated that over 90% of adults over the age of 61 years have one or more of them  They occur in males and females of all races, typically beginning to erupt in the 35s or 45s  They are uncommon under the age of 21 years  The precise cause of seborrhoeic keratoses is not known  Seborrhoeic keratoses are considered degenerative in nature  As time goes by, seborrheic keratoses tend to become more numerous  Some people inherit a tendency to develop a very large number of them; some people may have hundreds of them  The name "seborrheic keratosis" is misleading, because these lesions are not limited to a seborrhoeic distribution (scalp, mid-face, chest, upper back), nor are they formed from sebaceous glands, nor are they associated with sebum -- which is greasy  Seborrheic keratosis may also be called "SK," "Seb K," "basal cell papilloma," "senile wart," or "barnacle "      Researchers have noted:   Eruptive seborrhoeic keratoses can follow sunburn or dermatitis   Skin friction may be the reason they appear in body folds   Viral cause (e g , human papillomavirus) seems unlikely   Stable and clonal mutations or activation of FRFR3, PIK3CA, IRENA, AKT1 and EGFR genes are found in seborrhoeic keratoses   Seborrhoeic keratosis can arise from solar lentigo   FRFR3 mutations also arise in solar lentigines   These mutations are associated with increased age and location on the head and neck, suggesting a role of ultraviolet radiation in these lesions   Seborrheic keratoses do not harbour tumour suppressor gene mutations   Epidermal growth factor receptor inhibitors, which are used to treat some cancers, often result in an increase in verrucal (warty) keratoses  There is no easy way to remove multiple lesions on a single occasion  Unless a specific lesion is "inflamed" and is causing pain or stinging/burning or is bleeding, most insurance companies do not authorize treatment  PROCEDURE:  DESTRUCTION OF BENIGN LESIONS  After a thorough discussion of treatment options and risk/benefits/alternatives (including but not limited to local pain, scarring, dyspigmentation, blistering, and possible superinfection), verbal and written consent were obtained and the aforementioned lesions were treated on with cryotherapy using liquid nitrogen x 1 cycle for 5-10 seconds   TOTAL NUMBER of 1 lesions were treated today on the ANATOMIC LOCATION: right chin  The patient tolerated the procedure well, and after-care instructions were provided  FBSE with benign findings noted today  Area on the right cheek was biopsied years ago and then frozen but recurred  Discussed treatment options and given benign biopsy in the past, treated with Ln2 x 8 seconds x 3 cycles  Well tolerated  Discussed after effects of Ln2 and need for return for new or changing lesions      Scribe Attestation    I,:  Pam Pulido MA am acting as a scribe while in the presence of the attending physician :       I,:  Gray Washburn MD personally performed the services described in this documentation    as scribed in my presence :

## 2021-05-04 NOTE — PROGRESS NOTES
Assessment/Plan:    No problem-specific Assessment & Plan notes found for this encounter  Diagnoses and all orders for this visit:    Encounter for gynecological examination with abnormal finding  -     Liquid-based pap, screening    Vaginal dryness, menopausal  -     estradiol (ESTRACE) 0 1 mg/g vaginal cream; Use 1/4 applicator vaginally twice a week  Other orders  -     Cancel: Mammo screening bilateral w 3d & cad; Future  -     clindamycin (CLEOCIN) 300 MG capsule; TAKE 2 CAPSULES 60 MINUTES PRIOR TO PROCEDURE        Pap done  Order for mammogram already in [place]  Encouraged continued calcium use and weight bearing exercise  Rx for Estrace vaginal cream 1/4 applicator twice a week sent to mail order pharmacy  If no problems, patient to return in 1 year for routine gyn care  Subjective:      Patient ID: Nataliia Mckenzie is a 56 y o  female  Patient is here for yearly gyn exam   States she is doing well overall  Still experiencing vaginal dryness; requests rx for Estrace  Stopped because it became too expensive  Now has new insurance  Patient denies bowel/bladder changes, vaginal bleeding, pelvic pain, bloating, abdominal pain, n/v, and change in appetite  Thyroid is stable  She is taking calcium and getting exercise  DXA in 2020 was normal   Patient is up to date on her colonoscopy  Has mammogram appointment on 5/21  Patient is performing self-breast exam   Denies new masses, skin changes, nipple discharge, and pain/tenderness  The following portions of the patient's history were reviewed and updated as appropriate: allergies, current medications, past family history, past medical history, past social history, past surgical history and problem list     Review of Systems   Constitutional: Negative for appetite change and unexpected weight change  Cardiovascular:        No masses, skin changes, nipple discharge, and pain/tenderness     Gastrointestinal: Negative for abdominal distention, abdominal pain, constipation, diarrhea, nausea and vomiting  Genitourinary: Negative for difficulty urinating, dysuria, frequency, genital sores, hematuria, menstrual problem, pelvic pain, urgency, vaginal bleeding, vaginal discharge and vaginal pain  Vaginal dryness         Objective:      /78   Ht 5' 3" (1 6 m)   Wt 86 6 kg (191 lb)   BMI 33 83 kg/m²          Physical Exam  Vitals signs reviewed  Exam conducted with a chaperone present  Constitutional:       Appearance: Normal appearance  She is well-developed  Neck:      Musculoskeletal: Neck supple  Thyroid: No thyromegaly  Pulmonary:      Effort: Pulmonary effort is normal    Chest:      Breasts: Breasts are symmetrical          Right: Normal  No swelling, bleeding, inverted nipple, mass, nipple discharge, skin change or tenderness  Left: Normal  No swelling, bleeding, inverted nipple, mass, nipple discharge, skin change or tenderness  Abdominal:      General: Abdomen is flat  There is no distension  Palpations: Abdomen is soft  Tenderness: There is no abdominal tenderness  Genitourinary:     General: Normal vulva  Pubic Area: No rash  Labia:         Right: No rash, tenderness, lesion or injury  Left: No rash, tenderness, lesion or injury  Vagina: Normal  No vaginal discharge, erythema, tenderness or bleeding  Cervix: Normal       Uterus: Normal        Adnexa: Right adnexa normal and left adnexa normal         Right: No mass, tenderness or fullness  Left: No mass, tenderness or fullness  Comments: Moderate vaginal atrophy present  Lymphadenopathy:      Cervical: No cervical adenopathy  Upper Body:      Right upper body: No supraclavicular or axillary adenopathy  Left upper body: No supraclavicular or axillary adenopathy  Lower Body: No right inguinal adenopathy  No left inguinal adenopathy  Skin:     General: Skin is warm and dry  Neurological:      Mental Status: She is alert and oriented to person, place, and time  Psychiatric:         Mood and Affect: Mood normal          Behavior: Behavior normal  Behavior is cooperative  Thought Content:  Thought content normal          Judgment: Judgment normal

## 2021-05-06 LAB
HPV HR 12 DNA CVX QL NAA+PROBE: NEGATIVE
HPV16 DNA CVX QL NAA+PROBE: NEGATIVE
HPV18 DNA CVX QL NAA+PROBE: NEGATIVE

## 2021-05-11 LAB
LAB AP GYN PRIMARY INTERPRETATION: NORMAL
Lab: NORMAL

## 2021-05-21 DIAGNOSIS — I10 HYPERTENSION, UNSPECIFIED TYPE: ICD-10-CM

## 2021-05-21 DIAGNOSIS — E03.9 HYPOTHYROIDISM, UNSPECIFIED TYPE: ICD-10-CM

## 2021-05-21 DIAGNOSIS — F41.9 ANXIETY: ICD-10-CM

## 2021-05-21 RX ORDER — SERTRALINE HYDROCHLORIDE 100 MG/1
TABLET, FILM COATED ORAL
Qty: 90 TABLET | Refills: 1 | Status: SHIPPED | OUTPATIENT
Start: 2021-05-21 | End: 2021-09-27

## 2021-05-21 RX ORDER — TRIAMTERENE AND HYDROCHLOROTHIAZIDE 37.5; 25 MG/1; MG/1
CAPSULE ORAL
Qty: 90 CAPSULE | Refills: 1 | Status: SHIPPED | OUTPATIENT
Start: 2021-05-21 | End: 2021-09-27

## 2021-05-21 RX ORDER — LEVOTHYROXINE SODIUM 0.07 MG/1
TABLET ORAL
Qty: 90 TABLET | Refills: 1 | Status: SHIPPED | OUTPATIENT
Start: 2021-05-21 | End: 2021-09-27

## 2021-05-21 NOTE — TELEPHONE ENCOUNTER
Please set this up Roby Ward
Pt is requesting a script for clindamycin 300 mg  Pt states she takes this prior to going to the dentist   Pt goes to the dentist twice a year        Please advise
Sent to you for authorization  Please advise 
Detail Level: Zone

## 2021-05-29 ENCOUNTER — HOSPITAL ENCOUNTER (OUTPATIENT)
Dept: RADIOLOGY | Age: 61
Discharge: HOME | End: 2021-05-29
Payer: COMMERCIAL

## 2021-05-29 VITALS — HEIGHT: 63 IN | BODY MASS INDEX: 33.66 KG/M2 | WEIGHT: 190 LBS

## 2021-05-29 DIAGNOSIS — Z12.31 ENCOUNTER FOR SCREENING MAMMOGRAM FOR MALIGNANT NEOPLASM OF BREAST: ICD-10-CM

## 2021-05-29 PROCEDURE — 77067 SCR MAMMO BI INCL CAD: CPT

## 2021-05-29 PROCEDURE — 77063 BREAST TOMOSYNTHESIS BI: CPT

## 2021-05-31 DIAGNOSIS — K21.9 GASTROESOPHAGEAL REFLUX DISEASE WITHOUT ESOPHAGITIS: ICD-10-CM

## 2021-05-31 RX ORDER — FAMOTIDINE 20 MG/1
TABLET, FILM COATED ORAL
Qty: 90 TABLET | Refills: 1 | Status: SHIPPED | OUTPATIENT
Start: 2021-05-31 | End: 2021-10-09

## 2021-06-04 ENCOUNTER — APPOINTMENT (OUTPATIENT)
Dept: LAB | Age: 61
End: 2021-06-04
Payer: COMMERCIAL

## 2021-06-04 DIAGNOSIS — R73.03 PREDIABETES: ICD-10-CM

## 2021-06-04 DIAGNOSIS — Z13.6 SCREENING FOR CARDIOVASCULAR CONDITION: ICD-10-CM

## 2021-06-04 DIAGNOSIS — E03.9 HYPOTHYROIDISM, UNSPECIFIED TYPE: ICD-10-CM

## 2021-06-04 LAB
ALBUMIN SERPL BCP-MCNC: 4 G/DL (ref 3.5–5)
ALP SERPL-CCNC: 58 U/L (ref 46–116)
ALT SERPL W P-5'-P-CCNC: 17 U/L (ref 12–78)
ANION GAP SERPL CALCULATED.3IONS-SCNC: 4 MMOL/L (ref 4–13)
AST SERPL W P-5'-P-CCNC: 14 U/L (ref 5–45)
BILIRUB SERPL-MCNC: 0.62 MG/DL (ref 0.2–1)
BUN SERPL-MCNC: 18 MG/DL (ref 5–25)
CALCIUM SERPL-MCNC: 9.6 MG/DL (ref 8.3–10.1)
CHLORIDE SERPL-SCNC: 103 MMOL/L (ref 100–108)
CHOLEST SERPL-MCNC: 225 MG/DL (ref 50–200)
CO2 SERPL-SCNC: 32 MMOL/L (ref 21–32)
CREAT SERPL-MCNC: 0.87 MG/DL (ref 0.6–1.3)
EST. AVERAGE GLUCOSE BLD GHB EST-MCNC: 105 MG/DL
GFR SERPL CREATININE-BSD FRML MDRD: 72 ML/MIN/1.73SQ M
GLUCOSE P FAST SERPL-MCNC: 103 MG/DL (ref 65–99)
HBA1C MFR BLD: 5.3 %
HDLC SERPL-MCNC: 58 MG/DL
LDLC SERPL CALC-MCNC: 142 MG/DL (ref 0–100)
POTASSIUM SERPL-SCNC: 3.4 MMOL/L (ref 3.5–5.3)
PROT SERPL-MCNC: 6.9 G/DL (ref 6.4–8.2)
SODIUM SERPL-SCNC: 139 MMOL/L (ref 136–145)
TRIGL SERPL-MCNC: 124 MG/DL
TSH SERPL DL<=0.05 MIU/L-ACNC: 1.62 UIU/ML (ref 0.36–3.74)

## 2021-06-04 PROCEDURE — 80053 COMPREHEN METABOLIC PANEL: CPT

## 2021-06-04 PROCEDURE — 83036 HEMOGLOBIN GLYCOSYLATED A1C: CPT

## 2021-06-04 PROCEDURE — 36415 COLL VENOUS BLD VENIPUNCTURE: CPT

## 2021-06-04 PROCEDURE — 80061 LIPID PANEL: CPT

## 2021-06-04 PROCEDURE — 84443 ASSAY THYROID STIM HORMONE: CPT

## 2021-06-07 DIAGNOSIS — E87.6 LOW BLOOD POTASSIUM: Primary | ICD-10-CM

## 2021-06-11 ENCOUNTER — RA CDI HCC (OUTPATIENT)
Dept: OTHER | Facility: HOSPITAL | Age: 61
End: 2021-06-11

## 2021-06-11 NOTE — PROGRESS NOTES
[facility]  coding opportunities          Chart reviewed, no opportunity found: CHART REVIEWED, NO OPPORTUNITY FOUND                     Patients insurance company: [facility]

## 2021-06-15 ENCOUNTER — OFFICE VISIT (OUTPATIENT)
Dept: INTERNAL MEDICINE CLINIC | Facility: CLINIC | Age: 61
End: 2021-06-15
Payer: COMMERCIAL

## 2021-06-15 VITALS
WEIGHT: 191.4 LBS | OXYGEN SATURATION: 95 % | RESPIRATION RATE: 16 BRPM | BODY MASS INDEX: 35.22 KG/M2 | HEART RATE: 76 BPM | DIASTOLIC BLOOD PRESSURE: 82 MMHG | SYSTOLIC BLOOD PRESSURE: 124 MMHG | HEIGHT: 62 IN

## 2021-06-15 DIAGNOSIS — Z00.00 MEDICARE ANNUAL WELLNESS VISIT, SUBSEQUENT: ICD-10-CM

## 2021-06-15 DIAGNOSIS — M26.649 ARTHRITIS OF TEMPOROMANDIBULAR JOINT, UNSPECIFIED LATERALITY: ICD-10-CM

## 2021-06-15 DIAGNOSIS — E78.5 HYPERLIPIDEMIA, UNSPECIFIED HYPERLIPIDEMIA TYPE: ICD-10-CM

## 2021-06-15 DIAGNOSIS — E66.09 CLASS 1 OBESITY DUE TO EXCESS CALORIES WITHOUT SERIOUS COMORBIDITY WITH BODY MASS INDEX (BMI) OF 33.0 TO 33.9 IN ADULT: ICD-10-CM

## 2021-06-15 DIAGNOSIS — I10 HYPERTENSION, UNSPECIFIED TYPE: ICD-10-CM

## 2021-06-15 DIAGNOSIS — Z11.4 SCREENING FOR HIV (HUMAN IMMUNODEFICIENCY VIRUS): Primary | ICD-10-CM

## 2021-06-15 DIAGNOSIS — E03.9 HYPOTHYROIDISM, UNSPECIFIED TYPE: ICD-10-CM

## 2021-06-15 DIAGNOSIS — Z12.31 ENCOUNTER FOR SCREENING MAMMOGRAM FOR BREAST CANCER: ICD-10-CM

## 2021-06-15 DIAGNOSIS — R73.01 ELEVATED FASTING BLOOD SUGAR: ICD-10-CM

## 2021-06-15 DIAGNOSIS — Z96.652 STATUS POST LEFT KNEE REPLACEMENT: ICD-10-CM

## 2021-06-15 DIAGNOSIS — D80.3 IGG DEFICIENCY (HCC): ICD-10-CM

## 2021-06-15 PROCEDURE — G0439 PPPS, SUBSEQ VISIT: HCPCS | Performed by: INTERNAL MEDICINE

## 2021-06-15 PROCEDURE — 3725F SCREEN DEPRESSION PERFORMED: CPT | Performed by: INTERNAL MEDICINE

## 2021-06-15 PROCEDURE — 99214 OFFICE O/P EST MOD 30 MIN: CPT | Performed by: INTERNAL MEDICINE

## 2021-06-15 PROCEDURE — 3008F BODY MASS INDEX DOCD: CPT | Performed by: INTERNAL MEDICINE

## 2021-06-15 PROCEDURE — 1036F TOBACCO NON-USER: CPT | Performed by: INTERNAL MEDICINE

## 2021-06-15 RX ORDER — CLINDAMYCIN HYDROCHLORIDE 300 MG/1
CAPSULE ORAL
Qty: 6 CAPSULE | Refills: 3 | Status: SHIPPED | OUTPATIENT
Start: 2021-06-15 | End: 2021-06-15

## 2021-06-15 NOTE — PATIENT INSTRUCTIONS
Medicare Preventive Visit Patient Instructions  Thank you for completing your Welcome to Medicare Visit or Medicare Annual Wellness Visit today  Your next wellness visit will be due in one year (6/16/2022)  The screening/preventive services that you may require over the next 5-10 years are detailed below  Some tests may not apply to you based off risk factors and/or age  Screening tests ordered at today's visit but not completed yet may show as past due  Also, please note that scanned in results may not display below  Preventive Screenings:  Service Recommendations Previous Testing/Comments   Colorectal Cancer Screening  * Colonoscopy    * Fecal Occult Blood Test (FOBT)/Fecal Immunochemical Test (FIT)  * Fecal DNA/Cologuard Test  * Flexible Sigmoidoscopy Age: 54-65 years old   Colonoscopy: every 10 years (may be performed more frequently if at higher risk)  OR  FOBT/FIT: every 1 year  OR  Cologuard: every 3 years  OR  Sigmoidoscopy: every 5 years  Screening may be recommended earlier than age 48 if at higher risk for colorectal cancer  Also, an individualized decision between you and your healthcare provider will decide whether screening between the ages of 74-80 would be appropriate  Colonoscopy: 05/06/2019  FOBT/FIT: Not on file  Cologuard: Not on file  Sigmoidoscopy: Not on file          Breast Cancer Screening Age: 36 years old  Frequency: every 1-2 years  Not required if history of left and right mastectomy Mammogram: 05/29/2021        Cervical Cancer Screening Between the ages of 21-29, pap smear recommended once every 3 years  Between the ages of 33-67, can perform pap smear with HPV co-testing every 5 years     Recommendations may differ for women with a history of total hysterectomy, cervical cancer, or abnormal pap smears in past  Pap Smear: 05/04/2021        Hepatitis C Screening Once for adults born between 1945 and 1965  More frequently in patients at high risk for Hepatitis C Hep C Antibody: 06/06/2018        Diabetes Screening 1-2 times per year if you're at risk for diabetes or have pre-diabetes Fasting glucose: 103 mg/dL   A1C: 5 3 %        Cholesterol Screening Once every 5 years if you don't have a lipid disorder  May order more often based on risk factors  Lipid panel: 06/04/2021          Other Preventive Screenings Covered by Medicare:  1  Abdominal Aortic Aneurysm (AAA) Screening: covered once if your at risk  You're considered to be at risk if you have a family history of AAA  2  Lung Cancer Screening: covers low dose CT scan once per year if you meet all of the following conditions: (1) Age 50-69; (2) No signs or symptoms of lung cancer; (3) Current smoker or have quit smoking within the last 15 years; (4) You have a tobacco smoking history of at least 30 pack years (packs per day multiplied by number of years you smoked); (5) You get a written order from a healthcare provider  3  Glaucoma Screening: covered annually if you're considered high risk: (1) You have diabetes OR (2) Family history of glaucoma OR (3)  aged 48 and older OR (3)  American aged 72 and older  3  Osteoporosis Screening: covered every 2 years if you meet one of the following conditions: (1) You're estrogen deficient and at risk for osteoporosis based off medical history and other findings; (2) Have a vertebral abnormality; (3) On glucocorticoid therapy for more than 3 months; (4) Have primary hyperparathyroidism; (5) On osteoporosis medications and need to assess response to drug therapy  · Last bone density test (DXA Scan): 12/23/2020   5  HIV Screening: covered annually if you're between the age of 15-65  Also covered annually if you are younger than 13 and older than 72 with risk factors for HIV infection  For pregnant patients, it is covered up to 3 times per pregnancy      Immunizations:  Immunization Recommendations   Influenza Vaccine Annual influenza vaccination during flu season is recommended for all persons aged >= 6 months who do not have contraindications   Pneumococcal Vaccine (Prevnar and Pneumovax)  * Prevnar = PCV13  * Pneumovax = PPSV23   Adults 25-60 years old: 1-3 doses may be recommended based on certain risk factors  Adults 72 years old: Prevnar (PCV13) vaccine recommended followed by Pneumovax (PPSV23) vaccine  If already received PPSV23 since turning 65, then PCV13 recommended at least one year after PPSV23 dose  Hepatitis B Vaccine 3 dose series if at intermediate or high risk (ex: diabetes, end stage renal disease, liver disease)   Tetanus (Td) Vaccine - COST NOT COVERED BY MEDICARE PART B Following completion of primary series, a booster dose should be given every 10 years to maintain immunity against tetanus  Td may also be given as tetanus wound prophylaxis  Tdap Vaccine - COST NOT COVERED BY MEDICARE PART B Recommended at least once for all adults  For pregnant patients, recommended with each pregnancy  Shingles Vaccine (Shingrix) - COST NOT COVERED BY MEDICARE PART B  2 shot series recommended in those aged 48 and above     Health Maintenance Due:      Topic Date Due    HIV Screening  Never done    MAMMOGRAM  05/29/2023    Cervical Cancer Screening  05/04/2024    Colorectal Cancer Screening  05/06/2024    Hepatitis C Screening  Completed     Immunizations Due:  There are no preventive care reminders to display for this patient  Advance Directives   What are advance directives? Advance directives are legal documents that state your wishes and plans for medical care  These plans are made ahead of time in case you lose your ability to make decisions for yourself  Advance directives can apply to any medical decision, such as the treatments you want, and if you want to donate organs  What are the types of advance directives? There are many types of advance directives, and each state has rules about how to use them   You may choose a combination of any of the following:  · Living will: This is a written record of the treatment you want  You can also choose which treatments you do not want, which to limit, and which to stop at a certain time  This includes surgery, medicine, IV fluid, and tube feedings  · Durable power of  for healthcare [facility]): This is a written record that states who you want to make healthcare choices for you when you are unable to make them for yourself  This person, called a proxy, is usually a family member or a friend  You may choose more than 1 proxy  · Do not resuscitate (DNR) order:  A DNR order is used in case your heart stops beating or you stop breathing  It is a request not to have certain forms of treatment, such as CPR  A DNR order may be included in other types of advance directives  · Medical directive: This covers the care that you want if you are in a coma, near death, or unable to make decisions for yourself  You can list the treatments you want for each condition  Treatment may include pain medicine, surgery, blood transfusions, dialysis, IV or tube feedings, and a ventilator (breathing machine)  · Values history: This document has questions about your views, beliefs, and how you feel and think about life  This information can help others choose the care that you would choose  Why are advance directives important? An advance directive helps you control your care  Although spoken wishes may be used, it is better to have your wishes written down  Spoken wishes can be misunderstood, or not followed  Treatments may be given even if you do not want them  An advance directive may make it easier for your family to make difficult choices about your care  Weight Management   Why it is important to manage your weight:  Being overweight increases your risk of health conditions such as heart disease, high blood pressure, type 2 diabetes, and certain types of cancer   It can also increase your risk for osteoarthritis, sleep apnea, and other respiratory problems  Aim for a slow, steady weight loss  Even a small amount of weight loss can lower your risk of health problems  How to lose weight safely:  A safe and healthy way to lose weight is to eat fewer calories and get regular exercise  You can lose up about 1 pound a week by decreasing the number of calories you eat by 500 calories each day  Healthy meal plan for weight management:  A healthy meal plan includes a variety of foods, contains fewer calories, and helps you stay healthy  A healthy meal plan includes the following:  · Eat whole-grain foods more often  A healthy meal plan should contain fiber  Fiber is the part of grains, fruits, and vegetables that is not broken down by your body  Whole-grain foods are healthy and provide extra fiber in your diet  Some examples of whole-grain foods are whole-wheat breads and pastas, oatmeal, brown rice, and bulgur  · Eat a variety of vegetables every day  Include dark, leafy greens such as spinach, kale, rafael greens, and mustard greens  Eat yellow and orange vegetables such as carrots, sweet potatoes, and winter squash  · Eat a variety of fruits every day  Choose fresh or canned fruit (canned in its own juice or light syrup) instead of juice  Fruit juice has very little or no fiber  · Eat low-fat dairy foods  Drink fat-free (skim) milk or 1% milk  Eat fat-free yogurt and low-fat cottage cheese  Try low-fat cheeses such as mozzarella and other reduced-fat cheeses  · Choose meat and other protein foods that are low in fat  Choose beans or other legumes such as split peas or lentils  Choose fish, skinless poultry (chicken or turkey), or lean cuts of red meat (beef or pork)  Before you cook meat or poultry, cut off any visible fat  · Use less fat and oil  Try baking foods instead of frying them  Add less fat, such as margarine, sour cream, regular salad dressing and mayonnaise to foods  Eat fewer high-fat foods   Some examples of high-fat foods include french fries, doughnuts, ice cream, and cakes  · Eat fewer sweets  Limit foods and drinks that are high in sugar  This includes candy, cookies, regular soda, and sweetened drinks  Exercise:  Exercise at least 30 minutes per day on most days of the week  Some examples of exercise include walking, biking, dancing, and swimming  You can also fit in more physical activity by taking the stairs instead of the elevator or parking farther away from stores  Ask your healthcare provider about the best exercise plan for you  © Copyright [employer] 2018 Information is for End User's use only and may not be sold, redistributed or otherwise used for commercial purposes   All illustrations and images included in CareNotes® are the copyrighted property of A [employer] M [employer]  or [address] [place], [place] [address]jess shahnaz

## 2021-06-15 NOTE — ASSESSMENT & PLAN NOTE
Low-cholesterol diet reviewed the ASCVD risk score slightly increased may consider statin in the future for now she will follow low-cholesterol diet recheck lipid profile in 6 months

## 2021-06-15 NOTE — PROGRESS NOTES
Assessment/Plan:    Medicare annual wellness visit, subsequent  Assessment and plan 1  Health maintenance annual wellness examination overall the patient is clinically stable and doing well, we encouraged the patient to follow a healthy and balanced diet  We recommend that the patient exercise routinely approximately 30 minutes 5 times per week   We have reviewed the patient's vaccines and have made recommendations for updates if necessary      Annual flu shot recommended she has had the COVID vaccine and the Shingrix vaccine her tetanus booster is up-to-date   We will be ordering screening laboratories which are age appropriate  Return to the office in  Six months    call if any problems  Hypothyroidism   Hypothyroidism controlled the patient is currently euthyroid I will be ordering a TSH prior to the next office visit and the patient will continue with current medical regiment; we will continue to monitor the patient's progress  Hypertension    Hypertension - controlled, I have counseled patient following healthy balance diet, I would like the patient reduce sodium, exercise routinely, I would like the patient continued the med current medical regiment and we will continue to monitor  TMJ arthritis   X-rays reviewed negative arthritis symptoms have resolved    Class 1 obesity due to excess calories with body mass index (BMI) of 33 0 to 33 9 in adult    Obesity -I have counseled patient following healthy and balanced diet, I would like the patient to lose weight, I would like the patient exercise routinely; we will continue monitor the patient's progress      S/P knee replacement    Rx for dental prophylaxis provided    Hyperlipidemia   Low-cholesterol diet reviewed the ASCVD risk score slightly increased may consider statin in the future for now she will follow low-cholesterol diet recheck lipid profile in 6 months         Problem List Items Addressed This Visit        Endocrine    Hypothyroidism Hypothyroidism controlled the patient is currently euthyroid I will be ordering a TSH prior to the next office visit and the patient will continue with current medical regiment; we will continue to monitor the patient's progress  Relevant Orders    TSH, 3rd generation       Cardiovascular and Mediastinum    Hypertension       Hypertension - controlled, I have counseled patient following healthy balance diet, I would like the patient reduce sodium, exercise routinely, I would like the patient continued the med current medical regiment and we will continue to monitor  Relevant Orders    Comprehensive metabolic panel    Microalbumin / creatinine urine ratio       Musculoskeletal and Integument    TMJ arthritis      X-rays reviewed negative arthritis symptoms have resolved            Other    S/P knee replacement       Rx for dental prophylaxis provided         Relevant Medications    clindamycin (CLEOCIN) 300 MG capsule    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HCC)    Relevant Orders    Immunofixation IgA,IgG,IgM Qt  Immunofixation Serum Immunoglobulin    Class 1 obesity due to excess calories with body mass index (BMI) of 33 0 to 33 9 in adult       Obesity -I have counseled patient following healthy and balanced diet, I would like the patient to lose weight, I would like the patient exercise routinely; we will continue monitor the patient's progress  Medicare annual wellness visit, subsequent     Assessment and plan 1  Health [facility] annual wellness examination overall the patient is clinically stable and doing well, we encouraged the patient to follow a healthy and balanced diet  We recommend that the patient exercise routinely approximately 30 minutes 5 times per week   We have reviewed the patient's vaccines and have made recommendations for updates if necessary      Annual flu shot recommended she has had the COVID vaccine and the Shingrix vaccine her tetanus booster is up-to-date     We will be ordering screening laboratories which are age appropriate  Return to the office in  Six months    call if any problems  Hyperlipidemia      Low-cholesterol diet reviewed the ASCVD risk score slightly increased may consider statin in the future for now she will follow low-cholesterol diet recheck lipid profile in 6 months         Relevant Orders    Lipid Panel with Direct LDL reflex      Other Visit Diagnoses     Screening for HIV (human immunodeficiency virus)    -  Primary    Relevant Orders    HIV 1/2 Antigen/Antibody (4th Generation) w Reflex [facility]    Encounter for screening mammogram for breast cancer        Relevant Orders    Mammo screening bilateral w cad    Elevated fasting blood sugar        Relevant Orders    Hemoglobin A1C           RTO in 6 months call if any problems  Subjective:      Patient ID: Chelsea Nassar is a 59 y o  female  HPI   59-year old female coming in for a follow up office visit regarding hypothyroidism, hypertension, IgG deficiency, status post left total knee replacement, elevated fasting sugar hyperlipidemia, TMJ and obesity; The patient reports me compliant taking medications without untoward side effects the  The patient is here to review his medical condition, update me on the medical condition and the patient reports me no hospitalizations and no ER visits  No injuries, no illnesses overall is feeling well she reports me less active because of the PET epic she was vaccinated influenza become more active in the future today she is here to review her laboratories in detail overall is feeling a lot better since being vaccinated  More active with knees will be going for repeat potassium testing next week    TMJ has resolved  The following portions of the patient's history were reviewed and updated as appropriate: allergies, current medications, past family history, past medical history, past social history, past surgical history and problem list  Recently saw the eye doctor found to have mild cataracts no need for surgery at this point    Review of Systems   Constitutional: Negative for activity change, appetite change and unexpected weight change  Eyes: Negative for visual disturbance  Respiratory: Negative for cough and shortness of breath  Cardiovascular: Negative for chest pain  Gastrointestinal: Negative for abdominal pain, diarrhea, nausea and vomiting  Neurological: Negative for dizziness, light-headedness and headaches  Objective:    No follow-ups on file  Procedure: Mammo screening bilateral w 3d & cad    Result Date: 6/1/2021  Narrative: DIAGNOSIS: Encounter for screening mammogram for malignant neoplasm of breast TECHNIQUE: Digital screening mammography was performed  Computer Aided Detection (CAD) analyzed all applicable images  COMPARISONS: Prior breast imaging dated: 05/14/2020, 05/09/2019, 05/03/2018, 04/27/2017, and 04/21/2016 RELEVANT HISTORY: Family Breast Cancer History: History of breast cancer in Sister, Family, Paternal Grandmother, Paternal Aunt  Family Medical History: Family medical history includes breast cancer in 4 relatives (family, paternal aunt, paternal grandmother, sister)  Personal History: Hormone history includes birth control and estrogen replacement therapy  No known relevant surgical history  No known relevant medical history  The patient is scheduled in a reminder system for screening mammography  8-10% of cancers will be missed on mammography  Management of a palpable abnormality must be based on clinical grounds  Patients will be notified of their results via letter from our facility  Accredited by [employer] of [employer] and FDA  RISK ASSESSMENT: 5 Year Tyrer-Cuzick: 3 09 % 10 Year Tyrer-Cuzick: 5 92 % Lifetime Tyrer-Cuzick: 13 56 % TISSUE DENSITY: The breasts are heterogeneously dense, which may obscure small masses  INDICATION: Floyd Gonzalez is a 56 y o  female presenting for screening mammography  FINDINGS: There are no suspicious masses, grouped microcalcifications or areas of architectural distortion  The skin and nipple areolar complex are unremarkable  Impression: No mammographic evidence of malignancy  ASSESSMENT/BI-RADS CATEGORY: Left: 1 - Negative Right: 1 - Negative Overall: 1 - Negative RECOMMENDATION:      - Routine screening mammogram in 1 year for both breasts  Workstation ID: [id number]         Allergies   Allergen Reactions    Penicillins Anaphylaxis     Anaphylaxis  Anaphylaxis  Other reaction(s): Anaphylaxis    Acetazolamide Itching and Swelling     With eye drops - takes  oral sulfa w/o side effects    Metronidazole GI Intolerance and Nausea Only       Past Medical History:   Diagnosis Date    Abnormal electrocardiogram     Last assessed 10/04/13    Anemia     pt states hypogammaglobulinemia, needs washed RBCs if transfused - Rx by Dr Stephanie Carpenter 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  - Last assessed 11/02/17    Disease of thyroid gland     Diverticulitis of colon     GERD (gastroesophageal reflux disease)     Hypertension     Hypoglobulinemia     Slow transit constipation      Past Surgical History:   Procedure Laterality Date    APPENDECTOMY      BOWEL RESECTION      CARPAL TUNNEL RELEASE      COLECTOMY      Resolved 08/27/09    JOINT REPLACEMENT Right     LASIK      OTHER SURGICAL HISTORY      Biopsy Vulvar    TN COLONOSCOPY FLX DX W/COLLJ SPEC WHEN PFRMD N/A 5/6/2019    Procedure: COLONOSCOPY;  Surgeon: Asad Holt MD;  Location: [facility];   Service: General    TN TOTAL KNEE ARTHROPLASTY Left 2/8/2017    Procedure: TOTAL KNEE REPLACEMENT ARTHROPLASTY;  Surgeon: Isidra Lozada MD;  [facility];  Service: Orthopedics    REFRACTIVE SURGERY      REPLACEMENT TOTAL KNEE Right 10/2013    TONSILLECTOMY      With Adenoidectomy    TOOTH EXTRACTION      VAGINA SURGERY mesh     Current Outpatient Medications on File Prior to Visit   Medication Sig Dispense Refill    estradiol (ESTRACE) 0 1 mg/g vaginal cream Use 1/4 applicator vaginally twice a week  42 5 g 1    famotidine (PEPCID) 20 mg tablet TAKE ONE TABLET BY MOUTH EVERY DAY 90 tablet 1    levothyroxine 75 mcg tablet TAKE ONE TABLET BY MOUTH EVERY DAY IN THE EARLY MORNING 90 tablet 1    potassium chloride (K-DUR,KLOR-CON) 10 mEq tablet TAKE ONE TABLET BY MOUTH TWICE A  tablet 1    Psyllium (METAMUCIL PO) Take 1 Dose by mouth daily        senna (SENOKOT) 8 6 MG tablet Take 2 tablets by mouth daily      sertraline (ZOLOFT) 100 mg tablet TAKE ONE TABLET BY MOUTH EVERY DAY 90 tablet 1    triamterene-hydrochlorothiazide (DYAZIDE) 37 5-25 mg per capsule TAKE ONE CAPSULE BY MOUTH EVERY MORNING 90 capsule 1    [DISCONTINUED] clindamycin (CLEOCIN) 300 MG capsule TAKE 2 CAPSULES 60 MINUTES PRIOR TO PROCEDURE       No current facility-administered medications on file prior to visit       Family History   Problem Relation Age of Onset    No Known Problems Mother     Basal cell carcinoma Father     Breast cancer Sister 54    Breast cancer Family     Arthritis Family     Hypertension Family     Cancer Family     Breast cancer Paternal Grandmother 50    No Known Problems Daughter     Breast cancer Paternal Aunt 79    No Known Problems Sister     No Known Problems Daughter     No Known Problems Daughter     No Known Problems Maternal Grandmother     No Known Problems Maternal Grandfather     No Known Problems Paternal Grandfather      Social History     Socioeconomic History    Marital status: /Civil Union     Spouse name: Not on file    Number of children: Not on file    Years of education: Not on file    Highest education level: Not on file   Occupational History    Not on file   Tobacco Use    Smoking status: Never Smoker    Smokeless tobacco: Never Used   Vaping Use    Vaping Use: Never used   Substance and Sexual Activity    Alcohol use: No    Drug use: No    Sexual activity: Yes   Other Topics Concern    Not on file   Social History Narrative    Denied: Home environment Domestic Violence        Daily Tea consumption        Caffeine use     Social Determinants of Health     Financial Resource Strain:     Difficulty of Paying Living Expenses:    Food Insecurity:     Worried About Running Out of Food in the Last Year:     Ran Out of Food in the Last Year:    Transportation Needs:     Lack of Transportation (Medical):      Lack of Transportation (Non-Medical):    Physical Activity:     Days of Exercise per Week:     Minutes of Exercise per Session:    Stress:     Feeling of Stress :    Social Connections:     Frequency of Communication with Friends and Family:     Frequency of Social Gatherings with Friends and Family:     Attends [employer] Services:     Active Member of Clubs or Organizations:     Attends Club or Organization Meetings:     Marital Status:    Intimate Partner Violence:     Fear of Current or Ex-Partner:     Emotionally Abused:     Physically Abused:     Sexually Abused:      Vitals:    06/15/21 0758   BP: 124/82   Pulse: 76   Resp: 16   SpO2: 95%   Weight: 86 8 kg (191 lb 6 4 oz)   Height: 5' 2 25" (1 581 m)     Results for orders placed or performed in visit on 06/04/21   Comprehensive metabolic panel   Result Value Ref Range    Sodium 139 136 - 145 mmol/L    Potassium 3 4 (L) 3 5 - 5 3 mmol/L    Chloride 103 100 - 108 mmol/L    CO2 32 21 - 32 mmol/L    ANION GAP 4 4 - 13 mmol/L    BUN 18 5 - 25 mg/dL    Creatinine 0 87 0 60 - 1 30 mg/dL    Glucose, Fasting 103 (H) 65 - 99 mg/dL    Calcium 9 6 8 3 - 10 1 mg/dL    AST 14 5 - 45 U/L    ALT 17 12 - 78 U/L    Alkaline Phosphatase 58 46 - 116 U/L    Total Protein 6 9 6 4 - 8 2 g/dL    Albumin 4 0 3 5 - 5 0 g/dL    Total Bilirubin 0 62 0 20 - 1 00 mg/dL    eGFR 72 ml/min/1 73sq m   Hemoglobin A1C   Result Value Ref Range    Hemoglobin A1C 5 3 Normal 3 8-5 6%; PreDiabetic 5 7-6 4%; Diabetic >=6 5%; Glycemic control for adults with diabetes <7 0% %     mg/dl   Lipid Panel with Direct LDL reflex   Result Value Ref Range    Cholesterol 225 (H) 50 - 200 mg/dL    Triglycerides 124 <=150 mg/dL    HDL, Direct 58 >=40 mg/dL    LDL Calculated 142 (H) 0 - 100 mg/dL   TSH, 3rd generation   Result Value Ref Range    TSH 3RD GENERATON 1 620 0 358 - 3 740 uIU/mL     Weight (last 2 days)     Date/Time   Weight    06/15/21 0758   86 8 (191 4)            Body mass index is 34 73 kg/m²  /82 (06/15/21 0758)    Temp      Pulse 76 (06/15/21 0758)   Resp 16 (06/15/21 0758)    SpO2 95 % (06/15/21 0758)      Vitals:    06/15/21 0758   Weight: 86 8 kg (191 lb 6 4 oz)     Vitals:    06/15/21 0758   Weight: 86 8 kg (191 lb 6 4 oz)       /82   Pulse 76   Resp 16   Ht 5' 2 25" (1 581 m)   Wt 86 8 kg (191 lb 6 4 oz)   SpO2 95%   BMI 34 73 kg/m²          Physical Exam  Constitutional:       Appearance: She is well-developed  HENT:      Head: Normocephalic  Eyes:      General: No scleral icterus  Right eye: No discharge  Left eye: No discharge  Conjunctiva/sclera: Conjunctivae normal       Pupils: Pupils are equal, round, and reactive to light  Cardiovascular:      Rate and Rhythm: Normal rate and regular rhythm  Heart sounds: Normal heart sounds  No murmur heard  No friction rub  No gallop  Pulmonary:      Effort: No respiratory distress  Breath sounds: Normal breath sounds  No wheezing or rales  Abdominal:      General: Bowel sounds are normal  There is no distension  Palpations: Abdomen is soft  There is no mass  Tenderness: There is no abdominal tenderness  There is no guarding or rebound  Musculoskeletal:         General: No deformity  Cervical back: Neck supple  Lymphadenopathy:      Cervical: No cervical adenopathy  Neurological:      Mental Status: She is alert  Coordination: Coordination normal         Bilateral  cataracts

## 2021-06-15 NOTE — PROGRESS NOTES
Assessment and Plan:     Problem List Items Addressed This Visit        Endocrine    Hypothyroidism    Relevant Orders    TSH, 3rd generation       Cardiovascular and Mediastinum    Hypertension    Relevant Orders    Comprehensive metabolic panel    Microalbumin / creatinine urine ratio       Other    S/P knee replacement    Relevant Medications    clindamycin (CLEOCIN) 300 MG capsule    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HCC)    Relevant Orders    Immunofixation IgA,IgG,IgM Qt  Immunofixation Serum Immunoglobulin    Medicare annual wellness visit, subsequent     Assessment and plan 1  Health maintenance annual wellness examination overall the patient is clinically stable and doing well, we encouraged the patient to follow a healthy and balanced diet  We recommend that the patient exercise routinely approximately 30 minutes 5 times per week   We have reviewed the patient's vaccines and have made recommendations for updates if necessary      Annual flu shot recommended she has had the COVID vaccine and the Shingrix vaccine her tetanus booster is up-to-date   We will be ordering screening laboratories which are age appropriate  Return to the office in  Six months    call if any problems  Other Visit Diagnoses     Screening for HIV (human immunodeficiency virus)    -  Primary    Relevant Orders    HIV 1/2 Antigen/Antibody (4th Generation) w Reflex [facility]    Encounter for screening mammogram for breast cancer        Relevant Orders    Mammo screening bilateral w cad    Elevated fasting blood sugar        Relevant Orders    Hemoglobin A1C    Hyperlipidemia, unspecified hyperlipidemia type        Relevant Orders    Lipid Panel with Direct LDL reflex        BMI Counseling: Body mass index is 34 73 kg/m²  The BMI is above normal  Nutrition recommendations include decreasing portion sizes and moderation in carbohydrate intake  Exercise recommendations include exercising 3-5 times per week           Preventive health issues were discussed with patient, and age appropriate screening tests were ordered as noted in patient's After Visit Summary  Personalized health advice and appropriate referrals for health education or preventive services given if needed, as noted in patient's After Visit Summary  History of Present Illness:     Patient presents for Medicare Annual Wellness visit    Patient Care Team:  Daniel Files, DO as PCP - General  Daniel Files, DO as PCP - [address] [place] [address] (RTE)  Sanchez Files, [facility]  MD Sergei Triplett MD Trena Leo, MD Bethanie Force, MD as Endoscopist     Problem List:     Patient Active Problem List   Diagnosis    S/P knee replacement    Hypertension    Anxiety    Hypothyroidism    Gastroesophageal reflux disease without esophagitis    Prediabetes    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[address]    IgA deficiency ([address]    Thrombocytopenia ([address]    Melanosis coli    History of colon resection    Low back pain    Abnormal laboratory test    Edema    Class 1 obesity due to excess calories with body mass index (BMI) of 33 0 to 33 9 in adult    Low gammaglobulin level ([facility]    TMJ arthritis    Tension type headache    Exposure to SARS-associated coronavirus    Medicare annual wellness visit, subsequent      Past Medical and Surgical History:     Past Medical History:   Diagnosis Date    Abnormal electrocardiogram     Last assessed 10/04/13    Anemia     pt states hypogammaglobulinemia, needs washed RBCs if transfused - Rx by Dr Caty Ram Depression     Diabetes [facility])     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  - Last assessed 11/02/17    Disease of thyroid gland     Diverticulitis of colon     GERD (gastroesophageal reflux disease)     Hypertension     Hypoglobulinemia     Slow transit constipation      Past Surgical History:   Procedure Laterality Date    APPENDECTOMY      BOWEL RESECTION      CARPAL TUNNEL RELEASE      COLECTOMY      Resolved 08/27/09    JOINT REPLACEMENT Right     LASIK      OTHER SURGICAL HISTORY      Biopsy Vulvar    VT COLONOSCOPY FLX DX W/COLLJ SPEC WHEN PFRMD N/A 5/6/2019    Procedure: COLONOSCOPY;  Surgeon: Aurora Evans MD;  [facility];   Service: General    VT TOTAL KNEE ARTHROPLASTY Left 2/8/2017    Procedure: TOTAL KNEE REPLACEMENT ARTHROPLASTY;  Surgeon: Kaya Rubio MD;  [facility];  Service: Orthopedics    REFRACTIVE SURGERY      REPLACEMENT TOTAL KNEE Right 10/2013    TONSILLECTOMY      With Adenoidectomy    TOOTH EXTRACTION      VAGINA SURGERY      mesh      Family History:     Family History   Problem Relation Age of Onset    No Known Problems Mother     Basal cell carcinoma Father     Breast cancer Sister 54    Breast cancer Family     Arthritis Family     Hypertension Family     Cancer Family     Breast cancer Paternal Grandmother 50    No Known Problems Daughter     Breast cancer Paternal Aunt 79    No Known Problems Sister     No Known Problems Daughter     No Known Problems Daughter     No Known Problems Maternal Grandmother     No Known Problems Maternal Grandfather     No Known Problems Paternal Grandfather       Social History:     Social History     Socioeconomic History    Marital status: /Civil Union     Spouse name: None    Number of children: None    Years of education: None    Highest education level: None   Occupational History    None   Tobacco Use    Smoking status: Never Smoker    Smokeless tobacco: Never Used   Vaping Use    Vaping Use: Never used   Substance and Sexual Activity    Alcohol use: No    Drug use: No    Sexual activity: Yes   Other Topics Concern    None   Social History Narrative    Denied: Home environment Domestic Violence        Daily Tea consumption        Caffeine use     Social Determinants of Health     Financial Resource Strain:     Difficulty of Paying Living Expenses:    Food Insecurity:     Worried About Running Out of Food in the Last Year:     [address] in the Last Year:    Transportation Needs:     Lack of Transportation (Medical):  Lack of Transportation (Non-Medical):    Physical Activity:     Days of Exercise per Week:     Minutes of Exercise per Session:    Stress:     Feeling of Stress :    Social Connections:     Frequency of Communication with Friends and Family:     Frequency of Social Gatherings with Friends and Family:     Attends [employer] witness Services:     Active Member of Clubs or Organizations:     Attends Club or Organization Meetings:     Marital Status:    Intimate Partner Violence:     Fear of Current or Ex-Partner:     Emotionally Abused:     Physically Abused:     Sexually Abused:       Medications and Allergies:     Current Outpatient Medications   Medication Sig Dispense Refill    clindamycin (CLEOCIN) 300 MG capsule Take 2 capsules 60 minutes prior to dental procedure  6 capsule 3    estradiol (ESTRACE) 0 1 mg/g vaginal cream Use 1/4 applicator vaginally twice a week  42 5 g 1    famotidine (PEPCID) 20 mg tablet TAKE ONE TABLET BY MOUTH EVERY DAY 90 tablet 1    levothyroxine 75 mcg tablet TAKE ONE TABLET BY MOUTH EVERY DAY IN THE EARLY MORNING 90 tablet 1    potassium chloride (K-DUR,KLOR-CON) 10 mEq tablet TAKE ONE TABLET BY MOUTH TWICE A  tablet 1    Psyllium (METAMUCIL PO) Take 1 Dose by mouth daily        senna (SENOKOT) 8 6 MG tablet Take 2 tablets by mouth daily      sertraline (ZOLOFT) 100 mg tablet TAKE ONE TABLET BY MOUTH EVERY DAY 90 tablet 1    triamterene-hydrochlorothiazide (DYAZIDE) 37 5-25 mg per capsule TAKE ONE CAPSULE BY MOUTH EVERY MORNING 90 capsule 1     No current facility-administered medications for this visit  Allergies   Allergen Reactions    Penicillins Anaphylaxis     Anaphylaxis  Anaphylaxis  Other reaction(s):  Anaphylaxis    Acetazolamide Itching and Swelling     With eye drops - takes  oral sulfa w/o side effects    Metronidazole GI Intolerance and Nausea Only      Immunizations:     Immunization History   Administered Date(s) Administered    Influenza Quadrivalent Preservative Free 3 years and older IM 10/04/2014, 10/03/2015, 09/12/2016, 10/12/2017    Influenza, recombinant, quadrivalent,injectable, preservative free 10/18/2018, 11/08/2019, 09/04/2020    Influenza, seasonal, injectable 10/19/2012, 10/01/2013    Pneumococcal Conjugate 13-Valent 02/24/2016    Pneumococcal Polysaccharide PPV23 03/01/2013    SARS-CoV-2 / COVID-19 mRNA IM (Ruba Motts) 01/29/2021, 02/26/2021    Td (adult), adsorbed 07/14/2004    Tdap 07/14/2014    Zoster 11/30/2015    Zoster Vaccine Recombinant 10/13/2020      Health Maintenance:         Topic Date Due    HIV Screening  Never done    MAMMOGRAM  05/29/2023    Cervical Cancer Screening  05/04/2024    Colorectal Cancer Screening  05/06/2024    Hepatitis C Screening  Completed     There are no preventive care reminders to display for this patient  Medicare Health Risk Assessment:     /82   Pulse 76   Resp 16   Ht 5' 2 25" (1 581 m)   Wt 86 8 kg (191 lb 6 4 oz)   SpO2 95%   BMI 34 73 kg/m²      Kentrell Luna is here for her Subsequent Wellness visit  Health Risk Assessment:   Patient rates overall health as good  Patient feels that their physical health rating is same  Patient is very satisfied with their life  Eyesight was rated as slightly worse  Hearing was rated as same  Patient feels that their emotional and mental health rating is same  Patients states they are never, rarely angry  Patient states they are never, rarely unusually tired/fatigued  Pain experienced in the last 7 days has been none  Patient states that she has experienced no weight loss or gain in last 6 months   Patient had recent eye exam with Dr Brit Li and is starting with Cataracts    Depression Screening:   PHQ-2 Score: 0      Fall Risk Screening: In the past year, patient has experienced: no history of falling in past year      Urinary Incontinence Screening:   Patient has not leaked urine accidently in the last six months  Home Safety:  Patient does not have trouble with stairs inside or outside of their home  Patient has working smoke alarms and has working carbon monoxide detector  Home safety hazards include: none  Nutrition:   Current diet is Regular  Medications:   Patient is currently taking over-the-counter supplements  OTC medications include: see medication list  Patient is able to manage medications  Activities of Daily Living (ADLs)/Instrumental Activities of Daily Living (IADLs):   Walk and transfer into and out of bed and chair?: Yes  Dress and groom yourself?: Yes    Bathe or shower yourself?: Yes    Feed yourself? Yes  Do your laundry/housekeeping?: Yes  Manage your money, pay your bills and track your expenses?: Yes  Make your own meals?: Yes    Do your own shopping?: Yes    Previous Hospitalizations:   Any hospitalizations or ED visits within the last 12 months?: No      Advance Care Planning:   Living will: Yes    Durable POA for healthcare: Yes    Advanced directive: Yes      Cognitive Screening:   Provider or family/friend/caregiver concerned regarding cognition?: No    PREVENTIVE SCREENINGS      Cardiovascular Screening:    General: Screening Current      Diabetes Screening:     General: Screening Current      Colorectal Cancer Screening:     General: Screening Current      Breast Cancer Screening:     General: Screening Current      Cervical Cancer Screening:    General: Screening Current      Lung Cancer Screening:     General: Screening Not Indicated      Hepatitis C Screening:    General: Screening Current    Screening, Brief Intervention, and Referral to Treatment (SBIRT)    Screening      AUDIT-C Screenin) How often did you have a drink containing alcohol in the past year? never  2) How many drinks did you have on a typical day when you were drinking in the past year? 0  3) How often did you have 6 or more drinks on one occasion in the past year? never    AUDIT-C Score: 0  Interpretation: Score 0-2 (female): Negative screen for alcohol misuse    Single Item Drug Screening:  How often have you used an illegal drug (including marijuana) or a prescription medication for non-medical reasons in the past year? never    Single Item Drug Screen Score: 0  Interpretation: Negative screen for possible drug use disorder      Mason Mcmullen, DO

## 2021-06-16 ENCOUNTER — APPOINTMENT (OUTPATIENT)
Dept: LAB | Age: 61
End: 2021-06-16
Payer: COMMERCIAL

## 2021-06-16 ENCOUNTER — TELEPHONE (OUTPATIENT)
Dept: INTERNAL MEDICINE CLINIC | Facility: CLINIC | Age: 61
End: 2021-06-16

## 2021-06-16 DIAGNOSIS — E87.6 LOW BLOOD POTASSIUM: ICD-10-CM

## 2021-06-16 DIAGNOSIS — Z12.11 SCREENING FOR COLON CANCER: ICD-10-CM

## 2021-06-16 LAB — POTASSIUM SERPL-SCNC: 3.6 MMOL/L (ref 3.5–5.3)

## 2021-06-16 PROCEDURE — 36415 COLL VENOUS BLD VENIPUNCTURE: CPT

## 2021-06-16 PROCEDURE — 84132 ASSAY OF SERUM POTASSIUM: CPT

## 2021-06-16 NOTE — TELEPHONE ENCOUNTER
The patient was into see you yesterday  She was given a kit to have her stool tested  The order is not in the system    She did leave the specimen at [facility]  Please advise  Thank you  Once the order is in the system please notify the patient and Rangel Parnell  Thank you

## 2021-06-17 ENCOUNTER — APPOINTMENT (OUTPATIENT)
Dept: LAB | Facility: HOSPITAL | Age: 61
End: 2021-06-17
Payer: COMMERCIAL

## 2021-06-17 LAB — HEMOCCULT STL QL IA: NEGATIVE

## 2021-06-17 PROCEDURE — G0328 FECAL BLOOD SCRN IMMUNOASSAY: HCPCS

## 2021-07-01 ENCOUNTER — VBI (OUTPATIENT)
Dept: ADMINISTRATIVE | Facility: OTHER | Age: 61
End: 2021-07-01

## 2021-07-23 ENCOUNTER — VBI (OUTPATIENT)
Dept: ADMINISTRATIVE | Facility: OTHER | Age: 61
End: 2021-07-23

## 2021-09-11 DIAGNOSIS — N95.1 VAGINAL DRYNESS, MENOPAUSAL: ICD-10-CM

## 2021-09-13 RX ORDER — ESTRADIOL 0.1 MG/G
CREAM VAGINAL
Qty: 42.5 G | Refills: 1 | Status: SHIPPED | OUTPATIENT
Start: 2021-09-13 | End: 2022-02-28

## 2021-09-20 DIAGNOSIS — I10 HYPERTENSION, UNSPECIFIED TYPE: ICD-10-CM

## 2021-09-20 RX ORDER — POTASSIUM CHLORIDE 750 MG/1
TABLET, EXTENDED RELEASE ORAL
Qty: 180 TABLET | Refills: 1 | Status: SHIPPED | OUTPATIENT
Start: 2021-09-20 | End: 2022-03-08

## 2021-09-27 DIAGNOSIS — E03.9 HYPOTHYROIDISM, UNSPECIFIED TYPE: ICD-10-CM

## 2021-09-27 DIAGNOSIS — F41.9 ANXIETY: ICD-10-CM

## 2021-09-27 DIAGNOSIS — I10 HYPERTENSION, UNSPECIFIED TYPE: ICD-10-CM

## 2021-09-27 RX ORDER — TRIAMTERENE AND HYDROCHLOROTHIAZIDE 37.5; 25 MG/1; MG/1
CAPSULE ORAL
Qty: 90 CAPSULE | Refills: 1 | Status: SHIPPED | OUTPATIENT
Start: 2021-09-27 | End: 2022-03-17

## 2021-09-27 RX ORDER — LEVOTHYROXINE SODIUM 0.07 MG/1
TABLET ORAL
Qty: 90 TABLET | Refills: 1 | Status: SHIPPED | OUTPATIENT
Start: 2021-09-27 | End: 2022-03-17

## 2021-09-27 RX ORDER — SERTRALINE HYDROCHLORIDE 100 MG/1
TABLET, FILM COATED ORAL
Qty: 90 TABLET | Refills: 1 | Status: SHIPPED | OUTPATIENT
Start: 2021-09-27 | End: 2022-03-17

## 2021-10-09 DIAGNOSIS — K21.9 GASTROESOPHAGEAL REFLUX DISEASE WITHOUT ESOPHAGITIS: ICD-10-CM

## 2021-10-09 RX ORDER — FAMOTIDINE 20 MG/1
TABLET, FILM COATED ORAL
Qty: 90 TABLET | Refills: 1 | Status: SHIPPED | OUTPATIENT
Start: 2021-10-09 | End: 2022-03-26

## 2021-12-14 ENCOUNTER — RA CDI HCC (OUTPATIENT)
Dept: OTHER | Facility: HOSPITAL | Age: 61
End: 2021-12-14

## 2021-12-15 ENCOUNTER — APPOINTMENT (OUTPATIENT)
Dept: LAB | Age: 61
End: 2021-12-15
Payer: COMMERCIAL

## 2021-12-15 DIAGNOSIS — E78.5 HYPERLIPIDEMIA, UNSPECIFIED HYPERLIPIDEMIA TYPE: ICD-10-CM

## 2021-12-15 DIAGNOSIS — I10 HYPERTENSION, UNSPECIFIED TYPE: ICD-10-CM

## 2021-12-15 DIAGNOSIS — Z11.4 SCREENING FOR HIV (HUMAN IMMUNODEFICIENCY VIRUS): ICD-10-CM

## 2021-12-15 DIAGNOSIS — E03.9 HYPOTHYROIDISM, UNSPECIFIED TYPE: ICD-10-CM

## 2021-12-15 DIAGNOSIS — R73.01 ELEVATED FASTING BLOOD SUGAR: ICD-10-CM

## 2021-12-15 DIAGNOSIS — D80.3 IGG DEFICIENCY (HCC): ICD-10-CM

## 2021-12-15 LAB
ALBUMIN SERPL BCP-MCNC: 4 G/DL (ref 3.5–5)
ALP SERPL-CCNC: 58 U/L (ref 46–116)
ALT SERPL W P-5'-P-CCNC: 17 U/L (ref 12–78)
ANION GAP SERPL CALCULATED.3IONS-SCNC: 3 MMOL/L (ref 4–13)
AST SERPL W P-5'-P-CCNC: 13 U/L (ref 5–45)
BILIRUB SERPL-MCNC: 0.66 MG/DL (ref 0.2–1)
BUN SERPL-MCNC: 21 MG/DL (ref 5–25)
CALCIUM SERPL-MCNC: 9.9 MG/DL (ref 8.3–10.1)
CHLORIDE SERPL-SCNC: 105 MMOL/L (ref 100–108)
CHOLEST SERPL-MCNC: 228 MG/DL
CO2 SERPL-SCNC: 31 MMOL/L (ref 21–32)
CREAT SERPL-MCNC: 0.82 MG/DL (ref 0.6–1.3)
CREAT UR-MCNC: 144 MG/DL
EST. AVERAGE GLUCOSE BLD GHB EST-MCNC: 105 MG/DL
GFR SERPL CREATININE-BSD FRML MDRD: 76 ML/MIN/1.73SQ M
GLUCOSE P FAST SERPL-MCNC: 95 MG/DL (ref 65–99)
HBA1C MFR BLD: 5.3 %
HDLC SERPL-MCNC: 61 MG/DL
IGA SERPL-MCNC: 37 MG/DL (ref 70–400)
IGG SERPL-MCNC: 574 MG/DL (ref 700–1600)
IGM SERPL-MCNC: 43 MG/DL (ref 40–230)
LDLC SERPL CALC-MCNC: 148 MG/DL (ref 0–100)
MICROALBUMIN UR-MCNC: 20.9 MG/L (ref 0–20)
MICROALBUMIN/CREAT 24H UR: 15 MG/G CREATININE (ref 0–30)
POTASSIUM SERPL-SCNC: 3.6 MMOL/L (ref 3.5–5.3)
PROT SERPL-MCNC: 6.9 G/DL (ref 6.4–8.2)
SODIUM SERPL-SCNC: 139 MMOL/L (ref 136–145)
TRIGL SERPL-MCNC: 96 MG/DL
TSH SERPL DL<=0.05 MIU/L-ACNC: 1.82 UIU/ML (ref 0.36–3.74)

## 2021-12-15 PROCEDURE — 80061 LIPID PANEL: CPT

## 2021-12-15 PROCEDURE — 82043 UR ALBUMIN QUANTITATIVE: CPT

## 2021-12-15 PROCEDURE — 82570 ASSAY OF URINE CREATININE: CPT

## 2021-12-15 PROCEDURE — 80053 COMPREHEN METABOLIC PANEL: CPT

## 2021-12-15 PROCEDURE — 36415 COLL VENOUS BLD VENIPUNCTURE: CPT

## 2021-12-15 PROCEDURE — 84443 ASSAY THYROID STIM HORMONE: CPT

## 2021-12-15 PROCEDURE — 87389 HIV-1 AG W/HIV-1&-2 AB AG IA: CPT

## 2021-12-15 PROCEDURE — 82784 ASSAY IGA/IGD/IGG/IGM EACH: CPT

## 2021-12-15 PROCEDURE — 83036 HEMOGLOBIN GLYCOSYLATED A1C: CPT

## 2021-12-16 LAB — HIV 1+2 AB+HIV1 P24 AG SERPL QL IA: NORMAL

## 2021-12-21 ENCOUNTER — OFFICE VISIT (OUTPATIENT)
Dept: INTERNAL MEDICINE CLINIC | Facility: CLINIC | Age: 61
End: 2021-12-21
Payer: COMMERCIAL

## 2021-12-21 VITALS
SYSTOLIC BLOOD PRESSURE: 130 MMHG | HEIGHT: 63 IN | RESPIRATION RATE: 16 BRPM | HEART RATE: 91 BPM | DIASTOLIC BLOOD PRESSURE: 82 MMHG | WEIGHT: 181.6 LBS | OXYGEN SATURATION: 98 % | BODY MASS INDEX: 32.18 KG/M2

## 2021-12-21 DIAGNOSIS — Z96.652 STATUS POST LEFT KNEE REPLACEMENT: ICD-10-CM

## 2021-12-21 DIAGNOSIS — F41.9 ANXIETY: ICD-10-CM

## 2021-12-21 DIAGNOSIS — E66.09 CLASS 1 OBESITY DUE TO EXCESS CALORIES WITHOUT SERIOUS COMORBIDITY WITH BODY MASS INDEX (BMI) OF 33.0 TO 33.9 IN ADULT: Primary | ICD-10-CM

## 2021-12-21 DIAGNOSIS — E03.9 HYPOTHYROIDISM, UNSPECIFIED TYPE: ICD-10-CM

## 2021-12-21 DIAGNOSIS — E78.5 HYPERLIPIDEMIA, UNSPECIFIED HYPERLIPIDEMIA TYPE: ICD-10-CM

## 2021-12-21 DIAGNOSIS — D80.1 LOW GAMMAGLOBULIN LEVEL (HCC): ICD-10-CM

## 2021-12-21 DIAGNOSIS — R73.03 PREDIABETES: ICD-10-CM

## 2021-12-21 PROCEDURE — 3008F BODY MASS INDEX DOCD: CPT | Performed by: INTERNAL MEDICINE

## 2021-12-21 PROCEDURE — 1036F TOBACCO NON-USER: CPT | Performed by: INTERNAL MEDICINE

## 2021-12-21 PROCEDURE — 99214 OFFICE O/P EST MOD 30 MIN: CPT | Performed by: INTERNAL MEDICINE

## 2021-12-21 RX ORDER — ROSUVASTATIN CALCIUM 5 MG/1
5 TABLET, COATED ORAL DAILY
Qty: 90 TABLET | Refills: 5 | Status: SHIPPED | OUTPATIENT
Start: 2021-12-21

## 2021-12-21 RX ORDER — BUSPIRONE HYDROCHLORIDE 5 MG/1
5 TABLET ORAL DAILY PRN
Qty: 30 TABLET | Refills: 0 | Status: SHIPPED | OUTPATIENT
Start: 2021-12-21

## 2021-12-21 RX ORDER — CLINDAMYCIN HYDROCHLORIDE 300 MG/1
CAPSULE ORAL
Qty: 20 CAPSULE | Refills: 0 | Status: SHIPPED | OUTPATIENT
Start: 2021-12-21 | End: 2022-01-21

## 2022-01-10 NOTE — PROGRESS NOTES
Assessment/Plan:           Problem List Items Addressed This Visit        Other    Cough - Primary     Bronchitis will treat with Z-Kurtis 1  As directed, Robitussin AC 1 tsp p o  Q h s  P r n  No alcohol or driving after taking medication during the day patient may use Mucinex as directed p r n  Relevant Medications    guaiFENesin-codeine (ROBITUSSIN AC) 100-10 mg/5 mL oral solution    azithromycin (ZITHROMAX) 250 mg tablet          Return to office as scheduled  call if any problems  Subjective:      Patient ID: Raquel Umanzor is a 59 y o  female  HPI 26-year-old female history of hypogammaglobulinemia coming in with a chief complaint of cough patient does report me a cough the last 4 days, nonproductive , no fevers no chills no sore throat no sinus pain no ear pain no sick exposures reports me trying some Robitussin without any her symptoms  Deeper cough  She is requesting cough med with codeine for at night it has been helpful in the past    The following portions of the patient's history were reviewed and updated as appropriate: allergies, current medications, past family history, past medical history, past social history, past surgical history and problem list     Review of Systems   Constitutional: Negative for chills and fever  HENT: Negative for congestion, ear pain, sinus pain, sinus pressure and sore throat  Respiratory: Positive for cough  Negative for wheezing  Objective:                    No Follow-up on file  Allergies   Allergen Reactions    Penicillins Anaphylaxis     Anaphylaxis  Anaphylaxis  Other reaction(s):  Anaphylaxis    Acetazolamide Itching and Swelling     With eye drops - takes  oral sulfa w/o side effects    Metronidazole GI Intolerance and Nausea Only    Morphine Other (See Comments)     hallucinations       Past Medical History:   Diagnosis Date    Abnormal electrocardiogram     Last assessed 10/04/13    Anemia     pt states hypogammaglobulinemia, needs washed RBCs if transfused - Rx by Dr Watson Saenz Depression     Diabetes [facility])     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  - Last assessed 11/02/17    Disease of thyroid gland     Diverticulitis of colon     GERD (gastroesophageal reflux disease)     Hypertension     Hypoglobulinemia     Slow transit constipation      Past Surgical History:   Procedure Laterality Date    APPENDECTOMY      BOWEL RESECTION      CARPAL TUNNEL RELEASE      COLECTOMY      Resolved 08/27/09    JOINT REPLACEMENT Right     OTHER SURGICAL HISTORY      Biopsy Vulvar    SC TOTAL KNEE ARTHROPLASTY Left 2/8/2017    Procedure: TOTAL KNEE REPLACEMENT ARTHROPLASTY;  Surgeon: Candance Knife, MD;  [facility];  Service: Orthopedics    REFRACTIVE SURGERY      REPLACEMENT TOTAL KNEE Right 10/2013    TONSILLECTOMY      With Adenoidectomy    TOOTH EXTRACTION       Current Outpatient Prescriptions on File Prior to Visit   Medication Sig Dispense Refill    levothyroxine 75 mcg tablet Take 1 tablet (75 mcg total) by mouth daily in the early morning 90 tablet 3    omeprazole (PriLOSEC) 20 mg delayed release capsule TAKE 1 CAPSULE EVERY DAY 90 capsule 2    potassium chloride (KLOR-CON 10) 10 mEq tablet Take 1 tablet (10 mEq total) by mouth 2 (two) times a day 180 tablet 3    Psyllium (METAMUCIL PO) Take 1 Dose by mouth daily        senna (SENOKOT) 8 6 MG tablet Take 2 tablets by mouth daily      sertraline (ZOLOFT) 100 mg tablet Take 1 tablet (100 mg total) by mouth daily 90 tablet 3    triamterene-hydrochlorothiazide (DYAZIDE) 37 5-25 mg per capsule Take 1 capsule by mouth daily 90 capsule 3     No current facility-administered medications on file prior to visit        Family History   Problem Relation Age of Onset    Breast cancer Sister     Breast cancer Family     Arthritis Family     Hypertension Family     Cancer Family     Breast cancer Paternal  here in labor making cervical change.    -VE in 2-3hours    Plan per Dr. Nicolle Dwyer PGY-4 Grandmother      Social History     Social History    Marital status: /Civil Union     Spouse name: N/A    Number of children: N/A    Years of education: N/A     Occupational History    Not on file       Social History Main Topics    Smoking status: Never Smoker    Smokeless tobacco: Never Used    Alcohol use No    Drug use: No    Sexual activity: Not on file     Other Topics Concern    Not on file     Social History Narrative    Denied: Home environment Domestic Violence        Daily Tea consumption        Caffeine use     Vitals:    12/06/18 1313   BP: 114/76   Pulse: 81   SpO2: 97%   Weight: 89 6 kg (197 lb 9 6 oz)   Height: 5' 3" (1 6 m)     Results for orders placed or performed in visit on 10/08/18   CBC and differential   Result Value Ref Range    WBC 5 07 4 31 - 10 16 Thousand/uL    RBC 5 03 3 81 - 5 12 Million/uL    Hemoglobin 14 6 11 5 - 15 4 g/dL    Hematocrit 44 3 34 8 - 46 1 %    MCV 88 82 - 98 fL    MCH 29 0 26 8 - 34 3 pg    MCHC 33 0 31 4 - 37 4 g/dL    RDW 13 4 11 6 - 15 1 %    MPV 12 1 8 9 - 12 7 fL    Platelets 620 (L) 889 - 390 Thousands/uL    nRBC 0 /100 WBCs    Neutrophils Relative 59 43 - 75 %    Immat GRANS % 0 0 - 2 %    Lymphocytes Relative 31 14 - 44 %    Monocytes Relative 7 4 - 12 %    Eosinophils Relative 2 0 - 6 %    Basophils Relative 1 0 - 1 %    Neutrophils Absolute 2 94 1 85 - 7 62 Thousands/µL    Immature Grans Absolute 0 02 0 00 - 0 20 Thousand/uL    Lymphocytes Absolute 1 58 0 60 - 4 47 Thousands/µL    Monocytes Absolute 0 37 0 17 - 1 22 Thousand/µL    Eosinophils Absolute 0 12 0 00 - 0 61 Thousand/µL    Basophils Absolute 0 04 0 00 - 0 10 Thousands/µL   Comprehensive metabolic panel   Result Value Ref Range    Sodium 140 136 - 145 mmol/L    Potassium 3 8 3 5 - 5 3 mmol/L    Chloride 103 100 - 108 mmol/L    CO2 32 21 - 32 mmol/L    ANION GAP 5 4 - 13 mmol/L    BUN 14 5 - 25 mg/dL    Creatinine 0 91 0 60 - 1 30 mg/dL    Glucose, Fasting 108 (H) 65 - 99 mg/dL Calcium 9 2 8 3 - 10 1 mg/dL    AST 21 5 - 45 U/L    ALT 27 12 - 78 U/L    Alkaline Phosphatase 61 46 - 116 U/L    Total Protein 6 8 6 4 - 8 2 g/dL    Albumin 3 8 3 5 - 5 0 g/dL    Total Bilirubin 0 55 0 20 - 1 00 mg/dL    eGFR 69 ml/min/1 73sq m   IgA   Result Value Ref Range    IGA 34 0 (L) 70 0 - 400 0 mg/dL   IgG   Result Value Ref Range     0 (L) 700 0-1,600 0 mg/dL   IgM   Result Value Ref Range    IGM 49 0 40 0 - 230 0 mg/dL   Protein electrophoresis, serum   Result Value Ref Range    A/G Ratio 1 87 (H) 1 10 - 1 80    Albumin Electrophoresis 65 1 (H) 52 0 - 65 0 %    Albumin CONC 4 30 3 50 - 5 00 g/dl    Alpha 1 4 7 2 5 - 5 0 %    ALPHA 1 CONC 0 31 0 10 - 0 40 g/dL    Alpha 2 11 3 7 0 - 13 0 %    ALPHA 2 CONC 0 75 0 40 - 1 20 g/dL    Beta-1 6 5 5 0 - 13 0 %    BETA 1 CONC 0 43 0 40 - 0 80 g/dL    Beta-2 3 4 2 0 - 8 0 %    BETA 2 CONC 0 22 0 20 - 0 50 g/dL    Gamma Globulin 9 0 (L) 12 0 - 22 0 %    GAMMA CONC 0 59 0 50 - 1 60 g/dL    SPEP Interpretation       The serum total protein, albumin and electrophoresis are within normal limits  No monoclonal bands noted  Reviewed by: Lilliana Hernandez DO  ([id number])  **Electronic Signature**    Total Protein 6 6 6 4 - 8 2 g/dL     Weight (last 2 days)     None        Body mass index is 35 kg/m²  BP      Temp      Pulse     Resp      SpO2        Vitals:    12/06/18 1313   Weight: 89 6 kg (197 lb 9 6 oz)     Vitals:    12/06/18 1313   Weight: 89 6 kg (197 lb 9 6 oz)       /76   Pulse 81   Ht 5' 3" (1 6 m)   Wt 89 6 kg (197 lb 9 6 oz)   SpO2 97%   BMI 35 00 kg/m²          Physical Exam   Constitutional: She appears well-developed and well-nourished  No distress  HENT:   Head: Normocephalic and atraumatic  Right Ear: External ear normal    Left Ear: External ear normal    Nose: Nose normal    Mouth/Throat: Oropharynx is clear and moist  No oropharyngeal exudate  Eyes: Pupils are equal, round, and reactive to light   Conjunctivae and EOM are normal  Right Plan: 23y y/o  @ 39w3d in stable condition  - Con't expectant management  - Continuous EFM, [place]  - Con't IVF    d/w attending physician Dr. Meaghan Casiano MD  PGY-1 eye exhibits no discharge  Left eye exhibits no discharge  No scleral icterus  Cardiovascular: Normal heart sounds  No murmur heard  Pulmonary/Chest: No respiratory distress  She has no wheezes  She has no rales  Lymphadenopathy:     She has no cervical adenopathy  Skin: She is not diaphoretic

## 2022-01-21 ENCOUNTER — APPOINTMENT (OUTPATIENT)
Dept: LAB | Age: 62
End: 2022-01-21
Payer: COMMERCIAL

## 2022-01-21 DIAGNOSIS — E78.5 HYPERLIPIDEMIA, UNSPECIFIED HYPERLIPIDEMIA TYPE: ICD-10-CM

## 2022-01-21 LAB
ALBUMIN SERPL BCP-MCNC: 4 G/DL (ref 3.5–5)
ALP SERPL-CCNC: 55 U/L (ref 46–116)
ALT SERPL W P-5'-P-CCNC: 13 U/L (ref 12–78)
ANION GAP SERPL CALCULATED.3IONS-SCNC: 3 MMOL/L (ref 4–13)
AST SERPL W P-5'-P-CCNC: 12 U/L (ref 5–45)
BILIRUB SERPL-MCNC: 0.69 MG/DL (ref 0.2–1)
BUN SERPL-MCNC: 20 MG/DL (ref 5–25)
CALCIUM SERPL-MCNC: 9.4 MG/DL (ref 8.3–10.1)
CHLORIDE SERPL-SCNC: 103 MMOL/L (ref 100–108)
CHOLEST SERPL-MCNC: 187 MG/DL
CO2 SERPL-SCNC: 32 MMOL/L (ref 21–32)
CREAT SERPL-MCNC: 0.82 MG/DL (ref 0.6–1.3)
GFR SERPL CREATININE-BSD FRML MDRD: 76 ML/MIN/1.73SQ M
GLUCOSE P FAST SERPL-MCNC: 89 MG/DL (ref 65–99)
HDLC SERPL-MCNC: 47 MG/DL
LDLC SERPL CALC-MCNC: 115 MG/DL (ref 0–100)
POTASSIUM SERPL-SCNC: 3.5 MMOL/L (ref 3.5–5.3)
PROT SERPL-MCNC: 7.1 G/DL (ref 6.4–8.2)
SODIUM SERPL-SCNC: 138 MMOL/L (ref 136–145)
TRIGL SERPL-MCNC: 125 MG/DL

## 2022-01-21 PROCEDURE — 80053 COMPREHEN METABOLIC PANEL: CPT

## 2022-01-21 PROCEDURE — 80061 LIPID PANEL: CPT

## 2022-01-21 PROCEDURE — 36415 COLL VENOUS BLD VENIPUNCTURE: CPT

## 2022-03-08 DIAGNOSIS — I10 HYPERTENSION, UNSPECIFIED TYPE: ICD-10-CM

## 2022-03-08 RX ORDER — POTASSIUM CHLORIDE 750 MG/1
TABLET, EXTENDED RELEASE ORAL
Qty: 180 TABLET | Refills: 1 | Status: SHIPPED | OUTPATIENT
Start: 2022-03-08

## 2022-03-17 DIAGNOSIS — F41.9 ANXIETY: ICD-10-CM

## 2022-03-17 DIAGNOSIS — I10 HYPERTENSION, UNSPECIFIED TYPE: ICD-10-CM

## 2022-03-17 DIAGNOSIS — E03.9 HYPOTHYROIDISM, UNSPECIFIED TYPE: ICD-10-CM

## 2022-03-17 RX ORDER — TRIAMTERENE AND HYDROCHLOROTHIAZIDE 37.5; 25 MG/1; MG/1
CAPSULE ORAL
Qty: 90 CAPSULE | Refills: 1 | Status: SHIPPED | OUTPATIENT
Start: 2022-03-17

## 2022-03-17 RX ORDER — LEVOTHYROXINE SODIUM 0.07 MG/1
TABLET ORAL
Qty: 90 TABLET | Refills: 1 | Status: SHIPPED | OUTPATIENT
Start: 2022-03-17

## 2022-03-17 RX ORDER — SERTRALINE HYDROCHLORIDE 100 MG/1
TABLET, FILM COATED ORAL
Qty: 90 TABLET | Refills: 1 | Status: SHIPPED | OUTPATIENT
Start: 2022-03-17

## 2022-03-26 DIAGNOSIS — K21.9 GASTROESOPHAGEAL REFLUX DISEASE WITHOUT ESOPHAGITIS: ICD-10-CM

## 2022-03-26 RX ORDER — FAMOTIDINE 20 MG/1
TABLET, FILM COATED ORAL
Qty: 90 TABLET | Refills: 1 | Status: SHIPPED | OUTPATIENT
Start: 2022-03-26

## 2022-04-15 ENCOUNTER — OFFICE VISIT (OUTPATIENT)
Dept: INTERNAL MEDICINE CLINIC | Facility: CLINIC | Age: 62
End: 2022-04-15
Payer: COMMERCIAL

## 2022-04-15 ENCOUNTER — NURSE TRIAGE (OUTPATIENT)
Dept: OTHER | Facility: OTHER | Age: 62
End: 2022-04-15

## 2022-04-15 VITALS
OXYGEN SATURATION: 97 % | WEIGHT: 172 LBS | BODY MASS INDEX: 30.48 KG/M2 | HEIGHT: 63 IN | SYSTOLIC BLOOD PRESSURE: 118 MMHG | DIASTOLIC BLOOD PRESSURE: 67 MMHG | HEART RATE: 95 BPM

## 2022-04-15 DIAGNOSIS — J04.0 LARYNGITIS: Primary | ICD-10-CM

## 2022-04-15 PROCEDURE — 1036F TOBACCO NON-USER: CPT | Performed by: NURSE PRACTITIONER

## 2022-04-15 PROCEDURE — 3008F BODY MASS INDEX DOCD: CPT | Performed by: NURSE PRACTITIONER

## 2022-04-15 PROCEDURE — 99213 OFFICE O/P EST LOW 20 MIN: CPT | Performed by: NURSE PRACTITIONER

## 2022-04-15 RX ORDER — PROMETHAZINE HYDROCHLORIDE AND CODEINE PHOSPHATE 6.25; 1 MG/5ML; MG/5ML
5 SYRUP ORAL EVERY 4 HOURS PRN
Qty: 180 ML | Refills: 0 | Status: SHIPPED | OUTPATIENT
Start: 2022-04-15 | End: 2022-06-27 | Stop reason: ALTCHOICE

## 2022-04-15 RX ORDER — AZITHROMYCIN 250 MG/1
TABLET, FILM COATED ORAL
Qty: 6 TABLET | Refills: 0 | Status: SHIPPED | OUTPATIENT
Start: 2022-04-15 | End: 2022-04-19

## 2022-04-15 RX ORDER — PROMETHAZINE HYDROCHLORIDE AND CODEINE PHOSPHATE 6.25; 1 MG/5ML; MG/5ML
5 SYRUP ORAL EVERY 4 HOURS PRN
Qty: 180 ML | Refills: 0
Start: 2022-04-15 | End: 2022-04-15 | Stop reason: SDUPTHER

## 2022-04-15 NOTE — TELEPHONE ENCOUNTER
Reason for Disposition   Patient wants to be seen    Answer Assessment - Initial Assessment Questions  1  ONSET: "When did the throat start hurting?" (Hours or days ago)       4/13  Pt states motrin and tylenol did not help  2  SEVERITY: "How bad is the sore throat?" (Scale 1-10; mild, moderate or severe)    - MILD (1-3):  doesn't interfere with eating or normal activities    - MODERATE (4-7): interferes with eating some solids and normal activities    - SEVERE (8-10):  excruciating pain, interferes with most normal activities    - SEVERE DYSPHAGIA: can't swallow liquids, drooling      Mild    3  STREP EXPOSURE: "Has there been any exposure to strep within the past week?" If Yes, ask: "What type of contact occurred?"       Denies    4  VIRAL SYMPTOMS: "Are there any symptoms of a cold, such as a runny nose, cough, hoarse voice or red eyes?"       Denies    5  FEVER: "Do you have a fever?" If Yes, ask: "What is your temperature, how was it measured, and when did it start?"      Denies    6  PUS ON THE TONSILS: "Is there pus on the tonsils in the back of your throat?"      Yes, both sides    7   OTHER SYMPTOMS: "Do you have any other symptoms?" (e g , difficulty breathing, headache, rash)      Denies    Protocols used: SORE THROAT-ADULT-OH

## 2022-04-15 NOTE — ASSESSMENT & PLAN NOTE
Start antibiotics drink plenty of fluids and rest  Promethazine with codeine cough syrup for bedtime

## 2022-04-15 NOTE — TELEPHONE ENCOUNTER
----- Message from Karen Gill sent at 4/15/2022  8:21 AM EDT -----  "My wife has a bad chest cold, swollen glands, and laryngitis "

## 2022-04-15 NOTE — PROGRESS NOTES
Assessment/Plan:    Laryngitis  Start antibiotics drink plenty of fluids and rest  Promethazine with codeine cough syrup for bedtime       Diagnoses and all orders for this visit:    Laryngitis  -     azithromycin (ZITHROMAX) 250 mg tablet; Take 2 tablets today then 1 tablet daily x 4 days  -     Discontinue: promethazine-codeine (PHENERGAN WITH CODEINE) 6 25-10 mg/5 mL syrup; Take 5 mL by mouth every 4 (four) hours as needed for cough  -     promethazine-codeine (PHENERGAN WITH CODEINE) 6 25-10 mg/5 mL syrup; Take 5 mL by mouth every 4 (four) hours as needed for cough          Subjective:      Patient ID: Gio Lowe is a 59 y o  female  Patient is here for 2 days of sore throat loss of voice fatigue ear pressure cough at night  No fever no chills no shortness of breath      The following portions of the patient's history were reviewed and updated as appropriate: allergies, current medications, past family history, past medical history, past social history, past surgical history and problem list     Review of Systems   Constitutional: Positive for fatigue  HENT: Positive for ear pain, rhinorrhea, sore throat and voice change  Eyes: Negative  Respiratory: Positive for cough  Cardiovascular: Negative  Gastrointestinal: Negative  Musculoskeletal: Negative  Neurological: Negative  Objective:      /67   Pulse 95   Ht 5' 2 5" (1 588 m)   Wt 78 kg (172 lb)   SpO2 97%   BMI 30 96 kg/m²          Physical Exam  Vitals and nursing note reviewed  Constitutional:       Appearance: Normal appearance  She is well-developed  HENT:      Head: Normocephalic and atraumatic  Right Ear: External ear normal       Left Ear: External ear normal       Nose: Nose normal    Eyes:      Conjunctiva/sclera: Conjunctivae normal       Pupils: Pupils are equal, round, and reactive to light  Cardiovascular:      Rate and Rhythm: Normal rate and regular rhythm     Pulmonary:      Effort: Pulmonary effort is normal       Breath sounds: Normal breath sounds  Abdominal:      General: Bowel sounds are normal       Palpations: Abdomen is soft  Musculoskeletal:         General: Normal range of motion  Cervical back: Normal range of motion and neck supple  Skin:     General: Skin is warm and dry  Neurological:      Mental Status: She is alert and oriented to person, place, and time

## 2022-06-01 ENCOUNTER — OFFICE VISIT (OUTPATIENT)
Dept: OBGYN CLINIC | Facility: CLINIC | Age: 62
End: 2022-06-01
Payer: COMMERCIAL

## 2022-06-01 VITALS
HEIGHT: 63 IN | SYSTOLIC BLOOD PRESSURE: 124 MMHG | BODY MASS INDEX: 31.18 KG/M2 | DIASTOLIC BLOOD PRESSURE: 80 MMHG | WEIGHT: 176 LBS

## 2022-06-01 DIAGNOSIS — N90.7 VULVAR CYSTS: Primary | ICD-10-CM

## 2022-06-01 PROCEDURE — 99214 OFFICE O/P EST MOD 30 MIN: CPT | Performed by: OBSTETRICS & GYNECOLOGY

## 2022-06-01 NOTE — PATIENT INSTRUCTIONS
Topic:  Vulvar inclusion cysts     Several vulvar inclusion cysts were noted on the vulva bilaterally        Patient was reassured of the findings     She was told to utilize warm tea bags baths if they become bothersome for her     All questions were answered for her     She will be seen for routine follow-up as planned     Patient told to call for any problems, questions, issues or concerns which may arise for her

## 2022-06-01 NOTE — PROGRESS NOTES
Assessment/Plan:    No problem-specific Assessment & Plan notes found for this encounter  Diagnoses and all orders for this visit:    Vulvar cysts        Subjective:      Patient ID: Cyril Basilio is a 59 y o  female  HPI    Patient is a 25-year-old female presenting to the office complaining of pimple-like bumps on her vulvar area  They have been constantly present for the past few months, with no provoking or palliating factors  She notes that she has had similar prior episodes  Denies associated pain, itching, or irritation  Denies any recent changes in her hygiene routine  Reassured patient regarding the inclusion cyst   Advised her that treatment is only necessary if they become annoying or become enlarged to a significant degree or  If they are irritating for her, I also advised her that she may use warm tea bags baths for relief  Denies any bowel/bladder changes, vaginal bleeding, and pelvic or abdominal pain  Denies any vasomotor symptoms, including hot flashes  Notes some insomnia, for which she occasionally takes benadryl  Total time for the visit was 25 minutes of which greater than 50% was spent face-to-face counseling the patient as well as coordination of care, review of chart laboratory values, physical examination as well as computer entry into the epic medical record system  Review of Systems   Constitutional: Negative  Negative for appetite change, diaphoresis, fatigue, fever and unexpected weight change  HENT: Negative  Eyes: Negative  Respiratory: Negative  Cardiovascular: Negative  Followed for blood pressure and cholesterol   Gastrointestinal: Negative  Negative for abdominal pain, blood in stool, constipation, diarrhea, nausea and vomiting  Endocrine: Negative  Negative for cold intolerance and heat intolerance  Followed for thyroid   Genitourinary: Negative    Negative for dysuria, frequency, hematuria, urgency, vaginal bleeding, vaginal discharge and vaginal pain  Musculoskeletal: Negative  Skin: Negative  Allergic/Immunologic: Negative  Neurological: Negative  Hematological: Negative  Negative for adenopathy  Psychiatric/Behavioral: Negative  Objective:      /80   Ht 5' 2 5" (1 588 m)   Wt 79 8 kg (176 lb)   BMI 31 68 kg/m²          Physical Exam  Exam conducted with a chaperone present  Constitutional:       Appearance: She is well-developed  HENT:      Head: Normocephalic  Eyes:      Pupils: Pupils are equal, round, and reactive to light  Cardiovascular:      Rate and Rhythm: Normal rate  Pulmonary:      Effort: Pulmonary effort is normal    Genitourinary:     Labia:         Right: Lesion (several inclusion cysts bilaterally) present  No rash, tenderness or injury  Left: Lesion present  No rash, tenderness or injury  Urethra: No urethral swelling or urethral lesion  Musculoskeletal:         General: Normal range of motion  Cervical back: Normal range of motion and neck supple  Skin:     General: Skin is warm and dry  Neurological:      General: No focal deficit present  Mental Status: She is alert and oriented to person, place, and time  Psychiatric:         Mood and Affect: Mood normal          Behavior: Behavior normal          Thought Content:  Thought content normal          Judgment: Judgment normal

## 2022-06-03 ENCOUNTER — HOSPITAL ENCOUNTER (OUTPATIENT)
Dept: RADIOLOGY | Age: 62
Discharge: HOME | End: 2022-06-03
Payer: COMMERCIAL

## 2022-06-03 VITALS — WEIGHT: 176 LBS | BODY MASS INDEX: 31.18 KG/M2 | HEIGHT: 63 IN

## 2022-06-03 DIAGNOSIS — Z12.31 ENCOUNTER FOR SCREENING MAMMOGRAM FOR MALIGNANT NEOPLASM OF BREAST: ICD-10-CM

## 2022-06-03 PROCEDURE — 77063 BREAST TOMOSYNTHESIS BI: CPT

## 2022-06-03 PROCEDURE — 77067 SCR MAMMO BI INCL CAD: CPT

## 2022-06-22 ENCOUNTER — APPOINTMENT (OUTPATIENT)
Dept: LAB | Age: 62
End: 2022-06-22
Payer: COMMERCIAL

## 2022-06-22 DIAGNOSIS — E78.5 HYPERLIPIDEMIA, UNSPECIFIED HYPERLIPIDEMIA TYPE: ICD-10-CM

## 2022-06-22 DIAGNOSIS — E03.9 HYPOTHYROIDISM, UNSPECIFIED TYPE: ICD-10-CM

## 2022-06-22 LAB
ALBUMIN SERPL BCP-MCNC: 3.9 G/DL (ref 3.5–5)
ALP SERPL-CCNC: 52 U/L (ref 46–116)
ALT SERPL W P-5'-P-CCNC: 17 U/L (ref 12–78)
ANION GAP SERPL CALCULATED.3IONS-SCNC: 3 MMOL/L (ref 4–13)
AST SERPL W P-5'-P-CCNC: 17 U/L (ref 5–45)
BILIRUB SERPL-MCNC: 0.58 MG/DL (ref 0.2–1)
BUN SERPL-MCNC: 18 MG/DL (ref 5–25)
CALCIUM SERPL-MCNC: 9.6 MG/DL (ref 8.3–10.1)
CHLORIDE SERPL-SCNC: 105 MMOL/L (ref 100–108)
CHOLEST SERPL-MCNC: 177 MG/DL
CO2 SERPL-SCNC: 31 MMOL/L (ref 21–32)
CREAT SERPL-MCNC: 0.92 MG/DL (ref 0.6–1.3)
GFR SERPL CREATININE-BSD FRML MDRD: 66 ML/MIN/1.73SQ M
GLUCOSE P FAST SERPL-MCNC: 82 MG/DL (ref 65–99)
HDLC SERPL-MCNC: 66 MG/DL
LDLC SERPL CALC-MCNC: 92 MG/DL (ref 0–100)
POTASSIUM SERPL-SCNC: 3.7 MMOL/L (ref 3.5–5.3)
PROT SERPL-MCNC: 6.8 G/DL (ref 6.4–8.2)
SODIUM SERPL-SCNC: 139 MMOL/L (ref 136–145)
TRIGL SERPL-MCNC: 96 MG/DL
TSH SERPL DL<=0.05 MIU/L-ACNC: 1.65 UIU/ML (ref 0.45–4.5)

## 2022-06-22 PROCEDURE — 80061 LIPID PANEL: CPT

## 2022-06-22 PROCEDURE — 84443 ASSAY THYROID STIM HORMONE: CPT

## 2022-06-22 PROCEDURE — 36415 COLL VENOUS BLD VENIPUNCTURE: CPT

## 2022-06-22 PROCEDURE — 80053 COMPREHEN METABOLIC PANEL: CPT

## 2022-06-23 ENCOUNTER — HOSPITAL ENCOUNTER (OUTPATIENT)
Dept: ULTRASOUND IMAGING | Facility: CLINIC | Age: 62
Discharge: HOME | End: 2022-06-23
Payer: COMMERCIAL

## 2022-06-23 ENCOUNTER — HOSPITAL ENCOUNTER (OUTPATIENT)
Dept: MAMMOGRAPHY | Facility: CLINIC | Age: 62
Discharge: HOME | End: 2022-06-23
Payer: COMMERCIAL

## 2022-06-23 ENCOUNTER — RA CDI HCC (OUTPATIENT)
Dept: OTHER | Facility: HOSPITAL | Age: 62
End: 2022-06-23

## 2022-06-23 DIAGNOSIS — R92.8 ABNORMAL MAMMOGRAM: ICD-10-CM

## 2022-06-23 PROCEDURE — G0279 TOMOSYNTHESIS, MAMMO: HCPCS

## 2022-06-23 PROCEDURE — 77065 DX MAMMO INCL CAD UNI: CPT

## 2022-06-23 PROCEDURE — 76642 ULTRASOUND BREAST LIMITED: CPT

## 2022-06-23 NOTE — PROGRESS NOTES
[facility]  coding opportunities       Chart reviewed, no opportunity found:   [address]        Patients Insurance     Medicare Insurance: [employer]

## 2022-06-23 NOTE — PROGRESS NOTES
Met with patient and  regarding recommendation for;    ___x__ RIGHT ______LEFT      _____Ultrasound guided  ____x__Stereotactic breast biopsy  __X___Verbalized understanding        Blood thinners:  No: _x____ Yes: ______ What:                 Biopsy teaching sheet given:  Yes: ___X___ No: ________    Pt given contact information and adv to call with any questions/needs

## 2022-06-28 ENCOUNTER — OFFICE VISIT (OUTPATIENT)
Dept: INTERNAL MEDICINE CLINIC | Facility: CLINIC | Age: 62
End: 2022-06-28
Payer: COMMERCIAL

## 2022-06-28 VITALS
HEIGHT: 63 IN | HEART RATE: 86 BPM | BODY MASS INDEX: 31.08 KG/M2 | RESPIRATION RATE: 16 BRPM | DIASTOLIC BLOOD PRESSURE: 78 MMHG | WEIGHT: 175.4 LBS | OXYGEN SATURATION: 94 % | SYSTOLIC BLOOD PRESSURE: 122 MMHG

## 2022-06-28 DIAGNOSIS — Z00.00 MEDICARE ANNUAL WELLNESS VISIT, SUBSEQUENT: Primary | ICD-10-CM

## 2022-06-28 DIAGNOSIS — F51.01 PRIMARY INSOMNIA: ICD-10-CM

## 2022-06-28 DIAGNOSIS — I15.9 SECONDARY HYPERTENSION: ICD-10-CM

## 2022-06-28 DIAGNOSIS — E03.9 HYPOTHYROIDISM, UNSPECIFIED TYPE: ICD-10-CM

## 2022-06-28 DIAGNOSIS — E78.5 HYPERLIPIDEMIA, UNSPECIFIED HYPERLIPIDEMIA TYPE: ICD-10-CM

## 2022-06-28 DIAGNOSIS — R73.03 PREDIABETES: ICD-10-CM

## 2022-06-28 DIAGNOSIS — E66.09 CLASS 1 OBESITY DUE TO EXCESS CALORIES WITHOUT SERIOUS COMORBIDITY WITH BODY MASS INDEX (BMI) OF 33.0 TO 33.9 IN ADULT: ICD-10-CM

## 2022-06-28 DIAGNOSIS — R89.9 ABNORMAL LABORATORY TEST: ICD-10-CM

## 2022-06-28 DIAGNOSIS — R92.8 ABNORMAL MAMMOGRAM: ICD-10-CM

## 2022-06-28 PROCEDURE — G0439 PPPS, SUBSEQ VISIT: HCPCS | Performed by: INTERNAL MEDICINE

## 2022-06-28 PROCEDURE — 3725F SCREEN DEPRESSION PERFORMED: CPT | Performed by: INTERNAL MEDICINE

## 2022-06-28 PROCEDURE — 3008F BODY MASS INDEX DOCD: CPT | Performed by: INTERNAL MEDICINE

## 2022-06-28 PROCEDURE — 1036F TOBACCO NON-USER: CPT | Performed by: INTERNAL MEDICINE

## 2022-06-28 PROCEDURE — 99214 OFFICE O/P EST MOD 30 MIN: CPT | Performed by: INTERNAL MEDICINE

## 2022-06-28 RX ORDER — TRAZODONE HYDROCHLORIDE 50 MG/1
50 TABLET ORAL
Qty: 90 TABLET | Refills: 0 | Status: SHIPPED | OUTPATIENT
Start: 2022-06-28

## 2022-06-28 NOTE — ASSESSMENT & PLAN NOTE
Assessment and plan 1  Medicare subsequent annual wellness examination overall the patient is clinically stable and doing well, we encouraged the patient to follow a healthy and balanced diet  We recommend that the patient exercise routinely approximately 30 minutes 5 times per week   We have reviewed the patient's vaccines and have made recommendations for updates if necessary annual flu shot       We will be ordering screening laboratories which are age appropriate  Return to the office in  6 months    call if any problems

## 2022-06-28 NOTE — PATIENT INSTRUCTIONS
Medicare Preventive Visit Patient Instructions  Thank you for completing your Welcome to Medicare Visit or Medicare Annual Wellness Visit today  Your next wellness visit will be due in one year (6/29/2023)  The screening/preventive services that you may require over the next 5-10 years are detailed below  Some tests may not apply to you based off risk factors and/or age  Screening tests ordered at today's visit but not completed yet may show as past due  Also, please note that scanned in results may not display below  Preventive Screenings:  Service Recommendations Previous Testing/Comments   Colorectal Cancer Screening  * Colonoscopy    * Fecal Occult Blood Test (FOBT)/Fecal Immunochemical Test (FIT)  * Fecal DNA/Cologuard Test  * Flexible Sigmoidoscopy Age: 54-65 years old   Colonoscopy: every 10 years (may be performed more frequently if at higher risk)  OR  FOBT/FIT: every 1 year  OR  Cologuard: every 3 years  OR  Sigmoidoscopy: every 5 years  Screening may be recommended earlier than age 48 if at higher risk for colorectal cancer  Also, an individualized decision between you and your healthcare provider will decide whether screening between the ages of 74-80 would be appropriate  Colonoscopy: 06/17/2021  FOBT/FIT: 11/22/2021  Cologuard: Not on file  Sigmoidoscopy: Not on file          Breast Cancer Screening Age: 36 years old  Frequency: every 1-2 years  Not required if history of left and right mastectomy Mammogram: 06/23/2022        Cervical Cancer Screening Between the ages of 21-29, pap smear recommended once every 3 years  Between the ages of 33-67, can perform pap smear with HPV co-testing every 5 years     Recommendations may differ for women with a history of total hysterectomy, cervical cancer, or abnormal pap smears in past  Pap Smear: 05/04/2021        Hepatitis C Screening Once for adults born between 1945 and 1965  More frequently in patients at high risk for Hepatitis C Hep C Antibody: 06/06/2018        Diabetes Screening 1-2 times per year if you're at risk for diabetes or have pre-diabetes Fasting glucose: 82 mg/dL   A1C: 5 3 %        Cholesterol Screening Once every 5 years if you don't have a lipid disorder  May order more often based on risk factors  Lipid panel: 06/22/2022          Other Preventive Screenings Covered by Medicare:  1  Abdominal Aortic Aneurysm (AAA) Screening: covered once if your at risk  You're considered to be at risk if you have a family history of AAA  2  Lung Cancer Screening: covers low dose CT scan once per year if you meet all of the following conditions: (1) Age 50-69; (2) No signs or symptoms of lung cancer; (3) Current smoker or have quit smoking within the last 15 years; (4) You have a tobacco smoking history of at least 30 pack years (packs per day multiplied by number of years you smoked); (5) You get a written order from a healthcare provider  3  Glaucoma Screening: covered annually if you're considered high risk: (1) You have diabetes OR (2) Family history of glaucoma OR (3)  aged 48 and older OR (3)  American aged 72 and older  3  Osteoporosis Screening: covered every 2 years if you meet one of the following conditions: (1) You're estrogen deficient and at risk for osteoporosis based off medical history and other findings; (2) Have a vertebral abnormality; (3) On glucocorticoid therapy for more than 3 months; (4) Have primary hyperparathyroidism; (5) On osteoporosis medications and need to assess response to drug therapy  · Last bone density test (DXA Scan): 12/23/2020   5  HIV Screening: covered annually if you're between the age of 15-65  Also covered annually if you are younger than 13 and older than 72 with risk factors for HIV infection  For pregnant patients, it is covered up to 3 times per pregnancy      Immunizations:  Immunization Recommendations   Influenza Vaccine Annual influenza vaccination during flu season is recommended for all persons aged >= 6 months who do not have contraindications   Pneumococcal Vaccine (Prevnar and Pneumovax)  * Prevnar = PCV13  * Pneumovax = PPSV23   Adults 25-60 years old: 1-3 doses may be recommended based on certain risk factors  Adults 72 years old: Prevnar (PCV13) vaccine recommended followed by Pneumovax (PPSV23) vaccine  If already received PPSV23 since turning 65, then PCV13 recommended at least one year after PPSV23 dose  Hepatitis B Vaccine 3 dose series if at intermediate or high risk (ex: diabetes, end stage renal disease, liver disease)   Tetanus (Td) Vaccine - COST NOT COVERED BY MEDICARE PART B Following completion of primary series, a booster dose should be given every 10 years to maintain immunity against tetanus  Td may also be given as tetanus wound prophylaxis  Tdap Vaccine - COST NOT COVERED BY MEDICARE PART B Recommended at least once for all adults  For pregnant patients, recommended with each pregnancy  Shingles Vaccine (Shingrix) - COST NOT COVERED BY MEDICARE PART B  2 shot series recommended in those aged 48 and above     Health Maintenance Due:      Topic Date Due    Colorectal Cancer Screening  11/23/2021    Cervical Cancer Screening  05/04/2024    Breast Cancer Screening: Mammogram  06/23/2024    HIV Screening  Completed    Hepatitis C Screening  Completed     Immunizations Due:      Topic Date Due    COVID-19 Vaccine (4 - Booster for Pako Grout series) 01/29/2022     Advance Directives   What are advance directives? Advance directives are legal documents that state your wishes and plans for medical care  These plans are made ahead of time in case you lose your ability to make decisions for yourself  Advance directives can apply to any medical decision, such as the treatments you want, and if you want to donate organs  What are the types of advance directives? There are many types of advance directives, and each state has rules about how to use them  You may choose a combination of any of the following:  · Living will: This is a written record of the treatment you want  You can also choose which treatments you do not want, which to limit, and which to stop at a certain time  This includes surgery, medicine, IV fluid, and tube feedings  · Durable power of  for healthcare [facility]): This is a written record that states who you want to make healthcare choices for you when you are unable to make them for yourself  This person, called a proxy, is usually a family member or a friend  You may choose more than 1 proxy  · Do not resuscitate (DNR) order:  A DNR order is used in case your heart stops beating or you stop breathing  It is a request not to have certain forms of treatment, such as CPR  A DNR order may be included in other types of advance directives  · Medical directive: This covers the care that you want if you are in a coma, near death, or unable to make decisions for yourself  You can list the treatments you want for each condition  Treatment may include pain medicine, surgery, blood transfusions, dialysis, IV or tube feedings, and a ventilator (breathing machine)  · Values history: This document has questions about your views, beliefs, and how you feel and think about life  This information can help others choose the care that you would choose  Why are advance directives important? An advance directive helps you control your care  Although spoken wishes may be used, it is better to have your wishes written down  Spoken wishes can be misunderstood, or not followed  Treatments may be given even if you do not want them  An advance directive may make it easier for your family to make difficult choices about your care  Weight Management   Why it is important to manage your weight:  Being overweight increases your risk of health conditions such as heart disease, high blood pressure, type 2 diabetes, and certain types of cancer   It can also increase your risk for osteoarthritis, sleep apnea, and other respiratory problems  Aim for a slow, steady weight loss  Even a small amount of weight loss can lower your risk of health problems  How to lose weight safely:  A safe and healthy way to lose weight is to eat fewer calories and get regular exercise  You can lose up about 1 pound a week by decreasing the number of calories you eat by 500 calories each day  Healthy meal plan for weight management:  A healthy meal plan includes a variety of foods, contains fewer calories, and helps you stay healthy  A healthy meal plan includes the following:  · Eat whole-grain foods more often  A healthy meal plan should contain fiber  Fiber is the part of grains, fruits, and vegetables that is not broken down by your body  Whole-grain foods are healthy and provide extra fiber in your diet  Some examples of whole-grain foods are whole-wheat breads and pastas, oatmeal, brown rice, and bulgur  · Eat a variety of vegetables every day  Include dark, leafy greens such as spinach, kale, rafael greens, and mustard greens  Eat yellow and orange vegetables such as carrots, sweet potatoes, and winter squash  · Eat a variety of fruits every day  Choose fresh or canned fruit (canned in its own juice or light syrup) instead of juice  Fruit juice has very little or no fiber  · Eat low-fat dairy foods  Drink fat-free (skim) milk or 1% milk  Eat fat-free yogurt and low-fat cottage cheese  Try low-fat cheeses such as mozzarella and other reduced-fat cheeses  · Choose meat and other protein foods that are low in fat  Choose beans or other legumes such as split peas or lentils  Choose fish, skinless poultry (chicken or turkey), or lean cuts of red meat (beef or pork)  Before you cook meat or poultry, cut off any visible fat  · Use less fat and oil  Try baking foods instead of frying them   Add less fat, such as margarine, sour cream, regular salad dressing and mayonnaise to foods  Eat fewer high-fat foods  Some examples of high-fat foods include french fries, doughnuts, ice cream, and cakes  · Eat fewer sweets  Limit foods and drinks that are high in sugar  This includes candy, cookies, regular soda, and sweetened drinks  Exercise:  Exercise at least 30 minutes per day on most days of the week  Some examples of exercise include walking, biking, dancing, and swimming  You can also fit in more physical activity by taking the stairs instead of the elevator or parking farther away from stores  Ask your healthcare provider about the best exercise plan for you  © Copyright [employer] 2018 Information is for End User's use only and may not be sold, redistributed or otherwise used for commercial purposes   All illustrations and images included in CareNotes® are the copyrighted property of A D A M , Inc  or [address] [place], [place] [address]paHonorHealth [facility]

## 2022-06-28 NOTE — PROGRESS NOTES
Assessment and Plan:     Problem List Items Addressed This Visit        Endocrine    Hypothyroidism     Hypothyroidism controlled the patient is currently euthyroid I will be ordering a TSH prior to the next office visit and the patient will continue with current medical regiment; we will continue to monitor the patient's progress  Relevant Orders    TSH, 3rd generation       Cardiovascular and Mediastinum    Hypertension     Hypertension - controlled, I have counseled patient following healthy balance diet, I would like the patient reduce sodium, exercise routinely, I would like the patient continued the med current medical regiment and we will continue to monitor  Other    Prediabetes     Pre diabetes -I have counseled the patient to follow a healthy balanced diet, I have counseled patient reduce carbohydrates and sweets in the diet, I would like the patient exercise routinely  I will be checking hemoglobin A1c and comprehensive metabolic panel  Have counseled patient about the prevention of diabetes, and the risk of progression to type 2 diabetes  Relevant Orders    Comprehensive metabolic panel    Hemoglobin A1C    Abnormal laboratory test     Check quantitative immunoglobulin no recurrent infection           Relevant Orders    Immunofixation IgA,IgG,IgM Qt  Immunofixation Serum Immunoglobulin    Class 1 obesity due to excess calories with body mass index (BMI) of 33 0 to 33 9 in adult     Obesity -I have counseled patient following healthy and balanced diet, I would like the patient to lose weight, I would like the patient exercise routinely; we will continue monitor the patient's progress  Medicare annual wellness visit, subsequent - Primary     Assessment and plan 1  Medicare subsequent annual wellness examination overall the patient is clinically stable and doing well, we encouraged the patient to follow a healthy and balanced diet    We recommend that the patient exercise routinely approximately 30 minutes 5 times per week   We have reviewed the patient's vaccines and have made recommendations for updates if necessary annual flu shot       We will be ordering screening laboratories which are age appropriate  Return to the office in  6 months    call if any problems  Hyperlipidemia     Hyperlipidemia controlled continue with current medical regiment recommend a low-cholesterol diet and recommend routine exercise we will continue to monitor the progress  Relevant Orders    Lipid Panel with Direct LDL reflex    Primary insomnia     Insomnia/anxiety no suicidal ideation related to recent abnormal mammogram; patient has tried trazodone which is helpful and requested Rx Rx provided trazodone 50 mg 1 p o  q h s  I did explain the interaction with SSRI and the risk of serotonin syndrome she will monitor for the symptoms benefits outweigh the risk if she develops any symptoms she will discontinue medicine notify me immediately           Relevant Medications    traZODone (DESYREL) 50 mg tablet    Abnormal mammogram     Patient will be undergoing biopsy core needle biopsy will have patient see surgical Oncology Dr Beata Segal           Relevant Orders    Ambulatory Referral to Breast Surgery        BMI Counseling: Body mass index is 31 57 kg/m²  The BMI is above normal  Nutrition recommendations include decreasing portion sizes and moderation in carbohydrate intake  Exercise recommendations include exercising 3-5 times per week  Rationale for BMI follow-up plan is due to patient being overweight or obese  Depression Screening and Follow-up Plan: Patient was screened for depression during today's encounter  They screened negative with a PHQ-2 score of 0  Preventive health issues were discussed with patient, and age appropriate screening tests were ordered as noted in patient's After Visit Summary    Personalized health advice and appropriate referrals for health education or preventive services given if needed, as noted in patient's After Visit Summary  History of Present Illness:     Patient presents for a Medicare Wellness Visit    HPI   Patient Care Team:  Heena Jones DO as PCP - [place] [id number] as PCP - [address] [place] [address] (RTE)  [facility]  MD Naheed Leon MD Artice Sayer, MD Neda Mullet, MD as Endoscopist     Review of Systems:     Review of Systems   Constitutional: Negative for activity change, appetite change and unexpected weight change  HENT: Negative for congestion and postnasal drip  Eyes: Negative for visual disturbance  Respiratory: Negative for cough and shortness of breath  Cardiovascular: Negative for chest pain  Gastrointestinal: Negative for abdominal pain, diarrhea, nausea and vomiting  Neurological: Negative for dizziness, light-headedness and headaches  Hematological: Negative for adenopathy     Anxiety/insomnia no suicidal ideation     Problem List:     Patient Active Problem List   Diagnosis    S/P knee replacement    Hypertension    Anxiety    Hypothyroidism    Gastroesophageal reflux disease without esophagitis    Prediabetes    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[address] )    IgA deficiency (HCC)    Thrombocytopenia (HCC)    Melanosis coli    History of colon resection    Low back pain    Abnormal laboratory test    Edema    Class 1 obesity due to excess calories with body mass index (BMI) of 33 0 to 33 9 in adult    Low gammaglobulin level (HCC)    TMJ arthritis    Tension type headache    Exposure to SARS-associated coronavirus    Medicare annual wellness visit, subsequent    Hyperlipidemia    Laryngitis    Primary insomnia    Abnormal mammogram      Past Medical and Surgical History:     Past Medical History:   Diagnosis Date    Abnormal electrocardiogram     Last assessed 10/04/13    Anemia     pt states hypogammaglobulinemia, needs washed RBCs if transfused - Rx by Dr Selma Rollins [facility])     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  - Last assessed 11/02/17    Disease of thyroid gland     Diverticulitis of colon     GERD (gastroesophageal reflux disease)     Hypertension     Hypoglobulinemia     Slow transit constipation      Past Surgical History:   Procedure Laterality Date    APPENDECTOMY      BOWEL RESECTION      CARPAL TUNNEL RELEASE      COLECTOMY      Resolved 08/27/09    JOINT REPLACEMENT Right     LASIK      OTHER SURGICAL HISTORY      Biopsy Vul[place]    WA COLONOSCOPY FLX DX W/COLLJ SPEC WHEN PFRMD N/A 5/6/2019    Procedure: COLONOSCOPY;  Surgeon: Wale Leon MD;  [facility];   Service: General    WA TOTAL KNEE ARTHROPLASTY Left 2/8/2017    Procedure: TOTAL KNEE REPLACEMENT ARTHROPLASTY;  Surgeon: Hetal Hays MD;  [facility];  Service: Orthopedics    REFRACTIVE SURGERY      REPLACEMENT TOTAL KNEE Right 10/2013    TONSILLECTOMY      With Adenoidectomy    TOOTH EXTRACTION      VAGINA SURGERY      mesh      Family History:     Family History   Problem Relation Age of Onset    No Known Problems Mother     Basal cell carcinoma Father     Prostate cancer Father 80    Breast cancer Sister 54    No Known Problems Sister     No Known Problems Daughter     No Known Problems Daughter     No Known Problems Daughter     No Known Problems Maternal Grandmother     No Known Problems Maternal Grandfather     Breast cancer Paternal Grandmother 50    No Known Problems Paternal Grandfather     Breast cancer Paternal Aunt 79    Breast cancer Family     Arthritis Family     Hypertension Family     Cancer Family       Social History:     Social History     Socioeconomic History    Marital status: /Civil Union     Spouse name: None    Number of children: None    Years of education: None    Highest education level: None   Occupational History    None   Tobacco Use    Smoking status: Never Smoker    Smokeless tobacco: Never Used   Vaping Use    Vaping Use: Never used   Substance and Sexual Activity    Alcohol use: No    Drug use: No    Sexual activity: Yes   Other Topics Concern    None   Social History Narrative    Denied: Home environment Domestic Violence        Daily Tea consumption        Caffeine use     Social Determinants of Health     Financial Resource Strain: Not on file   Food Insecurity: Not on file   Transportation Needs: Not on file   Physical Activity: Not on file   Stress: Not on file   Social Connections: Not on file   Intimate Partner Violence: Not on file   Housing Stability: Not on file      Medications and Allergies:     Current Outpatient Medications   Medication Sig Dispense Refill    estradiol (ESTRACE) 0 1 mg/g vaginal cream USE 1/4 APPLICATOR VAGINALLY TWICE A WEEK 42 5 g 0    famotidine (PEPCID) 20 mg tablet TAKE ONE TABLET BY MOUTH EVERY DAY 90 tablet 1    levothyroxine 75 mcg tablet TAKE ONE TABLET BY MOUTH EVERY DAY IN THE EARLY MORNING 90 tablet 1    potassium chloride (K-DUR,KLOR-CON) 10 mEq tablet TAKE ONE TABLET BY MOUTH TWICE A  tablet 1    Psyllium (METAMUCIL PO) Take 1 Dose by mouth daily        rosuvastatin (CRESTOR) 5 mg tablet Take 1 tablet (5 mg total) by mouth daily 90 tablet 5    senna (SENOKOT) 8 6 MG tablet Take 2 tablets by mouth daily      sertraline (ZOLOFT) 100 mg tablet TAKE ONE TABLET BY MOUTH EVERY DAY 90 tablet 1    traZODone (DESYREL) 50 mg tablet Take 1 tablet (50 mg total) by mouth daily at bedtime 90 tablet 0    triamterene-hydrochlorothiazide (DYAZIDE) 37 5-25 mg per capsule TAKE ONE CAPSULE BY MOUTH EVERY MORNING 90 capsule 1    busPIRone (BUSPAR) 5 mg tablet Take 1 tablet (5 mg total) by mouth daily as needed (anxiety) (Patient not taking: No sig reported) 30 tablet 0     No current facility-administered medications for this visit  Allergies   Allergen Reactions    Penicillins Anaphylaxis     Anaphylaxis  Anaphylaxis  Other reaction(s): Anaphylaxis    Acetazolamide Itching and Swelling     With eye drops - takes  oral sulfa w/o side effects    Metronidazole GI Intolerance and Nausea Only      Immunizations:     Immunization History   Administered Date(s) Administered    COVID-19 MODERNA VACC 0 5 ML IM 01/29/2021, 02/26/2021, 10/29/2021    INFLUENZA 09/04/2021    Influenza Quadrivalent Preservative Free 3 years and older IM 10/04/2014, 10/03/2015, 09/12/2016, 10/12/2017    Influenza, recombinant, quadrivalent,injectable, preservative free 10/18/2018, 11/08/2019, 09/04/2020    Influenza, seasonal, injectable 10/19/2012, 10/01/2013    Pneumococcal Conjugate 13-Valent 02/24/2016    Pneumococcal Polysaccharide PPV23 03/01/2013    Td (adult), adsorbed 07/14/2004    Tdap 07/14/2014    Zoster 11/30/2015    Zoster Vaccine Recombinant 10/13/2020      Health Maintenance:         Topic Date Due    Colorectal Cancer Screening  11/23/2021    Cervical Cancer Screening  05/04/2024    Breast Cancer Screening: Mammogram  06/23/2024    HIV Screening  Completed    Hepatitis C Screening  Completed         Topic Date Due    COVID-19 Vaccine (4 - Booster for Malen [place] series) 01/29/2022      Medicare Screening Tests and Risk Assessments:   HPI 60-year old female coming in for a follow up office visit regarding prediabetes, hypothyroidism, hypertension, hyperlipidemia, obesity an abnormal mammogram; anxiety/insomnia no depression no suicidal ideation; The patient reports me compliant taking medications without untoward side effects the  The patient is here to review his medical condition, update me on the medical condition and the patient reports me no hospitalizations and no ER visits    No injuries no illnesses here to review laboratories reports me abnormal mammogram going for fine needle core biopsy near future reports me very anxious about this not sleeping well no depression no SI she has tried trazodone her daughters medication which has been very helpful with sleep and requesting Rx for this medication  Lyle Coello is here for her Subsequent Wellness visit  Health Risk Assessment:   Patient rates overall health as good  Patient feels that their physical health rating is slightly better  Patient is very satisfied with their life  Eyesight was rated as slightly worse  Hearing was rated as same  Patient feels that their emotional and mental health rating is slightly better  Patients states they are never, rarely angry  Patient states they are never, rarely unusually tired/fatigued  Pain experienced in the last 7 days has been none  Patient states that she has experienced no weight loss or gain in last 6 months  Depression Screening:   PHQ-2 Score: 0      Fall Risk Screening: In the past year, patient has experienced: no history of falling in past year      Urinary Incontinence Screening:   Patient has not leaked urine accidently in the last six months  Home Safety:  Patient does not have trouble with stairs inside or outside of their home  Patient has working smoke alarms and has working carbon monoxide detector  Home safety hazards include: none  Nutrition:   Current diet is Regular  Medications:   Patient is currently taking over-the-counter supplements  OTC medications include: Benadryl for sleep  Patient is able to manage medications  Activities of Daily Living (ADLs)/Instrumental Activities of Daily Living (IADLs):   Walk and transfer into and out of bed and chair?: Yes  Dress and groom yourself?: Yes    Bathe or shower yourself?: Yes    Feed yourself?  Yes  Do your laundry/housekeeping?: Yes  Manage your money, pay your bills and track your expenses?: Yes  Make your own meals?: Yes    Do your own shopping?: Yes    Previous Hospitalizations:   Any hospitalizations or ED visits within the last 12 months?: No      Advance Care Planning:   Living will: Yes    Durable POA for healthcare: Yes    Advanced directive: Yes      Cognitive Screening:   Provider or family/friend/caregiver concerned regarding cognition?: No    PREVENTIVE SCREENINGS      Cardiovascular Screening:    General: Screening Not Indicated and History Lipid Disorder      Diabetes Screening:     General: Screening Current      Colorectal Cancer Screening:     General: Screening Current      Breast Cancer Screening:     General: Screening Current      Cervical Cancer Screening:    General: Screening Current      Lung Cancer Screening:     General: Screening Not Indicated      Hepatitis C Screening:    General: Screening Current    Screening, Brief Intervention, and Referral to Treatment (SBIRT)    Screening  Typical number of drinks in a day: 0  Typical number of drinks in a week: 0  Interpretation: Low risk drinking behavior  AUDIT-C Screenin) How often did you have a drink containing alcohol in the past year? never  2) How many drinks did you have on a typical day when you were drinking in the past year? 0  3) How often did you have 6 or more drinks on one occasion in the past year? never    AUDIT-C Score: 0  Interpretation: Score 0-2 (female): Negative screen for alcohol misuse    Single Item Drug Screening:  How often have you used an illegal drug (including marijuana) or a prescription medication for non-medical reasons in the past year? never    Single Item Drug Screen Score: 0  Interpretation: Negative screen for possible drug use disorder    No exam data present     Physical Exam:     /78   Pulse 86   Resp 16   Ht 5' 2 5" (1 588 m)   Wt 79 6 kg (175 lb 6 4 oz)   SpO2 94%   BMI 31 57 kg/m²     Physical Exam  Constitutional:       Appearance: Normal appearance  She is well-developed  She is obese  HENT:      Head: Normocephalic and atraumatic        Right Ear: External ear normal  Left Ear: External ear normal       Nose: Nose normal    Eyes:      Pupils: Pupils are equal, round, and reactive to light  Cardiovascular:      Rate and Rhythm: Normal rate and regular rhythm  Heart sounds: Normal heart sounds  No murmur heard  Pulmonary:      Effort: Pulmonary effort is normal       Breath sounds: Normal breath sounds  Abdominal:      General: There is no distension  Palpations: Abdomen is soft  Tenderness: There is no abdominal tenderness  There is no guarding  Neurological:      Mental Status: She is alert  Psychiatric:         Mood and Affect: Mood is anxious  Mood is not depressed  Thought Content: Thought content does not include suicidal ideation            Rosangela Kennedy, DO

## 2022-06-28 NOTE — ASSESSMENT & PLAN NOTE
Patient will be undergoing biopsy core needle biopsy will have patient see surgical Oncology Dr Giselle Gomez

## 2022-06-28 NOTE — ASSESSMENT & PLAN NOTE
Insomnia/anxiety no suicidal ideation related to recent abnormal mammogram; patient has tried trazodone which is helpful and requested Rx Rx provided trazodone 50 mg 1 p o  q h s   I did explain the interaction with SSRI and the risk of serotonin syndrome she will monitor for the symptoms benefits outweigh the risk if she develops any symptoms she will discontinue medicine notify me immediately

## 2022-07-07 ENCOUNTER — HOSPITAL ENCOUNTER (OUTPATIENT)
Dept: MAMMOGRAPHY | Facility: CLINIC | Age: 62
Discharge: HOME | End: 2022-07-07
Payer: COMMERCIAL

## 2022-07-07 VITALS — DIASTOLIC BLOOD PRESSURE: 64 MMHG | SYSTOLIC BLOOD PRESSURE: 125 MMHG | HEART RATE: 77 BPM

## 2022-07-07 DIAGNOSIS — R92.8 ABNORMAL MAMMOGRAM: ICD-10-CM

## 2022-07-07 PROCEDURE — 88341 IMHCHEM/IMCYTCHM EA ADD ANTB: CPT | Performed by: PATHOLOGY

## 2022-07-07 PROCEDURE — 19081 BX BREAST 1ST LESION STRTCTC: CPT

## 2022-07-07 PROCEDURE — 88305 TISSUE EXAM BY PATHOLOGIST: CPT | Performed by: PATHOLOGY

## 2022-07-07 PROCEDURE — 88361 TUMOR IMMUNOHISTOCHEM/COMPUT: CPT | Performed by: PATHOLOGY

## 2022-07-07 PROCEDURE — 88342 IMHCHEM/IMCYTCHM 1ST ANTB: CPT | Performed by: PATHOLOGY

## 2022-07-07 PROCEDURE — A4648 IMPLANTABLE TISSUE MARKER: HCPCS

## 2022-07-07 PROCEDURE — 19082 BX BREAST ADD LESION STRTCTC: CPT

## 2022-07-07 RX ORDER — BUPIVACAINE HYDROCHLORIDE AND EPINEPHRINE 2.5; 5 MG/ML; UG/ML
10 INJECTION, SOLUTION INFILTRATION; PERINEURAL ONCE
Status: COMPLETED | OUTPATIENT
Start: 2022-07-07 | End: 2022-07-07

## 2022-07-07 RX ORDER — LIDOCAINE HYDROCHLORIDE 10 MG/ML
5 INJECTION, SOLUTION EPIDURAL; INFILTRATION; INTRACAUDAL; PERINEURAL ONCE
Status: COMPLETED | OUTPATIENT
Start: 2022-07-07 | End: 2022-07-07

## 2022-07-07 RX ADMIN — BUPIVACAINE HYDROCHLORIDE AND EPINEPHRINE BITARTRATE 10 ML: 2.5; .005 INJECTION, SOLUTION INFILTRATION; PERINEURAL at 09:22

## 2022-07-07 RX ADMIN — LIDOCAINE HYDROCHLORIDE 5 ML: 10 INJECTION, SOLUTION EPIDURAL; INFILTRATION; INTRACAUDAL; PERINEURAL at 09:22

## 2022-07-07 RX ADMIN — LIDOCAINE HYDROCHLORIDE 5 ML: 10 INJECTION, SOLUTION EPIDURAL; INFILTRATION; INTRACAUDAL; PERINEURAL at 09:48

## 2022-07-07 RX ADMIN — BUPIVACAINE HYDROCHLORIDE AND EPINEPHRINE BITARTRATE 10 ML: 2.5; .005 INJECTION, SOLUTION INFILTRATION; PERINEURAL at 09:48

## 2022-07-07 NOTE — PROGRESS NOTES
Procedure type:    _____ultrasound guided __x___stereotactic    Breast:    _____Left __x___Right    Location: 10 oclock post    Needle: 8g Revolve    # of passes: 6 cores all submitted    Clip: Brain Trotter U-Shape    Performed by: Dr Tirso Guzman    Pressure held for 5 minutes by: Britney Martinez    Steri Strips:    __x___yes _____no    Band aid:    __x___yes_____no    Tape and guaze:    _____yes __x___no    Tolerated procedure:    __x___yes _____no                Procedure type:    _____ultrasound guided __x___stereotactic    Breast:    _____Left __x___Right    Location: 10 oclcok mid    Needle: 8g Revolve    # of passes: 5 cores (3 cores with calcs, 2 cores without calcs)    Clip: Mammomaoscar Garrett    Performed by: Dr Andrew Perez held for 5 minutes by: Britney Martinez    Steri Strips:    __x___yes _____no    Band aid:    __x___yes_____no    Tape and guaze:    _____yes __x___no    Tolerated procedure:    __x___yes _____no

## 2022-07-07 NOTE — PROGRESS NOTES
Ice pack given:    __x___yes _____no    Discharge instructions signed by patient:    __x___yes _____no    Discharge instructions given to patient:    __x___yes _____no    Discharged via:    _x____ambulatory    _____wheelchair    _____stretcher    Stable on discharge:    __x___yes ____no

## 2022-07-07 NOTE — DISCHARGE INSTR - OTHER ORDERS
POST LARGE CORE BREAST BIOPSY PATIENT INFORMATION      Place an ice pack inside your bra over the top of the dressing every hour for 20 minutes (20 minutes on, 60 minutes off)  Do this until bedtime  Do not shower or bathe until the following morning  After bathing, you may remove the bandaid  You may bathe your breast carefully with the steri-strips in place  Be careful not to loosen them  The steri-strips will fall off in 3-5 days  You may have mild discomfort, and you may have some bruising where the needle entered the skin  This should clear within 5-7 days  If you need medicine for discomfort, take acetaminophen products such as Tylenol  You may also take Advil or Motrin products  DO NOT use Aspirin for the first 24 hours  Do not participate in strenuous activities such as-tennis, aerobics, weight lifting, etc  for 24 hours  Refrain from swimming/soaking for 72 hours  Wearing a bra for sleeping may be more comfortable for the first 24-48 hours after your biopsy  Watch for continued bleeding, pain or fever over 101  If any of these symptoms occur, please contact our breast nurse navigator at the location where your biopsy was performed  During normal business hours (7:30am - 4:00pm) please call the nurse navigator at the site     where your procedure was performed:       [address] [facility] [address]: [phone] or [phone] [address] [facility] [address]: [phone] or [phone]     [address]: [address] [facility]: [address]: [phone]        After 4pm - please call your physician or go to the nearest Emergency Department location  The final results of your biopsy are usually available within one week

## 2022-07-08 NOTE — PROGRESS NOTES
Post procedure call completed    Bleeding: _____yes __X___no    Pain: _____yes ___X___no    Redness/Swelling: ______yes ___X___no    Band aid removed: _____yes _x____no Patient reports that she will remove today    Steri-Strips intact: ___X___yes _____no (discussed with patient to remove steri strips on 7/13/2022 if they have not come off on their own)    Pt with no questions at this time, adv will call when results available, adv to call with any questions or concerns, has name/# for contact

## 2022-07-11 ENCOUNTER — TELEPHONE (OUTPATIENT)
Dept: INTERNAL MEDICINE CLINIC | Facility: CLINIC | Age: 62
End: 2022-07-11

## 2022-07-11 ENCOUNTER — TELEPHONE (OUTPATIENT)
Dept: MAMMOGRAPHY | Facility: CLINIC | Age: 62
End: 2022-07-11

## 2022-07-11 ENCOUNTER — PATIENT OUTREACH (OUTPATIENT)
Dept: HEMATOLOGY ONCOLOGY | Facility: CLINIC | Age: 62
End: 2022-07-11

## 2022-07-11 ENCOUNTER — TELEPHONE (OUTPATIENT)
Dept: HEMATOLOGY ONCOLOGY | Facility: CLINIC | Age: 62
End: 2022-07-11

## 2022-07-11 DIAGNOSIS — F41.9 ANXIETY: Primary | ICD-10-CM

## 2022-07-11 RX ORDER — ALPRAZOLAM 0.25 MG/1
0.25 TABLET ORAL
Qty: 10 TABLET | Refills: 0 | Status: SHIPPED | OUTPATIENT
Start: 2022-07-11 | End: 2022-07-19 | Stop reason: SDUPTHER

## 2022-07-11 NOTE — TELEPHONE ENCOUNTER
Appointment Cancellation Or Reschedule     Person calling in [facility]   Provider Dr Steve Ernandez   Office Visit Date and Time  7/25 at [facility] Visit Location [place]   Did patient want to reschedule their office appointment? If so, when was it scheduled to? Yes  8/2 at 8am    Is this patient calling to reschedule an infusion appointment? no   When is their next infusion appointment? n/a   Is this patient a Chemo patient? no   Reason for Cancellation or Reschedule RBC calling in to reschedule due to additional testing that needs to be complete before the patient is seen  If the patient is a treatment patient, please route this to the office nurse  If the patient is not on treatment, please route to the office MA  If the patient is a surgical oncology patient, please route to surg/onc clinical pool

## 2022-07-11 NOTE — TELEPHONE ENCOUNTER
Call placed to patient after pt given biopsy results from radiologist, questions answered, support given  Recommendations are diagnostic mammogram for the right breast and  biopsy- stereo guided for the right breast, appointments scheduled on 7/26/2022   Surgical Consultation for the right breast scheduled with Dr Lucy Boateng on 8/2/2022

## 2022-07-11 NOTE — PROGRESS NOTES
Intake received, chart reviewed  Pathology completed:Yes, completed on 7-7-2022  Imaging completed:Diagnostic Mammogram & Ultrasound done on 6-  Mammo screening done 6-3-2022  All records needed are in patients chart  No further action required of Care Coordination team needed at this time

## 2022-07-11 NOTE — TELEPHONE ENCOUNTER
----- Message from [address] [place] [address]  sent at 7/11/2022  2:53 PM EDT -----  Regarding: RE: Biopsy positive  Spoke to patient, she is getting more biopsies and seeing a breast surgeon  Sent her xanax for anxiety    ----- Message -----  From: Diana Allison  Sent: 7/11/2022   1:48 PM EDT  To: GISSELLE Najera  Subject: FW: Biopsy positive                              Are you able to call this patient to advise     ----- Message -----  From: Mallika Veronica  Sent: 7/11/2022   1:33 PM EDT  To: [facility]  Subject: Biopsy positive                                  Please have Dr Burgess Schroeder call me - my breast biopsy came back positive for DCIS  Im freaking out!     Thank you

## 2022-07-12 ENCOUNTER — DOCUMENTATION (OUTPATIENT)
Dept: HEMATOLOGY ONCOLOGY | Facility: CLINIC | Age: 62
End: 2022-07-12

## 2022-07-12 NOTE — PROGRESS NOTES
Intake received  Pt call to follow    07/13/22  Placed call to pt to go over the services that we provide got her voicemail left her a message to call me back  Pt called me back & I explained to her the services that we offer & she sd that she will wait until after her appt w/Dr Ratna Sue to call me back so I can get all the benefits that she needs verified  She thanked me for the call    07/26/22  Pt called me & sd that she is going to have some out pt surgery & she was wondering what that benefits is  Told her that I will look to be sure & call her back  She thanked me for the info    07/29/22  Called the ins & spoke to brenda call ref# [id number] he sd that the pt has in network benefits only     No deduct  Out of pocket $7550 met $349 48  For out pt surgery the pt is covered she will have a $300 co-pay then covered 100%  Once the out of pocket is met  All services will be covered 100%  Called pt to go over this info but got her voicemail left a message for pt to call me back

## 2022-07-12 NOTE — PROGRESS NOTES
Call placed to patient regarding recommendation for;    __x___ RIGHT _____LEFT      _____Ultrasound guided  __x____Stereotactic breast biopsy  Pt states that procedure was explained to her, additional questions answered at this time    __X___Verbalized understanding        Blood thinners: No: _x___ Yes: _____ What:     Biopsy teaching sheet given:  _____yes __X__no (All teaching points discussed during call, pt with no questions at this time, pt adv to arrive at 0830 for 0900 biopsy)    Pt given name/# for any further questions/needs    Pt agreeable to a post procedure call and states we can give her biopsy results to her over the phone

## 2022-07-19 DIAGNOSIS — F41.9 ANXIETY: ICD-10-CM

## 2022-07-19 RX ORDER — ALPRAZOLAM 0.25 MG/1
0.25 TABLET ORAL
Qty: 10 TABLET | Refills: 0 | Status: SHIPPED | OUTPATIENT
Start: 2022-07-19 | End: 2022-09-08 | Stop reason: SDUPTHER

## 2022-07-20 ENCOUNTER — PATIENT OUTREACH (OUTPATIENT)
Dept: HEMATOLOGY ONCOLOGY | Facility: CLINIC | Age: 62
End: 2022-07-20

## 2022-07-26 ENCOUNTER — TELEPHONE (OUTPATIENT)
Dept: GENETICS | Facility: CLINIC | Age: 62
End: 2022-07-26

## 2022-07-26 ENCOUNTER — HOSPITAL ENCOUNTER (OUTPATIENT)
Dept: MAMMOGRAPHY | Facility: CLINIC | Age: 62
Discharge: HOME | End: 2022-07-26
Payer: COMMERCIAL

## 2022-07-26 ENCOUNTER — PATIENT OUTREACH (OUTPATIENT)
Dept: HEMATOLOGY ONCOLOGY | Facility: CLINIC | Age: 62
End: 2022-07-26

## 2022-07-26 VITALS
DIASTOLIC BLOOD PRESSURE: 65 MMHG | WEIGHT: 175 LBS | HEIGHT: 63 IN | BODY MASS INDEX: 31.01 KG/M2 | SYSTOLIC BLOOD PRESSURE: 117 MMHG | HEART RATE: 83 BPM

## 2022-07-26 VITALS — BODY MASS INDEX: 31.01 KG/M2 | WEIGHT: 175 LBS | HEIGHT: 63 IN

## 2022-07-26 DIAGNOSIS — R92.8 ABNORMAL MAMMOGRAM: ICD-10-CM

## 2022-07-26 DIAGNOSIS — D05.11 DUCTAL CARCINOMA IN SITU (DCIS) OF RIGHT BREAST: Primary | ICD-10-CM

## 2022-07-26 PROCEDURE — 88341 IMHCHEM/IMCYTCHM EA ADD ANTB: CPT | Performed by: PATHOLOGY

## 2022-07-26 PROCEDURE — 77065 DX MAMMO INCL CAD UNI: CPT

## 2022-07-26 PROCEDURE — 19081 BX BREAST 1ST LESION STRTCTC: CPT

## 2022-07-26 PROCEDURE — 88342 IMHCHEM/IMCYTCHM 1ST ANTB: CPT | Performed by: PATHOLOGY

## 2022-07-26 PROCEDURE — A4648 IMPLANTABLE TISSUE MARKER: HCPCS

## 2022-07-26 PROCEDURE — 88305 TISSUE EXAM BY PATHOLOGIST: CPT | Performed by: PATHOLOGY

## 2022-07-26 RX ORDER — LIDOCAINE HYDROCHLORIDE AND EPINEPHRINE BITARTRATE 20; .01 MG/ML; MG/ML
1 INJECTION, SOLUTION SUBCUTANEOUS ONCE
Status: COMPLETED | OUTPATIENT
Start: 2022-07-26 | End: 2022-07-26

## 2022-07-26 RX ORDER — BUPIVACAINE HYDROCHLORIDE 5 MG/ML
5 INJECTION, SOLUTION PERINEURAL ONCE
Status: COMPLETED | OUTPATIENT
Start: 2022-07-26 | End: 2022-07-26

## 2022-07-26 RX ADMIN — LIDOCAINE HYDROCHLORIDE AND EPINEPHRINE 1 ML: 20; 10 INJECTION, SOLUTION INFILTRATION; PERINEURAL at 09:37

## 2022-07-26 RX ADMIN — BUPIVACAINE HYDROCHLORIDE 5 ML: 5 INJECTION, SOLUTION PERINEURAL at 09:37

## 2022-07-26 NOTE — PROGRESS NOTES
Breast Oncology Nurse Navigator    Called patient for initial outreach from nurse navigator  Introduced myself and my role  Patient lives with her , adult daughter and sister  She also has another sister that lives close by  Denies issues with transportation  Referral to OSW placed  Paternal grandmother  of breast cancer at age 48, paternal aunt had breast cancer, sister had breast cancer and father had prostate cancer (alive)  Referral placed to oncology genetics  Patient states she tried to find support groups online  Discussed Cancer Support Community and many of the programs offered  Patient is interested and phone number and web address provided  Patient to register herself  Patient has my contact information and knows she can reach out with questions  General assessment completed

## 2022-07-26 NOTE — PROGRESS NOTES
Procedure type:    _____ultrasound guided __x___stereotactic    Breast:    _____Left __x___Right    Location: 12 oclock    Needle: 8g Revolve    # of passes: 5 cores (1 core with calcs, 4 cores without calcs)    Clip: Mammomark Triple Twist    Performed by: Dr Rhys Gavin held for 5 minutes by: Tito Ham     Steri Strips:    __x___yes _____no    Band aid:    __x___yes_____no    Tape and guaze:    _____yes __x___no    Tolerated procedure:    __x___yes _____no

## 2022-07-26 NOTE — DISCHARGE INSTR - OTHER ORDERS
POST LARGE CORE BREAST BIOPSY PATIENT INFORMATION      Place an ice pack inside your bra over the top of the dressing every hour for 20 minutes (20 minutes on, 60 minutes off)  Do this until bedtime  Do not shower or bathe until the following morning  After bathing, you may remove the bandaid  You may bathe your breast carefully with the steri-strips in place  Be careful not to loosen them  The steri-strips will fall off in 3-5 days  You may have mild discomfort, and you may have some bruising where the needle entered the skin  This should clear within 5-7 days  If you need medicine for discomfort, take acetaminophen products such as Tylenol  You may also take Advil or Motrin products  DO NOT use Aspirin for the first 24 hours  Do not participate in strenuous activities such as-tennis, aerobics, weight lifting, etc  for 24 hours  Refrain from swimming/soaking for 72 hours  Wearing a bra for sleeping may be more comfortable for the first 24-48 hours after your biopsy  Watch for continued bleeding, pain or fever over 101  If any of these symptoms occur, please contact our breast nurse navigator at the location where your biopsy was performed  During normal business hours (7:30am - 4:00pm) please call the nurse navigator at the site     where your procedure was performed:       [facility] [address]: [phone] or [phone] [address] [facility] [place],[facility] Floor: [phone] or [phone]     [address]: [address] [facility]: [address]: [phone]        After 4pm - please call your physician or go to the nearest Emergency Department location  The final results of your biopsy are usually available within one week

## 2022-07-26 NOTE — PROGRESS NOTES
Ice pack given:    _x____yes _____no    Discharge instructions signed by patient:    __x___yes _____no    Discharge instructions given to patient:    __x___yes _____no    Discharged via:    __x___ambulatory    _____wheelchair    _____stretcher    Stable on discharge:    __x___yes ____no

## 2022-07-26 NOTE — TELEPHONE ENCOUNTER
I introduced myself to Kayleigh Monge and let her know that her nurse navigator reached out to the cancer risk and genetics program on her behalf  I reviewed the following with Kayleigh Monge:    [facility] While the majority of cancer occurs by chance, approximately 5-10% of breast cancer has an underlying genetic cause  Genetic testing is available which can determine if there is an underlying genetic cause to your cancer  Understanding if there is an underlying genetic cause can:  o Provide your surgeon with additional information to help with surgical decisions, treatment decisions and eligibility for clinical trials  o It can determine if you have an increased risk for any additional cancers  o Help family members understand their cancer risk   We work closely with the [facility] breast surgeons and are reaching out to see if you have interest in genetic testing  The reason we are reaching out at this time is that this result may help your surgeon determine the appropriate type of surgery (i e  lumpectomy vs mastectomy)  This test is not a requirement but can take 5-10 days to complete so we would like to start the process as soon as possible so the results are ready for your appointment with your surgeon   If you are interested in genetic testing, I can collect your family history and initiate genetic testing for you     Patient elected to pursue testing      Diagnosis Details:  o 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 No  - If yes type/age of diagnosis:    Family history:   o Do you have Ashkenazi Jew ancestry? No  - If yes, maternal, paternal, or both?  o Do you have any children? Yes  - How many sons? 0  - How many daughters? 3  - Do any of your children have a history of cancer? No  - If yes type/age of diagnosis:     o Do you have any brothers or sisters? Yes  - How many brothers?  0  - How many sisters? 2  - Are they from the same parents? Yes  - If no how maternal/paternal half-siblings:  - Do any of your brothers or sisters have a history of cancer? Yes  - If yes who and the type/age of diagnosis:   Sister - Bilateral Breast ca age 47          Do you have nieces or nephews? No  - Do any of them have a history of cancer? No  - If yes type/age of diagnosis:    o Does your mother have a history of cancer? No  - If yes, cancer type and age of diagnosis:   - Is your mother still living? Yes  - Age/Age of death: 80    o Thinking about your mother's family (aunts, uncles, cousins, grandparents) is there anyone with a history of cancer? No  - If yes, list relationship, cancer type and age of diagnosis:    o Does your father have a history of cancer? Yes  - If yes, cancer type and age of diagnosis: Prostate ca age 80  - Is your father still living? Yes  - Age/age of death: 80    o Thinking about your father's family (aunts, uncles, cousins, grandparents) is there anyone with a history of cancer? Yes  - If yes, list relationship, cancer type and age of diagnosis:   PGM - Breast ca age 48 ()  Paternal [de-identified] - Breast ca age 79 ()      Types of Results- positive, negative, VUS  o Positive result- may explain personal diagnosis/family history  Can give surgeon information on treatment plan, inform future screening/management or tell a person about other possible risks  Positive results can initiate testing for other family members who may be at risk (children, siblings, etc)  o Negative result- does not give an explanation  Surgical/treatment plan will be based on clinical presentation and will be part of discussion with surgeon  Negative result cannot be passed down to children, but they are still at elevated risk  o Uncertain result- common, but treated like a negative result clinically  90% are downgraded over time       [facility]'s billing policy   o Most insurance plans cover the cost of genetic testing  The out-of-pocket cost varies due to the differences in deductibles, co-payments and co-insurance defined by individual plans but 90% of people pay $100 or less for a genetic test     A blood kit will be mailed to you overnight  Please take the blood kit along with packet of paperwork to any [facility]'s lab to have your blood drawn  Plan:  A blood kit was mailed out on 7/26/2022 along with information on genetic testing and the lab's billing policy  Genetic Testing Preformed: [facility] BRCAplus STAT Panel (8 genes): AMY, BRCA1, BRCA2, CDH1, CHEK2, PALB2, PTEN, TP53 with reflex to [facility] CancerNext (28 additional genes): APC, AMY, AXIN2 BARD1, BRCA1, BRCA2, BRIP1, BMPR1A, CDH1, CDK4, CDKN2A, CHEK2, DICER1, EPCAM, GREM1, HOXB13, MLH1, MSH2, MSH3, MSH6, MUTYH, NBN, NF1, NTHL1, PALB2, PMS2, POLD1, POLE, PTEN, RAD51C, RAD51D, RECQL SMAD4, SMARCA4, STK11, TP53    Initial results will take approximately 5-12 days to return     Additional results may take up to 2-3 weeks to complete once test is started  Patient does not have any further questions, declined meeting with genetic counselor    When your results are ready, someone from the genetics team will call you, review the results, and contact your breast surgeon  You will be contacted with any type of result- positive, negative, or uncertain

## 2022-07-27 ENCOUNTER — APPOINTMENT (OUTPATIENT)
Dept: LAB | Age: 62
End: 2022-07-27
Payer: COMMERCIAL

## 2022-07-27 DIAGNOSIS — Z80.3 FAMILY HISTORY OF MALIGNANT NEOPLASM OF BREAST: ICD-10-CM

## 2022-07-27 DIAGNOSIS — Z80.42 FAMILY HISTORY OF MALIGNANT NEOPLASM OF PROSTATE: ICD-10-CM

## 2022-07-27 DIAGNOSIS — D05.11 INTRADUCTAL CARCINOMA IN SITU OF RIGHT BREAST: ICD-10-CM

## 2022-07-27 PROCEDURE — 36415 COLL VENOUS BLD VENIPUNCTURE: CPT

## 2022-07-27 NOTE — PROGRESS NOTES
Post procedure call completed    Bleeding: _____yes __X___no    Pain: _____yes ___X___no    Redness/Swelling: ______yes ___X___no    Band aid removed: __X___yes _____no    Steri-Strips intact: ___X___yes _____no (discussed with patient to remove steri strips on 8/1/2022  if they have not come off on their own)    Pt with no questions at this time, adv will call when results available, adv to call with any questions or concerns, has name/# for contact

## 2022-07-28 ENCOUNTER — PATIENT OUTREACH (OUTPATIENT)
Dept: HEMATOLOGY ONCOLOGY | Facility: CLINIC | Age: 62
End: 2022-07-28

## 2022-07-28 NOTE — PROGRESS NOTES
MSW received a referral from the Nurse navigator  Pt has not yet been seen by Oncology as she has an appointment next week  MSW will plan to follow up with the pt after her appointment and complete a psychosocial assessment

## 2022-07-29 ENCOUNTER — TELEPHONE (OUTPATIENT)
Dept: INTERNAL MEDICINE CLINIC | Facility: CLINIC | Age: 62
End: 2022-07-29

## 2022-07-29 NOTE — TELEPHONE ENCOUNTER
----- Message from Tylor Tapia DO sent at 7/28/2022  5:13 PM EDT -----  Regarding: FW: Question regarding TISSUE EXAM  Reviewed the report with the patient she will see surgical Oncology Dr Juliet Palacios  ----- Message -----  From: Desiree Dillon LPN  Sent: 3/24/7733   4:53 PM EDT  To: Tylor Tapia DO  Subject: FW: Question regarding TISSUE EXAM                 ----- Message -----  From: Chelsea Nassar  Sent: 7/28/2022   4:22 PM EDT  To: [facility]  Subject: Question regarding TISSUE EXAM                   What does this mean? CC:  Diabetes management/education    S/O: Jorge Mendez is a 27 y.o. male referred by Dr. Simin Jorgensen MD for diabetes management. HPI: Patient is here for his initial visit. He was told to see his primary care doctor after a biometric screening at work. He established care with Dr. Fani Alanis who had the patient complete lab work which came back with an a1c of 12.2%. Patient was started on Glipizide/Metformin and asked to follow up for additional education. Current Diabetes Regimen:  Glipizide/Metformin 5/500mg twice daily  --takes one before breakfast and one before dinner  --tolerating well, no adverse GI effects    ROS:   1 hypoglycemic episode-- started feeling anxious and shaky but was starting to eat lunch already so continued to do so and felt better  Patient denies recent hyperglycemic signs/symptoms, chest pain, shortness of breath, neuropathy, vision changes, and any foot changes.     Self-Monitoring Blood Glucose:  Does not want to check sugars right now but would maybe reconsider in the future if absolutely necessary    Diet was reviewed extensively and the following were noted:  Breakfast:  hardees (sausage biscuit), fruit (pineapple, apples, from daya), yogurt (regular- not greek- strawberry/banana), large coke fountain drink (lasts until lunch)  Lunch: gets 1 hour- [employer]end[employer] (chicken nuggets - 6 and french fries (medium or large) and large coke or dr. Robbie Melendez), mcdonalds (double cheeseburger and large hanna, coke-large), subway (ham, american cheese, LTM; no chips, 12 inch sub, water at work), wing zone delivered (8 boneless bbq wings, potato wedges, water), chinese (general tsos chicken, white rice, spring rolls (fried) dr. Robbie Melendez); has been packing lunch lately- eating salads (lettuce, cheese, arango bits, cucumbers) with ranch or thousand [address] with water with lays chips out of the snack machine  Dinner: pizza (pepperoni - 3 slices of large pizza; soda or juice); chicken tenders (bbq or honey mustard) with fries ([place]) soda; chinese (same as above); sometimes makes a sandwich at home (mayorga/lettuce/ham and sometimes cheese)  Doesn't really eat dessert and doesn't really snack after dinner  Alcohol- 2 drinks in a week; if going out on the weekend -3 drinks  Likes tequila sunrise  Going to [employer] for dinner tonight for friends birthday  Rhonda Rossnt from snack machine at work: lays (3x/week), honey buns (none recently)  Patient hates cooking  Drinks he likes: soda, apple juice, water    Exercise:  Joined gym- walks on treadmill for an hour  Been twice this week  Was trying to go every other day  On average goes 3 times a week      Past Medical History:   Diagnosis Date    Diabetes ([facility] [address]        Social History     Social History    Marital status:      Spouse name: N/A    Number of children: N/A    Years of education: N/A     Social History Main Topics    Smoking status: Current Every Day Smoker    Smokeless tobacco: Never Used    Alcohol use 1.8 oz/week     3 Shots of liquor per week    Drug use: No    Sexual activity: Yes     Partners: Male     Other Topics Concern    Not on file     Social History Narrative     No Known Allergies  Current Outpatient Prescriptions   Medication Sig    glipiZIDE-metFORMIN (METAGLIP) 5-500 mg per tablet Take 1 Tab by mouth Before breakfast and dinner. No current facility-administered medications for this visit. There were no vitals taken for this visit.     Data reviewed:  Lab Results   Component Value Date/Time    Hemoglobin A1c 12.2 07/17/2017 03:46 PM     Lab Results   Component Value Date/Time    Cholesterol, total 156 07/17/2017 03:46 PM    HDL Cholesterol 44 07/17/2017 03:46 PM    LDL, calculated 53 07/17/2017 03:46 PM    VLDL, calculated 59 07/17/2017 03:46 PM    Triglyceride 297 07/17/2017 03:46 PM     No results found for: GPT, ALT, SGOT, GGT, GGTP, AP, APIT, APX, CBIL, TBIL, TBILI  Lab Results   Component Value Date/Time Creatinine 0.81 2017 03:46 PM       Diabetes Health Maintenance: Address at next visit  Last eye exam:  Last foot exam:  Last influenza vaccine:  Last Pneumovax 23 vaccine:  Last Prevnar-13 vaccine:  Hepatitis B Series:   ASA Therapy:  ACE/ARB Therapy:  Statin Therapy:    Assessment/Plan:     1. Diabetes:  a1c not at goal <7% however patient has been newly diagnosed. He's tolerating glipizide/metformin 5/500mg twice daily with no adverse effects. He does not want to check his blood sugars at this time- although we discussed the importance and how it helps us to be able to better manage his diabetes. We spent a long time discussing his diet-- lots of fast food, sodas, delivery, etc. He set a goal to decrease sodas to 1/day (vs 3+ he is drinking now). We reviewed healthy meal planning, reading a nutrition label, the role of carbohydrates and diabetes and the plate method. Patient is going to try to pack his lunch more and make an effort to get at least 150minutes/week of exercise in the gym. Patient will follow up with Dr. Dereck Osgood in 2 weeks and me on 17. Will address other diabetic health maintenance issues at that time. Patient verbalized understanding of the information presented and all of the patients questions were answered. AVS was handed to the patient and information reviewed. Patient advised to call me or Dr. Zeina Hebert MD with any additional questions or concerns. Follow-up: ~1 month  Notification of recommendations will be sent to Dr. Zeina Hebert MD for review.     Thank you for the consult,  Glendell Kehr, MatheusD, BCPS, CDE    Time spent with the patient:  60  Time spent documentin

## 2022-08-01 ENCOUNTER — TELEPHONE (OUTPATIENT)
Dept: MAMMOGRAPHY | Facility: CLINIC | Age: 62
End: 2022-08-01

## 2022-08-02 ENCOUNTER — CONSULT (OUTPATIENT)
Dept: SURGICAL ONCOLOGY | Facility: CLINIC | Age: 62
End: 2022-08-02
Payer: COMMERCIAL

## 2022-08-02 ENCOUNTER — TELEPHONE (OUTPATIENT)
Dept: PLASTIC SURGERY | Facility: CLINIC | Age: 62
End: 2022-08-02

## 2022-08-02 ENCOUNTER — TELEPHONE (OUTPATIENT)
Dept: HEMATOLOGY ONCOLOGY | Facility: CLINIC | Age: 62
End: 2022-08-02

## 2022-08-02 VITALS
BODY MASS INDEX: 30.97 KG/M2 | RESPIRATION RATE: 17 BRPM | OXYGEN SATURATION: 97 % | TEMPERATURE: 98 F | HEART RATE: 71 BPM | HEIGHT: 63 IN | SYSTOLIC BLOOD PRESSURE: 111 MMHG | WEIGHT: 174.8 LBS | DIASTOLIC BLOOD PRESSURE: 64 MMHG

## 2022-08-02 DIAGNOSIS — D05.11 DUCTAL CARCINOMA IN SITU (DCIS) OF RIGHT BREAST: Primary | ICD-10-CM

## 2022-08-02 DIAGNOSIS — N60.91 ATYPICAL DUCTAL HYPERPLASIA OF RIGHT BREAST: ICD-10-CM

## 2022-08-02 DIAGNOSIS — N60.91 ATYPICAL LOBULAR HYPERPLASIA (ALH) OF RIGHT BREAST: ICD-10-CM

## 2022-08-02 DIAGNOSIS — Z80.9 FAMILY HISTORY OF CANCER: ICD-10-CM

## 2022-08-02 PROCEDURE — 99205 OFFICE O/P NEW HI 60 MIN: CPT | Performed by: SURGERY

## 2022-08-02 NOTE — PROGRESS NOTES
Breast Consultation-Surgical Oncology     [facility]  [facility] [address]  [facility] [address]    Name:  Nataliia Mckenzie  YOB: 1960  MRN:  [id number]    Assessment/Plan   Diagnoses and all orders for this visit:    Ductal carcinoma in situ (DCIS) of right breast  -     Ambulatory referral to Plastic Surgery; Future    Atypical lobular hyperplasia (ALH) of right breast    Family history of cancer    Atypical ductal hyperplasia of right breast          HPI: Nataliia Mckenzie is a 57y o  year old female who presents with newly diagnosed carcinoma of the right breast   She had an abnormal screening mammogram showing calcifications as well as an asymmetry in the right breast   She had multiple biopsies ultimately revealing DCIS and atypia to include atypical duct and lobular hyperplasia  She does report family history of breast and additional cancers  She already met with our genetics team and her testing is pending  Surgical treatment to date consisted of n/a      Oncology History:    Oncology History   Ductal carcinoma in situ (DCIS) of right breast   2022 Initial Diagnosis    Ductal carcinoma in situ (DCIS) of right breast     2022 -  Cancer Staged    Staging form: Breast, AJCC 8th Edition  - Clinical stage from 2022: Stage 0 (cTis (DCIS), cN0, cM0, ER+, MN+, HER2: Not Assessed) - Signed by Ayanna Christine MD on 2022  Stage prefix: Initial diagnosis  Method of lymph node assessment: Clinical  Nuclear grade: G2           Pertinent reproductive history:  Age at menarche:    OB History        3    Para   3    Term   3            AB        Living           SAB        IAB        Ectopic        Multiple        Live Births                       Problem List:   Patient Active Problem List   Diagnosis    S/P knee replacement    Hypertension    Anxiety    Hypothyroidism    Gastroesophageal reflux disease without Please follow up with an ENT specialist if pain does not improve in a week.    esophagitis    Prediabetes    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HCC)    IgA deficiency (HCC)    Thrombocytopenia (HCC)    Melanosis coli    History of colon resection    Low back pain    Abnormal laboratory test    Edema    Class 1 obesity due to excess calories with body mass index (BMI) of 33 0 to 33 9 in adult    Low gammaglobulin level (HCC)    TMJ arthritis    Tension type headache    Exposure to SARS-associated coronavirus    Medicare annual wellness visit, subsequent    Hyperlipidemia    Laryngitis    Primary insomnia    Abnormal mammogram    Ductal carcinoma in situ (DCIS) of right breast    Atypical lobular hyperplasia (ALH) of right breast    Family history of cancer    Atypical ductal hyperplasia of right breast     Past Medical History:   Diagnosis Date    Abnormal electrocardiogram     Last assessed 10/04/13    Anemia     pt states hypogammaglobulinemia, needs washed RBCs if transfused - Rx by Dr Swetha Parkinson Depression     Diabetes ([facility] [address] )     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  - Last assessed 11/02/17    Disease of thyroid gland     Diverticulitis of colon     GERD (gastroesophageal reflux disease)     Hypertension     Hypoglobulinemia     Slow transit constipation      Past Surgical History:   Procedure Laterality Date    APPENDECTOMY      BOWEL RESECTION      BREAST BIOPSY Right 07/07/2022    stereo x 2    BREAST BIOPSY Right 07/26/2022    stereo    CARPAL TUNNEL RELEASE      COLECTOMY      Resolved 08/27/09    JOINT REPLACEMENT Right     LASIK      MAMMO STEREOTACTIC BREAST BIOPSY RIGHT (ALL INC) Right 07/07/2022    MAMMO STEREOTACTIC BREAST BIOPSY RIGHT (ALL INC) Right 7/26/2022    MAMMO STEREOTACTIC BREAST BIOPSY RIGHT (ALL INC) EACH ADD Right 07/07/2022    OTHER SURGICAL HISTORY      Biopsy Vulvar    OR COLONOSCOPY FLX DX W/COLLJ SPEC WHEN PFRMD N/A 05/06/2019    Procedure: COLONOSCOPY;  Surgeon: Kelly Sherman MD; Location: [facility];   Service: General    [place] TOTAL KNEE ARTHROPLASTY Left 02/08/2017    Procedure: TOTAL KNEE REPLACEMENT ARTHROPLASTY;  Surgeon: Isidra Lozada MD;  Location:  MAIN OR;  Service: Orthopedics    REFRACTIVE SURGERY      REPLACEMENT TOTAL KNEE Right 10/2013    TONSILLECTOMY      With Adenoidectomy    TOOTH EXTRACTION      VAGINA SURGERY      mesh     Family History   Problem Relation Age of Onset    No Known Problems Mother     Basal cell carcinoma Father     Prostate cancer Father 80    Breast cancer Sister 54    No Known Problems Sister     No Known Problems Daughter     No Known Problems Daughter     No Known Problems Daughter     No Known Problems Maternal Grandmother     No Known Problems Maternal Grandfather     Breast cancer Paternal Grandmother 50    No Known Problems Paternal Grandfather     Breast cancer Paternal Aunt 79    Breast cancer Family     Arthritis Family     Hypertension Family     Cancer Family      Social History     Socioeconomic History    Marital status: /Civil Union     Spouse name: Not on file    Number of children: Not on file    Years of education: Not on file    Highest education level: Not on file   Occupational History    Not on file   Tobacco Use    Smoking status: Never Smoker    Smokeless tobacco: Never Used   Vaping Use    Vaping Use: Never used   Substance and Sexual Activity    Alcohol use: No    Drug use: No    Sexual activity: Yes   Other Topics Concern    Not on file   Social History Narrative    Denied: Home environment Domestic Violence        Daily Tea consumption        Caffeine use     Social Determinants of Health     Financial Resource Strain: Not on file   Food Insecurity: Not on file   Transportation Needs: Not on file   Physical Activity: Not on file   Stress: Not on file   Social Connections: Not on file   Intimate Partner Violence: Not on file   Housing Stability: Not on file     Current Outpatient Medications   Medication Sig Dispense Refill    ALPRAZolam (XANAX) 0 25 mg tablet Take 1 tablet (0 25 mg total) by mouth daily at bedtime as needed for anxiety 10 tablet 0    estradiol (ESTRACE) 0 1 mg/g vaginal cream USE 1/4 APPLICATOR VAGINALLY TWICE A WEEK 42 5 g 0    famotidine (PEPCID) 20 mg tablet TAKE ONE TABLET BY MOUTH EVERY DAY 90 tablet 1    levothyroxine 75 mcg tablet TAKE ONE TABLET BY MOUTH EVERY DAY IN THE EARLY MORNING 90 tablet 1    potassium chloride (K-DUR,KLOR-CON) 10 mEq tablet TAKE ONE TABLET BY MOUTH TWICE A  tablet 1    Psyllium (METAMUCIL PO) Take 1 Dose by mouth daily        rosuvastatin (CRESTOR) 5 mg tablet Take 1 tablet (5 mg total) by mouth daily 90 tablet 5    senna (SENOKOT) 8 6 MG tablet Take 2 tablets by mouth daily      sertraline (ZOLOFT) 100 mg tablet TAKE ONE TABLET BY MOUTH EVERY DAY 90 tablet 1    traZODone (DESYREL) 50 mg tablet Take 1 tablet (50 mg total) by mouth daily at bedtime 90 tablet 0    triamterene-hydrochlorothiazide (DYAZIDE) 37 5-25 mg per capsule TAKE ONE CAPSULE BY MOUTH EVERY MORNING 90 capsule 1    busPIRone (BUSPAR) 5 mg tablet Take 1 tablet (5 mg total) by mouth daily as needed (anxiety) (Patient not taking: No sig reported) 30 tablet 0     No current facility-administered medications for this visit  Allergies   Allergen Reactions    Penicillins Anaphylaxis     Anaphylaxis  Anaphylaxis  Other reaction(s): Anaphylaxis    Acetazolamide Itching and Swelling     With eye drops - takes  oral sulfa w/o side effects    Metronidazole GI Intolerance and Nausea Only         The following portions of the patient's history were reviewed and updated as appropriate: allergies, current medications, past family history, past medical history, past social history, past surgical history and problem list     Review of Systems:  Review of Systems   Constitutional: Negative  Negative for appetite change and fever  Eyes: Negative      Respiratory: Negative for shortness of breath  Cardiovascular: Negative  Gastrointestinal: Positive for constipation  Endocrine: Negative  Genitourinary: Negative  Musculoskeletal: Negative  Negative for arthralgias and myalgias  Skin: Negative  Allergic/Immunologic: Negative  Neurological: Negative  Hematological: Negative  Negative for adenopathy  Does not bruise/bleed easily  Psychiatric/Behavioral: The patient is nervous/anxious  Physical Exam:  Physical Exam  Constitutional:       General: She is not in acute distress  Appearance: Normal appearance  She is well-developed  HENT:      Head: Normocephalic and atraumatic  Cardiovascular:      Heart sounds: Normal heart sounds  Pulmonary:      Breath sounds: Normal breath sounds  Chest:   Breasts:      Right: Skin change (Well healing biopsy sites with resolving ecchymosis and palpable hematomas x2) present  No inverted nipple, mass, nipple discharge, tenderness, axillary adenopathy or supraclavicular adenopathy  Left: No inverted nipple, mass, nipple discharge, skin change, tenderness, axillary adenopathy or supraclavicular adenopathy  Abdominal:      Palpations: Abdomen is soft  Lymphadenopathy:      Upper Body:      Right upper body: No supraclavicular, axillary or pectoral adenopathy  Left upper body: No supraclavicular, axillary or pectoral adenopathy  Neurological:      Mental Status: She is alert and oriented to person, place, and time     Psychiatric:         Mood and Affect: Mood normal          Laboratory:  07/07/2022 stereotactic biopsy right breast 1000 hours posterior was benign and concordant    07/07/2022 stereotactic biopsy right breast mid without calcifications revealed atypical duct hyperplasia    07/07/2022 stereotactic biopsy right breast mid with calcifications    Pathology revealed: ductal carcinoma in situ    Histologic grade: moderately differentiated     Tumor node status: Negative    Hormone receptor status:  ER 95, MT 15    Other studies:  2022 stereotactic biopsy right breast 1200 hours with calcs was benign and without calcs shows atypical lobular hyperplasia    Imagin2022 bilateral 3D screening mammogram was a BI-RADS 0 secondary to calcifications in the outer right breast anterior with an ill-defined asymmetric nodular density, left side was benign, density is a two    2022 right 3D diagnostic mammogram and ultrasound show linear branching calcifications in a segmental distribution spanning several cm in the upper outer right breast anterior as well as a 2nd group of linear branching calcifications lower outer for which a two site stereotactic biopsy was recommended, the asymmetry related to the more anterior calcifications upper outer may be stable compared to priors, right breast ultrasound was performed in this area with no sonographic abnormality noted, ultrasound of the right axilla showed one benign appearing lymph node    2022 post biopsy mammogram shows standard clips, pathology is concordant, additional biopsy recommended in lieu of the diagnosis of DCIS    2022 right diagnostic mammogram indication view show additional clusters of calcifications some of which are faint in nature however one cluster was amenable to stereotactic biopsy in the 1200 hours axis    2022 post biopsy mammogram is concordant, the area of DCIS and atypical lobular hyperplasia are roughly 4 cm apart          Discussion/Summary:  70-year-old female who presents with a spectrum of disease in the right breast to include DCIS, atypical duct and atypical lobular hyperplasia which were diagnosed based on calcifications  Both the ADH and ALH were noted in tissue that did not contain calcifications  She also has strong family history  She already met with our genetics team and her genetic testing is pending    If her genetic testing is positive, I would recommend a mastectomy along with a contralateral prophylactic mastectomy  If the genetic testing is negative, we talked about a generous bracketed lumpectomy of the right breast to include the area of DCIS as well as atypia  This would leave a significant defect in the breast   If she has breast conservation, she understands that she will need radiation therapy and possibly hormone therapy  If she has a mastectomy and her final pathology is DCIS only, she would not likely need any additional therapy  She would like to consider her options  She is interested in meeting with Plastic surgery  She will need to have Plastic surgery regardless of the type of surgery  All of her questions were answered today  We will call her with the results of the genetic testing  Following receipt of these results as well as her plastic surgery consult, she will return to the office to get surgery arranged

## 2022-08-02 NOTE — TELEPHONE ENCOUNTER
SPOKE TO PATIENT ABOUT UPCOMING APPOINTMENT WITH DR Ed Sanchez   ANSWERED SOME QUESTIONS AND LET HER KNOW THAT DR Ed Sanchez CAN GIVE GUIDANCE AND ANSWER OTHER QUESTIONS AT TIME OF APPOINTMENT

## 2022-08-02 NOTE — TELEPHONE ENCOUNTER
Called to schedule appt with Dr Patricia Ashford, ref from Dr Baltazar Estrada  Spoke to  as patient not home  Gave appt information and asked to please have patient call to confirm if this is ok for her

## 2022-08-03 ENCOUNTER — TELEPHONE (OUTPATIENT)
Dept: GENETICS | Facility: CLINIC | Age: 62
End: 2022-08-03

## 2022-08-03 NOTE — TELEPHONE ENCOUNTER
Genetic Test Result Note:    Today I spoke with Keysha Webb over the phone to review the results of her genetic test for hereditary cancer  She underwent genetic testing through our program on 7/26/2022 due to her recent diagnosis of breast cancer  Her results will be sent to her breast surgeon Marvin Sanders     SUMMARY:    Test: Roderick Hernandez (36 genes): APC, AMY, AXIN2 BARD1, BRCA1, BRCA2, BRIP1, BMPR1A, CDH1, CDK4, CDKN2A, CHEK2, DICER1, EPCAM, GREM1, HOXB13, MLH1, MSH2, MSH3, MSH6, MUTYH, NBN, NF1, NTHL1, PALB2, PMS2, POLD1, POLE, PTEN, RAD51C, RAD51D, RECQL SMAD4, SMARCA4, STK11, TP53    Result: Variant of uncertain significance    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); heterozygous; uncertain significance      Variant of uncertain significance (VUS)  A variant of uncertain significance (VUS) means that a change was identified in a specific gene but it cannot be determined whether the variant is associated with an increased risk of cancer or is a harmless genetic change  It is possible that the variant was seen in only a handful of individuals, or there may be conflicting or incomplete information in the medical literature about the variant and its association with hereditary cancer  The significance of the BARD1 variant is currently not known and therefore this test result cannot be used to help determine Alease Severs surgical plan, medical management or cancer risks  Risks for Family Members:  Alease Severs was made aware that all of her first-degree relatives are at increased risk to develop breast cancer based on her recent diagnosis and family history of breast cancer  We recommend that her first-degree relatives make their healthcare providers aware of the family history and discuss their options for screening and risk-reduction  Testing for other Family Members: At this time, the BARD1 VUS is not clinically actionable and is not recommended to be tested in other family members      The laboratory will continue to accumulate information on this variant and will reclassify it as either a positive or negative genetic test result when they are confident that they have adequate information  As updated information is obtained, we will notify Naye Aggarwal with the updated information  It is important to note that the majority of variants of uncertain significance are reclassified as likely benign or benign as additional information about the variant becomes available  If Naye Aggarwal has any affected family members with a cancer diagnosis, especially at a young age, they may still consider genetic testing  Relatives who wish to pursue genetic testing can reach out to the [address] [place] [address] Road ([phone]) to schedule an appointment or visit www [facility] org to identify a local genetic counselor  Plan:     VUS Result: This result does not have surgical implications and surgical options should be discussed with her healthcare provider   Svitlana's breast surgeon, Dr Sam Garza will be made aware of her results

## 2022-08-04 ENCOUNTER — TELEPHONE (OUTPATIENT)
Dept: SURGICAL ONCOLOGY | Facility: CLINIC | Age: 62
End: 2022-08-04

## 2022-08-04 NOTE — TELEPHONE ENCOUNTER
----- Message from Eva Bhagat MD sent at 8/4/2022 11:30 AM EDT -----  Can you offer her tues am f/u long to set up surgery per our conversation

## 2022-08-04 NOTE — TELEPHONE ENCOUNTER
Called and left message to offer appt on 8/9 at 8am or 8:30am  Left call back number to confirm  Time is on hold for her

## 2022-08-08 ENCOUNTER — CONSULT (OUTPATIENT)
Dept: PLASTIC SURGERY | Facility: CLINIC | Age: 62
End: 2022-08-08
Payer: COMMERCIAL

## 2022-08-08 VITALS
DIASTOLIC BLOOD PRESSURE: 60 MMHG | BODY MASS INDEX: 30.83 KG/M2 | HEIGHT: 63 IN | WEIGHT: 174 LBS | TEMPERATURE: 97.4 F | HEART RATE: 85 BPM | SYSTOLIC BLOOD PRESSURE: 118 MMHG

## 2022-08-08 DIAGNOSIS — D05.11 DUCTAL CARCINOMA IN SITU (DCIS) OF RIGHT BREAST: Primary | ICD-10-CM

## 2022-08-08 PROCEDURE — 99205 OFFICE O/P NEW HI 60 MIN: CPT | Performed by: STUDENT IN AN ORGANIZED HEALTH CARE EDUCATION/TRAINING PROGRAM

## 2022-08-09 ENCOUNTER — OFFICE VISIT (OUTPATIENT)
Dept: SURGICAL ONCOLOGY | Facility: CLINIC | Age: 62
End: 2022-08-09
Payer: COMMERCIAL

## 2022-08-09 VITALS
OXYGEN SATURATION: 96 % | BODY MASS INDEX: 32.57 KG/M2 | SYSTOLIC BLOOD PRESSURE: 120 MMHG | RESPIRATION RATE: 17 BRPM | HEIGHT: 62 IN | DIASTOLIC BLOOD PRESSURE: 82 MMHG | HEART RATE: 82 BPM | WEIGHT: 177 LBS | TEMPERATURE: 97 F

## 2022-08-09 DIAGNOSIS — D05.11 DUCTAL CARCINOMA IN SITU (DCIS) OF RIGHT BREAST: Primary | ICD-10-CM

## 2022-08-09 DIAGNOSIS — N60.91 ATYPICAL LOBULAR HYPERPLASIA (ALH) OF RIGHT BREAST: ICD-10-CM

## 2022-08-09 DIAGNOSIS — Z13.71 BRCA NEGATIVE: ICD-10-CM

## 2022-08-09 DIAGNOSIS — N60.91 ATYPICAL DUCTAL HYPERPLASIA OF RIGHT BREAST: ICD-10-CM

## 2022-08-09 PROCEDURE — 99215 OFFICE O/P EST HI 40 MIN: CPT | Performed by: SURGERY

## 2022-08-09 RX ORDER — CLINDAMYCIN PHOSPHATE 900 MG/50ML
900 INJECTION INTRAVENOUS ONCE
Status: CANCELLED | OUTPATIENT
Start: 2022-09-09 | End: 2022-08-09

## 2022-08-09 RX ORDER — ENOXAPARIN SODIUM 100 MG/ML
40 INJECTION SUBCUTANEOUS ONCE
Status: CANCELLED | OUTPATIENT
Start: 2022-09-09 | End: 2022-08-09

## 2022-08-09 NOTE — PROGRESS NOTES
Surgical Oncology Follow Up       [facility] SURGICAL ONCOLOGY [facility]  [address] [place] [address]    Baron Underwood  1960  [id number]  [address] [facility] SURGICAL ONCOLOGY [place]  [place] [address]    Chief Complaint   Patient presents with    Follow-up       Assessment/Plan   Diagnoses and all orders for this visit:    Ductal carcinoma in situ (DCIS) of right breast    Atypical lobular hyperplasia [facility]) of right breast    Atypical ductal hyperplasia of right breast    BRCA negative    Other orders  -     enoxaparin (LOVENOX) subcutaneous injection 40 mg  -     Apply SCD or Foot pumps; Standing  -     clindamycin (CLEOCIN) IVPB (premix in dextrose) 900 mg 50 mL        Advance Care Planning/Advance Directives:  Discussed disease status, cancer treatment plans and/or cancer treatment goals with the patient  Oncology History:    Oncology History   Ductal carcinoma in situ (DCIS) of right breast   8/2/2022 Initial Diagnosis    Ductal carcinoma in situ (DCIS) of right breast     8/2/2022 -  Cancer Staged    Staging form: Breast, AJCC 8th Edition  - Clinical stage from 8/2/2022: Stage 0 (cTis (DCIS), cN0, cM0, ER+, PA+, HER2: Not Assessed) - Signed by Heather Vazquez MD on 8/2/2022  Stage prefix: Initial diagnosis  Method of lymph node assessment: Clinical  Nuclear grade: G2           History of Present Illness:  Right breast carcinoma as well as atypical hyperplasia, here to discuss and set up surgery  -Interval History:9 genetic testing, Plastic surgery consult    Review of Systems:  Review of Systems   Constitutional: Negative  Negative for appetite change and fever  Eyes: Negative  Respiratory: Negative for shortness of breath  Cardiovascular: Negative  Gastrointestinal: Negative  Endocrine: Negative  Genitourinary: Negative  Musculoskeletal: Negative  Negative for arthralgias and myalgias  Skin: Negative  Allergic/Immunologic: Negative  Neurological: Negative  Hematological: Negative  Negative for adenopathy  Does not bruise/bleed easily  Psychiatric/Behavioral: The patient is nervous/anxious  Patient Active Problem List   Diagnosis    S/P knee replacement    Hypertension    Anxiety    Hypothyroidism    Gastroesophageal reflux disease without esophagitis    Prediabetes    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HCC)    IgA deficiency (HCC)    Thrombocytopenia (HCC)    Melanosis coli    History of colon resection    Low back pain    Abnormal laboratory test    Edema    Class 1 obesity due to excess calories with body mass index (BMI) of 33 0 to 33 9 in adult    Low gammaglobulin level (HCC)    TMJ arthritis    Tension type headache    Exposure to SARS-associated coronavirus    Medicare annual wellness visit, subsequent    Hyperlipidemia    Laryngitis    Primary insomnia    Abnormal mammogram    Ductal carcinoma in situ (DCIS) of right breast    Atypical lobular hyperplasia (ALH) of right breast    Family history of cancer    Atypical ductal hyperplasia of right breast    BRCA negative     Past Medical History:   Diagnosis Date    Abnormal electrocardiogram     Last assessed 10/04/13    Anemia     pt states hypogammaglobulinemia, needs washed RBCs if transfused - Rx by Dr Tere Goode Depression     Diabetes ([facility] [address] )     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  - Last assessed 11/02/17    Disease of thyroid gland     Diverticulitis of colon     GERD (gastroesophageal reflux disease)     Hypertension     Hypoglobulinemia     Slow transit constipation      Past Surgical History:   Procedure Laterality Date    APPENDECTOMY      BOWEL RESECTION      BREAST BIOPSY Right 07/07/2022    stereo x 2    BREAST BIOPSY Right 07/26/2022    stereo    CARPAL TUNNEL RELEASE      COLECTOMY      Resolved 08/27/09    JOINT REPLACEMENT Right     LASIK      MAMMO STEREOTACTIC BREAST BIOPSY RIGHT (ALL INC) Right 07/07/2022    MAMMO STEREOTACTIC BREAST BIOPSY RIGHT (ALL INC) Right 7/26/2022    MAMMO STEREOTACTIC BREAST BIOPSY RIGHT (ALL INC) EACH ADD Right 07/07/2022    OTHER SURGICAL HISTORY      Biopsy Vulvar    ID COLONOSCOPY FLX DX W/COLLJ SPEC WHEN PFRMD N/A 05/06/2019    Procedure: COLONOSCOPY;  Surgeon: Ean Ty MD;  [facility];   Service: General    ID TOTAL KNEE ARTHROPLASTY Left 02/08/2017    Procedure: TOTAL KNEE REPLACEMENT ARTHROPLASTY;  Surgeon: Cristian Waite MD;  [facility];  Service: Orthopedics    REFRACTIVE SURGERY      REPLACEMENT TOTAL KNEE Right 10/2013    TONSILLECTOMY      With Adenoidectomy    TOOTH EXTRACTION      VAGINA SURGERY      mesh     Family History   Problem Relation Age of Onset    No Known Problems Mother     Basal cell carcinoma Father     Prostate cancer Father 80    Breast cancer Sister 54    No Known Problems Sister     No Known Problems Daughter     No Known Problems Daughter     No Known Problems Daughter     No Known Problems Maternal Grandmother     No Known Problems Maternal Grandfather     Breast cancer Paternal Grandmother 50    No Known Problems Paternal Grandfather     Breast cancer Paternal Aunt 79    Breast cancer Family     Arthritis Family     Hypertension Family     Cancer Family      Social History     Socioeconomic History    Marital status: /Civil Union     Spouse name: Not on file    Number of children: Not on file    Years of education: Not on file    Highest education level: Not on file   Occupational History    Not on file   Tobacco Use    Smoking status: Never Smoker    Smokeless tobacco: Never Used   Vaping Use    Vaping Use: Never used   Substance and Sexual Activity    Alcohol use: No    Drug use: No    Sexual activity: Yes   Other Topics Concern    Not on file   Social History Narrative    Denied: Home environment Domestic Violence        Daily Tea consumption        Caffeine use     Social Determinants of Health     Financial Resource Strain: Not on file   Food Insecurity: Not on file   Transportation Needs: Not on file   Physical Activity: Not on file   Stress: Not on file   Social Connections: Not on file   Intimate Partner Violence: Not on file   Housing Stability: Not on file       Current Outpatient Medications:     ALPRAZolam (XANAX) 0 25 mg tablet, Take 1 tablet (0 25 mg total) by mouth daily at bedtime as needed for anxiety, Disp: 10 tablet, Rfl: 0    estradiol (ESTRACE) 0 1 mg/g vaginal cream, USE 1/4 APPLICATOR VAGINALLY TWICE A WEEK, Disp: 42 5 g, Rfl: 0    famotidine (PEPCID) 20 mg tablet, TAKE ONE TABLET BY MOUTH EVERY DAY, Disp: 90 tablet, Rfl: 1    levothyroxine 75 mcg tablet, TAKE ONE TABLET BY MOUTH EVERY DAY IN THE EARLY MORNING, Disp: 90 tablet, Rfl: 1    potassium chloride (K-DUR,KLOR-CON) 10 mEq tablet, TAKE ONE TABLET BY MOUTH TWICE A DAY, Disp: 180 tablet, Rfl: 1    Psyllium (METAMUCIL PO), Take 1 Dose by mouth daily  , Disp: , Rfl:     rosuvastatin (CRESTOR) 5 mg tablet, Take 1 tablet (5 mg total) by mouth daily, Disp: 90 tablet, Rfl: 5    senna (SENOKOT) 8 6 MG tablet, Take 2 tablets by mouth daily, Disp: , Rfl:     sertraline (ZOLOFT) 100 mg tablet, TAKE ONE TABLET BY MOUTH EVERY DAY, Disp: 90 tablet, Rfl: 1    traZODone (DESYREL) 50 mg tablet, Take 1 tablet (50 mg total) by mouth daily at bedtime, Disp: 90 tablet, Rfl: 0    triamterene-hydrochlorothiazide (DYAZIDE) 37 5-25 mg per capsule, TAKE ONE CAPSULE BY MOUTH EVERY MORNING, Disp: 90 capsule, Rfl: 1    busPIRone (BUSPAR) 5 mg tablet, Take 1 tablet (5 mg total) by mouth daily as needed (anxiety) (Patient not taking: No sig reported), Disp: 30 tablet, Rfl: 0  Allergies   Allergen Reactions    Penicillins Anaphylaxis     Anaphylaxis  Anaphylaxis  Other reaction(s):  Anaphylaxis    Acetazolamide Itching and Swelling With eye drops - takes  oral sulfa w/o side effects    Metronidazole GI Intolerance and Nausea Only       The following portions of the patient's history were reviewed and updated as appropriate: allergies, current medications, past family history, past medical history, past social history, past surgical history and problem list         Vitals:    08/09/22 0805   BP: 120/82   Pulse: 82   Resp: 17   Temp: (!) 97 °F (36 1 °C)   SpO2: 96%       Physical Exam  Constitutional:       General: She is not in acute distress  Appearance: She is well-developed  HENT:      Head: Normocephalic and atraumatic  Cardiovascular:      Heart sounds: Normal heart sounds  Pulmonary:      Breath sounds: Normal breath sounds  Chest:   Breasts:      Right: Skin change (resolving ecchymosis) present  Abdominal:      Palpations: Abdomen is soft  Musculoskeletal:      Right lower leg: No edema  Left lower leg: No edema  Neurological:      Mental Status: She is alert and oriented to person, place, and time  Psychiatric:         Mood and Affect: Mood normal            Results:  Labs:  Genetic testing shows a VUS in Bard1 but no mutations    Imaging  Post biopsy mammogram shows standard clips only    I reviewed the above laboratory and imaging data  Discussion/Summary:  73-year-old female who presented with DCIS, atypical duct hyperplasia and atypical lobular hyperplasia in the right breast   She was counseled on either a generous lumpectomy along with oncoplastic reduction/lift verses a full mastectomy  She did have genetic testing which was negative  She met with Plastic surgery and has decided to proceed with the mastectomy  She will be having an expander placed for immediate reconstruction  I therefore counseled her on the right mastectomy along with sentinel node biopsy  I did review this procedure with her in detail  All of her questions were answered today  Consent was signed in the office    Surgery will be scheduled for the near term

## 2022-08-10 ENCOUNTER — PATIENT OUTREACH (OUTPATIENT)
Dept: HEMATOLOGY ONCOLOGY | Facility: CLINIC | Age: 62
End: 2022-08-10

## 2022-08-10 ENCOUNTER — TELEPHONE (OUTPATIENT)
Dept: SURGICAL ONCOLOGY | Facility: CLINIC | Age: 62
End: 2022-08-10

## 2022-08-10 NOTE — TELEPHONE ENCOUNTER
----- Message from Kristeen Kehr, MA sent at 8/9/2022  1:35 PM EDT -----  Regarding: Request for note -excuse from gym  Hello,    She is requesting a note for her Gym [facility]) starting on 9/9/22 (surgery date) out 6 weeks due to having surgery she won't be able to use gym      Thank you,  Markus Smith

## 2022-08-10 NOTE — TELEPHONE ENCOUNTER
Attempted calling Kayleigh Monge to confirm dates she would like to be excused from her Gym Memmbership, no answer, left her a detailed message to return call with information

## 2022-08-10 NOTE — PROGRESS NOTES
Biopsychosocial and Barriers Assessment    Cancer Diagnosis: Breast Cancer  Home/Cell Phone: [phone]  Emergency Contact: Bren Colon, spouse  Marital Status:    Interpretation concerns, speaks another language, preferred language: English  Cultural concerns: None reported  Ability to read or write:  Fully Literate    Caregiver/Support: Spouse, children, friends  Children: 2 daughters  Child/Elder care: No, pt had to put her parents in [place] earlier this year    Housing: Own home  Home Setup: 2 story  Lives With: Spouse  Daily Living Activities: Keeps active with friends and family  Durable Medical Equipment: walker  Ambulation: Fully Ambulatory      Preferred Pharmacy: [facility] co-pays with insurance: $35 co-pays for specialist  High co-pays with medication coverage: No  No medication coverage: Pt has coverage through her [facility]    Primary Care Provider: Ardia Canavan, MD  Hx of [address]: Yes, post knee replacement  Hx of Short term rehab: No  Mental Health Hx: depression and on anxiety  Substance Abuse Hx: No  Employment: Retired RN   Status/Location: No  Ability to pay bills: yes  POA/LW/AD:Not discussed  Transportation Plan/Concerns: pt drives as does her spouse      What do you know about your Cancer Diagnosis    What has your doctor told you about your cancer diagnosis: Pt was able to fully explain her breast cancer diagnosis  She is a retired nurse  What has your doctor told you about your cancer treatment: Pt is having a double mastectomy  What specific concerns do you have about your diagnosis and treatment: If the pathology is clear post surgery there may not be a need for chemo or radiation  Have you been made aware of any hair loss associated with treatment: Pt is aware that if she were to require chemo, she would lose her hair      Additional Comments:  MSW received a call this day from the pt as she had been to see Dr Nanci Hammonds and self reports feeling highly anxious  Pt shared that she is scheduled for a double mastectomy, followed by reconstruction surgery  She spoke of the anxiety that she is experiencing over her upcoming surgery and the unknowns that exist   Initially, Dr Lucy Boateng was going to do a lumpectomy and the pt was going to be required to have radiation  With further testing and the plan to do the mastectomy, the pt is aware that she will not know until after her surgery, what further treatment could be needed  The pt spent time processing the unknown and the anxiety she is feeling over this  During this time, she shared her concerns over the co-pays that she has each time she goes to the Doctor  The pt has a $35 co-pay and states that post surgery, she will have to go to the office weekly  She mentioned not being able to afford this and she was planning on speaking to the Doctor  MSW discussed the hospital shadia care and the pt states she applied last week  MSW encouraged her to ask the office to bill her the co-pays and this would go towards her hospital bill  MSW will e-mail the hospital finance group to explain that the pt is considering not going to appointments for this reason  Pt has been charging her co-pays and expressed concerns over the high cost of her credit card bill as a result  MSW discussed the cancer funds and encouraged her to submit these bills and MSW will request payment to the credit card  Pt verbalized understanding  Another major concern the pt has is that her  is having knee replacement surgery about 6 weeks post her mastectomy  She worries about his care and needs  They have 2 daughters, one local and one in [place] and both have small children  The pt is realistic that neither of their daughters will be able to help  MSW spoke with the pt about private duty caregivers but she reports they would not be able to afford this    Pt shared that her daughter in [place] could not come up to help as her  "nearly  from [place] "  He was hospitalized and she described having to makes several trips this past year to [facility], to help with her 4 small grandchildren  He has recovered but with significant lung deficits  Her local daughter has 3 very young children and works full-time  MSW encouraged the pt to focus on her surgery for now and as the time gets closer to her 's surgery, if additional resources are needed, that can be addressed at that time  Pt has someone scheduled to clean her home from [employer] and MSW provided information on the [address]  The pt agreed to reach out to the [facility] for support as well  MSW offered significant support and will continue to follow

## 2022-08-11 ENCOUNTER — TELEPHONE (OUTPATIENT)
Dept: SURGICAL ONCOLOGY | Facility: CLINIC | Age: 62
End: 2022-08-11

## 2022-08-11 NOTE — TELEPHONE ENCOUNTER
Called Bella Rodriges to clarify dates she wanted on the letter for her gym membership  Discussed concerns about COVID exposure pre op  She staed she wanted the dates on her letter to be from 09/05/22 through 19/24/22  Letter drawn up and sent to patient

## 2022-08-14 ENCOUNTER — APPOINTMENT (OUTPATIENT)
Dept: LAB | Age: 62
End: 2022-08-14
Payer: COMMERCIAL

## 2022-08-14 ENCOUNTER — APPOINTMENT (OUTPATIENT)
Dept: RADIOLOGY | Age: 62
End: 2022-08-14
Payer: COMMERCIAL

## 2022-08-14 ENCOUNTER — LAB (OUTPATIENT)
Dept: LAB | Age: 62
End: 2022-08-14
Payer: COMMERCIAL

## 2022-08-14 DIAGNOSIS — N60.91 ATYPICAL DUCTAL HYPERPLASIA OF RIGHT BREAST: ICD-10-CM

## 2022-08-14 DIAGNOSIS — D05.11 DUCTAL CARCINOMA IN SITU (DCIS) OF RIGHT BREAST: ICD-10-CM

## 2022-08-14 DIAGNOSIS — N60.91 ATYPICAL LOBULAR HYPERPLASIA (ALH) OF RIGHT BREAST: ICD-10-CM

## 2022-08-14 LAB
ALBUMIN SERPL BCP-MCNC: 4 G/DL (ref 3.5–5)
ALP SERPL-CCNC: 65 U/L (ref 46–116)
ALT SERPL W P-5'-P-CCNC: 17 U/L (ref 12–78)
ANION GAP SERPL CALCULATED.3IONS-SCNC: 3 MMOL/L (ref 4–13)
AST SERPL W P-5'-P-CCNC: 13 U/L (ref 5–45)
BACTERIA UR QL AUTO: ABNORMAL /HPF
BASOPHILS # BLD AUTO: 0.06 THOUSANDS/ΜL (ref 0–0.1)
BASOPHILS NFR BLD AUTO: 1 % (ref 0–1)
BILIRUB SERPL-MCNC: 0.57 MG/DL (ref 0.2–1)
BILIRUB UR QL STRIP: NEGATIVE
BUN SERPL-MCNC: 14 MG/DL (ref 5–25)
CALCIUM SERPL-MCNC: 9.1 MG/DL (ref 8.3–10.1)
CHLORIDE SERPL-SCNC: 105 MMOL/L (ref 96–108)
CLARITY UR: CLEAR
CO2 SERPL-SCNC: 30 MMOL/L (ref 21–32)
COLOR UR: ABNORMAL
CREAT SERPL-MCNC: 0.99 MG/DL (ref 0.6–1.3)
EOSINOPHIL # BLD AUTO: 0.23 THOUSAND/ΜL (ref 0–0.61)
EOSINOPHIL NFR BLD AUTO: 3 % (ref 0–6)
ERYTHROCYTE [DISTWIDTH] IN BLOOD BY AUTOMATED COUNT: 13.1 % (ref 11.6–15.1)
GFR SERPL CREATININE-BSD FRML MDRD: 60 ML/MIN/1.73SQ M
GLUCOSE SERPL-MCNC: 99 MG/DL (ref 65–140)
GLUCOSE UR STRIP-MCNC: NEGATIVE MG/DL
HCT VFR BLD AUTO: 43.8 % (ref 34.8–46.1)
HGB BLD-MCNC: 14.6 G/DL (ref 11.5–15.4)
HGB UR QL STRIP.AUTO: NEGATIVE
IMM GRANULOCYTES # BLD AUTO: 0.02 THOUSAND/UL (ref 0–0.2)
IMM GRANULOCYTES NFR BLD AUTO: 0 % (ref 0–2)
KETONES UR STRIP-MCNC: NEGATIVE MG/DL
LEUKOCYTE ESTERASE UR QL STRIP: ABNORMAL
LYMPHOCYTES # BLD AUTO: 1.89 THOUSANDS/ΜL (ref 0.6–4.47)
LYMPHOCYTES NFR BLD AUTO: 28 % (ref 14–44)
MCH RBC QN AUTO: 30.3 PG (ref 26.8–34.3)
MCHC RBC AUTO-ENTMCNC: 33.3 G/DL (ref 31.4–37.4)
MCV RBC AUTO: 91 FL (ref 82–98)
MONOCYTES # BLD AUTO: 0.38 THOUSAND/ΜL (ref 0.17–1.22)
MONOCYTES NFR BLD AUTO: 6 % (ref 4–12)
MUCOUS THREADS UR QL AUTO: ABNORMAL
NEUTROPHILS # BLD AUTO: 4.09 THOUSANDS/ΜL (ref 1.85–7.62)
NEUTS SEG NFR BLD AUTO: 62 % (ref 43–75)
NITRITE UR QL STRIP: NEGATIVE
NON-SQ EPI CELLS URNS QL MICRO: ABNORMAL /HPF
NRBC BLD AUTO-RTO: 0 /100 WBCS
PH UR STRIP.AUTO: 6 [PH]
PLATELET # BLD AUTO: 157 THOUSANDS/UL (ref 149–390)
PMV BLD AUTO: 12.6 FL (ref 8.9–12.7)
POTASSIUM SERPL-SCNC: 3.6 MMOL/L (ref 3.5–5.3)
PROT SERPL-MCNC: 6.8 G/DL (ref 6.4–8.4)
PROT UR STRIP-MCNC: NEGATIVE MG/DL
RBC # BLD AUTO: 4.82 MILLION/UL (ref 3.81–5.12)
RBC #/AREA URNS AUTO: ABNORMAL /HPF
SODIUM SERPL-SCNC: 138 MMOL/L (ref 135–147)
SP GR UR STRIP.AUTO: 1.01 (ref 1–1.03)
UROBILINOGEN UR STRIP-ACNC: <2 MG/DL
WBC # BLD AUTO: 6.67 THOUSAND/UL (ref 4.31–10.16)
WBC #/AREA URNS AUTO: ABNORMAL /HPF

## 2022-08-14 PROCEDURE — 80053 COMPREHEN METABOLIC PANEL: CPT

## 2022-08-14 PROCEDURE — 81001 URINALYSIS AUTO W/SCOPE: CPT | Performed by: SURGERY

## 2022-08-14 PROCEDURE — 87086 URINE CULTURE/COLONY COUNT: CPT | Performed by: SURGERY

## 2022-08-14 PROCEDURE — 85025 COMPLETE CBC W/AUTO DIFF WBC: CPT

## 2022-08-14 PROCEDURE — 93005 ELECTROCARDIOGRAM TRACING: CPT

## 2022-08-14 PROCEDURE — 36415 COLL VENOUS BLD VENIPUNCTURE: CPT

## 2022-08-14 PROCEDURE — 87186 SC STD MICRODIL/AGAR DIL: CPT | Performed by: SURGERY

## 2022-08-14 PROCEDURE — 87077 CULTURE AEROBIC IDENTIFY: CPT | Performed by: SURGERY

## 2022-08-14 PROCEDURE — 71046 X-RAY EXAM CHEST 2 VIEWS: CPT

## 2022-08-15 ENCOUNTER — OFFICE VISIT (OUTPATIENT)
Dept: PLASTIC SURGERY | Facility: CLINIC | Age: 62
End: 2022-08-15
Payer: COMMERCIAL

## 2022-08-15 DIAGNOSIS — D05.11 DUCTAL CARCINOMA IN SITU (DCIS) OF RIGHT BREAST: Primary | ICD-10-CM

## 2022-08-15 LAB
ATRIAL RATE: 72 BPM
P AXIS: 48 DEGREES
PR INTERVAL: 150 MS
QRS AXIS: -6 DEGREES
QRSD INTERVAL: 86 MS
QT INTERVAL: 418 MS
QTC INTERVAL: 457 MS
T WAVE AXIS: 41 DEGREES
VENTRICULAR RATE: 72 BPM

## 2022-08-15 PROCEDURE — 99214 OFFICE O/P EST MOD 30 MIN: CPT | Performed by: STUDENT IN AN ORGANIZED HEALTH CARE EDUCATION/TRAINING PROGRAM

## 2022-08-15 PROCEDURE — 93010 ELECTROCARDIOGRAM REPORT: CPT | Performed by: INTERNAL MEDICINE

## 2022-08-15 RX ORDER — OXYCODONE HYDROCHLORIDE 5 MG/1
5 TABLET ORAL EVERY 6 HOURS PRN
Qty: 10 TABLET | Refills: 0 | Status: SHIPPED | OUTPATIENT
Start: 2022-08-15 | End: 2022-09-16 | Stop reason: ALTCHOICE

## 2022-08-15 RX ORDER — GABAPENTIN 300 MG/1
300 CAPSULE ORAL 3 TIMES DAILY
Qty: 15 CAPSULE | Refills: 0 | Status: SHIPPED | OUTPATIENT
Start: 2022-08-15 | End: 2022-09-21 | Stop reason: ALTCHOICE

## 2022-08-15 RX ORDER — IBUPROFEN 800 MG/1
800 TABLET ORAL EVERY 8 HOURS PRN
Qty: 15 TABLET | Refills: 0 | Status: SHIPPED | OUTPATIENT
Start: 2022-08-15 | End: 2022-09-16 | Stop reason: ALTCHOICE

## 2022-08-15 RX ORDER — TRAMADOL HYDROCHLORIDE 50 MG/1
50 TABLET ORAL EVERY 6 HOURS PRN
Qty: 20 TABLET | Refills: 0 | Status: SHIPPED | OUTPATIENT
Start: 2022-08-15 | End: 2022-09-16 | Stop reason: ALTCHOICE

## 2022-08-15 RX ORDER — SULFAMETHOXAZOLE AND TRIMETHOPRIM 800; 160 MG/1; MG/1
1 TABLET ORAL EVERY 12 HOURS SCHEDULED
Qty: 14 TABLET | Refills: 0 | Status: SHIPPED | OUTPATIENT
Start: 2022-08-15 | End: 2022-08-22

## 2022-08-15 RX ORDER — SENNOSIDES 8.6 MG
650 CAPSULE ORAL EVERY 6 HOURS
Qty: 20 TABLET | Refills: 0 | Status: SHIPPED | OUTPATIENT
Start: 2022-08-15 | End: 2022-08-20

## 2022-08-15 NOTE — H&P (VIEW-ONLY)
Assessment and Plan:  Ms Roseline Gomes is a 57 y o  female presenting with right breast cancer    Procedure, risks, benefits, alternatives and postoperative instructions and expectations reviewed  All questions answered  Consent obtained  TE and galaflex ordered  Postoperative prescriptions sent to mail order pharmacy today per request of patient  Will plan to jaden day prior or day of depending on exact timing of surgery  History of Present Illness:   Ms Roseline Gomes is a 57 y o  female presenting with right breast cancer  Plan for mastectomy with immediate TE based recon with Dr Julia Medeiros  Contralateral symmetry procedure planned for second stage  Doing well since initial consult  Review of Systems:  A 12 point ROS was performed and negative except per HPI  Past Medical History:  Past Medical History:   Diagnosis Date    Abnormal electrocardiogram     Last assessed 10/04/13    Anemia     pt states hypogammaglobulinemia, needs washed RBCs if transfused - Rx by Dr Anibal Delcid [facility])     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  - Last assessed 11/02/17    Disease of thyroid gland     Diverticulitis of colon     GERD (gastroesophageal reflux disease)     Hypertension     Hypoglobulinemia     Slow transit constipation        Past Surgical History:  Past Surgical History:   Procedure Laterality Date    APPENDECTOMY      BOWEL RESECTION      BREAST BIOPSY Right 07/07/2022    stereo x 2    BREAST BIOPSY Right 07/26/2022    stereo    CARPAL TUNNEL RELEASE      COLECTOMY      Resolved 08/27/09    JOINT REPLACEMENT Right     LASIK      MAMMO STEREOTACTIC BREAST BIOPSY RIGHT (ALL INC) Right 07/07/2022    MAMMO STEREOTACTIC BREAST BIOPSY RIGHT (ALL INC) Right 7/26/2022    MAMMO STEREOTACTIC BREAST BIOPSY RIGHT (ALL INC) EACH ADD Right 07/07/2022    OTHER SURGICAL HISTORY      Biopsy Vulvar    VT COLONOSCOPY FLX DX W/COLLJ SPEC WHEN PFRMD N/A 05/06/2019    Procedure: COLONOSCOPY;  Surgeon: Julia Wilder MD;  [facility]; Service: General    AR TOTAL KNEE ARTHROPLASTY Left 02/08/2017    Procedure: TOTAL KNEE REPLACEMENT ARTHROPLASTY;  Surgeon: Violetta Curtis MD;  [facility];  Service: Orthopedics    REFRACTIVE SURGERY      REPLACEMENT TOTAL KNEE Right 10/2013    TONSILLECTOMY      With Adenoidectomy    TOOTH EXTRACTION      VAGINA SURGERY      mesh       Social History:  Social History     Tobacco Use    Smoking status: Never Smoker    Smokeless tobacco: Never Used   Vaping Use    Vaping Use: Never used   Substance Use Topics    Alcohol use: No    Drug use: No       Family History:  Family History   Problem Relation Age of Onset    No Known Problems Mother     Basal cell carcinoma Father     Prostate cancer Father 80    Breast cancer Sister 54    No Known Problems Sister     No Known Problems Daughter     No Known Problems Daughter     No Known Problems Daughter     No Known Problems Maternal Grandmother     No Known Problems Maternal Grandfather     Breast cancer Paternal Grandmother 50    No Known Problems Paternal Grandfather     Breast cancer Paternal Aunt 79    Breast cancer Family     Arthritis Family     Hypertension Family     Cancer Family        Allergies: Allergies   Allergen Reactions    Penicillins Anaphylaxis     Anaphylaxis  Anaphylaxis  Other reaction(s):  Anaphylaxis    Acetazolamide Itching and Swelling     With eye drops - takes  oral sulfa w/o side effects    Metronidazole GI Intolerance and Nausea Only       Medications:  Current Outpatient Medications on File Prior to Visit   Medication Sig Dispense Refill    ALPRAZolam (XANAX) 0 25 mg tablet Take 1 tablet (0 25 mg total) by mouth daily at bedtime as needed for anxiety 10 tablet 0    busPIRone (BUSPAR) 5 mg tablet Take 1 tablet (5 mg total) by mouth daily as needed (anxiety) (Patient not taking: No sig reported) 30 tablet 0    estradiol (ESTRACE) 0 1 mg/g vaginal cream USE 1/4 APPLICATOR VAGINALLY TWICE A WEEK 42 5 g 0    famotidine (PEPCID) 20 mg tablet TAKE ONE TABLET BY MOUTH EVERY DAY 90 tablet 1    levothyroxine 75 mcg tablet TAKE ONE TABLET BY MOUTH EVERY DAY IN THE EARLY MORNING 90 tablet 1    potassium chloride (K-DUR,KLOR-CON) 10 mEq tablet TAKE ONE TABLET BY MOUTH TWICE A  tablet 1    Psyllium (METAMUCIL PO) Take 1 Dose by mouth daily        rosuvastatin (CRESTOR) 5 mg tablet Take 1 tablet (5 mg total) by mouth daily 90 tablet 5    senna (SENOKOT) 8 6 MG tablet Take 2 tablets by mouth daily      sertraline (ZOLOFT) 100 mg tablet TAKE ONE TABLET BY MOUTH EVERY DAY 90 tablet 1    traZODone (DESYREL) 50 mg tablet Take 1 tablet (50 mg total) by mouth daily at bedtime 90 tablet 0    triamterene-hydrochlorothiazide (DYAZIDE) 37 5-25 mg per capsule TAKE ONE CAPSULE BY MOUTH EVERY MORNING 90 capsule 1     No current facility-administered medications on file prior to visit  Physical Examination:  There were no vitals taken for this visit  Estimated body mass index is 32 37 kg/m² as calculated from the following:    Height as of 8/9/22: 5' 2" (1 575 m)  Weight as of 8/9/22: 80 3 kg (177 lb)  General: NAD, well appearing, AAOx3  HEENT: NCAT, EOMI, MMM, supple  Resp: Nonlabored  Heart: RRR  Abdomen: Soft, ND, NT  Extremities/MSK: no LE edema, no obvious deficits in ROM  Neuro: grossly intact with no obvious deficits  Skin: no obvious lesions or rashes  Breast: no palpable mass, no palpable axillary lymphadenopathy, grade II ptosis, BD 14, bra band size 37      Daniela Craig, DO  Plastic and Reconstructive Surgery    I have spent 30 minutes with Patient  today in which greater than 50% of this time was spent in counseling/coordination of care regarding Intructions for management, Patient and family education, Importance of tx compliance, Risk factor reductions and Impressions

## 2022-08-15 NOTE — PROGRESS NOTES
Assessment and Plan:  Ms Saul Archer is a 57 y o  female presenting with right breast cancer    Procedure, risks, benefits, alternatives and postoperative instructions and expectations reviewed  All questions answered  Consent obtained  TE and galaflex ordered  Postoperative prescriptions sent to mail order pharmacy today per request of patient  Will plan to jaden day prior or day of depending on exact timing of surgery  History of Present Illness:   Ms Saul Archer is a 57 y o  female presenting with right breast cancer  Plan for mastectomy with immediate TE based recon with Dr Ashok Gaviria  Contralateral symmetry procedure planned for second stage  Doing well since initial consult  Review of Systems:  A 12 point ROS was performed and negative except per HPI  Past Medical History:  Past Medical History:   Diagnosis Date    Abnormal electrocardiogram     Last assessed 10/04/13    Anemia     pt states hypogammaglobulinemia, needs washed RBCs if transfused - Rx by Dr Narayan Bliss [facility])     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  - Last assessed 11/02/17    Disease of thyroid gland     Diverticulitis of colon     GERD (gastroesophageal reflux disease)     Hypertension     Hypoglobulinemia     Slow transit constipation        Past Surgical History:  Past Surgical History:   Procedure Laterality Date    APPENDECTOMY      BOWEL RESECTION      BREAST BIOPSY Right 07/07/2022    stereo x 2    BREAST BIOPSY Right 07/26/2022    stereo    CARPAL TUNNEL RELEASE      COLECTOMY      Resolved 08/27/09    JOINT REPLACEMENT Right     LASIK      MAMMO STEREOTACTIC BREAST BIOPSY RIGHT (ALL INC) Right 07/07/2022    MAMMO STEREOTACTIC BREAST BIOPSY RIGHT (ALL INC) Right 7/26/2022    MAMMO STEREOTACTIC BREAST BIOPSY RIGHT (ALL INC) EACH ADD Right 07/07/2022    OTHER SURGICAL HISTORY      Biopsy Vulvar    CO COLONOSCOPY FLX DX W/COLLJ SPEC WHEN PFRMD N/A 05/06/2019    Procedure: COLONOSCOPY;  Surgeon: Eben Hutson MD;  [facility]; Service: General    [place] TOTAL KNEE ARTHROPLASTY Left 02/08/2017    Procedure: TOTAL KNEE REPLACEMENT ARTHROPLASTY;  Surgeon: Noel Briceño MD;  [facility];  Service: Orthopedics    REFRACTIVE SURGERY      REPLACEMENT TOTAL KNEE Right 10/2013    TONSILLECTOMY      With Adenoidectomy    TOOTH EXTRACTION      VAGINA SURGERY      mesh       Social History:  Social History     Tobacco Use    Smoking status: Never Smoker    Smokeless tobacco: Never Used   Vaping Use    Vaping Use: Never used   Substance Use Topics    Alcohol use: No    Drug use: No       Family History:  Family History   Problem Relation Age of Onset    No Known Problems Mother     Basal cell carcinoma Father     Prostate cancer Father 80    Breast cancer Sister 54    No Known Problems Sister     No Known Problems Daughter     No Known Problems Daughter     No Known Problems Daughter     No Known Problems Maternal Grandmother     No Known Problems Maternal Grandfather     Breast cancer Paternal Grandmother 50    No Known Problems Paternal Grandfather     Breast cancer Paternal Aunt 79    Breast cancer Family     Arthritis Family     Hypertension Family     Cancer Family        Allergies: Allergies   Allergen Reactions    Penicillins Anaphylaxis     Anaphylaxis  Anaphylaxis  Other reaction(s):  Anaphylaxis    Acetazolamide Itching and Swelling     With eye drops - takes  oral sulfa w/o side effects    Metronidazole GI Intolerance and Nausea Only       Medications:  Current Outpatient Medications on File Prior to Visit   Medication Sig Dispense Refill    ALPRAZolam (XANAX) 0 25 mg tablet Take 1 tablet (0 25 mg total) by mouth daily at bedtime as needed for anxiety 10 tablet 0    busPIRone (BUSPAR) 5 mg tablet Take 1 tablet (5 mg total) by mouth daily as needed (anxiety) (Patient not taking: No sig reported) 30 tablet 0    estradiol (ESTRACE) 0 1 mg/g vaginal cream USE 1/4 APPLICATOR VAGINALLY TWICE A WEEK 42 5 g 0    famotidine (PEPCID) 20 mg tablet TAKE ONE TABLET BY MOUTH EVERY DAY 90 tablet 1    levothyroxine 75 mcg tablet TAKE ONE TABLET BY MOUTH EVERY DAY IN THE EARLY MORNING 90 tablet 1    potassium chloride (K-DUR,KLOR-CON) 10 mEq tablet TAKE ONE TABLET BY MOUTH TWICE A  tablet 1    Psyllium (METAMUCIL PO) Take 1 Dose by mouth daily        rosuvastatin (CRESTOR) 5 mg tablet Take 1 tablet (5 mg total) by mouth daily 90 tablet 5    senna (SENOKOT) 8 6 MG tablet Take 2 tablets by mouth daily      sertraline (ZOLOFT) 100 mg tablet TAKE ONE TABLET BY MOUTH EVERY DAY 90 tablet 1    traZODone (DESYREL) 50 mg tablet Take 1 tablet (50 mg total) by mouth daily at bedtime 90 tablet 0    triamterene-hydrochlorothiazide (DYAZIDE) 37 5-25 mg per capsule TAKE ONE CAPSULE BY MOUTH EVERY MORNING 90 capsule 1     No current facility-administered medications on file prior to visit  Physical Examination:  There were no vitals taken for this visit  Estimated body mass index is 32 37 kg/m² as calculated from the following:    Height as of 8/9/22: 5' 2" (1 575 m)  Weight as of 8/9/22: 80 3 kg (177 lb)  General: NAD, well appearing, AAOx3  HEENT: NCAT, EOMI, MMM, supple  Resp: Nonlabored  Heart: RRR  Abdomen: Soft, ND, NT  Extremities/MSK: no LE edema, no obvious deficits in ROM  Neuro: grossly intact with no obvious deficits  Skin: no obvious lesions or rashes  Breast: no palpable mass, no palpable axillary lymphadenopathy, grade II ptosis, BD 14, bra band size 37      Daniela Craig, DO  Plastic and Reconstructive Surgery    I have spent 30 minutes with Patient  today in which greater than 50% of this time was spent in counseling/coordination of care regarding Intructions for management, Patient and family education, Importance of tx compliance, Risk factor reductions and Impressions

## 2022-08-15 NOTE — PROGRESS NOTES
Assessment and Plan:  Ms Keke Mary is a 57 y o  female presenting with right breast DCIS    We had a long discussion regarding the spectrum of breast reconstruction options ranging from none, external prosthetic, implants based reconstruction (TE vs direct), and autologous  We also discussed the immediate vs delayed pathways  We discussed all the procedures, risks, benefits, alternatives and postoperative instructions and expectations  I explicitly explained that this is a large, team based approach where oncologic treatment is the primary focus however the importance of reconstruction is considerable but the timeline is variable depending on oncologic therapy required  I do think this patient is a candidate for immediate reconstruction with tissue expander, galaflex based on her tumor, extirpative plan, anatomy and goals  I recommend the patient digest our comprehensive discussion and to prepare questions after speaking with her family/support system  I will plan to see her back within the next few weeks to finalize our reconstructive plan  I also provided my contact information should any immediate questions arise  We will begin to coordinate a date with Dr Teja Jimenez  History of Present Illness:   Ms Keke Mary is a 57 y o  female presenting with DCIS of the right breast   She is pending genetic testing results but has decided upon a mastectomy  She is happy with her current size  She is active and a non-nicotine user  She does not excessive use sodium but will be more mindful of her consumption  Her malignancy was diagnosed on screening mammogram   She does have a family history of malignancies and genetic testing is pending  She understands the results may alter her surgical plan  Review of Systems:  A 12 point ROS was performed and negative except per HPI      Past Medical History:  Past Medical History:   Diagnosis Date    Abnormal electrocardiogram     Last assessed 10/04/13    Anemia     pt states hypogammaglobulinemia, needs washed RBCs if transfused - Rx by Dr Jakub Langford Depression     Diabetes [facility])     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  - Last assessed 11/02/17    Disease of thyroid gland     Diverticulitis of colon     GERD (gastroesophageal reflux disease)     Hypertension     Hypoglobulinemia     Slow transit constipation        Past Surgical History:  Past Surgical History:   Procedure Laterality Date    APPENDECTOMY      BOWEL RESECTION      BREAST BIOPSY Right 07/07/2022    stereo x 2    BREAST BIOPSY Right 07/26/2022    stereo    CARPAL TUNNEL RELEASE      COLECTOMY      Resolved 08/27/09    JOINT REPLACEMENT Right     LASIK      MAMMO STEREOTACTIC BREAST BIOPSY RIGHT (ALL INC) Right 07/07/2022    MAMMO STEREOTACTIC BREAST BIOPSY RIGHT (ALL INC) Right 7/26/2022    MAMMO STEREOTACTIC BREAST BIOPSY RIGHT (ALL INC) EACH ADD Right 07/07/2022    OTHER SURGICAL HISTORY      Biopsy Vulvar    RI COLONOSCOPY FLX DX W/COLLJ SPEC WHEN PFRMD N/A 05/06/2019    Procedure: COLONOSCOPY;  Surgeon: Barry Kevin MD;  [facility];   Service: General    RI TOTAL KNEE ARTHROPLASTY Left 02/08/2017    Procedure: TOTAL KNEE REPLACEMENT ARTHROPLASTY;  Surgeon: Elicia Oden MD;  [facility];  Service: Orthopedics    REFRACTIVE SURGERY      REPLACEMENT TOTAL KNEE Right 10/2013    TONSILLECTOMY      With Adenoidectomy    TOOTH EXTRACTION      VAGINA SURGERY      mesh       Social History:  Social History     Tobacco Use    Smoking status: Never Smoker    Smokeless tobacco: Never Used   Vaping Use    Vaping Use: Never used   Substance Use Topics    Alcohol use: No    Drug use: No       Family History:  Family History   Problem Relation Age of Onset    No Known Problems Mother     Basal cell carcinoma Father     Prostate cancer Father 80    Breast cancer Sister 54    No Known Problems Sister     No Known Problems Daughter     No Known Problems Daughter     No Known Problems Daughter     No Known Problems Maternal Grandmother     No Known Problems Maternal Grandfather     Breast cancer Paternal Grandmother 50    No Known Problems Paternal Grandfather     Breast cancer Paternal Aunt 79    Breast cancer Family     Arthritis Family     Hypertension Family     Cancer Family        Allergies: Allergies   Allergen Reactions    Penicillins Anaphylaxis     Anaphylaxis  Anaphylaxis  Other reaction(s):  Anaphylaxis    Acetazolamide Itching and Swelling     With eye drops - takes  oral sulfa w/o side effects    Metronidazole GI Intolerance and Nausea Only       Medications:  Current Outpatient Medications on File Prior to Visit   Medication Sig Dispense Refill    ALPRAZolam (XANAX) 0 25 mg tablet Take 1 tablet (0 25 mg total) by mouth daily at bedtime as needed for anxiety 10 tablet 0    busPIRone (BUSPAR) 5 mg tablet Take 1 tablet (5 mg total) by mouth daily as needed (anxiety) (Patient not taking: No sig reported) 30 tablet 0    estradiol (ESTRACE) 0 1 mg/g vaginal cream USE 1/4 APPLICATOR VAGINALLY TWICE A WEEK 42 5 g 0    famotidine (PEPCID) 20 mg tablet TAKE ONE TABLET BY MOUTH EVERY DAY 90 tablet 1    levothyroxine 75 mcg tablet TAKE ONE TABLET BY MOUTH EVERY DAY IN THE EARLY MORNING 90 tablet 1    potassium chloride (K-DUR,KLOR-CON) 10 mEq tablet TAKE ONE TABLET BY MOUTH TWICE A  tablet 1    Psyllium (METAMUCIL PO) Take 1 Dose by mouth daily        rosuvastatin (CRESTOR) 5 mg tablet Take 1 tablet (5 mg total) by mouth daily 90 tablet 5    senna (SENOKOT) 8 6 MG tablet Take 2 tablets by mouth daily      sertraline (ZOLOFT) 100 mg tablet TAKE ONE TABLET BY MOUTH EVERY DAY 90 tablet 1    traZODone (DESYREL) 50 mg tablet Take 1 tablet (50 mg total) by mouth daily at bedtime 90 tablet 0    triamterene-hydrochlorothiazide (DYAZIDE) 37 5-25 mg per capsule TAKE ONE CAPSULE BY MOUTH EVERY MORNING 90 capsule 1     No current facility-administered medications on file prior to visit  Physical Examination:  /60   Pulse 85   Temp (!) 97 4 °F (36 3 °C)   Ht 5' 2 5" (1 588 m)   Wt 78 9 kg (174 lb)   BMI 31 32 kg/m²   Estimated body mass index is 31 32 kg/m² as calculated from the following:    Height as of this encounter: 5' 2 5" (1 588 m)  Weight as of this encounter: 78 9 kg (174 lb)  General: NAD, well appearing, AAOx3  HEENT: NCAT, EOMI, MMM, supple  Resp: Nonlabored  Heart: RRR  Abdomen: Soft, moderate intra-abdominal lipodystrophy, NT  Extremities/MSK: no LE edema, no obvious deficits in ROM  Neuro: grossly intact with no obvious deficits  Skin: no obvious lesions or rashes  Breast: right breast with resolving ecchymosis, no palpable axillary lymphadenopathy, grade II ptosis, BD 13-14      Daniela Craig, DO  Plastic and Reconstructive Surgery    I have spent 60 minutes with Patient and family today in which greater than 50% of this time was spent in counseling/coordination of care regarding Prognosis, Risks and benefits of tx options, Intructions for management, Patient and family education and Impressions

## 2022-08-16 ENCOUNTER — PATIENT OUTREACH (OUTPATIENT)
Dept: HEMATOLOGY ONCOLOGY | Facility: CLINIC | Age: 62
End: 2022-08-16

## 2022-08-16 ENCOUNTER — TELEPHONE (OUTPATIENT)
Dept: SURGICAL ONCOLOGY | Facility: CLINIC | Age: 62
End: 2022-08-16

## 2022-08-16 DIAGNOSIS — N30.00 ACUTE CYSTITIS WITHOUT HEMATURIA: Primary | ICD-10-CM

## 2022-08-16 LAB — BACTERIA UR CULT: ABNORMAL

## 2022-08-16 RX ORDER — CIPROFLOXACIN 500 MG/1
500 TABLET, FILM COATED ORAL EVERY 12 HOURS SCHEDULED
Qty: 10 TABLET | Refills: 0 | Status: SHIPPED | OUTPATIENT
Start: 2022-08-16 | End: 2022-08-16 | Stop reason: SDUPTHER

## 2022-08-16 RX ORDER — CIPROFLOXACIN 500 MG/1
500 TABLET, FILM COATED ORAL EVERY 12 HOURS SCHEDULED
Qty: 10 TABLET | Refills: 0 | Status: SHIPPED | OUTPATIENT
Start: 2022-08-16 | End: 2022-08-21

## 2022-08-16 NOTE — PROGRESS NOTES
I called patient to check In and see how everything is going  A detailed message was left with my contact information

## 2022-08-16 NOTE — TELEPHONE ENCOUNTER
Dr Christine Rogers reviewed patient's urinalysis report and has ordered antibiotics to be started  Attempted calling the patient , no answer, left her a detailed message and also sent her a MyChart message

## 2022-08-22 ENCOUNTER — PATIENT OUTREACH (OUTPATIENT)
Dept: HEMATOLOGY ONCOLOGY | Facility: CLINIC | Age: 62
End: 2022-08-22

## 2022-08-22 NOTE — PROGRESS NOTES
Pt called in this day to inquire about her [employer] application  MSW sent an e-mail to the hospital finance group and learned that they have received the pt's application, however, it has not yet been processed  Pt has since received another bill and was inquiring if she should make a payment  MSW instructed the pt to wait until her application was reviewed and approved  Pt was also instructed, as per the ance group, to request that all of her co-pays be billed to her account  This will allow for the sh[employer]ia care to be used to assist with these high co-pays  Pt appeared relieved when she heard this news  Pt reports no other issues or concerns at this time  She remains anxious for her surgery/recovery occurring at the same time as her husbands knee replacement  Emotional support was offered and MSW reminded the pt that private duty can be discussed if needed  MSW will continue to follow

## 2022-08-26 DIAGNOSIS — I10 HYPERTENSION, UNSPECIFIED TYPE: ICD-10-CM

## 2022-08-26 RX ORDER — POTASSIUM CHLORIDE 750 MG/1
TABLET, EXTENDED RELEASE ORAL
Qty: 180 TABLET | Refills: 1 | Status: SHIPPED | OUTPATIENT
Start: 2022-08-26

## 2022-08-30 ENCOUNTER — TELEPHONE (OUTPATIENT)
Dept: SURGICAL ONCOLOGY | Facility: CLINIC | Age: 62
End: 2022-08-30

## 2022-08-30 ENCOUNTER — PATIENT OUTREACH (OUTPATIENT)
Dept: HEMATOLOGY ONCOLOGY | Facility: CLINIC | Age: 62
End: 2022-08-30

## 2022-08-30 NOTE — TELEPHONE ENCOUNTER
Received a MyChart message from Jg Odell with concerns of having an infected tooth removed with respect to her upcoming surgery  Spoke with Dr Reji Santacruz who shared this should be ok if there are no complications and the infection is cleared  Dr Reji Santacruz also recommended she consult with Dr Mesha Dickey due to her having breast reconstruction surgery  Attempted calling Svitlana to discuss, no answer, sent her a detailed message through [address] with information

## 2022-09-01 NOTE — TELEPHONE ENCOUNTER
Leellen Bumpers from [place] [address] out patient lab called  The order in chart is for Occult blood 1-3 stool  That is not what the patient brought in  patient brought in the sample for an occult blood fit kit- lab order# [id number]  They want to clarify if it is in fact the fit kit you want ordered, if so, we need the correct order to reflect the sample  If you would like the Occult blood 1-3 stool, patient needs to submit another sample  Please advise  Sandhya Pemberton,   Please call the patient this morning to discuss lab results, [phone].

## 2022-09-01 NOTE — PRE-PROCEDURE INSTRUCTIONS
Pre-Surgery Instructions:   Medication Instructions    ALPRAZolam (XANAX) 0 25 mg tablet Take night before surgery    famotidine (PEPCID) 20 mg tablet Take day of surgery   levothyroxine 75 mcg tablet Take day of surgery   potassium chloride (K-DUR,KLOR-CON) 10 mEq tablet Hold day of surgery   rosuvastatin (CRESTOR) 5 mg tablet Take day of surgery   senna (SENOKOT) 8 6 MG tablet Hold day of surgery   sertraline (ZOLOFT) 100 mg tablet Take night before surgery    traZODone (DESYREL) 50 mg tablet Take night before surgery    triamterene-hydrochlorothiazide (DYAZIDE) 37 5-25 mg per capsule Hold day of surgery  Covid screening negative as per patient  Fully vaccinated  Reviewed showering and medication instructions  Take medications morning of surgery with a small sip of water  Patient verbalized understanding  Advised NPO after [facility] and [facility] will call with scheduled surgical time

## 2022-09-02 ENCOUNTER — PATIENT OUTREACH (OUTPATIENT)
Dept: HEMATOLOGY ONCOLOGY | Facility: CLINIC | Age: 62
End: 2022-09-02

## 2022-09-02 NOTE — PROGRESS NOTES
MSW received an application for [employer] from the pt  Application was completed and submitted on behalf of the pt  MSW then reached out to the [address] [place] [address] to inquire about the pt's application  Awaiting a response

## 2022-09-04 DIAGNOSIS — F41.9 ANXIETY: ICD-10-CM

## 2022-09-04 DIAGNOSIS — E03.9 HYPOTHYROIDISM, UNSPECIFIED TYPE: ICD-10-CM

## 2022-09-04 DIAGNOSIS — I10 HYPERTENSION, UNSPECIFIED TYPE: ICD-10-CM

## 2022-09-04 RX ORDER — TRIAMTERENE AND HYDROCHLOROTHIAZIDE 37.5; 25 MG/1; MG/1
CAPSULE ORAL
Qty: 90 CAPSULE | Refills: 1 | Status: SHIPPED | OUTPATIENT
Start: 2022-09-04

## 2022-09-04 RX ORDER — LEVOTHYROXINE SODIUM 0.07 MG/1
TABLET ORAL
Qty: 90 TABLET | Refills: 1 | Status: SHIPPED | OUTPATIENT
Start: 2022-09-04

## 2022-09-04 RX ORDER — SERTRALINE HYDROCHLORIDE 100 MG/1
TABLET, FILM COATED ORAL
Qty: 90 TABLET | Refills: 1 | Status: SHIPPED | OUTPATIENT
Start: 2022-09-04

## 2022-09-08 ENCOUNTER — ANESTHESIA EVENT (OUTPATIENT)
Dept: PERIOP | Facility: HOSPITAL | Age: 62
DRG: 580 | End: 2022-09-08
Payer: COMMERCIAL

## 2022-09-08 DIAGNOSIS — Z90.11 ACQUIRED ABSENCE OF RIGHT BREAST AND NIPPLE: Primary | ICD-10-CM

## 2022-09-08 DIAGNOSIS — F41.9 ANXIETY: ICD-10-CM

## 2022-09-08 RX ORDER — ALPRAZOLAM 0.25 MG/1
0.25 TABLET ORAL 2 TIMES DAILY PRN
Qty: 20 TABLET | Refills: 0 | Status: SHIPPED | OUTPATIENT
Start: 2022-09-08 | End: 2022-09-16 | Stop reason: ALTCHOICE

## 2022-09-09 ENCOUNTER — HOSPITAL ENCOUNTER (OUTPATIENT)
Dept: NUCLEAR MEDICINE | Facility: HOSPITAL | Age: 62
Discharge: HOME | DRG: 580 | End: 2022-09-09
Attending: SURGERY
Payer: COMMERCIAL

## 2022-09-09 ENCOUNTER — HOSPITAL ENCOUNTER (INPATIENT)
Facility: HOSPITAL | Age: 62
LOS: 1 days | Discharge: HOME | DRG: 580 | End: 2022-09-11
Attending: SURGERY | Admitting: STUDENT IN AN ORGANIZED HEALTH CARE EDUCATION/TRAINING PROGRAM
Payer: COMMERCIAL

## 2022-09-09 ENCOUNTER — ANESTHESIA (OUTPATIENT)
Dept: PERIOP | Facility: HOSPITAL | Age: 62
DRG: 580 | End: 2022-09-09
Payer: COMMERCIAL

## 2022-09-09 DIAGNOSIS — N60.91 ATYPICAL DUCTAL HYPERPLASIA OF RIGHT BREAST: ICD-10-CM

## 2022-09-09 DIAGNOSIS — N60.91 ATYPICAL LOBULAR HYPERPLASIA (ALH) OF RIGHT BREAST: ICD-10-CM

## 2022-09-09 DIAGNOSIS — D05.11 DUCTAL CARCINOMA IN SITU (DCIS) OF RIGHT BREAST: ICD-10-CM

## 2022-09-09 PROCEDURE — 07B50ZX EXCISION OF RIGHT AXILLARY LYMPHATIC, OPEN APPROACH, DIAGNOSTIC: ICD-10-PCS | Performed by: SURGERY

## 2022-09-09 PROCEDURE — 0HHT0NZ INSERTION OF TISSUE EXPANDER INTO RIGHT BREAST, OPEN APPROACH: ICD-10-PCS | Performed by: STUDENT IN AN ORGANIZED HEALTH CARE EDUCATION/TRAINING PROGRAM

## 2022-09-09 PROCEDURE — C9290 INJ, BUPIVACAINE LIPOSOME: HCPCS | Performed by: STUDENT IN AN ORGANIZED HEALTH CARE EDUCATION/TRAINING PROGRAM

## 2022-09-09 PROCEDURE — 15777 ACELLULAR DERM MATRIX IMPLT: CPT | Performed by: STUDENT IN AN ORGANIZED HEALTH CARE EDUCATION/TRAINING PROGRAM

## 2022-09-09 PROCEDURE — A9541 TC99M SULFUR COLLOID: HCPCS

## 2022-09-09 PROCEDURE — 88341 IMHCHEM/IMCYTCHM EA ADD ANTB: CPT | Performed by: SPECIALIST

## 2022-09-09 PROCEDURE — C1781 MESH (IMPLANTABLE): HCPCS | Performed by: SURGERY

## 2022-09-09 PROCEDURE — C71L1ZZ PLANAR NUCLEAR MEDICINE IMAGING OF UPPER CHEST LYMPHATICS USING TECHNETIUM 99M (TC-99M): ICD-10-PCS | Performed by: SURGERY

## 2022-09-09 PROCEDURE — 19303 MAST SIMPLE COMPLETE: CPT | Performed by: SURGERY

## 2022-09-09 PROCEDURE — 88307 TISSUE EXAM BY PATHOLOGIST: CPT | Performed by: SPECIALIST

## 2022-09-09 PROCEDURE — 0HRT0JZ REPLACEMENT OF RIGHT BREAST WITH SYNTHETIC SUBSTITUTE, OPEN APPROACH: ICD-10-PCS | Performed by: STUDENT IN AN ORGANIZED HEALTH CARE EDUCATION/TRAINING PROGRAM

## 2022-09-09 PROCEDURE — 0HTT0ZZ RESECTION OF RIGHT BREAST, OPEN APPROACH: ICD-10-PCS | Performed by: SURGERY

## 2022-09-09 PROCEDURE — 38525 BIOPSY/REMOVAL LYMPH NODES: CPT | Performed by: SURGERY

## 2022-09-09 PROCEDURE — C1789 PROSTHESIS, BREAST, IMP: HCPCS | Performed by: SURGERY

## 2022-09-09 PROCEDURE — 88342 IMHCHEM/IMCYTCHM 1ST ANTB: CPT | Performed by: SPECIALIST

## 2022-09-09 PROCEDURE — G1004 CDSM NDSC: HCPCS

## 2022-09-09 PROCEDURE — 19357 TISS XPNDR PLMT BRST RCNSTJ: CPT | Performed by: STUDENT IN AN ORGANIZED HEALTH CARE EDUCATION/TRAINING PROGRAM

## 2022-09-09 PROCEDURE — 38900 IO MAP OF SENT LYMPH NODE: CPT | Performed by: SURGERY

## 2022-09-09 PROCEDURE — 78195 LYMPH SYSTEM IMAGING: CPT

## 2022-09-09 DEVICE — GALAFLEX LITE SCAFFOLD, 19.0 CM, CIRCLE
Type: IMPLANTABLE DEVICE | Site: BREAST | Status: FUNCTIONAL
Brand: GALAFLEX LITE

## 2022-09-09 DEVICE — BREAST TISSUE EXPANDER, SUTURE TABS, INTEGRAL INJECTION DOME, 500CC
Type: IMPLANTABLE DEVICE | Site: BREAST | Status: FUNCTIONAL
Brand: MENTOR ARTOURA PLUS, SMOOTH, HIGH PROFILE

## 2022-09-09 RX ORDER — SODIUM CHLORIDE 9 MG/ML
125 INJECTION, SOLUTION INTRAVENOUS CONTINUOUS
Status: DISCONTINUED | OUTPATIENT
Start: 2022-09-09 | End: 2022-09-10

## 2022-09-09 RX ORDER — SCOLOPAMINE TRANSDERMAL SYSTEM 1 MG/1
1 PATCH, EXTENDED RELEASE TRANSDERMAL ONCE AS NEEDED
Status: DISCONTINUED | OUTPATIENT
Start: 2022-09-09 | End: 2022-09-09

## 2022-09-09 RX ORDER — ONDANSETRON 2 MG/ML
4 INJECTION INTRAMUSCULAR; INTRAVENOUS ONCE AS NEEDED
Status: DISCONTINUED | OUTPATIENT
Start: 2022-09-09 | End: 2022-09-09 | Stop reason: HOSPADM

## 2022-09-09 RX ORDER — SODIUM CHLORIDE 9 MG/ML
INJECTION, SOLUTION INTRAVENOUS AS NEEDED
Status: DISCONTINUED | OUTPATIENT
Start: 2022-09-09 | End: 2022-09-09 | Stop reason: HOSPADM

## 2022-09-09 RX ORDER — ALPRAZOLAM 0.25 MG/1
0.25 TABLET ORAL 2 TIMES DAILY PRN
Status: DISCONTINUED | OUTPATIENT
Start: 2022-09-09 | End: 2022-09-11 | Stop reason: HOSPADM

## 2022-09-09 RX ORDER — FENTANYL CITRATE/PF 50 MCG/ML
50 SYRINGE (ML) INJECTION
Status: DISCONTINUED | OUTPATIENT
Start: 2022-09-09 | End: 2022-09-09 | Stop reason: HOSPADM

## 2022-09-09 RX ORDER — SODIUM CHLORIDE, SODIUM LACTATE, POTASSIUM CHLORIDE, CALCIUM CHLORIDE 600; 310; 30; 20 MG/100ML; MG/100ML; MG/100ML; MG/100ML
75 INJECTION, SOLUTION INTRAVENOUS CONTINUOUS
Status: DISCONTINUED | OUTPATIENT
Start: 2022-09-09 | End: 2022-09-10

## 2022-09-09 RX ORDER — MAGNESIUM HYDROXIDE 1200 MG/15ML
LIQUID ORAL AS NEEDED
Status: DISCONTINUED | OUTPATIENT
Start: 2022-09-09 | End: 2022-09-09 | Stop reason: HOSPADM

## 2022-09-09 RX ORDER — LIDOCAINE HYDROCHLORIDE 20 MG/ML
INJECTION, SOLUTION EPIDURAL; INFILTRATION; INTRACAUDAL; PERINEURAL AS NEEDED
Status: DISCONTINUED | OUTPATIENT
Start: 2022-09-09 | End: 2022-09-09

## 2022-09-09 RX ORDER — SODIUM CHLORIDE, SODIUM LACTATE, POTASSIUM CHLORIDE, CALCIUM CHLORIDE 600; 310; 30; 20 MG/100ML; MG/100ML; MG/100ML; MG/100ML
INJECTION, SOLUTION INTRAVENOUS CONTINUOUS PRN
Status: DISCONTINUED | OUTPATIENT
Start: 2022-09-09 | End: 2022-09-09

## 2022-09-09 RX ORDER — ENOXAPARIN SODIUM 100 MG/ML
40 INJECTION SUBCUTANEOUS ONCE
Status: COMPLETED | OUTPATIENT
Start: 2022-09-09 | End: 2022-09-09

## 2022-09-09 RX ORDER — GABAPENTIN 300 MG/1
300 CAPSULE ORAL 3 TIMES DAILY
Status: DISCONTINUED | OUTPATIENT
Start: 2022-09-09 | End: 2022-09-11 | Stop reason: HOSPADM

## 2022-09-09 RX ORDER — TRAZODONE HYDROCHLORIDE 50 MG/1
50 TABLET ORAL
Status: DISCONTINUED | OUTPATIENT
Start: 2022-09-09 | End: 2022-09-11 | Stop reason: HOSPADM

## 2022-09-09 RX ORDER — PROPOFOL 10 MG/ML
INJECTION, EMULSION INTRAVENOUS AS NEEDED
Status: DISCONTINUED | OUTPATIENT
Start: 2022-09-09 | End: 2022-09-09

## 2022-09-09 RX ORDER — ONDANSETRON 2 MG/ML
INJECTION INTRAMUSCULAR; INTRAVENOUS AS NEEDED
Status: DISCONTINUED | OUTPATIENT
Start: 2022-09-09 | End: 2022-09-09

## 2022-09-09 RX ORDER — OXYCODONE HYDROCHLORIDE 5 MG/1
5 TABLET ORAL EVERY 4 HOURS PRN
Status: DISCONTINUED | OUTPATIENT
Start: 2022-09-09 | End: 2022-09-11 | Stop reason: HOSPADM

## 2022-09-09 RX ORDER — PROPOFOL 10 MG/ML
INJECTION, EMULSION INTRAVENOUS CONTINUOUS PRN
Status: DISCONTINUED | OUTPATIENT
Start: 2022-09-09 | End: 2022-09-09

## 2022-09-09 RX ORDER — LEVOTHYROXINE SODIUM 0.07 MG/1
75 TABLET ORAL
Status: DISCONTINUED | OUTPATIENT
Start: 2022-09-10 | End: 2022-09-11 | Stop reason: HOSPADM

## 2022-09-09 RX ORDER — ONDANSETRON 2 MG/ML
4 INJECTION INTRAMUSCULAR; INTRAVENOUS EVERY 6 HOURS PRN
Status: DISCONTINUED | OUTPATIENT
Start: 2022-09-09 | End: 2022-09-11 | Stop reason: HOSPADM

## 2022-09-09 RX ORDER — ISOSULFAN BLUE 50 MG/5ML
INJECTION, SOLUTION SUBCUTANEOUS AS NEEDED
Status: DISCONTINUED | OUTPATIENT
Start: 2022-09-09 | End: 2022-09-09 | Stop reason: HOSPADM

## 2022-09-09 RX ORDER — FENTANYL CITRATE 50 UG/ML
INJECTION, SOLUTION INTRAMUSCULAR; INTRAVENOUS AS NEEDED
Status: DISCONTINUED | OUTPATIENT
Start: 2022-09-09 | End: 2022-09-09

## 2022-09-09 RX ORDER — BUPIVACAINE HYDROCHLORIDE 5 MG/ML
INJECTION, SOLUTION PERINEURAL AS NEEDED
Status: DISCONTINUED | OUTPATIENT
Start: 2022-09-09 | End: 2022-09-09 | Stop reason: HOSPADM

## 2022-09-09 RX ORDER — MIDAZOLAM HYDROCHLORIDE 2 MG/2ML
INJECTION, SOLUTION INTRAMUSCULAR; INTRAVENOUS AS NEEDED
Status: DISCONTINUED | OUTPATIENT
Start: 2022-09-09 | End: 2022-09-09

## 2022-09-09 RX ORDER — HYDROMORPHONE HCL/PF 1 MG/ML
0.5 SYRINGE (ML) INJECTION EVERY 4 HOURS PRN
Status: DISCONTINUED | OUTPATIENT
Start: 2022-09-09 | End: 2022-09-10

## 2022-09-09 RX ORDER — CLINDAMYCIN PHOSPHATE 900 MG/50ML
900 INJECTION INTRAVENOUS ONCE
Status: COMPLETED | OUTPATIENT
Start: 2022-09-09 | End: 2022-09-09

## 2022-09-09 RX ORDER — DEXAMETHASONE SODIUM PHOSPHATE 10 MG/ML
INJECTION, SOLUTION INTRAMUSCULAR; INTRAVENOUS AS NEEDED
Status: DISCONTINUED | OUTPATIENT
Start: 2022-09-09 | End: 2022-09-09

## 2022-09-09 RX ORDER — ROCURONIUM BROMIDE 10 MG/ML
INJECTION, SOLUTION INTRAVENOUS AS NEEDED
Status: DISCONTINUED | OUTPATIENT
Start: 2022-09-09 | End: 2022-09-09

## 2022-09-09 RX ORDER — SERTRALINE HYDROCHLORIDE 100 MG/1
100 TABLET, FILM COATED ORAL DAILY
Status: DISCONTINUED | OUTPATIENT
Start: 2022-09-10 | End: 2022-09-11 | Stop reason: HOSPADM

## 2022-09-09 RX ORDER — HYDROMORPHONE HCL/PF 1 MG/ML
SYRINGE (ML) INJECTION AS NEEDED
Status: DISCONTINUED | OUTPATIENT
Start: 2022-09-09 | End: 2022-09-09

## 2022-09-09 RX ORDER — TRIAMTERENE AND HYDROCHLOROTHIAZIDE 37.5; 25 MG/1; MG/1
1 TABLET ORAL EVERY MORNING
Status: DISCONTINUED | OUTPATIENT
Start: 2022-09-10 | End: 2022-09-11 | Stop reason: HOSPADM

## 2022-09-09 RX ORDER — ACETAMINOPHEN 325 MG/1
650 TABLET ORAL EVERY 6 HOURS
Status: DISCONTINUED | OUTPATIENT
Start: 2022-09-09 | End: 2022-09-11 | Stop reason: HOSPADM

## 2022-09-09 RX ORDER — CLINDAMYCIN PHOSPHATE 600 MG/50ML
600 INJECTION INTRAVENOUS EVERY 8 HOURS
Status: COMPLETED | OUTPATIENT
Start: 2022-09-09 | End: 2022-09-10

## 2022-09-09 RX ADMIN — PROPOFOL 160 MG: 10 INJECTION, EMULSION INTRAVENOUS at 14:10

## 2022-09-09 RX ADMIN — FENTANYL CITRATE 100 MCG: 50 INJECTION, SOLUTION INTRAMUSCULAR; INTRAVENOUS at 14:10

## 2022-09-09 RX ADMIN — PROPOFOL 40 MG: 10 INJECTION, EMULSION INTRAVENOUS at 14:12

## 2022-09-09 RX ADMIN — CLINDAMYCIN IN 5 PERCENT DEXTROSE 900 MG: 18 INJECTION, SOLUTION INTRAVENOUS at 13:57

## 2022-09-09 RX ADMIN — SODIUM CHLORIDE, SODIUM LACTATE, POTASSIUM CHLORIDE, AND CALCIUM CHLORIDE 75 ML/HR: .6; .31; .03; .02 INJECTION, SOLUTION INTRAVENOUS at 18:43

## 2022-09-09 RX ADMIN — LIDOCAINE HYDROCHLORIDE 100 MG: 20 INJECTION, SOLUTION EPIDURAL; INFILTRATION; INTRACAUDAL at 14:10

## 2022-09-09 RX ADMIN — PROPOFOL 120 MCG/KG/MIN: 10 INJECTION, EMULSION INTRAVENOUS at 14:12

## 2022-09-09 RX ADMIN — ACETAMINOPHEN 650 MG: 325 TABLET ORAL at 19:45

## 2022-09-09 RX ADMIN — DEXAMETHASONE SODIUM PHOSPHATE 10 MG: 10 INJECTION, SOLUTION INTRAMUSCULAR; INTRAVENOUS at 14:20

## 2022-09-09 RX ADMIN — ALPRAZOLAM 0.25 MG: 0.25 TABLET ORAL at 19:55

## 2022-09-09 RX ADMIN — ONDANSETRON 4 MG: 2 INJECTION INTRAMUSCULAR; INTRAVENOUS at 14:20

## 2022-09-09 RX ADMIN — SODIUM CHLORIDE, SODIUM LACTATE, POTASSIUM CHLORIDE, AND CALCIUM CHLORIDE: .6; .31; .03; .02 INJECTION, SOLUTION INTRAVENOUS at 14:15

## 2022-09-09 RX ADMIN — GABAPENTIN 300 MG: 300 CAPSULE ORAL at 19:45

## 2022-09-09 RX ADMIN — MIDAZOLAM 3 MG: 1 INJECTION INTRAMUSCULAR; INTRAVENOUS at 14:01

## 2022-09-09 RX ADMIN — ROCURONIUM BROMIDE 40 MG: 50 INJECTION, SOLUTION INTRAVENOUS at 14:10

## 2022-09-09 RX ADMIN — SODIUM CHLORIDE 0.6 MCG/KG/HR: 9 INJECTION, SOLUTION INTRAVENOUS at 14:12

## 2022-09-09 RX ADMIN — FENTANYL CITRATE 50 MCG: 50 INJECTION, SOLUTION INTRAMUSCULAR; INTRAVENOUS at 14:42

## 2022-09-09 RX ADMIN — SODIUM CHLORIDE, SODIUM LACTATE, POTASSIUM CHLORIDE, AND CALCIUM CHLORIDE: .6; .31; .03; .02 INJECTION, SOLUTION INTRAVENOUS at 15:55

## 2022-09-09 RX ADMIN — ENOXAPARIN SODIUM 40 MG: 40 INJECTION SUBCUTANEOUS at 10:52

## 2022-09-09 RX ADMIN — CLINDAMYCIN IN 5 PERCENT DEXTROSE 600 MG: 12 INJECTION, SOLUTION INTRAVENOUS at 21:59

## 2022-09-09 RX ADMIN — ROCURONIUM BROMIDE 20 MG: 50 INJECTION, SOLUTION INTRAVENOUS at 15:45

## 2022-09-09 RX ADMIN — FENTANYL CITRATE 50 MCG: 50 INJECTION, SOLUTION INTRAMUSCULAR; INTRAVENOUS at 14:52

## 2022-09-09 RX ADMIN — MIDAZOLAM 1 MG: 1 INJECTION INTRAMUSCULAR; INTRAVENOUS at 14:06

## 2022-09-09 RX ADMIN — HYDROMORPHONE HYDROCHLORIDE 1 MG: 1 INJECTION, SOLUTION INTRAMUSCULAR; INTRAVENOUS; SUBCUTANEOUS at 15:19

## 2022-09-09 RX ADMIN — SODIUM CHLORIDE 125 ML/HR: 0.9 INJECTION, SOLUTION INTRAVENOUS at 10:16

## 2022-09-09 RX ADMIN — TRAZODONE HYDROCHLORIDE 50 MG: 50 TABLET ORAL at 21:55

## 2022-09-09 RX ADMIN — SCOPALAMINE 1 PATCH: 1 PATCH, EXTENDED RELEASE TRANSDERMAL at 10:31

## 2022-09-09 RX ADMIN — SUGAMMADEX 200 MG: 100 INJECTION, SOLUTION INTRAVENOUS at 17:24

## 2022-09-09 RX ADMIN — HYDROMORPHONE HYDROCHLORIDE 0.5 MG: 1 INJECTION, SOLUTION INTRAMUSCULAR; INTRAVENOUS; SUBCUTANEOUS at 19:46

## 2022-09-09 NOTE — INTERVAL H&P NOTE
H&P reviewed  After examining the patient I find no changes in the patients condition since the H&P had been written      Vitals:    09/09/22 0953   BP: 142/65   Pulse: 68   Resp: 16   Temp: 98 5 °F (36 9 °C)   SpO2: 98%

## 2022-09-09 NOTE — ANESTHESIA POSTPROCEDURE EVALUATION
Post-Op Assessment Note    CV Status:  Stable  Pain Score: 2    Pain management: adequate     Mental Status:  Alert and awake   Hydration Status:  Euvolemic   PONV Controlled:  Controlled   Airway Patency:  Patent      Post Op Vitals Reviewed: Yes      Staff: Anesthesiologist         No complications documented      BP      Temp      Pulse     Resp      SpO2      /62   Pulse 90   Temp 98 2 °F (36 8 °C)   Resp 17   Ht 5' 2" (1 575 m)   Wt 77 kg (169 lb 12 1 oz)   SpO2 98%   BMI 31 05 kg/m²

## 2022-09-09 NOTE — OP NOTE
OPERATIVE REPORT  PATIENT NAME: Phineas Gosselin    :  1960  MRN: [id number]  Pt Location: [facility] OR ROOM 05    SURGERY DATE: 2022    Surgeon(s) and Role:  Panel 1:     * Cuauhtemoc March MD - Primary     * Ellie Olivo MD - Assisting      Preop Diagnosis:  Ductal carcinoma in situ (DCIS) of right breast [D05 11]  Atypical lobular hyperplasia [facility]) of right breast [N60 91]  Atypical ductal hyperplasia of right breast [N60 91]    Post-Op Diagnosis Codes:     * Ductal carcinoma in situ (DCIS) of right breast [D05 11]     * Atypical lobular hyperplasia ([place]) of right breast [N60 91]     * Atypical ductal hyperplasia of right breast [N60 91]    Procedure(s) (LRB):  MASTECTOMY WITH BIOPSY LYMPH NODE SENTINEL,Lymphoscintigraphy,Lymphatic Mapping; 1100 NUC MED (Right)  injection of blue dye  Use of gamma probe    Specimen(s):  ID Type Source Tests Collected by Time Destination   1 : RIGHT BREAST Tissue Breast, Right TISSUE EXAM Cuauhtemoc March MD 2022 1507    2 : RIGHT SENTINEL LYMPH NODE #1 Tissue Lymph Node, [place] TISSUE EXAM Cuauhtemoc March MD 2022 1507    3 : RIGHT SENTINEL LYMPH NODE #2 Tissue Lymph Node, [place] TISSUE EXAM Cuauhtemoc March MD 2022 1559        Estimated Blood Loss:   Minimal    Drains:  * No LDAs found *    Anesthesia Type:   General    Operative Indications:  Ductal carcinoma in situ (DCIS) of right breast [D05 11]  Atypical lobular hyperplasia ([facility]) of right breast [N60 91]  Atypical ductal hyperplasia of right breast [N60 91]      Operative Findings:  n/a    Complications:   None    Procedure and Technique:  Ramirez Joy is a 58-year-old female who presented with DCIS as well as atypical duct and lobular hyperplasia of the right breast   She was counseled on a generous lumpectomy with oncoplastic reduction mammoplasty verses mastectomy with reconstruction  She opted for the latter    She presented the day of surgery to the radiology suite and underwent lymphoscintigraphy of the right breast   From there she went to the operating room  She was administered general anesthesia  She had a 5 cc injection of Lymphazurin into the right subareolar plexus  She was prepped and draped in the usual standard fashion  Time-out was performed  0 5% Marcaine plain was injected for local anesthesia  Elliptical incision was created around the nipple-areolar complex  Electrocautery was used to dissect her mastectomy flaps to the level of the clavicle superior, to the sternal border medial, to the inframammary fold inferior and to the edge of the latissimus dorsi muscle lateral   The breast was then partially excised from the underlying pectoralis including the pectoralis fascia  In the upper outer aspect, the clavipectoral fascia was incised to expose the axillary fat pad  There was a blue lymphatic channel that traced to two adjacent radioactive nodes  Hemoclips were placed on draining vessels  The nodes were excised, divided and submitted separately sentinel node one and two of the right axilla  The remaining portion of the breast was then excised from the underlying pectoralis including the pectoralis fascia  This was marked with a short stitch superior and a long stitch lateral   This was submitted to pathology in formalin  Her wound irrigated and hemostasis was achieved  The patient remained in hemodynamically stable condition for the remaining portion of her procedure which will be dictated by Dr Nhi Hassan        Patient Disposition:  hemodynamically stable      SIGNATURE: Suha Kwan MD  DATE: September 9, 2022  TIME: 4:17 PM

## 2022-09-09 NOTE — OP NOTE
OPERATIVE REPORT  PATIENT NAME: Mallika Veronica    :  1960  MRN: [id number]  Pt Location: [facility] OR ROOM 05    SURGERY DATE: 2022    Surgeon(s) and Role:  Bucky Gillette,  - Primary    Preop Diagnosis:  Ductal carcinoma in situ (DCIS) of right breast [D05 11]  Atypical lobular hyperplasia (ALH) of right breast [N60 91]  Atypical ductal hyperplasia of right breast [N60 91]    Post-Op Diagnosis Codes:     * Ductal carcinoma in situ (DCIS) of right breast [D05 11]     * Atypical lobular hyperplasia (ALH) of right breast [N60 91]     * Atypical ductal hyperplasia of right breast [N60 91]    Procedure(s) (LRB):  MASTECTOMY WITH BIOPSY LYMPH NODE SENTINEL,Lymphoscintigraphy,Lymphatic Mapping; 1100 NUC MED (Right)  IMMEDIATE BREAST RECONSTRUCTION WITH TISSUE EXPANDER AND  GALAFLEX (Right)    Specimen(s):  ID Type Source Tests Collected by Time Destination   1 : RIGHT BREAST Tissue Breast, Right TISSUE EXAM Samantha Vazquez MD 2022  3:07 PM    2 : RIGHT SENTINEL LYMPH NODE #1 Tissue Lymph Node, [place] TISSUE EXAM Samantha Vazquez MD 2022  3:07 PM    3 : RIGHT SENTINEL LYMPH NODE #2 Tissue Lymph Node, [place] TISSUE EXAM Samantha Vazquez MD 2022  3:59 PM        Estimated Blood Loss:   50 ml    Drains:  * No LDAs found *    Anesthesia Type:   General    Operative Indications:  56 yo female presents with right breast DCIS for mastectomy, SLNB and immediate reconstruction  Operative Findings:  Right mentor smooth tissue expander 500 cc SN [id number]    Complications:   None    Procedure and Technique:  The patient was seen preoperatively  The procedure, risks, benefits and alternatives were discussed  Informed consent was obtained  The patient was site marked preoperatively  The patient was brought to the operating room where she was positioned supine with all of her pressure points appropriately padded  Anesthesia commenced  A timeout was performed and verified       Dr Teja Jimenez completed the mastectomy portion of the procedure  The skin flaps were robust and appeared well perfused  I then began the reconstructive portion of the case on the right  The patient was reprepped and draped in usual sterile fashion  The defect was inspected and meticulous hemostasis was obtained  The defect was copiously irrigated with triple antibiotic solution and irrisept solution  A pec block was performed using exparel  2-0 PDS was used to reinforce/reconstruct the inframammary fold  Next, the galaflex was tacked along the medial border using 2-0 PDS  The tissue expander was prepared on the back table  All of the air was aspirated and it was filled with 100 ml of saline  The tissue expander was sutured to the chest wall using 2-0 prolene on the suture tabs  The remainder of the galaflex was used to cover the tissue expander anteriorly and was sutured in place using 2-0 PDS along the inframammary fold and lateral breast border  1, 15 Fr EFFIE drain was brought out through the inframammary fold and secured in place using 2-0 nylon  The remaining wound was closed in layers, using 3-0 monocryl for the deep dermis and running 4-0 PDS for the skin  The area was cleansed and skin glue was applied  After this was dry, steristrips and a surgical bra was applied  The instrument, sponge and needle count was correct an verified prior to completion of the case  The patient emerged from anesthesia and was transferred to the recovery room in stable condition        Patient Disposition:  PACU       SIGNATURE: Tien Trinidad DO  DATE: September 9, 2022  TIME: 5:51 PM

## 2022-09-09 NOTE — ANESTHESIA PREPROCEDURE EVALUATION
Procedure:  MASTECTOMY WITH BIOPSY LYMPH NODE SENTINEL,Lymphoscintigraphy,Lymphatic Mapping; 1100 NUC MED (Right Breast)  RIGHT TISSUE EXPANDER, IMMEDIATE BREAT RECON (Right Breast)    Relevant Problems   CARDIO   (+) Hyperlipidemia   (+) Hypertension      ENDO   (+) Hypothyroidism      GI/HEPATIC   (+) Gastroesophageal reflux disease without esophagitis      HEMATOLOGY   (+) Thrombocytopenia (HCC)      MUSCULOSKELETAL   (+) Low back pain   (+) TMJ arthritis      NEURO/PSYCH   (+) Anxiety   (+) Tension type headache        Physical Exam    Airway       Dental   No notable dental hx     Cardiovascular  Rhythm: regular, Rate: normal, Cardiovascular exam normal    Pulmonary  Pulmonary exam normal Breath sounds clear to auscultation,     Other Findings        Anesthesia Plan  ASA Score- 2     Anesthesia Type- general with ASA Monitors  Additional Monitors:   Airway Plan: ETT  Plan Factors-Exercise tolerance (METS): >4 METS  Chart reviewed  EKG reviewed  Existing labs reviewed  Patient summary reviewed  Patient is not a current smoker  Induction- intravenous  Postoperative Plan- Plan for postoperative opioid use  Planned trial extubation    Informed Consent- Anesthetic plan and risks discussed with patient and spouse

## 2022-09-10 DIAGNOSIS — K21.9 GASTROESOPHAGEAL REFLUX DISEASE WITHOUT ESOPHAGITIS: ICD-10-CM

## 2022-09-10 PROCEDURE — NC001 PR NO CHARGE: Performed by: SURGERY

## 2022-09-10 PROCEDURE — 99024 POSTOP FOLLOW-UP VISIT: CPT | Performed by: STUDENT IN AN ORGANIZED HEALTH CARE EDUCATION/TRAINING PROGRAM

## 2022-09-10 RX ORDER — FAMOTIDINE 20 MG/1
20 TABLET, FILM COATED ORAL DAILY
Status: DISCONTINUED | OUTPATIENT
Start: 2022-09-11 | End: 2022-09-11 | Stop reason: HOSPADM

## 2022-09-10 RX ORDER — POTASSIUM CHLORIDE 20 MEQ/1
20 TABLET, EXTENDED RELEASE ORAL DAILY
Status: DISCONTINUED | OUTPATIENT
Start: 2022-09-11 | End: 2022-09-11 | Stop reason: HOSPADM

## 2022-09-10 RX ORDER — HYDROMORPHONE HCL/PF 1 MG/ML
0.5 SYRINGE (ML) INJECTION EVERY 6 HOURS PRN
Status: DISCONTINUED | OUTPATIENT
Start: 2022-09-10 | End: 2022-09-11 | Stop reason: HOSPADM

## 2022-09-10 RX ORDER — SENNOSIDES 8.6 MG
2 TABLET ORAL
Status: DISCONTINUED | OUTPATIENT
Start: 2022-09-10 | End: 2022-09-11 | Stop reason: HOSPADM

## 2022-09-10 RX ORDER — FAMOTIDINE 20 MG/1
TABLET, FILM COATED ORAL
Qty: 90 TABLET | Refills: 1 | Status: SHIPPED | OUTPATIENT
Start: 2022-09-10 | End: 2022-09-21 | Stop reason: SDUPTHER

## 2022-09-10 RX ADMIN — TRAZODONE HYDROCHLORIDE 50 MG: 50 TABLET ORAL at 21:28

## 2022-09-10 RX ADMIN — ACETAMINOPHEN 650 MG: 325 TABLET ORAL at 20:09

## 2022-09-10 RX ADMIN — OXYCODONE 5 MG: 5 TABLET ORAL at 19:50

## 2022-09-10 RX ADMIN — CLINDAMYCIN IN 5 PERCENT DEXTROSE 600 MG: 12 INJECTION, SOLUTION INTRAVENOUS at 05:04

## 2022-09-10 RX ADMIN — ACETAMINOPHEN 650 MG: 325 TABLET ORAL at 01:08

## 2022-09-10 RX ADMIN — ACETAMINOPHEN 650 MG: 325 TABLET ORAL at 14:40

## 2022-09-10 RX ADMIN — GABAPENTIN 300 MG: 300 CAPSULE ORAL at 14:40

## 2022-09-10 RX ADMIN — HYDROMORPHONE HYDROCHLORIDE 0.5 MG: 1 INJECTION, SOLUTION INTRAMUSCULAR; INTRAVENOUS; SUBCUTANEOUS at 05:04

## 2022-09-10 RX ADMIN — SENNOSIDES 17.2 MG: 8.6 TABLET, FILM COATED ORAL at 21:28

## 2022-09-10 RX ADMIN — HYDROMORPHONE HYDROCHLORIDE 0.5 MG: 1 INJECTION, SOLUTION INTRAMUSCULAR; INTRAVENOUS; SUBCUTANEOUS at 01:08

## 2022-09-10 RX ADMIN — ACETAMINOPHEN 650 MG: 325 TABLET ORAL at 08:46

## 2022-09-10 RX ADMIN — SERTRALINE HYDROCHLORIDE 100 MG: 100 TABLET ORAL at 08:46

## 2022-09-10 RX ADMIN — OXYCODONE 5 MG: 5 TABLET ORAL at 08:46

## 2022-09-10 RX ADMIN — TRIAMTERENE AND HYDROCHLOROTHIAZIDE 1 TABLET: 37.5; 25 TABLET ORAL at 08:52

## 2022-09-10 RX ADMIN — OXYCODONE 5 MG: 5 TABLET ORAL at 14:40

## 2022-09-10 RX ADMIN — GABAPENTIN 300 MG: 300 CAPSULE ORAL at 20:09

## 2022-09-10 RX ADMIN — GABAPENTIN 300 MG: 300 CAPSULE ORAL at 08:46

## 2022-09-10 RX ADMIN — LEVOTHYROXINE SODIUM 75 MCG: 75 TABLET ORAL at 05:04

## 2022-09-10 NOTE — PLAN OF CARE
Problem: PAIN - ADULT  Goal: Verbalizes/displays adequate comfort level or baseline comfort level  Description: Interventions:  - Encourage patient to monitor pain and request assistance  - Assess pain using appropriate pain scale  - Administer analgesics based on type and severity of pain and evaluate response  - Implement non-pharmacological measures as appropriate and evaluate response  - Consider cultural and social influences on pain and pain management  - Notify physician/advanced practitioner if interventions unsuccessful or patient reports new pain  Outcome: Progressing     Problem: INFECTION - ADULT  Goal: Absence or prevention of progression during hospitalization  Description: INTERVENTIONS:  - Assess and monitor for signs and symptoms of infection  - Monitor lab/diagnostic results  - Monitor all insertion sites, i e  indwelling lines, tubes, and drains  - Monitor endotracheal if appropriate and nasal secretions for changes in amount and color  - [place] appropriate cooling/warming therapies per order  - Administer medications as ordered  - Instruct and encourage patient and family to use good hand hygiene technique  - Identify and instruct in appropriate isolation precautions for identified infection/condition  Outcome: Progressing  Goal: Absence of fever/infection during neutropenic period  Description: INTERVENTIONS:  - Monitor WBC    Outcome: Progressing     Problem: SAFETY ADULT  Goal: Patient will remain free of falls  Description: INTERVENTIONS:  - Educate patient/family on patient safety including physical limitations  - Instruct patient to call for assistance with activity   - Consult OT/PT to assist with strengthening/mobility   - Keep Call bell within reach  - Keep bed low and locked with side rails adjusted as appropriate  - Keep care items and personal belongings within reach  - Initiate and maintain comfort rounds  - Make Fall Risk Sign visible to staff  - Offer Toileting every  Hours, in advance of need  - Initiate/Maintain alarm  - Obtain necessary fall risk management equipment:   - Apply yellow socks and bracelet for high fall risk patients  - Consider moving patient to room near nurses station  Outcome: Progressing  Goal: Maintain or return to baseline ADL function  Description: INTERVENTIONS:  -  Assess patient's ability to carry out ADLs; assess patient's baseline for ADL function and identify physical deficits which impact ability to perform ADLs (bathing, care of mouth/teeth, toileting, grooming, dressing, etc )  - Assess/evaluate cause of self-care deficits   - Assess range of motion  - Assess patient's mobility; develop plan if impaired  - Assess patient's need for assistive devices and provide as appropriate  - Encourage maximum independence but intervene and supervise when necessary  - Involve family in performance of ADLs  - Assess for home care needs following discharge   - Consider OT consult to assist with ADL evaluation and planning for discharge  - Provide patient education as appropriate  Outcome: Progressing  Goal: Maintains/Returns to pre admission functional level  Description: INTERVENTIONS:  - Perform BMAT or MOVE assessment daily    - Set and communicate daily mobility goal to care team and patient/family/caregiver  - Collaborate with rehabilitation services on mobility goals if consulted  - Perform Range of Motion  times a day  - Reposition patient every  hours    - Dangle patient  times a day  - Stand patient  times a day  - Ambulate patient  times a day  - Out of bed to chair  times a day   - Out of bed for meal times a day  - Out of bed for toileting  - Record patient progress and toleration of activity level   Outcome: Progressing     Problem: DISCHARGE PLANNING  Goal: Discharge to home or other facility with appropriate resources  Description: INTERVENTIONS:  - Identify barriers to discharge w/patient and caregiver  - Arrange for needed discharge resources and transportation as appropriate  - Identify discharge learning needs (meds, wound care, etc )  - Arrange for interpretive services to assist at discharge as needed  - Refer to Case Management Department for coordinating discharge planning if the patient needs post-hospital services based on physician/advanced practitioner order or complex needs related to functional status, cognitive ability, or social support system  Outcome: Progressing

## 2022-09-10 NOTE — UTILIZATION REVIEW
Initial Clinical Review    [facility] 9/9 1437 changed to inpatient 9/10 1449  Pt requiring continued stay due to continued need for pain meds and IV fluids  Elective Inpatient surgical procedure  Age/Sex: 57 y o  female  Surgery Date: 9/9  Procedure: MASTECTOMY WITH BIOPSY LYMPH NODE SENTINEL,Lymphoscintigraphy,Lymphatic Mapping; 1100 NUC MED (Right)  IMMEDIATE BREAST RECONSTRUCTION WITH TISSUE EXPANDER AND  GALAFLEX (Right)  Anesthesia: General  Operative Findings: N/A    POD#1 Progress Note: Regular diet, R breast EFFIE-monitor output and character, IV fluids until tolerating adequate PO, wean nasal cannula, pain control  Date: 9/11 POD #2 Progress Note: Regular diet, right breast EFFIE; monitor output in character, pain control, encourage ambulation       Admission Orders: Date/Time/Statement:   Admission Orders (From admission, onward)     Ordered        09/10/22 1450  Inpatient Admission  Once                      Orders Placed This Encounter   Procedures    Inpatient Admission     Standing Status:   Standing     Number of Occurrences:   1     Order Specific Question:   Level of Care     Answer:   Med Surg [16]     Order Specific Question:   Estimated length of stay     Answer:   Inpatient Only Surgery     Vital Signs: /62   Pulse 83   Temp 98 2 °F (36 8 °C)   Resp 18   Ht 5' 2" (1 575 m)   Wt 77 kg (169 lb 12 1 oz)   SpO2 93%   BMI 31 05 kg/m²     Pertinent Labs/Diagnostic Test Results:   No orders to display         Diet: Regular 4 gm Na  Mobility: Ambulate as tolerated  DVT Prophylaxis: SCD    Medications/Pain Control:   Scheduled Medications:  acetaminophen, 650 mg, Oral, Q6H  gabapentin, 300 mg, Oral, TID  levothyroxine, 75 mcg, Oral, Early Morning  sertraline, 100 mg, Oral, Daily  traZODone, 50 mg, Oral, HS  triamterene-hydrochlorothiazide, 1 tablet, Oral, QAM      Continuous IV Infusions:  lactated ringers, 75 mL/hr, Intravenous, Continuous  sodium chloride, 125 mL/hr, Intravenous, Continuous      PRN Meds:  ALPRAZolam, 0 25 mg, Oral, BID PRN  HYDROmorphone, 0 5 mg, Intravenous, Q6H PRN x1 9/9, x2 9/10 thus far  ondansetron, 4 mg, Intravenous, Q6H PRN  oxyCODONE, 5 mg, Oral, Q4H PRN x2 9/10 thus far        Network Utilization Review Department  ATTENTION: Please call with any questions or concerns to [phone] and carefully listen to the prompts so that you are directed to the right person  All voicemails are confidential   Brandon Calhoun all requests for admission clinical reviews, approved or denied determinations and any other requests to dedicated fax number below belonging to the campus where the patient is receiving treatment   List of dedicated fax numbers for the Facilities:  [address] [place] [address] DENIALS (Administrative/Medical Necessity) [phone]   [address] (Maternity/NICU/Pediatrics) [phone]   [address]  [address] [place] [address] [place] [address] [address] [place] [address] [address]admin[address] [facility] [place] [address] [phone]

## 2022-09-10 NOTE — PROGRESS NOTES
Progress Note - Surgical Oncology   Janelle Plate 57 y o  female MRN: [id number]  Unit/Bed#: [place] [address] -01 Encounter: [id number]    Assessment:  55yoF w R DCIS, now POD1 s/p R mastectomy, SLNB, R breast reconstruction w tissue expander on 09/09    Vitals stable, afebrile, 2L NC  Labs pending    R breast EFFIE 150 dark serosanguineous      Plan:  - regular diet  - right breast EFFIE, monitor output and character  - Gaye@[employer] until tolerating adequate p o   - wean nasal cannula  - DVT PPX  - pain control  - encourage ambulation  - incentive spirometry  - possible discharge today    Subjective/Objective   Subjective:   Reports some pain in the right breast area, also some right arm pain, tolerating diet without nausea or emesis, using incentive spirometry, denies any shortness of breath or chest pain, not yet out of bed ambulating  Voiding without issues  Objective:     Blood pressure 93/58, pulse 66, temperature 97 9 °F (36 6 °C), resp  rate 18, height 5' 2" (1 575 m), weight 77 kg (169 lb 12 1 oz), SpO2 97 %, not currently breastfeeding  ,Body mass index is 31 05 kg/m²  Intake/Output Summary (Last 24 hours) at 9/10/2022 0657  Last data filed at 9/10/2022 0504  Gross per 24 hour   Intake 2780 ml   Output 775 ml   Net 2005 ml       Invasive Devices  Report    Peripheral Intravenous Line  Duration           Peripheral IV 09/09/22 Dorsal (posterior); Left Wrist <1 day          Drain  Duration           Closed/Suction Drain Lateral;Right Breast Bulb 15 Fr  <1 day                Physical Exam:   General: NAD  Skin: Warm, dry, anicteric  HEENT: Normocephalic, atraumatic  CV: RRR, no m/r/g  Pulm: CTA b/l, no inc WOB  Abd: Soft, ND/NT  MSK:  Right breast EFFIE dark serosanguineous, right breast incision clean dry and intact with Steri-Strips, tender around incision  Neuro: AOx3, GCS 15    Lab, Imaging and other studies:I have personally reviewed pertinent lab results    , CBC: No results found for: WBC, HGB, HCT, MCV, PLT, ADJUSTEDWBC, MCH, MCHC, RDW, MPV, NRBC, CMP: No results found for: SODIUM, K, CL, CO2, ANIONGAP, BUN, CREATININE, GLUCOSE, CALCIUM, AST, ALT, ALKPHOS, PROT, BILITOT, EGFR  VTE Pharmacologic Prophylaxis: Sequential compression device (Venodyne)  and Enoxaparin (Lovenox)  VTE Mechanical Prophylaxis: sequential compression device

## 2022-09-10 NOTE — PROGRESS NOTES
Called that patient concerned for implant migration  Exam stable  Tissue expander in place  No s/s of hematoma  Expectant ecchymosis  Daniela Craig, DO  Plastic and Reconstructive Surgery

## 2022-09-10 NOTE — PROGRESS NOTES
Patient seen and examined  No issues overnight  Patient very anxious about going home today  Has not been OOB  NAD, resting comfortably  Expectant ecchymosis, skin flaps healthy, incision c/d/i, EFFIE output dark serosanguinous given surgical along axillary base    Patient POD#1 s/p right mastectomy and TE recon    Patient requesting to stay additional night  Patient MUST ambulate  IS  Continue ERAS protocol and PO medications, IV only for breakthrough pain  DC IV once tolerating PO    Daniela A   Kiara, DO  Plastic and Reconstructive Surgery

## 2022-09-10 NOTE — QUICK NOTE
S: Shruthi Schilling is a 57 y o  female who returns to the floor s/p R mastectomy, SLNB, R breast reconstruction w tissue expander  EBL minimal   Patient tolerated the procedure well, without complications, was extubated in the OR, returned to PACU in stable condition  Patient reports pain mainly in the right breast near her incision, also reports her right arm is somewhat sore  Drinking water postoperatively, denies any nausea or emesis  Denies any chest pain or shortness of breath, denies any dizziness or lightheadedness  Voiding without issues      O:    Vitals:    09/09/22 1921   BP: 119/71   Pulse: 92   Resp: 18   Temp: (!) 97 2 °F (36 2 °C)   SpO2: 98%     General: NAD  Skin: Warm, dry, anicteric  HEENT: Normocephalic, atraumatic  CV: RRR, no m/r/g  Pulm: CTA b/l, no inc WOB, 1L NC  Abd: Soft, ND/NT  MSK: R breast incision clean dry and intact, dressing present, EFFIE with dark serosanguineous output  Neuro: AOx3, GCS 15    A: 62yoF w R DCIS, now POD0 s/p R mastectomy, SLNB, R breast reconstruction w tissue expander on 09/09    P:  - regular diet  - right breast EFFIE, monitor output and character  - Jeimy@[employer] until tolerating adequate p o   - DVT PPX  - pain control  - encourage ambulation  - incentive spirometry

## 2022-09-10 NOTE — PLAN OF CARE
Problem: PAIN - ADULT  Goal: Verbalizes/displays adequate comfort level or baseline comfort level  Description: Interventions:  - Encourage patient to monitor pain and request assistance  - Assess pain using appropriate pain scale  - Administer analgesics based on type and severity of pain and evaluate response  - Implement non-pharmacological measures as appropriate and evaluate response  - Consider cultural and social influences on pain and pain management  - Notify physician/advanced practitioner if interventions unsuccessful or patient reports new pain  Outcome: Progressing     Problem: INFECTION - ADULT  Goal: Absence or prevention of progression during hospitalization  Description: INTERVENTIONS:  - Assess and monitor for signs and symptoms of infection  - Monitor lab/diagnostic results  - Monitor all insertion sites, i e  indwelling lines, tubes, and drains  - Monitor endotracheal if appropriate and nasal secretions for changes in amount and color  - [place] appropriate cooling/warming therapies per order  - Administer medications as ordered  - Instruct and encourage patient and family to use good hand hygiene technique  - Identify and instruct in appropriate isolation precautions for identified infection/condition  Outcome: Progressing  Goal: Absence of fever/infection during neutropenic period  Description: INTERVENTIONS:  - Monitor WBC    Outcome: Progressing     Problem: SAFETY ADULT  Goal: Patient will remain free of falls  Description: INTERVENTIONS:  - Educate patient/family on patient safety including physical limitations  - Instruct patient to call for assistance with activity   - Consult OT/PT to assist with strengthening/mobility   - Keep Call bell within reach  - Keep bed low and locked with side rails adjusted as appropriate  - Keep care items and personal belongings within reach  - Initiate and maintain comfort rounds  - Make Fall Risk Sign visible to staff  - Offer Toileting every  Hours, in advance of need  - Initiate/Maintain alarm  - Obtain necessary fall risk management equipment:   - Apply yellow socks and bracelet for high fall risk patients  - Consider moving patient to room near nurses station  Outcome: Progressing  Goal: Maintain or return to baseline ADL function  Description: INTERVENTIONS:  -  Assess patient's ability to carry out ADLs; assess patient's baseline for ADL function and identify physical deficits which impact ability to perform ADLs (bathing, care of mouth/teeth, toileting, grooming, dressing, etc )  - Assess/evaluate cause of self-care deficits   - Assess range of motion  - Assess patient's mobility; develop plan if impaired  - Assess patient's need for assistive devices and provide as appropriate  - Encourage maximum independence but intervene and supervise when necessary  - Involve family in performance of ADLs  - Assess for home care needs following discharge   - Consider OT consult to assist with ADL evaluation and planning for discharge  - Provide patient education as appropriate  Outcome: Progressing  Goal: Maintains/Returns to pre admission functional level  Description: INTERVENTIONS:  - Perform BMAT or MOVE assessment daily    - Set and communicate daily mobility goal to care team and patient/family/caregiver  - Collaborate with rehabilitation services on mobility goals if consulted  - Perform Range of Motion  times a day  - Reposition patient every  hours    - Dangle patient  times a day  - Stand patient  times a day  - Ambulate patient  times a day  - Out of bed to chair  times a day   - Out of bed for meals  times a day  - Out of bed for toileting  - Record patient progress and toleration of activity level   Outcome: Progressing     Problem: DISCHARGE PLANNING  Goal: Discharge to home or other facility with appropriate resources  Description: INTERVENTIONS:  - Identify barriers to discharge w/patient and caregiver  - Arrange for needed discharge resources and transportation as appropriate  - Identify discharge learning needs (meds, wound care, etc )  - Arrange for interpretive services to assist at discharge as needed  - Refer to Case Management Department for coordinating discharge planning if the patient needs post-hospital services based on physician/advanced practitioner order or complex needs related to functional status, cognitive ability, or social support system  Outcome: Progressing     Problem: Knowledge Deficit  Goal: Patient/family/caregiver demonstrates understanding of disease process, treatment plan, medications, and discharge instructions  Description: Complete learning assessment and assess knowledge base    Interventions:  - Provide teaching at level of understanding  - Provide teaching via preferred learning methods  Outcome: Progressing     Problem: Potential for Falls  Goal: Patient will remain free of falls  Description: INTERVENTIONS:  - Educate patient/family on patient safety including physical limitations  - Instruct patient to call for assistance with activity   - Consult OT/PT to assist with strengthening/mobility   - Keep Call bell within reach  - Keep bed low and locked with side rails adjusted as appropriate  - Keep care items and personal belongings within reach  - Initiate and maintain comfort rounds  - Make Fall Risk Sign visible to staff  - Offer Toileting every  Hours, in advance of need  - Initiate/Maintain alarm  - Obtain necessary fall risk management equipment:   - Apply yellow socks and bracelet for high fall risk patients  - Consider moving patient to room near nurses station  Outcome: Progressing

## 2022-09-11 VITALS
HEART RATE: 64 BPM | HEIGHT: 62 IN | WEIGHT: 169.31 LBS | DIASTOLIC BLOOD PRESSURE: 63 MMHG | RESPIRATION RATE: 15 BRPM | TEMPERATURE: 98.8 F | OXYGEN SATURATION: 96 % | SYSTOLIC BLOOD PRESSURE: 103 MMHG | BODY MASS INDEX: 31.16 KG/M2

## 2022-09-11 PROCEDURE — 99024 POSTOP FOLLOW-UP VISIT: CPT | Performed by: STUDENT IN AN ORGANIZED HEALTH CARE EDUCATION/TRAINING PROGRAM

## 2022-09-11 PROCEDURE — NC001 PR NO CHARGE: Performed by: SURGERY

## 2022-09-11 RX ADMIN — OXYCODONE 5 MG: 5 TABLET ORAL at 10:14

## 2022-09-11 RX ADMIN — TRIAMTERENE AND HYDROCHLOROTHIAZIDE 1 TABLET: 37.5; 25 TABLET ORAL at 08:44

## 2022-09-11 RX ADMIN — SERTRALINE HYDROCHLORIDE 100 MG: 100 TABLET ORAL at 08:44

## 2022-09-11 RX ADMIN — GABAPENTIN 300 MG: 300 CAPSULE ORAL at 08:44

## 2022-09-11 RX ADMIN — LEVOTHYROXINE SODIUM 75 MCG: 75 TABLET ORAL at 06:08

## 2022-09-11 RX ADMIN — ACETAMINOPHEN 650 MG: 325 TABLET ORAL at 02:15

## 2022-09-11 RX ADMIN — OXYCODONE 5 MG: 5 TABLET ORAL at 06:08

## 2022-09-11 RX ADMIN — OXYCODONE 5 MG: 5 TABLET ORAL at 02:15

## 2022-09-11 RX ADMIN — POTASSIUM CHLORIDE 20 MEQ: 1500 TABLET, EXTENDED RELEASE ORAL at 08:44

## 2022-09-11 RX ADMIN — FAMOTIDINE 20 MG: 20 TABLET ORAL at 08:44

## 2022-09-11 RX ADMIN — ACETAMINOPHEN 650 MG: 325 TABLET ORAL at 08:43

## 2022-09-11 NOTE — DISCHARGE SUMMARY
Discharge Summary    Shruthi Schilling 57 y o  female MRN: [id number]  Unit/Bed#: [place] [address] -01 Encounter: [id number]    Admission/Discharge Date: 9/9-11/2022    Admitting Diagnosis: Ductal carcinoma in situ (DCIS) of right breast [D05 11]  Atypical lobular hyperplasia (ALH) of right breast [N60 91]  Atypical ductal hyperplasia of right breast [N60 91]    HPI: 57y o  year old female presents with right breast CA  Procedures Performed: See operative report    Hospital Course: 57y o  year old female presented for and underwent the aforementioned procedure  Her postoperative course was uncomplicated  She was tolerating PO and pain was well controlled with oral medications  She was amenable to dismissal home to the care of her family  Complications: None    Discharge Diagnosis: Ductal carcinoma in situ (DCIS) of right breast [D05 11]  Atypical lobular hyperplasia (ALH) of right breast [N60 91]  Atypical ductal hyperplasia of right breast [N60 91]    Condition at Discharge: stable     Discharge instructions/Information to patient and family:   See after visit summary for information provided to patient and family  Provisions for Follow-Up Care:  See after visit summary for information related to follow-up care and any pertinent home health orders  Disposition: Home      Planned Readmission: No    Discharge Statement   I spent 15 minutes discharging the patient  This time was spent on the day of discharge  I had direct contact with the patient on the day of discharge  Additional documentation is required if more than 30 minutes were spent on discharge  Discharge Medications:  See after visit summary for reconciled discharge medications provided to patient and family  Upon dismissal, NAD, skin flaps healthy, expectant edema/ecchymosis, EFFIE output serosanguinous and dark d/t surgical in axillary base

## 2022-09-11 NOTE — DISCHARGE INSTR - AVS FIRST PAGE
Surgery Date: 9/9/2022                Patient: Baron Underwood  Surgeon: Chad Babb, DO     Postoperative Instructions   Breast Reconstruction     Dressings:  [x] Skin glue was applied to your incision over absorbable sutures  You may feel small pieces of suture at the ends of your incision  [x] Remove dressing the second morning following your surgery and bathe as directed  Please leave tegaderm (clear dressing over your drains) in place  [x] No dressings are required but you may cover the incision with gauze for comfort  [x] Wear your surgical bra at all times except while showering  [x] Strip and record your EFFIE drain output as instructed  Be sure to bring the output log to your follow up appointment  [] Other instructions:      Bathing:  [x] Shower 48 hours after surgery  Allow soap and water to gently wash over the incision  No scrubbing  Gently pat dry and apply dressing as needed/instructed above  [x] No submerging incision in bathtub, pool, hot tub and/or lake  Activity:  [x] No heavy lifting (> 10lbs)  [x] No strenuous exercise  [x] Walking is mandatory daily  [x] Sleeping may be more comfortable with your head elevated  [x] No driving until off pain medications and you have resumed full range of motion  Diet and Medication:  [x] Resume diet as tolerated  High protein diet is important for healing  Remain well hydrated with water and minimize sodium intake  [x] Resume preoperative medications  [x] Pain medications as prescribed  You may also begin to use ibuprofen 48 hours after surgery  [x] Finish all antibiotics as prescribed  [x] Apply ice to area as needed for pain  Do not place ice directly on skin  Do not use heat  [] Other instructions: It is expected to have some bruising, swelling and mild oozing at the incision site and of the surrounding area    If there is more than you expect, an enlarging area or you suspect an infection, please call the office  Some patients may experience a low-grade fever after surgery  If it is above 100 4, please call the office  If you do not have a postoperative office appointment scheduled, please call the office today and let the staff know Dr Jeffry Stahl needs to see you in 7 days  Please call [phone] with any questions, concerns or changes        EFFIE Drain Output Log     Date Time Drain #1 Drain #2

## 2022-09-11 NOTE — PLAN OF CARE
Problem: PAIN - ADULT  Goal: Verbalizes/displays adequate comfort level or baseline comfort level  Description: Interventions:  - Encourage patient to monitor pain and request assistance  - Assess pain using appropriate pain scale  - Administer analgesics based on type and severity of pain and evaluate response  - Implement non-pharmacological measures as appropriate and evaluate response  - Consider cultural and social influences on pain and pain management  - Notify physician/advanced practitioner if interventions unsuccessful or patient reports new pain  Outcome: Progressing     Problem: INFECTION - ADULT  Goal: Absence or prevention of progression during hospitalization  Description: INTERVENTIONS:  - Assess and monitor for signs and symptoms of infection  - Monitor lab/diagnostic results  - Monitor all insertion sites, i e  indwelling lines, tubes, and drains  - Monitor endotracheal if appropriate and nasal secretions for changes in amount and color  - Yarnell appropriate cooling/warming therapies per order  - Administer medications as ordered  - Instruct and encourage patient and family to use good hand hygiene technique  - Identify and instruct in appropriate isolation precautions for identified infection/condition  Outcome: Progressing  Goal: Absence of fever/infection during neutropenic period  Description: INTERVENTIONS:  - Monitor WBC    Outcome: Progressing     Problem: SAFETY ADULT  Goal: Patient will remain free of falls  Description: INTERVENTIONS:  - Educate patient/family on patient safety including physical limitations  - Instruct patient to call for assistance with activity   - Consult OT/PT to assist with strengthening/mobility   - Keep Call bell within reach  - Keep bed low and locked with side rails adjusted as appropriate  - Keep care items and personal belongings within reach  - Initiate and maintain comfort rounds  - Make Fall Risk Sign visible to staff  - Offer Toileting every 2 Hours, in advance of need  - Apply yellow socks and bracelet for high fall risk patients  - Consider moving patient to room near nurses station  Outcome: Progressing  Goal: Maintain or return to baseline ADL function  Description: INTERVENTIONS:  -  Assess patient's ability to carry out ADLs; assess patient's baseline for ADL function and identify physical deficits which impact ability to perform ADLs (bathing, care of mouth/teeth, toileting, grooming, dressing, etc )  - Assess/evaluate cause of self-care deficits   - Assess range of motion  - Assess patient's mobility; develop plan if impaired  - Assess patient's need for assistive devices and provide as appropriate  - Encourage maximum independence but intervene and supervise when necessary  - Involve family in performance of ADLs  - Assess for home care needs following discharge   - Consider OT consult to assist with ADL evaluation and planning for discharge  - Provide patient education as appropriate  Outcome: Progressing  Goal: Maintains/Returns to pre admission functional level  Description: INTERVENTIONS:  - Perform BMAT or MOVE assessment daily    - Set and communicate daily mobility goal to care team and patient/family/caregiver  - Collaborate with rehabilitation services on mobility goals if consulted  - Perform Range of Motion 3 times a day  - Reposition patient every 2 hours    - Dangle patient 3 times a day  - Stand patient 3 times a day  - Ambulate patient 3 times a day  - Out of bed to chair 3 times a day   - Out of bed for meals 3 times a day  - Out of bed for toileting  - Record patient progress and toleration of activity level   Outcome: Progressing     Problem: DISCHARGE PLANNING  Goal: Discharge to home or other facility with appropriate resources  Description: INTERVENTIONS:  - Identify barriers to discharge w/patient and caregiver  - Arrange for needed discharge resources and transportation as appropriate  - Identify discharge learning needs (meds, wound care, etc )  - Arrange for interpretive services to assist at discharge as needed  - Refer to Case Management Department for coordinating discharge planning if the patient needs post-hospital services based on physician/advanced practitioner order or complex needs related to functional status, cognitive ability, or social support system  Outcome: Progressing     Problem: Knowledge Deficit  Goal: Patient/family/caregiver demonstrates understanding of disease process, treatment plan, medications, and discharge instructions  Description: Complete learning assessment and assess knowledge base    Interventions:  - Provide teaching at level of understanding  - Provide teaching via preferred learning methods  Outcome: Progressing     Problem: Potential for Falls  Goal: Patient will remain free of falls  Description: INTERVENTIONS:  - Educate patient/family on patient safety including physical limitations  - Instruct patient to call for assistance with activity   - Consult OT/PT to assist with strengthening/mobility   - Keep Call bell within reach  - Keep bed low and locked with side rails adjusted as appropriate  - Keep care items and personal belongings within reach  - Initiate and maintain comfort rounds  - Make Fall Risk Sign visible to staff  - Offer Toileting every 2 Hours, in advance of need  - Apply yellow socks and bracelet for high fall risk patients  - Consider moving patient to room near nurses station  Outcome: Progressing

## 2022-09-11 NOTE — PROGRESS NOTES
General Surgery  Progress Note   Baron Underwood 57 y o  female MRN: [id number]  Unit/Bed#: [place] [address] -01 Encounter: [id number]    Assessment:  55yoF w R DCIS, now POD1 s/p R mastectomy, SLNB, R breast reconstruction w/ tissue expander on     Afebrile, VSS    Right breast EFFIE: 50 cc SS; patient reports she has been emptying the EFFIE without measuring reporting to nurse    Plan:  -regular diet  -right breast EFFIE; monitor output in character  -pain control  -SCDs  -encourage ambulation  -incentive spirometry  -plan for discharge today    Subjective/Objective     Subjective: Patient seen and examined at bedside, in no acute distress  No acute events overnight  Patient's pain is well controlled  Pt denies nausea or vomiting  Passing gas and stool  Was apprehensive about going home yesterday, this morning reports that she is feeling well in his rate to go home  Objective:     Vitals:Blood pressure 103/63, pulse 64, temperature 98 8 °F (37 1 °C), resp  rate 15, height 5' 2" (1 575 m), weight 76 8 kg (169 lb 5 oz), SpO2 96 %, not currently breastfeeding  ,Body mass index is 30 97 kg/m²  Temp (24hrs), Av 2 °F (36 8 °C), Min:97 9 °F (36 6 °C), Max:98 8 °F (37 1 °C)  Current: Temperature: 98 8 °F (37 1 °C)      Intake/Output Summary (Last 24 hours) at 2022 0743  Last data filed at 9/10/2022 2301  Gross per 24 hour   Intake 120 ml   Output 300 ml   Net -180 ml       Invasive Devices  Report    Peripheral Intravenous Line  Duration           Peripheral IV 22 Dorsal (posterior); Left Wrist 1 day          Drain  Duration           Closed/Suction Drain Lateral;Right Breast Bulb 15 Fr  1 day                Physical Exam:  General: No acute distress, alert and oriented  CV: Well perfused, regular rate and rhythm  Breast:  Right breast incision clean dry and intact with Steri-Strips in place, ecchymoses stable, appropriately tender, EFFIE in place as above  Lungs: Normal work of breathing, no increased respiratory effort  Abdomen: Soft, non-tender, non-distended    Extremities: No edema, clubbing or cyanosis  Skin: Warm, dry    Lab Results: Results: I have personally reviewed all pertinent laboratory/tests results  VTE Prophylaxis: Sequential compression device (Kady Muñoz)     Chan Tena MD  9/11/2022

## 2022-09-11 NOTE — UTILIZATION REVIEW
Inpatient Admission Authorization Request   NOTIFICATION OF INPATIENT ADMISSION/INPATIENT AUTHORIZATION REQUEST   SERVICING FACILITY:   [address] [place] [address], [facility], [facility]  Tax ID: [id number]  NPI: [id number]  Place of Service: Inpatient [address] [facility]  [address]  Place of Service Code: 24     ATTENDING PROVIDER:  Attending Name and NPI#: Camron Wing Md [[id number]]  Address: [address] [place] [address], [facility], [facility]  Phone: [phone]     UTILIZATION REVIEW CONTACT:  Darell Cook, Utilization   Network Utilization Review Department  Phone: [phone]  Fax: [phone]  Email: [email or web address]     PHYSICIAN ADVISORY SERVICES:  FOR [facility] REVIEW - MEDICAL NECESSITY DENIAL  Phone: [phone]  Fax: [phone]  Email: [email or web address]     TYPE OF REQUEST:  Inpatient Status     ADMISSION INFORMATION:  ADMISSION DATE/TIME: 9/10/22  2:50 PM  PATIENT DIAGNOSIS CODE/DESCRIPTION:  Ductal carcinoma in situ (DCIS) of right breast [D05 11]  Atypical lobular hyperplasia (ALH) of right breast [N60 91]  Atypical ductal hyperplasia of right breast [N60 91]  DISCHARGE DATE/TIME: No discharge date for patient encounter  IMPORTANT INFORMATION:  Please contact Darell Cook directly with any questions or concerns regarding this request  Department voicemails are confidential     Send requests for admission clinical reviews, concurrent reviews, approvals, and administrative denials due to lack of clinical to fax [phone]

## 2022-09-13 ENCOUNTER — TRANSITIONAL CARE MANAGEMENT (OUTPATIENT)
Dept: INTERNAL MEDICINE CLINIC | Facility: CLINIC | Age: 62
End: 2022-09-13

## 2022-09-14 RX ORDER — CLINDAMYCIN HYDROCHLORIDE 300 MG/1
CAPSULE ORAL
COMMUNITY
Start: 2022-09-02 | End: 2022-09-16 | Stop reason: ALTCHOICE

## 2022-09-14 RX ORDER — HYDROCODONE BITARTRATE AND ACETAMINOPHEN 5; 325 MG/1; MG/1
1 TABLET ORAL EVERY 6 HOURS PRN
COMMUNITY
Start: 2022-09-02 | End: 2022-09-16 | Stop reason: ALTCHOICE

## 2022-09-15 ENCOUNTER — OFFICE VISIT (OUTPATIENT)
Dept: PLASTIC SURGERY | Facility: CLINIC | Age: 62
End: 2022-09-15

## 2022-09-15 DIAGNOSIS — D05.11 DUCTAL CARCINOMA IN SITU (DCIS) OF RIGHT BREAST: Primary | ICD-10-CM

## 2022-09-15 PROCEDURE — 99024 POSTOP FOLLOW-UP VISIT: CPT | Performed by: PHYSICIAN ASSISTANT

## 2022-09-15 NOTE — PROGRESS NOTES
Assessment/Plan:     Pt is a 57 YOF who is s/p right breast mastectomy with tissue expander placement by Dr Andrew Li in conjunction with Dr Steve Ernandez on 9/9/22  Please see HPI  The patient presents to the office today for 1st postoperative check and drain check  She continues to have approximately 60 cc of drain output daily  The patient will return to our office in approximately 1 week for a drain check  She will continue to track and record her drain output daily  She may shower, but should have her back to the shower head  Do not soak incisions  Diagnoses and all orders for this visit:    Ductal carcinoma in situ (DCIS) of right breast  -     Breast Prothesis          Subjective:     Patient ID: Romeo Mijares is a 57 y o  female  HPI     Patient reports that she has been doing well  Pain is well controlled  The patient and I extensively discussed timeline for tissue expander feels and further reconstruction  All questions answered to patient's satisfaction  Review of Systems    See HPI    Objective:     Physical Exam      Incision and Steri-Strips are clean, dry and intact  Drain is serosanguineous

## 2022-09-20 ENCOUNTER — RA CDI HCC (OUTPATIENT)
Dept: OTHER | Facility: HOSPITAL | Age: 62
End: 2022-09-20

## 2022-09-20 ENCOUNTER — PATIENT OUTREACH (OUTPATIENT)
Dept: HEMATOLOGY ONCOLOGY | Facility: CLINIC | Age: 62
End: 2022-09-20

## 2022-09-20 PROCEDURE — 88307 TISSUE EXAM BY PATHOLOGIST: CPT | Performed by: SPECIALIST

## 2022-09-20 PROCEDURE — 88342 IMHCHEM/IMCYTCHM 1ST ANTB: CPT | Performed by: SPECIALIST

## 2022-09-20 PROCEDURE — 88341 IMHCHEM/IMCYTCHM EA ADD ANTB: CPT | Performed by: SPECIALIST

## 2022-09-20 RX ORDER — SENNOSIDES 8.6 MG
650 CAPSULE ORAL EVERY 6 HOURS
Qty: 20 TABLET | Refills: 0 | Status: SHIPPED | OUTPATIENT
Start: 2022-09-20 | End: 2022-09-25

## 2022-09-20 NOTE — PROGRESS NOTES
Patient returned call asking about her final pathology results  I informed patient it is still pending  I informed her I would keep a eye out for them  Patient stated she had no other questions or concerns at this time  I encouraged patient to call with any questions or concerns that may arise

## 2022-09-20 NOTE — PROGRESS NOTES
[facility]  coding opportunities       Chart reviewed, no opportunity found:   [address]        Patients Insurance     Medicare Insurance: [employer]

## 2022-09-20 NOTE — PROGRESS NOTES
I reached out to patient to check in and see how everything is going   A detailed message was left including my contact information

## 2022-09-21 ENCOUNTER — OFFICE VISIT (OUTPATIENT)
Dept: INTERNAL MEDICINE CLINIC | Facility: CLINIC | Age: 62
End: 2022-09-21
Payer: COMMERCIAL

## 2022-09-21 VITALS
RESPIRATION RATE: 16 BRPM | WEIGHT: 170.4 LBS | DIASTOLIC BLOOD PRESSURE: 68 MMHG | HEIGHT: 62 IN | OXYGEN SATURATION: 95 % | HEART RATE: 92 BPM | BODY MASS INDEX: 31.36 KG/M2 | SYSTOLIC BLOOD PRESSURE: 118 MMHG

## 2022-09-21 DIAGNOSIS — Z23 NEED FOR VACCINATION: ICD-10-CM

## 2022-09-21 DIAGNOSIS — I83.93 ASYMPTOMATIC VARICOSE VEINS OF BOTH LOWER EXTREMITIES: Primary | ICD-10-CM

## 2022-09-21 DIAGNOSIS — K21.9 GASTROESOPHAGEAL REFLUX DISEASE WITHOUT ESOPHAGITIS: ICD-10-CM

## 2022-09-21 DIAGNOSIS — F41.9 ANXIETY: ICD-10-CM

## 2022-09-21 DIAGNOSIS — E66.09 CLASS 1 OBESITY DUE TO EXCESS CALORIES WITHOUT SERIOUS COMORBIDITY WITH BODY MASS INDEX (BMI) OF 33.0 TO 33.9 IN ADULT: ICD-10-CM

## 2022-09-21 DIAGNOSIS — D05.11 DUCTAL CARCINOMA IN SITU (DCIS) OF RIGHT BREAST: ICD-10-CM

## 2022-09-21 DIAGNOSIS — H26.9 CATARACT OF BOTH EYES, UNSPECIFIED CATARACT TYPE: ICD-10-CM

## 2022-09-21 PROCEDURE — 99495 TRANSJ CARE MGMT MOD F2F 14D: CPT | Performed by: INTERNAL MEDICINE

## 2022-09-21 PROCEDURE — 90682 RIV4 VACC RECOMBINANT DNA IM: CPT

## 2022-09-21 PROCEDURE — G0008 ADMIN INFLUENZA VIRUS VAC: HCPCS

## 2022-09-21 RX ORDER — FAMOTIDINE 20 MG/1
20 TABLET, FILM COATED ORAL DAILY
Qty: 90 TABLET | Refills: 1 | Status: SHIPPED | OUTPATIENT
Start: 2022-09-21 | End: 2022-10-03 | Stop reason: SDUPTHER

## 2022-09-21 RX ORDER — IBUPROFEN 800 MG/1
800 TABLET ORAL EVERY 8 HOURS PRN
Qty: 15 TABLET | Refills: 0 | Status: SHIPPED | OUTPATIENT
Start: 2022-09-21

## 2022-09-21 NOTE — PROGRESS NOTES
Assessment & Plan     1  Asymptomatic varicose veins of both lower extremities  -     Compression Stocking    2  Ductal carcinoma in situ (DCIS) of right breast  Assessment & Plan:  Reviewed pathology report with patient she will follow-up with surgical Oncology status post mastectomy/central lymph node biopsy also has follow-up with Plastic surgery     Transition care management visit incision is healing well patient follow-up with surgical Oncology    Orders:  -     ibuprofen (MOTRIN) 800 mg tablet; Take 1 tablet (800 mg total) by mouth every 8 (eight) hours as needed for mild pain (DO NOT BEGIN UNTIL 48 HOURS AFTER SURGERY)    3  Gastroesophageal reflux disease without esophagitis  Assessment & Plan:  Clinically stable and doing well continue the current medical regiment will continue monitor  Rx Pepcid 20 mg once daily    Orders:  -     famotidine (PEPCID) 20 mg tablet; Take 1 tablet (20 mg total) by mouth daily One tablet bid    4  Cataract of both eyes, unspecified cataract type  -     Ambulatory Referral to Ophthalmology; Future    5  Need for vaccination  -     influenza vaccine, quadrivalent, recombinant, PF, 0 5 mL, for patients 18 yr+ (FLUBLOK)    6  Class 1 obesity due to excess calories without serious comorbidity with body mass index (BMI) of 33 0 to 33 9 in adult  Assessment & Plan:  Obesity -I have counseled patient following healthy and balanced diet, I would like the patient to lose weight, I would like the patient exercise routinely; we will continue monitor the patient's progress  7  Anxiety  Assessment & Plan:  Currently stable doing well has improved since surgical intervention p r n  Xanax, tolerates well and uses very infrequently      RTO in 6 months call if any problems     Subjective     Transitional Care Management Review:   Ashly Garcia is a 57 y o  female here for TCM follow up       During the TCM phone call patient stated:  TCM Call     Date and time call was made  9/13/2022 10:10 AM    Hospital care reviewed  Records not available    Patient was hospitialized at  [facility]    Date of Admission  09/09/22    Date of discharge  09/11/22    Diagnosis  Ductal carcinoma in situ (DCIS) of right breast    Disposition  Home    Were the patients medications reviewed and updated  No    Current Symptoms  None      TCM Call     Post hospital issues  None    Should patient be enrolled in anticoag monitoring? No    Scheduled for follow up? Yes    Did you obtain your prescribed medications  Yes    Do you need help managing your prescriptions or medications  No    Is transportation to your appointment needed  No    I have advised the patient to call PCP with any new or worsening symptoms  Jose Thompson - MR/MA    Living Arrangements  Family members    Are you recieving any outpatient services  No    Are you recieving home care services  No    Have you fallen in the last 12 months  No    Interperter language line needed  No        HPI  transition care management visit regarding recent hospitalization for mastectomy; overall patient is doing well today we did review the discharge summary laboratories in test completed during the hospitalization pathology reports reports me overall is doing well at home eating and drinking bowel movements are normal   Incisions are healing well no drainage no redness no fever she is using incentive spirometer  Ductal carcinoma in situ (DCIS) of right breast [D05 11]  Atypical lobular hyperplasia (ALH) of right breast [N60 91]  Atypical ductal hyperplasia of right breast [N60 91] has notice some asymptomatic enlargement of her varicose veins after walking in [place] they do not cause any pain  Review of Systems   Constitutional: Negative for activity change, appetite change and unexpected weight change  HENT: Negative for congestion  Eyes: Negative for visual disturbance  Respiratory: Negative for cough and shortness of breath      Cardiovascular: Negative for chest pain  Gastrointestinal: Negative for abdominal pain, diarrhea, nausea and vomiting  Neurological: Negative for dizziness, light-headedness and headaches  Psychiatric/Behavioral: The patient is not nervous/anxious  Objective     /68   Pulse 92   Resp 16   Ht 5' 2" (1 575 m)   Wt 77 3 kg (170 lb 6 4 oz)   SpO2 95%   BMI 31 17 kg/m²                    Return in about 6 months (around 3/21/2023)  Allergies   Allergen Reactions    Penicillins Anaphylaxis     Anaphylaxis  Anaphylaxis  Other reaction(s): Anaphylaxis    Acetazolamide Itching and Swelling     With eye drops - takes  oral sulfa w/o side effects    Metronidazole GI Intolerance and Nausea Only       Past Medical History:   Diagnosis Date    Abnormal electrocardiogram     Last assessed 10/04/13    Anemia     pt states hypogammaglobulinemia, needs washed RBCs if transfused - Rx by Dr Sailaja Hern[facility]dez [facility])     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  - Last assessed 11/02/17    Disease of thyroid gland     Diverticulitis of colon     GERD (gastroesophageal reflux disease)     Hyperlipidemia     Hypertension     Hypoglobulinemia     PONV (postoperative nausea and vomiting)     Slow transit constipation      Past Surgical History:   Procedure Laterality Date    APPENDECTOMY      BOWEL RESECTION      BREAST BIOPSY Right 07/07/2022    stereo x 2    BREAST BIOPSY Right 07/26/2022    stereo    CARPAL TUNNEL RELEASE      COLECTOMY      Resolved 08/27/09    COLONOSCOPY      INSERTION OF BREAST TISSUE EXPANDER Right 9/9/2022    Procedure: IMMEDIATE BREAST RECONSTRUCTION WITH TISSUE EXPANDER AND  GALAFLEX;  Surgeon: Chad Babb DO;  [facility];  Service: Plastics    JOINT REPLACEMENT Right     LASIK      MAMMO STEREOTACTIC BREAST BIOPSY RIGHT (ALL INC) Right 07/07/2022    MAMMO STEREOTACTIC BREAST BIOPSY RIGHT (ALL INC) Right 2022    MAMMO STEREOTACTIC BREAST BIOPSY RIGHT (ALL INC) EACH ADD Right 2022    MASTECTOMY W/ SENTINEL NODE BIOPSY Right 2022    Procedure: MASTECTOMY WITH BIOPSY LYMPH NODE SENTINEL,Lymphoscintigraphy,Lymphatic Mapping; [address];  Surgeon: Stephanie Rivera MD;  [facility];  Service: Surgical Oncology    OTHER SURGICAL HISTORY      Biopsy Vulvar    HI COLONOSCOPY FLX DX W/COLLJ SPEC WHEN PFRMD N/A 2019    Procedure: COLONOSCOPY;  Surgeon: Krystle Voss MD;  Location: [facility];   Service: General    HI TOTAL KNEE ARTHROPLASTY Left 2017    Procedure: TOTAL KNEE REPLACEMENT ARTHROPLASTY;  Surgeon: Melvin Harrington MD;  [facility];  Service: Orthopedics    REFRACTIVE SURGERY      REPLACEMENT TOTAL KNEE Right 10/2013    TONSILLECTOMY      With Adenoidectomy    TOOTH EXTRACTION      VAGINA SURGERY      mesh     Current Outpatient Medications on File Prior to Visit   Medication Sig Dispense Refill    [] acetaminophen (TYLENOL) 650 mg CR tablet Take 1 tablet (650 mg total) by mouth every 6 (six) hours for 5 days 20 tablet 0    estradiol (ESTRACE) 0 1 mg/g vaginal cream USE 1/4 APPLICATOR VAGINALLY TWICE A WEEK 42 5 g 0    levothyroxine 75 mcg tablet TAKE ONE TABLET BY MOUTH EVERY DAY IN THE EARLY MORNING 90 tablet 1    potassium chloride (K-DUR,KLOR-CON) 10 mEq tablet TAKE ONE TABLET BY MOUTH TWICE A DAY (Patient taking differently: 20 mEq Daily in AM) 180 tablet 1    Psyllium (METAMUCIL PO) Take 1 Dose by mouth daily        rosuvastatin (CRESTOR) 5 mg tablet Take 1 tablet (5 mg total) by mouth daily 90 tablet 5    senna (SENOKOT) 8 6 MG tablet Take 2 tablets by mouth daily      sertraline (ZOLOFT) 100 mg tablet TAKE ONE TABLET BY MOUTH EVERY DAY 90 tablet 1    traZODone (DESYREL) 50 mg tablet Take 1 tablet (50 mg total) by mouth daily at bedtime 90 tablet 0    triamterene-hydrochlorothiazide (DYAZIDE) 37 5-25 mg per capsule TAKE ONE CAPSULE BY MOUTH EVERY MORNING 90 capsule 1     No current facility-administered medications on file prior to visit       Family History   Problem Relation Age of Onset    No Known Problems Mother     Basal cell carcinoma Father     Prostate cancer Father 80    Breast cancer Sister 54    No Known Problems Sister     No Known Problems Daughter     No Known Problems Daughter     No Known Problems Daughter     No Known Problems Maternal Grandmother     No Known Problems Maternal Grandfather     Breast cancer Paternal Grandmother 50    No Known Problems Paternal Grandfather     Breast cancer Paternal Aunt 79    Breast cancer Family     Arthritis Family     Hypertension Family     Cancer Family      Social History     Socioeconomic History    Marital status: /Civil Union     Spouse name: Not on file    Number of children: Not on file    Years of education: Not on file    Highest education level: Not on file   Occupational History    Not on file   Tobacco Use    Smoking status: Never Smoker    Smokeless tobacco: Never Used   Vaping Use    Vaping Use: Never used   Substance and Sexual Activity    Alcohol use: No    Drug use: No    Sexual activity: Yes   Other Topics Concern    Not on file   Social History Narrative    Denied: Home environment Domestic Violence        Daily Tea consumption        Caffeine use     Social Determinants of Health     Financial Resource Strain: Not on file   Food Insecurity: Not on file   Transportation Needs: Not on file   Physical Activity: Not on file   Stress: Not on file   Social Connections: Not on file   Intimate Partner Violence: Not on file   Housing Stability: Not on file     Vitals:    09/21/22 1357   BP: 118/68   Pulse: 92   Resp: 16   SpO2: 95%   Weight: 77 3 kg (170 lb 6 4 oz)   Height: 5' 2" (1 575 m)     Results for orders placed or performed during the hospital encounter of 09/09/22   Tissue Exam   Result Value Ref Range    Case Report Surgical Pathology Report                         Case: [id number]                                   Authorizing Provider:  Pradeep Esparza MD           Collected:           09/09/2022 1507              Ordering Location:     [facility]        Received:            09/09/2022 [id number] [place] [address] Operating Room                                                     Pathologist:           Anatoliy Telles MD                                                     Specimens:   A) - Breast, Right, RIGHT BREAST                                                                    B) - Lymph Node, [place], RIGHT SENTINEL LYMPH NODE #1                                             C) - Lymph Node, [place], RIGHT SENTINEL LYMPH NODE #2                                    Final Diagnosis       A  Breast, Right:  - Ductal carcinoma in situ (DCIS), 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, 4 mm  Please see synoptic report  - Sclerosing adenosis  - Calcifications    - Nipple and skin, benign   - All inked designated surgical resection margins, negative for in situ or invasive carcinoma  - Three healing biopsy sites  - One lymph node, negative for carcinoma (0/1) (confirmed by CK-AE1/AE3)  B  Lymph Node, [place], RIGHT SENTINEL LYMPH NODE #1:  - One lymph node, negative for carcinoma (0/1) (confirmed by CK-AE1/AE3)  C  Lymph Node, [place], RIGHT SENTINEL LYMPH NODE #2:  - One lymph node, negative for carcinoma (0/1) (confirmed by CK-AE1/AE3)  Microscopic Description       Immunohistochemical stains performed with adequate controls demonstrates that Calponin and p63 show intact myoepithelial cells  P120 Catenin and E-Cadherin show membranous staining with loss of CK5/6 mosaic pattern staining in DCIS  The immunophenotype supports the diagnosis  Note       - Intradepartmental consultation concurs with the diagnosis        Additional Information       All reported additional testing was performed with appropriately reactive controls  These tests were developed and their performance characteristics determined by [facility] or appropriate performing facility, though some tests may be performed on tissues which have not been validated for performance characteristics (such as staining performed on alcohol exposed cell blocks and decalcified tissues)  Results should be interpreted with caution and in the context of the patients clinical condition  These tests may not be cleared or approved by the [place]  Food and Drug Administration, though the FDA has determined that such clearance or approval is not necessary  These tests are used for clinical purposes and they should not be regarded as investigational or for research  This laboratory has been approved by [facility], designated as a high-complexity laboratory and is qualified to perform these tests  Interpretation performed at [place] [address]  Synoptic Checklist       DCIS OF THE BREAST: Resection   DCIS OF THE BREAST: COMPLETE EXCISION - A, B, C   8th Edition - Protocol posted: 6/30/2021      SPECIMEN      Procedure: Total mastectomy       Specimen Laterality:    Right       TUMOR      Histologic Type:    Ductal carcinoma in situ       Size (Extent) of DCIS:    Estimated size (extent) of DCIS is at least (Millimeters): 4 mm        Number of Blocks with DCIS:    8         Number of Blocks Examined:    40       Architectural Patterns:    Cribriform       Architectural Patterns:    Solid       Nuclear Grade:    Grade III (high)       Necrosis:    Present, focal (small foci or single cell necrosis)       Microcalcifications:    Present in nonneoplastic tissue       MARGINS      Margin Status:     All margins negative for DCIS         Distance from DCIS to Closest Margin:    Greater than: 3 mm      REGIONAL LYMPH NODES      Regional Lymph Node Status:            :    All regional lymph nodes negative for tumor         Total Number of Lymph Nodes Examined (sentinel and non-sentinel):    3         Number of [place] Nodes Examined:    2       PATHOLOGIC STAGE CLASSIFICATION (pTNM, AJCC 8th Edition)      Reporting of pT, pN, and (when applicable) pM categories is based on information available to the pathologist at the time the report is issued  As per the AJCC (Chapter 1, 8th Ed ) it is the managing physicians responsibility to establish the final pathologic stage based upon all pertinent information, including but potentially not limited to this pathology report  pT Category:    pTis (DCIS)       Regional Lymph Nodes Modifier:    (sn): [place] [facility] node(s) evaluated       pN Category:    pN0          Breast Biomarker Testing Performed on Previous Biopsy:            Estrogen Receptor (ER) Status:    Positive           Percentage of Cells with Nuclear Positivity:    90-95 %      Breast Biomarker Testing Performed on Previous Biopsy:            Progesterone Receptor (PgR) Status:    Positive           Percentage of Cells with Nuclear Positivity:    10-15 %        Testing Performed on Case Number:    [id number]       Gross Description          A  The specimen is received in formalin, labeled with the patient's name and hospital number, and is designated "right breast  The specimen consists of a 592 g right breast mastectomy specimen (23 1 x 22 5 x 5 0 cm) with an attached tan-pink skin ellipse (8 3 x 4 3 cm)  The skin surface is wrinkled and appears grossly unremarkable  The nipple is everted and measures 1 5 cm in diameter  A possible previous scar (0 4 cm in greatest dimension) is identified at 12:00, 1 2 cm away from the nipple  A short suture designates superior and a long suture designates lateral   The specimen is inked as follows:  Upper outer quadrant-yellow, upper inner quadrant-blue, lower outer quadrant-green, lower inner quadrant-orange and entire deep margin-black    Sectioning reveals 3 separate biopsy sites  Biopsy site 1  At 10:00 middle depth in the upper outer quadrant is a recent hemorrhagic biopsy cavity (0 5 cm in diameter) surrounded by yellow fat necrosis  Within the biopsy cavity, a tri bell clip is found  The biopsy cavity is 3 5 cm away from the deep margin, 0 4 cm away from the anterior/superior margins and is greater than 2 0 cm away from the remaining margins  Biopsy site 2  At 10:00 posterior depth in the upper outer quadrant is a tan round recent biopsy cavity (0 8 cm in diameter) that contains a U-clip  The biopsy cavity is 1 7 cm away from the deep margin, 1 9 cm away from the anterior/superior margins, and is greater than 2 0 cm away from the remaining margins  Biopsy site 3   At 12:00 middle depth in the upper outer quadrant is a tan-pink round recent biopsy cavity (1 0 cm in diameter) that contains a triple twist clip  The biopsy cavity is 1 5 cm away from the deep margin, 0 1 cm away from the anterior/superior margins and is greater than 2 0 cm away from the remaining margins  Biopsy site 1 and 2 are 2 5 cm away from one another from medial to lateral   Biopsy site 1 and 3 are 6 6 cm away from one another from superior to inferior  Biopsy site 2 and 3 are 3 0 cm from one another from superior to inferior  Overall, all 3 areas measure 2 5 cm medial to lateral, 6 6 cm superior to inferior and 3 0 cm anterior to posterior  2 intramammary lymph nodes are found in the lower outer quadrant measuring 0 7 and 0 5 cm in greatest dimension  The remainder of the breast parenchyma consists of 20-30% dense white gritty and vaguely nodular fibrous tissue and the remainder yellow/white grossly unremarkable adipose tissue containing multiple small cysts measuring up to 0 3 cm in greatest dimension    Representative sections are submitted as follows:    1: Nipple, perpendicular section   2: Skin with scar   3: Deep margin nearest to biopsy site 1  4: Uninvolved medial to biopsy site 1   5-8: Biopsy site 1 entirely submitted sequentially from medial to lateral; cassette 6 contains tri bell clip; includes nearest anterior/superior margins   9: Uninvolved lateral to biopsy site 1  10-12: Bridging tissue between biopsy site 1 and biopsy site 2  13: Deep margin nearest biopsy site 2   14: Uninvolved medial to biopsy site 2   15-17:  Biopsy site 2 entirely submitted sequentially from medial to lateral; cassette 16 contains U-clip  18: Uninvolved lateral to biopsy site 2   19: Anterior/superior margin nearest to biopsy site 2  20-21: Bridging tissue between biopsy site 1 and biopsy site 3  22-23: Bridging tissue between biopsy site 2 and biopsy site 3  24: Deep margin nearest biopsy site 3  25: Uninvolved medial to biopsy site 3   26-28: Biopsy site 3 entirely submitted sequentially from medial to lateral; cassette 27 contains a triple twist clip   29: Uninvolved lateral to biopsy site 3  30: Larger lymph node, bisected, entirely submitted  31: Smaller lymph node, entirely submitted  32-33: Representative sections from upper outer quadrant   34-35: Representative sections from upper inner quadrant  36-38: Representative sections from lower outer quadrant  39-40: Representative sections from lower inner quadrant         B  The specimen is received in formalin, labeled with the patient's name and hospital number, and is designated "right sentinel lymph node #1  The specimen consists of a 2 0 x 1 0 x 0 3 cm portion of tan-yellow fibrofatty tissue  There is a 0 5 x 0 5 x 0 3 cm tan-pink lymph node identified within the tissue  The specimen is entirely submitted in 1 cassette  C  The specimen is received in formalin, labeled with the patient's name and hospital number, and is designated "right sentinel lymph node #2  The specimen consists of a single portion of tan-yellow fibrofatty tissue measuring 2 8 x 1 6 x 0 9 cm    Bisected lengthwise revealing a tan-pink lymph node measuring 1 0 x 1 0 x 0 5 cm  The specimen is entirely submitted in 2 cassettes  Note: The estimated total formalin fixation time based upon information provided by the submitting clinician and the standard processing schedule is 60 5 hours  RRavotti      Clinical Information SHORT SUPERIOR/LONG LATERAL      Weight (last 2 days)     None        Body mass index is 31 17 kg/m²  BP      Temp      Pulse     Resp      SpO2        Vitals:    09/21/22 1357   Weight: 77 3 kg (170 lb 6 4 oz)     Vitals:    09/21/22 1357   Weight: 77 3 kg (170 lb 6 4 oz)       Physical Exam  Vitals and nursing note reviewed  Exam conducted with a chaperone present  Constitutional:       General: She is not in acute distress  Appearance: Normal appearance  She is well-developed  She is obese  She is not ill-appearing, toxic-appearing or diaphoretic  HENT:      Head: Normocephalic and atraumatic  Right Ear: External ear normal       Left Ear: External ear normal       Nose: Nose normal    Eyes:      Pupils: Pupils are equal, round, and reactive to light  Cardiovascular:      Rate and Rhythm: Normal rate and regular rhythm  Heart sounds: Normal heart sounds  No murmur heard  Pulmonary:      Effort: Pulmonary effort is normal       Breath sounds: Normal breath sounds  Abdominal:      General: There is no distension  Palpations: Abdomen is soft  Tenderness: There is no abdominal tenderness  There is no guarding  Neurological:      Mental Status: She is alert       Medical assistant Ezequiel Dee in the room during examination wound is clean and dry no erythema healing well  Jeremy Mayorga DO

## 2022-09-22 ENCOUNTER — OFFICE VISIT (OUTPATIENT)
Dept: PLASTIC SURGERY | Facility: CLINIC | Age: 62
End: 2022-09-22

## 2022-09-22 DIAGNOSIS — D05.11 DUCTAL CARCINOMA IN SITU (DCIS) OF RIGHT BREAST: Primary | ICD-10-CM

## 2022-09-22 PROCEDURE — 99024 POSTOP FOLLOW-UP VISIT: CPT | Performed by: PHYSICIAN ASSISTANT

## 2022-09-22 NOTE — PROGRESS NOTES
POST-OP EVALUATION  9/22/2022    Will Chang is a 57 y o  female is here today for routine post-operative follow up  She is  s/p right breast mastectomy with tissue expander placement by Dr Jeffry Stahl in conjunction with Dr Nila Fraga on 9/9/22  The patient feels well  She has not been recording drain output, but notes that the amount in the drain is since emptying this morning  Past Medical History:   Diagnosis Date    Abnormal electrocardiogram     Last assessed 10/04/13    Anemia     pt states hypogammaglobulinemia, needs washed RBCs if transfused - Rx by Dr Rachael Martinez [facility])     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  - Last assessed 11/02/17    Disease of thyroid gland     Diverticulitis of colon     GERD (gastroesophageal reflux disease)     Hyperlipidemia     Hypertension     Hypoglobulinemia     PONV (postoperative nausea and vomiting)     Slow transit constipation      Past Surgical History:   Procedure Laterality Date    APPENDECTOMY      BOWEL RESECTION      BREAST BIOPSY Right 07/07/2022    stereo x 2    BREAST BIOPSY Right 07/26/2022    stereo    CARPAL TUNNEL RELEASE      COLECTOMY      Resolved 08/27/09    COLONOSCOPY      INSERTION OF BREAST TISSUE EXPANDER Right 9/9/2022    Procedure: IMMEDIATE BREAST RECONSTRUCTION WITH TISSUE EXPANDER AND  GALAFLEX;  Surgeon: Prem Sandoval DO;  [facility];  Service: Plastics    JOINT REPLACEMENT Right     LASIK      MAMMO STEREOTACTIC BREAST BIOPSY RIGHT (ALL INC) Right 07/07/2022    MAMMO STEREOTACTIC BREAST BIOPSY RIGHT (ALL INC) Right 07/26/2022    MAMMO STEREOTACTIC BREAST BIOPSY RIGHT (ALL INC) EACH ADD Right 07/07/2022    MASTECTOMY W/ SENTINEL NODE BIOPSY Right 9/9/2022    Procedure: MASTECTOMY WITH BIOPSY LYMPH NODE SENTINEL,Lymphoscintigraphy,Lymphatic Mapping; [address];  Surgeon: Juma Morocho MD;  [facility];  Service: Surgical Oncology    OTHER SURGICAL HISTORY      Biopsy Vulvar    AR COLONOSCOPY FLX DX W/COLLJ SPEC WHEN PFRMD N/A 05/06/2019    Procedure: COLONOSCOPY;  Surgeon: Xuan Vázquez MD;  [facility];   Service: General    AR TOTAL KNEE ARTHROPLASTY Left 02/08/2017    Procedure: TOTAL KNEE REPLACEMENT ARTHROPLASTY;  Surgeon: Brittany Mantilla MD;  [facility];  Service: Orthopedics    REFRACTIVE SURGERY      REPLACEMENT TOTAL KNEE Right 10/2013    TONSILLECTOMY      With Adenoidectomy    TOOTH EXTRACTION      VAGINA SURGERY      mesh        Current Outpatient Medications:     acetaminophen (TYLENOL) 650 mg CR tablet, Take 1 tablet (650 mg total) by mouth every 6 (six) hours for 5 days, Disp: 20 tablet, Rfl: 0    estradiol (ESTRACE) 0 1 mg/g vaginal cream, USE 1/4 APPLICATOR VAGINALLY TWICE A WEEK, Disp: 42 5 g, Rfl: 0    famotidine (PEPCID) 20 mg tablet, Take 1 tablet (20 mg total) by mouth daily One tablet bid, Disp: 90 tablet, Rfl: 1    ibuprofen (MOTRIN) 800 mg tablet, Take 1 tablet (800 mg total) by mouth every 8 (eight) hours as needed for mild pain (DO NOT BEGIN UNTIL 48 HOURS AFTER SURGERY), Disp: 15 tablet, Rfl: 0    levothyroxine 75 mcg tablet, TAKE ONE TABLET BY MOUTH EVERY DAY IN THE EARLY MORNING, Disp: 90 tablet, Rfl: 1    potassium chloride (K-DUR,KLOR-CON) 10 mEq tablet, TAKE ONE TABLET BY MOUTH TWICE A DAY (Patient taking differently: 20 mEq Daily in AM), Disp: 180 tablet, Rfl: 1    Psyllium (METAMUCIL PO), Take 1 Dose by mouth daily  , Disp: , Rfl:     rosuvastatin (CRESTOR) 5 mg tablet, Take 1 tablet (5 mg total) by mouth daily, Disp: 90 tablet, Rfl: 5    senna (SENOKOT) 8 6 MG tablet, Take 2 tablets by mouth daily, Disp: , Rfl:     sertraline (ZOLOFT) 100 mg tablet, TAKE ONE TABLET BY MOUTH EVERY DAY, Disp: 90 tablet, Rfl: 1    traZODone (DESYREL) 50 mg tablet, Take 1 tablet (50 mg total) by mouth daily at bedtime, Disp: 90 tablet, Rfl: 0   triamterene-hydrochlorothiazide (DYAZIDE) 37 5-25 mg per capsule, TAKE ONE CAPSULE BY MOUTH EVERY MORNING, Disp: 90 capsule, Rfl: 1  Allergies: Penicillins, Acetazolamide, and Metronidazole    Objective    Incision is covered with steristrips and glue  Breast area is soft, non tender  Expander well positioned  There is no evidence of fluid collection  Drain contains approximately 10-15 cc of serous fluid, with some subcutaneous fat  Assessment/Plan   Diagnoses and all orders for this visit:    Ductal carcinoma in situ (DCIS) of right breast    Drain was removed today with difficulty  She will monitor for any concerning changes  We will see her in 2 weeks      Janessa Hoyt PA-C

## 2022-09-25 NOTE — ASSESSMENT & PLAN NOTE
Currently stable doing well has improved since surgical intervention p r n   Xanax, tolerates well and uses very infrequently

## 2022-09-25 NOTE — ASSESSMENT & PLAN NOTE
Reviewed pathology report with patient she will follow-up with surgical Oncology status post mastectomy/central lymph node biopsy also has follow-up with Plastic surgery     Transition care management visit incision is healing well patient follow-up with surgical Oncology

## 2022-09-25 NOTE — ASSESSMENT & PLAN NOTE
Clinically stable and doing well continue the current medical regiment will continue monitor    Rx Pepcid 20 mg once daily

## 2022-09-26 ENCOUNTER — DOCUMENTATION (OUTPATIENT)
Dept: PLASTIC SURGERY | Facility: CLINIC | Age: 62
End: 2022-09-26

## 2022-09-26 ENCOUNTER — OFFICE VISIT (OUTPATIENT)
Dept: PLASTIC SURGERY | Facility: CLINIC | Age: 62
End: 2022-09-26

## 2022-09-26 ENCOUNTER — OFFICE VISIT (OUTPATIENT)
Dept: SURGICAL ONCOLOGY | Facility: CLINIC | Age: 62
End: 2022-09-26

## 2022-09-26 VITALS
BODY MASS INDEX: 31.28 KG/M2 | RESPIRATION RATE: 16 BRPM | HEART RATE: 100 BPM | HEIGHT: 62 IN | SYSTOLIC BLOOD PRESSURE: 120 MMHG | TEMPERATURE: 97.5 F | OXYGEN SATURATION: 97 % | WEIGHT: 170 LBS | DIASTOLIC BLOOD PRESSURE: 68 MMHG

## 2022-09-26 DIAGNOSIS — D05.11 DUCTAL CARCINOMA IN SITU (DCIS) OF RIGHT BREAST: Primary | ICD-10-CM

## 2022-09-26 DIAGNOSIS — Z90.11 ACQUIRED ABSENCE OF RIGHT BREAST AND NIPPLE: Primary | ICD-10-CM

## 2022-09-26 PROBLEM — N60.91 ATYPICAL DUCTAL HYPERPLASIA OF RIGHT BREAST: Status: RESOLVED | Noted: 2022-08-02 | Resolved: 2022-09-26

## 2022-09-26 PROBLEM — N60.91 ATYPICAL LOBULAR HYPERPLASIA (ALH) OF RIGHT BREAST: Status: RESOLVED | Noted: 2022-08-02 | Resolved: 2022-09-26

## 2022-09-26 PROCEDURE — 99024 POSTOP FOLLOW-UP VISIT: CPT | Performed by: SURGERY

## 2022-09-26 PROCEDURE — 99024 POSTOP FOLLOW-UP VISIT: CPT | Performed by: STUDENT IN AN ORGANIZED HEALTH CARE EDUCATION/TRAINING PROGRAM

## 2022-09-26 RX ORDER — SULFAMETHOXAZOLE AND TRIMETHOPRIM 800; 160 MG/1; MG/1
1 TABLET ORAL EVERY 12 HOURS SCHEDULED
Qty: 14 TABLET | Refills: 0 | Status: SHIPPED | OUTPATIENT
Start: 2022-09-26 | End: 2022-10-03

## 2022-09-26 NOTE — PROGRESS NOTES
57 y o  female is here today s/p right mastectomy and reconstruction  She reports having her drain removed on Friday and developing increasing swelling over the weekend  Physical Exam  Constitutional:       General: She is not in acute distress  Appearance: Normal appearance  Chest:   Breasts:      Right: Swelling ( with fluid shift) and skin change (Well-healing mastectomy incision with scant drainage from a central location along the incision line, fluid is serous, no signs of infection) present  Neurological:      Mental Status: She is alert and oriented to person, place, and time  Psychiatric:         Mood and Affect: Mood normal          Data:   09/09/2022 right mastectomy and sentinel node biopsy    Staging:    DCIS  Tumor grade 3  Margins clean  Estrogen receptor and progesterone receptor status positive  HER2 status and test method not applicable    Lymph node assessment/status 0/3, one of which was in the breast, two were sentinel      Neoadjuvant therapy:  Not applicable  Stage: 0        Diagnoses and all orders for this visit:    Ductal carcinoma in situ (DCIS) of right breast        Assessment/Plan:  73-year-old female status post right mastectomy and reconstruction  Her final pathology reveals DCIS only  This is a stage 0  There is no other treatment indicated secondary to the mastectomy  She does have a seroma and will be seen by her plastic surgeon this afternoon  We will see her again in six months in our survivorship Clinic or sooner should the need arise

## 2022-09-27 PROBLEM — Z90.11 ACQUIRED ABSENCE OF RIGHT BREAST AND NIPPLE: Status: ACTIVE | Noted: 2022-09-27

## 2022-09-27 NOTE — PROGRESS NOTES
Assessment and Plan:  Maranda Blas 57 y o  female s/p right breast reconstruction    Urged importance of compression and low sodium  Abx sent to pharmacy given aspiration/seroma today  Will see next week for next fill but will call sooner should she not significant recurrence of seroma  RTC in 1 week or sooner should any issues arise  Subjective:  Seroma noted after drain removal Friday  Has not been wearing her surgical bra  Objective:  NAD, AAOx3  200cc of serous fluid aspirated, tolerated 100 ml fill on the right, incision c/d/i    Daniela Craig, DO  Plastic and Reconstructive Surgery

## 2022-09-30 ENCOUNTER — OFFICE VISIT (OUTPATIENT)
Dept: PLASTIC SURGERY | Facility: CLINIC | Age: 62
End: 2022-09-30

## 2022-09-30 DIAGNOSIS — Z90.11 ACQUIRED ABSENCE OF RIGHT BREAST AND NIPPLE: Primary | ICD-10-CM

## 2022-09-30 PROCEDURE — 99024 POSTOP FOLLOW-UP VISIT: CPT | Performed by: STUDENT IN AN ORGANIZED HEALTH CARE EDUCATION/TRAINING PROGRAM

## 2022-10-03 DIAGNOSIS — K21.9 GASTROESOPHAGEAL REFLUX DISEASE WITHOUT ESOPHAGITIS: ICD-10-CM

## 2022-10-03 RX ORDER — FAMOTIDINE 20 MG/1
20 TABLET, FILM COATED ORAL 2 TIMES DAILY
Qty: 90 TABLET | Refills: 1 | Status: SHIPPED | OUTPATIENT
Start: 2022-10-03

## 2022-10-04 ENCOUNTER — OFFICE VISIT (OUTPATIENT)
Dept: PLASTIC SURGERY | Facility: CLINIC | Age: 62
End: 2022-10-04

## 2022-10-04 DIAGNOSIS — Z90.11 ACQUIRED ABSENCE OF RIGHT BREAST AND NIPPLE: Primary | ICD-10-CM

## 2022-10-04 PROCEDURE — 99024 POSTOP FOLLOW-UP VISIT: CPT | Performed by: STUDENT IN AN ORGANIZED HEALTH CARE EDUCATION/TRAINING PROGRAM

## 2022-10-04 RX ORDER — SULFAMETHOXAZOLE AND TRIMETHOPRIM 800; 160 MG/1; MG/1
1 TABLET ORAL EVERY 12 HOURS SCHEDULED
Qty: 14 TABLET | Refills: 0 | Status: SHIPPED | OUTPATIENT
Start: 2022-10-04 | End: 2022-10-11

## 2022-10-04 NOTE — PROGRESS NOTES
Assessment and Plan:  Ashley Lizzette 57 y o  female s/p TE recon    Continue compression  Continue abx  RTC Friday or sooner should any issues arise  Subjective:  Doing well  Small amount of fluid has recurred    Objective:  NAD, AAOx3  Incision C/D/I, tolerated 100 ml fill, 100 ml of seroma drained  Daniela Craig, DO  Plastic and Reconstructive Surgery

## 2022-10-07 ENCOUNTER — OFFICE VISIT (OUTPATIENT)
Dept: PLASTIC SURGERY | Facility: CLINIC | Age: 62
End: 2022-10-07

## 2022-10-07 DIAGNOSIS — Z90.11 ACQUIRED ABSENCE OF RIGHT BREAST AND NIPPLE: Primary | ICD-10-CM

## 2022-10-07 PROCEDURE — 99024 POSTOP FOLLOW-UP VISIT: CPT | Performed by: STUDENT IN AN ORGANIZED HEALTH CARE EDUCATION/TRAINING PROGRAM

## 2022-10-10 ENCOUNTER — DOCUMENTATION (OUTPATIENT)
Dept: HEMATOLOGY ONCOLOGY | Facility: CLINIC | Age: 62
End: 2022-10-10

## 2022-10-10 NOTE — PROGRESS NOTES
Per notes on the acct no treatment for pt  She has a f/u appt in 03/23  Pt outreach not needed at this time

## 2022-10-12 PROBLEM — Z00.00 MEDICARE ANNUAL WELLNESS VISIT, SUBSEQUENT: Status: RESOLVED | Noted: 2021-06-15 | Resolved: 2022-10-12

## 2022-10-15 PROBLEM — N30.00 ACUTE CYSTITIS WITHOUT HEMATURIA: Status: RESOLVED | Noted: 2022-08-16 | Resolved: 2022-10-15

## 2022-10-16 ENCOUNTER — NURSE TRIAGE (OUTPATIENT)
Dept: OTHER | Facility: OTHER | Age: 62
End: 2022-10-16

## 2022-10-16 NOTE — TELEPHONE ENCOUNTER
Regarding: Mastectomy open incision   ----- Message from Yarely Chan sent at 10/16/2022  8:27 AM EDT -----  "I had a mastectomy on 9/9 and I see one of the incisions is starting to open  Should I wait until tomorrow?

## 2022-10-16 NOTE — TELEPHONE ENCOUNTER
Reason for Disposition  • [1] Incision gaping open AND [2] > 48 hours since wound re-opened AND [3] length of opening > 1/2 inch (12 mm)    Answer Assessment - Initial Assessment Questions  1  SYMPTOM: "What's the main symptom you're concerned about?" (e g , redness, pain, drainage)      One inch gaping whole where incision was    2  ONSET: "When did   start?"      A few days ago but today seems larger    3  SURGERY: "What surgery was performed?"      Mastectomy     4  DATE of SURGERY: "When was surgery performed?"       9/9    5  INCISION SITE: "Where is the incision located?"       Right breast    6  REDNESS: "Is there any redness at the incision site?" If yes, ask: "How wide across is the redness?" (Inches, centimeters)       No    7  PAIN: "Is there any pain?" If Yes, ask: "How bad is it?"  (Scale 1-10; or mild, moderate, severe)      Unchanged- the same as it has been and tolerable  8  BLEEDING: "Is there any bleeding?" If Yes, ask: "How much?" and "Where?"      No    9  DRAINAGE: "Is there any drainage from the incision site?" If yes, ask: "What color and how much?" (e g , red, cloudy, pus; drops, teaspoon)      Serous sanguinous drainage  No foul odor  10  FEVER: "Do you have a fever?" If Yes, ask: "What is your temperature, how was it measured, and when did it start?"        Denies fever  11  OTHER SYMPTOMS: "Do you have any other symptoms?" (e g , shaking chills, weakness, rash elsewhere on body)        No spreading redness  No other symptoms      Protocols used: POST-OP INCISION SYMPTOMS AND QUESTIONS-ADULT-AH

## 2022-10-17 ENCOUNTER — OFFICE VISIT (OUTPATIENT)
Dept: PLASTIC SURGERY | Facility: CLINIC | Age: 62
End: 2022-10-17

## 2022-10-17 DIAGNOSIS — Z90.11 ACQUIRED ABSENCE OF RIGHT BREAST AND NIPPLE: Primary | ICD-10-CM

## 2022-10-17 PROCEDURE — 99024 POSTOP FOLLOW-UP VISIT: CPT | Performed by: STUDENT IN AN ORGANIZED HEALTH CARE EDUCATION/TRAINING PROGRAM

## 2022-10-18 DIAGNOSIS — Z90.11 ACQUIRED ABSENCE OF RIGHT BREAST AND NIPPLE: ICD-10-CM

## 2022-10-18 DIAGNOSIS — Z90.11 ACQUIRED ABSENCE OF RIGHT BREAST AND NIPPLE: Primary | ICD-10-CM

## 2022-10-18 RX ORDER — SULFAMETHOXAZOLE AND TRIMETHOPRIM 800; 160 MG/1; MG/1
1 TABLET ORAL EVERY 12 HOURS SCHEDULED
Qty: 14 TABLET | Refills: 0 | Status: SHIPPED | OUTPATIENT
Start: 2022-10-18 | End: 2022-10-18 | Stop reason: SDUPTHER

## 2022-10-18 RX ORDER — SULFAMETHOXAZOLE AND TRIMETHOPRIM 800; 160 MG/1; MG/1
1 TABLET ORAL EVERY 12 HOURS SCHEDULED
Qty: 14 TABLET | Refills: 0 | Status: SHIPPED | OUTPATIENT
Start: 2022-10-18 | End: 2022-10-25

## 2022-10-21 NOTE — PROGRESS NOTES
Assessment and Plan:  Jose Guadalupe Ortiz 57 y o  female s/p right breast reconstruction    Healing well  Continue compression  Continue to fill as tolerated/desired  Will drain seroma as needed  RTC in 1 week or sooner  should any issues arise  Subjective:  Doing well  Mild recurrence of seroma  Objective:  NAD, AAOx3  Incision C/D/I, small amount of seroma drained  No s/s of infection  Compression wrap applied  Daniela Craig, DO  Plastic and Reconstructive Surgery

## 2022-10-24 DIAGNOSIS — F51.01 PRIMARY INSOMNIA: ICD-10-CM

## 2022-10-24 RX ORDER — TRAZODONE HYDROCHLORIDE 50 MG/1
50 TABLET ORAL
Qty: 90 TABLET | Refills: 1 | Status: SHIPPED | OUTPATIENT
Start: 2022-10-24

## 2022-10-24 NOTE — PROGRESS NOTES
Assessment and Plan:  Eura Elders 57 y o  female s/p right breast recon    Continue ointment to incision line  Continue compressive bra  RTC in 2 weeks for final surgical planning or sooner as needed should any issues arise  Subjective:  "opened" incision today    Objective:  NAD, AAOx3  Central aspect of incision with mild dehiscence, no implant exposure, reinforced; no seroma    Daniela Craig, DO  Plastic and Reconstructive Surgery

## 2022-10-24 NOTE — PROGRESS NOTES
Assessment and Plan:  Heather Arias 57 y o  female s/p right breast reconstruction    Healing well  Decreasing seroma  Tolerating fills  Continue strict compression and minimization of sodium  RTC in 1 week or sooner should any issues arise  Subjective:  Doing well  Mild recurrence of fluid  Objective:  NAD, AAOx3  Incision C/D/I, decreasing seroma drained  Daniela Craig, DO  Plastic and Reconstructive Surgery

## 2022-10-25 ENCOUNTER — PREP FOR PROCEDURE (OUTPATIENT)
Dept: PLASTIC SURGERY | Facility: CLINIC | Age: 62
End: 2022-10-25

## 2022-10-25 ENCOUNTER — NEW PATIENT (OUTPATIENT)
Dept: URBAN - METROPOLITAN AREA CLINIC 6 | Facility: CLINIC | Age: 62
End: 2022-10-25

## 2022-10-25 DIAGNOSIS — H25.813: ICD-10-CM

## 2022-10-25 DIAGNOSIS — Z98.890: ICD-10-CM

## 2022-10-25 DIAGNOSIS — Z90.11 ACQUIRED ABSENCE OF RIGHT BREAST AND NIPPLE: Primary | ICD-10-CM

## 2022-10-25 PROCEDURE — 92004 COMPRE OPH EXAM NEW PT 1/>: CPT

## 2022-10-25 ASSESSMENT — TONOMETRY
OD_IOP_MMHG: 13
OS_IOP_MMHG: 11

## 2022-10-25 ASSESSMENT — VISUAL ACUITY
OS_CC: 20/40-1
OU_SC: J1
OS_GLARE: 20/80
OD_GLARE: 20/200
OD_CC: 20/40-1

## 2022-10-25 ASSESSMENT — KERATOMETRY
OD_AXISANGLE2_DEGREES: 12
OS_K1POWER_DIOPTERS: 38.00
OS_K2POWER_DIOPTERS: 38.75
OD_AXISANGLE_DEGREES: 102
OD_K1POWER_DIOPTERS: 37.75
OD_K2POWER_DIOPTERS: 38.75
OS_AXISANGLE2_DEGREES: 133
OS_AXISANGLE_DEGREES: 43

## 2022-10-28 ENCOUNTER — OFFICE VISIT (OUTPATIENT)
Dept: PLASTIC SURGERY | Facility: CLINIC | Age: 62
End: 2022-10-28

## 2022-10-28 DIAGNOSIS — D05.11 DUCTAL CARCINOMA IN SITU (DCIS) OF RIGHT BREAST: Primary | ICD-10-CM

## 2022-10-28 NOTE — PROGRESS NOTES
Assessment/Plan:     Pt is a 57 YOF who is s/p right breast mastectomy with tissue expander placement by Dr Devan Galindo in conjunction with Dr Sherry Macias on 9/9/22  Please see HPI  The patient returns to the office today for planning for tissue expander to implant exchange  The patient has been doing well and has had no issues postoperatively  The patient will return to the office in approximately 2 weeks to discuss implant size with Dr Devan Galindo  She will call with any questions or concerns arise prior to her next scheduled appointment  She is scheduled for tissue expander to implant exchange in mid December, 2022  Diagnoses and all orders for this visit:    Ductal carcinoma in situ (DCIS) of right breast          Subjective:     Patient ID: Irma Solares is a 57 y o  female  HPI    Patient reports that she has been doing well  She has had no issues postoperatively  She does have several questions in regards to her timeline for reconstruction  Dr Devan Galindo was present during the visit today and answered all questions to patient's satisfaction  The patient will return for a preoperative visit as above  Review of Systems    See HPI    Objective:     Physical Exam      Incision is clean, dry and intact  There is mild surrounding erythema at the incision site  Please see photo in media

## 2022-11-14 ENCOUNTER — OFFICE VISIT (OUTPATIENT)
Dept: PLASTIC SURGERY | Facility: CLINIC | Age: 62
End: 2022-11-14

## 2022-11-14 DIAGNOSIS — Z90.11 ACQUIRED ABSENCE OF RIGHT BREAST AND NIPPLE: Primary | ICD-10-CM

## 2022-11-14 NOTE — PROGRESS NOTES
Assessment and Plan:  Ms Snehal León is a 57 y o  female presenting with right breast recon with TE    We discussed second stage procedure and her goals for size  She will require an augmentation mastopexy on the left with possible galaflex  I will obtain the galaflex [facility] the excess at [facility]  We discussed the procedure, risks, benefits, alternatives and postoperative instructions and expectations  Informed consent obtained  Implants ordered  Silicone consent form provided and patient to return day of procedure  Reminded patient to bring bra the day of surgery  History of Present Illness:   Ms Snehal León is a 57 y o  female presenting s/p right breast recon with TE, galaflex  She is doing well  She is excited for her second stage  She is happy with the size on the right and would like to approximate this best possible on the left  She expressed discontent with her upper arms but I urged her that this can not be addressed until she is completely healed from her surgery and I would not discuss this until at least 1 years postop  Review of Systems:  A 12 point ROS was performed and negative except per HPI  Past Medical History:  Past Medical History:   Diagnosis Date   • Abnormal electrocardiogram     Last assessed 10/04/13   • Anemia     pt states hypogammaglobulinemia, needs washed RBCs if transfused - Rx by Dr Orellana Sensing   • Anxiety    • Depression    • Diabetes 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  - Last assessed 11/02/17   • Disease of thyroid gland    • Diverticulitis of colon    • GERD (gastroesophageal reflux disease)    • Hyperlipidemia    • Hypertension    • Hypoglobulinemia    • PONV (postoperative nausea and vomiting)    • Slow transit constipation        Past Surgical History:  Past Surgical History:   Procedure Laterality Date   • APPENDECTOMY     • BOWEL RESECTION     • BREAST BIOPSY Right 07/07/2022    stereo x 2   • BREAST BIOPSY Right 07/26/2022    stereo   • CARPAL TUNNEL RELEASE     • COLECTOMY      Resolved 08/27/09   • COLONOSCOPY     • INSERTION OF BREAST TISSUE EXPANDER Right 9/9/2022    Procedure: IMMEDIATE BREAST RECONSTRUCTION WITH TISSUE EXPANDER AND  GALAFLEX;  Surgeon: Tien Trinidad DO;  [facility];  Service: Plastics   • JOINT REPLACEMENT Right    • LASIK     • MAMMO STEREOTACTIC BREAST BIOPSY RIGHT (ALL INC) Right 07/07/2022   • MAMMO STEREOTACTIC BREAST BIOPSY RIGHT (ALL INC) Right 07/26/2022   • MAMMO STEREOTACTIC BREAST BIOPSY RIGHT (ALL INC) EACH ADD Right 07/07/2022   • MASTECTOMY W/ SENTINEL NODE BIOPSY Right 9/9/2022    Procedure: MASTECTOMY WITH BIOPSY LYMPH NODE SENTINEL,Lymphoscintigraphy,Lymphatic Mapping; [address];  Surgeon: Reginaldo Sutton MD;  [facility];  Service: Surgical Oncology   • OTHER SURGICAL HISTORY      Biopsy Vulvar   • DC COLONOSCOPY FLX DX W/COLLJ SPEC WHEN PFRMD N/A 05/06/2019    Procedure: COLONOSCOPY;  Surgeon: Brenda Mckinney MD;  Location:  GI LAB;   Service: General   • DC TOTAL KNEE ARTHROPLASTY Left 02/08/2017    Procedure: TOTAL KNEE REPLACEMENT ARTHROPLASTY;  Surgeon: Desean Cornejo MD;  [facility];  Service: Orthopedics   • REFRACTIVE SURGERY     • REPLACEMENT TOTAL KNEE Right 10/2013   • TONSILLECTOMY      With Adenoidectomy   • TOOTH EXTRACTION     • VAGINA SURGERY      mesh       Social History:  Social History     Tobacco Use   • Smoking status: Never Smoker   • Smokeless tobacco: Never Used   Vaping Use   • Vaping Use: Never used   Substance Use Topics   • Alcohol use: No   • Drug use: No       Family History:  Family History   Problem Relation Age of Onset   • No Known Problems Mother    • Basal cell carcinoma Father    • Prostate cancer Father 80   • Breast cancer Sister 54   • No Known Problems Sister    • No Known Problems Daughter    • No Known Problems Daughter    • No Known Problems Daughter    • No Known Problems Maternal Grandmother    • No Known Problems Maternal Grandfather    • Breast cancer Paternal Grandmother 50   • No Known Problems Paternal Grandfather    • Breast cancer Paternal Aunt 79   • Breast cancer Family    • Arthritis Family    • Hypertension Family    • Cancer Family        Allergies: Allergies   Allergen Reactions   • Penicillins Anaphylaxis     Anaphylaxis  Anaphylaxis  Other reaction(s): Anaphylaxis   • Acetazolamide Itching and Swelling     With eye drops - takes  oral sulfa w/o side effects   • Metronidazole GI Intolerance and Nausea Only       Medications:  Current Outpatient Medications on File Prior to Visit   Medication Sig Dispense Refill   • estradiol (ESTRACE) 0 1 mg/g vaginal cream USE 1/4 APPLICATOR VAGINALLY TWICE A WEEK 42 5 g 0   • famotidine (PEPCID) 20 mg tablet Take 1 tablet (20 mg total) by mouth 2 (two) times a day 90 tablet 1   • ibuprofen (MOTRIN) 800 mg tablet Take 1 tablet (800 mg total) by mouth every 8 (eight) hours as needed for mild pain (DO NOT BEGIN UNTIL 48 HOURS AFTER SURGERY) 15 tablet 0   • levothyroxine 75 mcg tablet TAKE ONE TABLET BY MOUTH EVERY DAY IN THE EARLY MORNING 90 tablet 1   • potassium chloride (K-DUR,KLOR-CON) 10 mEq tablet TAKE ONE TABLET BY MOUTH TWICE A DAY (Patient taking differently: 20 mEq Daily in AM) 180 tablet 1   • Psyllium (METAMUCIL PO) Take 1 Dose by mouth daily       • rosuvastatin (CRESTOR) 5 mg tablet Take 1 tablet (5 mg total) by mouth daily 90 tablet 5   • senna (SENOKOT) 8 6 MG tablet Take 2 tablets by mouth daily     • sertraline (ZOLOFT) 100 mg tablet TAKE ONE TABLET BY MOUTH EVERY DAY 90 tablet 1   • traZODone (DESYREL) 50 mg tablet Take 1 tablet (50 mg total) by mouth daily at bedtime 90 tablet 1   • triamterene-hydrochlorothiazide (DYAZIDE) 37 5-25 mg per capsule TAKE ONE CAPSULE BY MOUTH EVERY MORNING 90 capsule 1     No current facility-administered medications on file prior to visit           Physical Examination:  There were no vitals taken for this visit  Estimated body mass index is 31 09 kg/m² as calculated from the following:    Height as of 9/26/22: 5' 2" (1 575 m)  Weight as of 9/26/22: 77 1 kg (170 lb)  General: NAD, well appearing, AAOx3  HEENT: NCAT, EOMI, MMM, supple  Resp: Nonlabored  Heart: RRR  Abdomen: Soft, ND, NT  Extremities/MSK: no LE edema, no obvious deficits in ROM  Neuro: grossly intact with no obvious deficits  Skin: no obvious lesions or rashes  Breast: Well healed incision on right, TE in place with excess skin inferiorly and laterally; left no palpable masses, grade III ptosis, excess skin and lateral tissue    Daniela Craig, DO  Plastic and Reconstructive Surgery

## 2022-11-29 ENCOUNTER — APPOINTMENT (OUTPATIENT)
Dept: LAB | Age: 62
End: 2022-11-29

## 2022-11-29 DIAGNOSIS — R73.03 PREDIABETES: ICD-10-CM

## 2022-11-29 DIAGNOSIS — Z90.11 ACQUIRED ABSENCE OF RIGHT BREAST AND NIPPLE: ICD-10-CM

## 2022-11-29 DIAGNOSIS — R89.9 ABNORMAL LABORATORY TEST: ICD-10-CM

## 2022-11-29 DIAGNOSIS — E78.5 HYPERLIPIDEMIA, UNSPECIFIED HYPERLIPIDEMIA TYPE: ICD-10-CM

## 2022-11-29 DIAGNOSIS — E03.9 HYPOTHYROIDISM, UNSPECIFIED TYPE: ICD-10-CM

## 2022-11-29 LAB
ALBUMIN SERPL BCP-MCNC: 3.5 G/DL (ref 3.5–5)
ALP SERPL-CCNC: 52 U/L (ref 46–116)
ALT SERPL W P-5'-P-CCNC: 12 U/L (ref 12–78)
ANION GAP SERPL CALCULATED.3IONS-SCNC: 6 MMOL/L (ref 4–13)
AST SERPL W P-5'-P-CCNC: 11 U/L (ref 5–45)
ATRIAL RATE: 78 BPM
BASOPHILS # BLD AUTO: 0.04 THOUSANDS/ÂΜL (ref 0–0.1)
BASOPHILS NFR BLD AUTO: 1 % (ref 0–1)
BILIRUB SERPL-MCNC: 0.5 MG/DL (ref 0.2–1)
BUN SERPL-MCNC: 16 MG/DL (ref 5–25)
CALCIUM SERPL-MCNC: 9.4 MG/DL (ref 8.3–10.1)
CHLORIDE SERPL-SCNC: 105 MMOL/L (ref 96–108)
CHOLEST SERPL-MCNC: 163 MG/DL
CO2 SERPL-SCNC: 29 MMOL/L (ref 21–32)
CREAT SERPL-MCNC: 0.92 MG/DL (ref 0.6–1.3)
EOSINOPHIL # BLD AUTO: 0.19 THOUSAND/ÂΜL (ref 0–0.61)
EOSINOPHIL NFR BLD AUTO: 4 % (ref 0–6)
ERYTHROCYTE [DISTWIDTH] IN BLOOD BY AUTOMATED COUNT: 12.9 % (ref 11.6–15.1)
EST. AVERAGE GLUCOSE BLD GHB EST-MCNC: 94 MG/DL
GFR SERPL CREATININE-BSD FRML MDRD: 66 ML/MIN/1.73SQ M
GLUCOSE P FAST SERPL-MCNC: 92 MG/DL (ref 65–99)
HBA1C MFR BLD: 4.9 %
HCT VFR BLD AUTO: 40.9 % (ref 34.8–46.1)
HDLC SERPL-MCNC: 59 MG/DL
HGB BLD-MCNC: 13.2 G/DL (ref 11.5–15.4)
IGA SERPL-MCNC: 37 MG/DL (ref 70–400)
IGG SERPL-MCNC: 534 MG/DL (ref 700–1600)
IGM SERPL-MCNC: 31 MG/DL (ref 40–230)
IMM GRANULOCYTES # BLD AUTO: 0.01 THOUSAND/UL (ref 0–0.2)
IMM GRANULOCYTES NFR BLD AUTO: 0 % (ref 0–2)
LDLC SERPL CALC-MCNC: 79 MG/DL (ref 0–100)
LYMPHOCYTES # BLD AUTO: 1.38 THOUSANDS/ÂΜL (ref 0.6–4.47)
LYMPHOCYTES NFR BLD AUTO: 26 % (ref 14–44)
MCH RBC QN AUTO: 29.1 PG (ref 26.8–34.3)
MCHC RBC AUTO-ENTMCNC: 32.3 G/DL (ref 31.4–37.4)
MCV RBC AUTO: 90 FL (ref 82–98)
MONOCYTES # BLD AUTO: 0.35 THOUSAND/ÂΜL (ref 0.17–1.22)
MONOCYTES NFR BLD AUTO: 7 % (ref 4–12)
NEUTROPHILS # BLD AUTO: 3.34 THOUSANDS/ÂΜL (ref 1.85–7.62)
NEUTS SEG NFR BLD AUTO: 62 % (ref 43–75)
NRBC BLD AUTO-RTO: 0 /100 WBCS
P AXIS: 61 DEGREES
PLATELET # BLD AUTO: 136 THOUSANDS/UL (ref 149–390)
PMV BLD AUTO: 12.1 FL (ref 8.9–12.7)
POTASSIUM SERPL-SCNC: 3.2 MMOL/L (ref 3.5–5.3)
PR INTERVAL: 126 MS
PROT SERPL-MCNC: 6.7 G/DL (ref 6.4–8.4)
QRS AXIS: 0 DEGREES
QRSD INTERVAL: 90 MS
QT INTERVAL: 404 MS
QTC INTERVAL: 460 MS
RBC # BLD AUTO: 4.53 MILLION/UL (ref 3.81–5.12)
SODIUM SERPL-SCNC: 140 MMOL/L (ref 135–147)
T WAVE AXIS: 29 DEGREES
TRIGL SERPL-MCNC: 123 MG/DL
TSH SERPL DL<=0.05 MIU/L-ACNC: 2.15 UIU/ML (ref 0.45–4.5)
VENTRICULAR RATE: 78 BPM
WBC # BLD AUTO: 5.31 THOUSAND/UL (ref 4.31–10.16)

## 2022-12-01 ENCOUNTER — PRE-OP CATARACT MEASUREMENTS (OUTPATIENT)
Dept: URBAN - METROPOLITAN AREA CLINIC 6 | Facility: CLINIC | Age: 62
End: 2022-12-01

## 2022-12-01 DIAGNOSIS — Z98.890: ICD-10-CM

## 2022-12-01 DIAGNOSIS — H25.813: ICD-10-CM

## 2022-12-01 PROCEDURE — 92134 CPTRZ OPH DX IMG PST SGM RTA: CPT | Mod: NC

## 2022-12-01 PROCEDURE — 92014 COMPRE OPH EXAM EST PT 1/>: CPT

## 2022-12-01 ASSESSMENT — KERATOMETRY
OD_K1POWER_DIOPTERS: 38.00
OD_AXISANGLE_DEGREES: 102
OS_K1POWER_DIOPTERS: 38.00
OD_AXISANGLE2_DEGREES: 6
OD_K2POWER_DIOPTERS: 38.50
OD_K2POWER_DIOPTERS: 38.75
OD_AXISANGLE2_DEGREES: 12
OS_K1POWER_DIOPTERS: 37.75
OS_AXISANGLE_DEGREES: 43
OD_AXISANGLE_DEGREES: 96
OS_AXISANGLE_DEGREES: 043
OS_AXISANGLE2_DEGREES: 133
OS_K2POWER_DIOPTERS: 38.75
OD_K1POWER_DIOPTERS: 37.75

## 2022-12-01 ASSESSMENT — VISUAL ACUITY
OS_CC: 20/30
OD_GLARE: 20/200
OD_CC: 20/40
OU_SC: J1
OS_GLARE: 20/80

## 2022-12-01 ASSESSMENT — TONOMETRY
OD_IOP_MMHG: 11
OS_IOP_MMHG: 11

## 2022-12-02 DIAGNOSIS — Z90.11 ACQUIRED ABSENCE OF RIGHT BREAST AND NIPPLE: Primary | ICD-10-CM

## 2022-12-02 DIAGNOSIS — E87.6 LOW BLOOD POTASSIUM: Primary | ICD-10-CM

## 2022-12-02 RX ORDER — OXYCODONE HYDROCHLORIDE 5 MG/1
5 TABLET ORAL EVERY 6 HOURS PRN
Qty: 10 TABLET | Refills: 0 | Status: SHIPPED | OUTPATIENT
Start: 2022-12-02

## 2022-12-02 RX ORDER — SENNOSIDES 8.6 MG
650 CAPSULE ORAL EVERY 6 HOURS
Qty: 20 TABLET | Refills: 0 | Status: SHIPPED | OUTPATIENT
Start: 2022-12-02 | End: 2022-12-07

## 2022-12-02 RX ORDER — GABAPENTIN 300 MG/1
300 CAPSULE ORAL 3 TIMES DAILY
Qty: 15 CAPSULE | Refills: 0 | Status: SHIPPED | OUTPATIENT
Start: 2022-12-02 | End: 2022-12-07

## 2022-12-02 RX ORDER — IBUPROFEN 800 MG/1
800 TABLET ORAL EVERY 8 HOURS PRN
Qty: 15 TABLET | Refills: 0 | Status: SHIPPED | OUTPATIENT
Start: 2022-12-02

## 2022-12-02 RX ORDER — TRAMADOL HYDROCHLORIDE 50 MG/1
50 TABLET ORAL EVERY 6 HOURS PRN
Qty: 20 TABLET | Refills: 0 | Status: SHIPPED | OUTPATIENT
Start: 2022-12-02

## 2022-12-02 RX ORDER — SULFAMETHOXAZOLE AND TRIMETHOPRIM 800; 160 MG/1; MG/1
1 TABLET ORAL EVERY 12 HOURS SCHEDULED
Qty: 14 TABLET | Refills: 0 | Status: SHIPPED | OUTPATIENT
Start: 2022-12-02 | End: 2022-12-09

## 2022-12-04 DIAGNOSIS — R89.9 ABNORMAL LABORATORY TEST: Primary | ICD-10-CM

## 2022-12-07 ENCOUNTER — PATIENT OUTREACH (OUTPATIENT)
Dept: HEMATOLOGY ONCOLOGY | Facility: CLINIC | Age: 62
End: 2022-12-07

## 2022-12-07 NOTE — PROGRESS NOTES
Pt submitted a bill for her dental work, which was needed prior to surgery  The cancer funds paid this bill for hte pt in the amount of $1600  Pt expressed much gratitude  No other needs at this time  MSW will continue to follow and support as needed

## 2022-12-12 ENCOUNTER — ANESTHESIA EVENT (OUTPATIENT)
Dept: PERIOP | Facility: HOSPITAL | Age: 62
End: 2022-12-12

## 2022-12-12 RX ORDER — ALPRAZOLAM 0.25 MG/1
TABLET ORAL AS NEEDED
COMMUNITY

## 2022-12-12 RX ORDER — DIPHENHYDRAMINE HCL 25 MG
25 TABLET ORAL EVERY 6 HOURS PRN
COMMUNITY

## 2022-12-12 NOTE — PRE-PROCEDURE INSTRUCTIONS
Pre-Surgery Instructions:   Medication Instructions   • diphenhydrAMINE (BENADRYL) 25 mg tablet continue as prescribed excluding DOS   • famotidine (PEPCID) 20 mg tablet Continue to take as prescribed including DOS with a small sip of water, unless usually taken at night   • levothyroxine 75 mcg tablet Continue to take as prescribed including DOS with a small sip of water, unless usually taken at night   • potassium chloride (K-DUR,KLOR-CON) 10 mEq tablet continue as prescribed excluding DOS   • Psyllium (METAMUCIL PO) continue as prescribed excluding DOS   • rosuvastatin (CRESTOR) 5 mg tablet Continue to take as prescribed including DOS with a small sip of water, unless usually taken at night   • senna (SENOKOT) 8 6 MG tablet continue as prescribed excluding DOS   • sertraline (ZOLOFT) 100 mg tablet Continue to take as prescribed including DOS with a small sip of water, unless usually taken at night   • traZODone (DESYREL) 50 mg tablet Continue to take as prescribed including DOS with a small sip of water, unless usually taken at night   • triamterene-hydrochlorothiazide (DYAZIDE) 37 5-25 mg per capsule continue as prescribed excluding DOS    Patient states she will be getting a script for Xanax PRN prescribed for surgery by her PCP  She understands she can take it DOS with a small sip of water  Avoid non-prescribed ASPIRIN, OTC vitamins and NSAIDS prior to surgery  Tylenol okay PRN  Continue scheduled medications excluding DOS  Avoid smoking prior to Surgery   Avoid alcohol, illicit drugs and marijuana for 24 hours prior to DOS  NPO instructions given: no food, water or anything else by mouth after midnight prior to surgery  Shower the night before and the morning of surgery using CHG wash or antibacterial soap if CHG is not indicated  Avoid shaving for 24 hours prior to DOS  Avoid using lotions, powders, oils, makeup, hair products, etc  DOS  Patient given up to date visitor guidelines    A ride home after surgery is needed- failure to have a ride can result in cancellation  Remove jewelry and not to bring valuables DOS  Dentures and contact lenses will have to be removed for surgery  Patient understands he or she will receive a call the afternoon before surgery regarding an arrival time  If you have any changes in your health before DOS please call the surgeon's office  Patient verbalized understanding of all instructions

## 2022-12-13 ENCOUNTER — APPOINTMENT (OUTPATIENT)
Dept: LAB | Age: 62
End: 2022-12-13

## 2022-12-13 DIAGNOSIS — E87.6 LOW BLOOD POTASSIUM: ICD-10-CM

## 2022-12-13 LAB — POTASSIUM SERPL-SCNC: 3.4 MMOL/L (ref 3.5–5.3)

## 2022-12-15 ENCOUNTER — HOSPITAL ENCOUNTER (OUTPATIENT)
Facility: HOSPITAL | Age: 62
Setting detail: OUTPATIENT SURGERY
Discharge: HOME | End: 2022-12-15
Attending: STUDENT IN AN ORGANIZED HEALTH CARE EDUCATION/TRAINING PROGRAM | Admitting: STUDENT IN AN ORGANIZED HEALTH CARE EDUCATION/TRAINING PROGRAM

## 2022-12-15 ENCOUNTER — ANESTHESIA (OUTPATIENT)
Dept: PERIOP | Facility: HOSPITAL | Age: 62
End: 2022-12-15

## 2022-12-15 VITALS
WEIGHT: 170 LBS | DIASTOLIC BLOOD PRESSURE: 73 MMHG | HEART RATE: 94 BPM | TEMPERATURE: 97.7 F | OXYGEN SATURATION: 96 % | BODY MASS INDEX: 31.28 KG/M2 | RESPIRATION RATE: 18 BRPM | SYSTOLIC BLOOD PRESSURE: 136 MMHG | HEIGHT: 62 IN

## 2022-12-15 DIAGNOSIS — Z90.11 ACQUIRED ABSENCE OF RIGHT BREAST AND NIPPLE: ICD-10-CM

## 2022-12-15 DIAGNOSIS — E87.6 HYPOKALEMIA: Primary | ICD-10-CM

## 2022-12-15 DEVICE — GALAFLEX LITE SCAFFOLD, 5.0 CM X 20.0 CM, RECTANGLE
Type: IMPLANTABLE DEVICE | Site: BREAST | Status: FUNCTIONAL
Brand: GALAFLEX LITE

## 2022-12-15 DEVICE — NATRELLE INSPIRA SCM 445CC MODERATE PROFILE  SMOOTH ROUND SILICONE
Type: IMPLANTABLE DEVICE | Site: BREAST | Status: FUNCTIONAL
Brand: NATRELLE INSPIRA COHESIVE BREAST IMPLANTS

## 2022-12-15 RX ORDER — SCOLOPAMINE TRANSDERMAL SYSTEM 1 MG/1
1 PATCH, EXTENDED RELEASE TRANSDERMAL ONCE
Status: DISCONTINUED | OUTPATIENT
Start: 2022-12-15 | End: 2022-12-15 | Stop reason: HOSPADM

## 2022-12-15 RX ORDER — SODIUM CHLORIDE, SODIUM LACTATE, POTASSIUM CHLORIDE, CALCIUM CHLORIDE 600; 310; 30; 20 MG/100ML; MG/100ML; MG/100ML; MG/100ML
125 INJECTION, SOLUTION INTRAVENOUS CONTINUOUS
Status: DISCONTINUED | OUTPATIENT
Start: 2022-12-15 | End: 2022-12-15 | Stop reason: HOSPADM

## 2022-12-15 RX ORDER — OXYCODONE HYDROCHLORIDE 5 MG/1
5 TABLET ORAL ONCE AS NEEDED
Status: COMPLETED | OUTPATIENT
Start: 2022-12-15 | End: 2022-12-15

## 2022-12-15 RX ORDER — LIDOCAINE HYDROCHLORIDE 10 MG/ML
INJECTION, SOLUTION EPIDURAL; INFILTRATION; INTRACAUDAL; PERINEURAL AS NEEDED
Status: DISCONTINUED | OUTPATIENT
Start: 2022-12-15 | End: 2022-12-15

## 2022-12-15 RX ORDER — EPHEDRINE SULFATE 50 MG/ML
INJECTION INTRAVENOUS AS NEEDED
Status: DISCONTINUED | OUTPATIENT
Start: 2022-12-15 | End: 2022-12-15

## 2022-12-15 RX ORDER — FENTANYL CITRATE/PF 50 MCG/ML
25 SYRINGE (ML) INJECTION
Status: COMPLETED | OUTPATIENT
Start: 2022-12-15 | End: 2022-12-15

## 2022-12-15 RX ORDER — MIDAZOLAM HYDROCHLORIDE 2 MG/2ML
INJECTION, SOLUTION INTRAMUSCULAR; INTRAVENOUS AS NEEDED
Status: DISCONTINUED | OUTPATIENT
Start: 2022-12-15 | End: 2022-12-15

## 2022-12-15 RX ORDER — FENTANYL CITRATE 50 UG/ML
INJECTION, SOLUTION INTRAMUSCULAR; INTRAVENOUS AS NEEDED
Status: DISCONTINUED | OUTPATIENT
Start: 2022-12-15 | End: 2022-12-15

## 2022-12-15 RX ORDER — MAGNESIUM HYDROXIDE 1200 MG/15ML
LIQUID ORAL AS NEEDED
Status: DISCONTINUED | OUTPATIENT
Start: 2022-12-15 | End: 2022-12-15 | Stop reason: HOSPADM

## 2022-12-15 RX ORDER — GLYCOPYRROLATE 0.2 MG/ML
INJECTION INTRAMUSCULAR; INTRAVENOUS AS NEEDED
Status: DISCONTINUED | OUTPATIENT
Start: 2022-12-15 | End: 2022-12-15

## 2022-12-15 RX ORDER — PROPOFOL 10 MG/ML
INJECTION, EMULSION INTRAVENOUS AS NEEDED
Status: DISCONTINUED | OUTPATIENT
Start: 2022-12-15 | End: 2022-12-15

## 2022-12-15 RX ORDER — NEOSTIGMINE METHYLSULFATE 1 MG/ML
INJECTION INTRAVENOUS AS NEEDED
Status: DISCONTINUED | OUTPATIENT
Start: 2022-12-15 | End: 2022-12-15

## 2022-12-15 RX ORDER — ROCURONIUM BROMIDE 10 MG/ML
INJECTION, SOLUTION INTRAVENOUS AS NEEDED
Status: DISCONTINUED | OUTPATIENT
Start: 2022-12-15 | End: 2022-12-15

## 2022-12-15 RX ORDER — ONDANSETRON 2 MG/ML
4 INJECTION INTRAMUSCULAR; INTRAVENOUS ONCE AS NEEDED
Status: DISCONTINUED | OUTPATIENT
Start: 2022-12-15 | End: 2022-12-15 | Stop reason: HOSPADM

## 2022-12-15 RX ORDER — GABAPENTIN 300 MG/1
300 CAPSULE ORAL ONCE
Status: COMPLETED | OUTPATIENT
Start: 2022-12-15 | End: 2022-12-15

## 2022-12-15 RX ORDER — DEXAMETHASONE SODIUM PHOSPHATE 10 MG/ML
INJECTION, SOLUTION INTRAMUSCULAR; INTRAVENOUS AS NEEDED
Status: DISCONTINUED | OUTPATIENT
Start: 2022-12-15 | End: 2022-12-15

## 2022-12-15 RX ORDER — CLINDAMYCIN PHOSPHATE 900 MG/50ML
900 INJECTION INTRAVENOUS ONCE
Status: COMPLETED | OUTPATIENT
Start: 2022-12-15 | End: 2022-12-15

## 2022-12-15 RX ORDER — PROPOFOL 10 MG/ML
INJECTION, EMULSION INTRAVENOUS CONTINUOUS PRN
Status: DISCONTINUED | OUTPATIENT
Start: 2022-12-15 | End: 2022-12-15

## 2022-12-15 RX ORDER — HYDROMORPHONE HCL/PF 1 MG/ML
0.5 SYRINGE (ML) INJECTION
Status: DISCONTINUED | OUTPATIENT
Start: 2022-12-15 | End: 2022-12-15 | Stop reason: HOSPADM

## 2022-12-15 RX ORDER — ACETAMINOPHEN 325 MG/1
975 TABLET ORAL ONCE
Status: COMPLETED | OUTPATIENT
Start: 2022-12-15 | End: 2022-12-15

## 2022-12-15 RX ORDER — ONDANSETRON 2 MG/ML
INJECTION INTRAMUSCULAR; INTRAVENOUS AS NEEDED
Status: DISCONTINUED | OUTPATIENT
Start: 2022-12-15 | End: 2022-12-15

## 2022-12-15 RX ADMIN — GLYCOPYRROLATE 0.4 MG: 0.2 INJECTION, SOLUTION INTRAMUSCULAR; INTRAVENOUS at 16:54

## 2022-12-15 RX ADMIN — DEXAMETHASONE SODIUM PHOSPHATE 10 MG: 10 INJECTION, SOLUTION INTRAMUSCULAR; INTRAVENOUS at 14:25

## 2022-12-15 RX ADMIN — FENTANYL CITRATE 25 MCG: 50 INJECTION, SOLUTION INTRAMUSCULAR; INTRAVENOUS at 17:34

## 2022-12-15 RX ADMIN — FENTANYL CITRATE 25 MCG: 50 INJECTION, SOLUTION INTRAMUSCULAR; INTRAVENOUS at 17:39

## 2022-12-15 RX ADMIN — SODIUM CHLORIDE, SODIUM LACTATE, POTASSIUM CHLORIDE, AND CALCIUM CHLORIDE: .6; .31; .03; .02 INJECTION, SOLUTION INTRAVENOUS at 13:50

## 2022-12-15 RX ADMIN — ACETAMINOPHEN 975 MG: 325 TABLET ORAL at 11:19

## 2022-12-15 RX ADMIN — FENTANYL CITRATE 25 MCG: 50 INJECTION, SOLUTION INTRAMUSCULAR; INTRAVENOUS at 17:49

## 2022-12-15 RX ADMIN — FENTANYL CITRATE 50 MCG: 50 INJECTION, SOLUTION INTRAMUSCULAR; INTRAVENOUS at 17:08

## 2022-12-15 RX ADMIN — ROCURONIUM BROMIDE 50 MG: 10 INJECTION, SOLUTION INTRAVENOUS at 14:13

## 2022-12-15 RX ADMIN — PROPOFOL 120 MCG/KG/MIN: 10 INJECTION, EMULSION INTRAVENOUS at 14:16

## 2022-12-15 RX ADMIN — PROPOFOL 200 MG: 10 INJECTION, EMULSION INTRAVENOUS at 14:13

## 2022-12-15 RX ADMIN — MIDAZOLAM 2 MG: 1 INJECTION INTRAMUSCULAR; INTRAVENOUS at 14:08

## 2022-12-15 RX ADMIN — CLINDAMYCIN IN 5 PERCENT DEXTROSE 900 MG: 18 INJECTION, SOLUTION INTRAVENOUS at 14:08

## 2022-12-15 RX ADMIN — GABAPENTIN 300 MG: 300 CAPSULE ORAL at 11:19

## 2022-12-15 RX ADMIN — SODIUM CHLORIDE, SODIUM LACTATE, POTASSIUM CHLORIDE, AND CALCIUM CHLORIDE: .6; .31; .03; .02 INJECTION, SOLUTION INTRAVENOUS at 16:27

## 2022-12-15 RX ADMIN — SODIUM CHLORIDE, SODIUM LACTATE, POTASSIUM CHLORIDE, AND CALCIUM CHLORIDE: .6; .31; .03; .02 INJECTION, SOLUTION INTRAVENOUS at 15:24

## 2022-12-15 RX ADMIN — EPHEDRINE SULFATE 5 MG: 50 INJECTION INTRAVENOUS at 14:48

## 2022-12-15 RX ADMIN — OXYCODONE HYDROCHLORIDE 5 MG: 5 TABLET ORAL at 19:39

## 2022-12-15 RX ADMIN — LIDOCAINE HYDROCHLORIDE 50 MG: 10 INJECTION, SOLUTION EPIDURAL; INFILTRATION; INTRACAUDAL; PERINEURAL at 14:13

## 2022-12-15 RX ADMIN — SODIUM CHLORIDE 0.06 MCG/KG/MIN: 9 INJECTION, SOLUTION INTRAVENOUS at 14:27

## 2022-12-15 RX ADMIN — NEOSTIGMINE 2 MG: 1 INJECTION INTRAVENOUS at 16:54

## 2022-12-15 RX ADMIN — FENTANYL CITRATE 25 MCG: 50 INJECTION, SOLUTION INTRAMUSCULAR; INTRAVENOUS at 17:44

## 2022-12-15 RX ADMIN — FENTANYL CITRATE 50 MCG: 50 INJECTION, SOLUTION INTRAMUSCULAR; INTRAVENOUS at 14:13

## 2022-12-15 RX ADMIN — SCOPALAMINE 1 PATCH: 1 PATCH, EXTENDED RELEASE TRANSDERMAL at 12:19

## 2022-12-15 RX ADMIN — ONDANSETRON 4 MG: 2 INJECTION INTRAMUSCULAR; INTRAVENOUS at 16:53

## 2022-12-15 RX ADMIN — HYDROMORPHONE HYDROCHLORIDE 0.5 MG: 1 INJECTION, SOLUTION INTRAMUSCULAR; INTRAVENOUS; SUBCUTANEOUS at 17:50

## 2022-12-15 RX ADMIN — SODIUM CHLORIDE, SODIUM LACTATE, POTASSIUM CHLORIDE, AND CALCIUM CHLORIDE: .6; .31; .03; .02 INJECTION, SOLUTION INTRAVENOUS at 16:57

## 2022-12-15 NOTE — DISCHARGE INSTR - AVS FIRST PAGE
Surgery Date: 12/15/2022                Patient: Krystle Humphreys  Surgeon: Demetrius Pittman, DO     Postoperative Instructions   Breast Reconstruction     Dressings:  [x] Skin glue was applied to your incision over absorbable sutures  You may feel small pieces of suture at the ends of your incision  [x] Remove dressing the second morning following your surgery and bathe as directed  Please leave tegaderm (clear dressing over your drains) and steristrips in place  [x] No dressings are required but you may cover the incision with gauze for comfort  [x] Wear your surgical bra at all times except while showering  [x] Strip and record your EFFIE drain output as instructed  Be sure to bring the output log to your follow up appointment  [] Other instructions:      Bathing:  [x] Shower 48 hours after surgery  Allow soap and water to gently wash over the incision  No scrubbing  Gently pat dry and apply dressing as needed/instructed above  [x] No submerging incision in bathtub, pool, hot tub and/or lake  Activity:  [x] No heavy lifting (> 10lbs)  [x] No strenuous exercise  [x] Walking is mandatory daily  [x] Sleeping may be more comfortable with your head elevated  [x] No driving until off pain medications and you have resumed full range of motion  Diet and Medication:  [x] Resume diet as tolerated  High protein diet is important for healing  Remain well hydrated with water and minimize sodium intake  [x] Resume preoperative medications  [x] Pain medications as prescribed  You may also begin to use ibuprofen 48 hours after surgery  [x] Finish all antibiotics as prescribed  [x] Apply ice to area as needed for pain  Do not place ice directly on skin  Do not use heat  [] Other instructions: It is expected to have some bruising, swelling and mild oozing at the incision site and of the surrounding area    If there is more than you expect, an enlarging area or you suspect an infection, please call the office  Some patients may experience a low-grade fever after surgery  If it is above 100 4, please call the office  If you do not have a postoperative office appointment scheduled, please call the office today and let the staff know Dr Claribel Zhou needs to see you in 7 days  Please call [phone] (Dr Cortes Bush office) or [phone] (Dr Cortes Bush phone) after hours with any questions, concerns or changes        EFFIE Drain Output Log     Date Time Drain #1 Drain #2

## 2022-12-15 NOTE — H&P
H&P - Plastic Surgery   Jade Wilburn 57 y o  female MRN: [id number]  Unit/Bed#:  Encounter: [id number]           Assessment:  Acquired absence of right breast and nipple   Plan:   RIGHT BREAST RECONSTRUCTION AND REMOVAL OF TISSUE EXPANDER WITH PLACEMENT OF IMPLANT  CAPSULOTOMY  (Right: Breast)       LEFT MASTOPEXY FOR SYMMETRY AND POSSIBLE GALAFLEX (Left: Breast)        HPI:   Jade Wilburn is a 57y o  year old female who presents with right breast recon with TE     Dr Marcel James discussed second stage procedure and her goals for size  She will require an augmentation mastopexy on the left with possible galaflex  They discussed the procedure, risks, benefits, alternatives and postoperative instructions and expectations  Informed consent obtained  Implants ordered  Silicone consent form provided and patient to return day of procedure  Reminded patient to bring bra the day of surgery  REVIEW OF SYSTEMS    GENERAL/CONSTITUTIONAL: The patient denies fever, fatigue, weakness, weight gain or weight loss  HEAD, EYES, EARS, NOSE AND THROAT: Eyes - The patient denies pain, redness, loss of vision, double or blurred vision and denies wearing glasses  The patient denies ringing in the ears, nosebleeds sinusitis, post nasal drip  Also denies frequent sore throats, hoarseness, painful swallowing  CARDIOVASCULAR: The patient denies chest pain, irregular heartbeats, palpitations, shortness of breath, heart murmurs, high blood pressure, cramps in his legs with walking, pain in his feet or toes at night or varicose veins  RESPIRATORY: The patient denies chronic cough, wheezing or night sweats  GASTROINTESTINAL: The patient denies decreased appetite, nausea, vomiting, diarrhea, constipation, blood in the stools  GENITOURINARY: The patient denies difficult urination, pain or burning with urination, blood in the urine  MUSCULOSKELETAL: The patient denies arm, thigh or calf cramps  No joint or muscle pain   No muscle weakness or tenderness  No joint swelling, neck pain, back pain or major orthopedic injuries  SKIN AND BREASTS:see hpi  The patient denies easy bruising, skin redness, skin rash, hives  NEUROLOGIC: The patient denies headache, dizziness, fainting, memory loss  PSYCHIATRIC: The patient denies depression anxiety  ENDOCRINE: The patient denies intolerance to hot or cold temperature, flushing, fingernail changes, increased thirst, increased salt intake or decreased sexual desire  HEMATOLOGIC/LYMPHATIC: The patient denies anemia, bleeding tendency or clotting tendency  ALLERGIC/IMMUNOLOGIC: The patient denies rhinitis, asthma, skin sensitivity, latex allergies or sensitivity  Historical Information   Past Medical History:   Diagnosis Date   • Abnormal electrocardiogram     Last assessed 10/04/13   • Anemia     pt states hypogammaglobulinemia, needs washed RBCs if transfused - Rx by Dr Timmy Sánchez   • Anxiety    • Depression    • Diabetes [facility])     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  - Last assessed 11/02/17   • Disease of thyroid gland    • Diverticulitis of colon    • GERD (gastroesophageal reflux disease)    • Hyperlipidemia    • Hypertension    • Hypoglobulinemia    • PONV (postoperative nausea and vomiting)    • Slow transit constipation      Past Surgical History:   Procedure Laterality Date   • APPENDECTOMY     • BOWEL RESECTION     • BREAST BIOPSY Right 07/07/2022    stereo x 2   • BREAST BIOPSY Right 07/26/2022    stereo   • CARPAL TUNNEL RELEASE     • COLECTOMY      Resolved 08/27/09   • COLONOSCOPY     • INSERTION OF BREAST TISSUE EXPANDER Right 09/09/2022    Procedure: IMMEDIATE BREAST RECONSTRUCTION WITH TISSUE EXPANDER AND  GALAFLEX;  Surgeon: Terrie Bain DO;  Location: [facility];  Service: Plastics   • JOINT REPLACEMENT Right    • LASIK     • MAMMO STEREOTACTIC BREAST BIOPSY RIGHT (ALL INC) Right 07/07/2022   • MAMMO STEREOTACTIC BREAST BIOPSY RIGHT (ALL INC) Right 07/26/2022   • MAMMO STEREOTACTIC BREAST BIOPSY RIGHT (ALL INC) EACH ADD Right 07/07/2022   • MASTECTOMY W/ SENTINEL NODE BIOPSY Right 09/09/2022    Procedure: MASTECTOMY WITH BIOPSY LYMPH NODE SENTINEL,Lymphoscintigraphy,Lymphatic Mapping; [address];  Surgeon: Padma Mata MD;  Location: [facility];  Service: Surgical Oncology   • OTHER SURGICAL HISTORY      Biopsy Vulvar   • WA COLONOSCOPY FLX DX W/COLLJ SPEC WHEN PFRMD N/A 05/06/2019    Procedure: COLONOSCOPY;  Surgeon: Vale Conn MD;  Location: [facility]; Service: General   • WA TOTAL KNEE ARTHROPLASTY Left 02/08/2017    Procedure: TOTAL KNEE REPLACEMENT ARTHROPLASTY;  Surgeon: Tatyana Sahni MD;  Location:  MAIN OR;  Service: Orthopedics   • REFRACTIVE SURGERY     • REPLACEMENT TOTAL KNEE Bilateral 10/2013   • TONSILLECTOMY      With Adenoidectomy   • TOOTH EXTRACTION     • VAGINA SURGERY      mesh     Social History   Social History     Substance and Sexual Activity   Alcohol Use No     Social History     Substance and Sexual Activity   Drug Use No     Social History     Tobacco Use   Smoking Status Never   Smokeless Tobacco Never     Family History:   Family History   Problem Relation Age of Onset   • No Known Problems Mother    • Basal cell carcinoma Father    • Prostate cancer Father 80   • Breast cancer Sister 54   • No Known Problems Sister    • No Known Problems Daughter    • No Known Problems Daughter    • No Known Problems Daughter    • No Known Problems Maternal Grandmother    • No Known Problems Maternal Grandfather    • Breast cancer Paternal Grandmother 50   • No Known Problems Paternal Grandfather    • Breast cancer Paternal Aunt 79   • Breast cancer Family    • Arthritis Family    • Hypertension Family    • Cancer Family        Meds/Allergies   No current facility-administered medications for this encounter      Current Outpatient Medications:   •  ALPRAZolam (XANAX) 0 25 mg tablet, Take by mouth if needed for anxiety, Disp: , Rfl:   •  diphenhydrAMINE (BENADRYL) 25 mg tablet, Take 25 mg by mouth every 6 (six) hours as needed for itching, Disp: , Rfl:   •  famotidine (PEPCID) 20 mg tablet, Take 1 tablet (20 mg total) by mouth 2 (two) times a day, Disp: 90 tablet, Rfl: 1  •  levothyroxine 75 mcg tablet, TAKE ONE TABLET BY MOUTH EVERY DAY IN THE EARLY MORNING, Disp: 90 tablet, Rfl: 1  •  potassium chloride (K-DUR,KLOR-CON) 10 mEq tablet, TAKE ONE TABLET BY MOUTH TWICE A DAY (Patient taking differently: 20 mEq Daily in AM), Disp: 180 tablet, Rfl: 1  •  Psyllium (METAMUCIL PO), Take 1 Dose by mouth daily  , Disp: , Rfl:   •  rosuvastatin (CRESTOR) 5 mg tablet, Take 1 tablet (5 mg total) by mouth daily, Disp: 90 tablet, Rfl: 5  •  senna (SENOKOT) 8 6 MG tablet, Take 2 tablets by mouth daily, Disp: , Rfl:   •  sertraline (ZOLOFT) 100 mg tablet, TAKE ONE TABLET BY MOUTH EVERY DAY, Disp: 90 tablet, Rfl: 1  •  traZODone (DESYREL) 50 mg tablet, Take 1 tablet (50 mg total) by mouth daily at bedtime, Disp: 90 tablet, Rfl: 1  •  triamterene-hydrochlorothiazide (DYAZIDE) 37 5-25 mg per capsule, TAKE ONE CAPSULE BY MOUTH EVERY MORNING, Disp: 90 capsule, Rfl: 1  •  gabapentin (Neurontin) 300 mg capsule, Take 1 capsule (300 mg total) by mouth 3 (three) times a day for 5 days, Disp: 15 capsule, Rfl: 0  •  ibuprofen (MOTRIN) 800 mg tablet, Take 1 tablet (800 mg total) by mouth every 8 (eight) hours as needed for mild pain (DO NOT BEGIN UNTIL 48 HOURS AFTER SURGERY), Disp: 15 tablet, Rfl: 0  •  oxyCODONE (Roxicodone) 5 immediate release tablet, Take 1 tablet (5 mg total) by mouth every 6 (six) hours as needed for moderate pain Max Daily Amount: 20 mg, Disp: 10 tablet, Rfl: 0  •  traMADol (Ultram) 50 mg tablet, Take 1 tablet (50 mg total) by mouth every 6 (six) hours as needed for moderate pain, Disp: 20 tablet, Rfl: 0      Objective     Estimated body mass index is 31 09 kg/m² as calculated from the following:    Height as of 9/26/22: 5' 2" (1 575 m)  Weight as of 9/26/22: 77 1 kg (170 lb)  General: NAD, well appearing, AAOx3  HEENT: NCAT, EOMI, MMM, supple  Resp: Nonlabored  Heart: RRR  Abdomen: Soft, ND, NT  Extremities/MSK: no LE edema, no obvious deficits in ROM  Neuro: grossly intact with no obvious deficits  Skin: no obvious lesions or rashes  Breast: Well healed incision on right, TE in place with excess skin inferiorly and laterally; left no palpable masses, grade III ptosis, excess skin and lateral tissue    Lab Results:   Lab Results   Component Value Date    WBC 5 31 11/29/2022    HGB 13 2 11/29/2022    HCT 40 9 11/29/2022    MCV 90 11/29/2022     (L) 11/29/2022          Lab Results   Component Value Date/Time    WOUNDCULT 1+ Growth of 04/16/2018 12:00 AM         Imaging Studies:   No results found  EKG, Pathology, and Other Studies:   Lab Results   Component Value Date/Time    [facility]  09/09/2022 03:07 PM     A  Breast, Right:  - Ductal carcinoma in situ (DCIS), 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, 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  Please see synoptic report  - Sclerosing adenosis  - Calcifications    - Nipple and skin, benign   - All inked designated surgical resection margins, negative for in situ or invasive carcinoma  - Three healing biopsy sites  - One lymph node, negative for carcinoma (0/1) (confirmed by CK-AE1/AE3)  B  Lymph Node, [place], RIGHT SENTINEL LYMPH NODE #1:  - One lymph node, negative for carcinoma (0/1) (confirmed by CK-AE1/AE3)  C  Lymph Node, [place], RIGHT SENTINEL LYMPH NODE #2:  - One lymph node, negative for carcinoma (0/1) (confirmed by CK-AE1/AE3)  No intake or output data in the 24 hours ending 12/14/22 2009    Invasive Devices     Drain  Duration           Closed/Suction Drain Lateral;Right Breast Bulb 15 Fr   96 days                VTE Prophylaxis: Sequential compression device (Venodyne)

## 2022-12-15 NOTE — INTERVAL H&P NOTE
H&P reviewed  After examining the patient I find no changes in the patients condition since the H&P had been written      Vitals:    12/15/22 1115   BP: 119/64   Pulse: 74   Resp: 16   Temp: 97 6 °F (36 4 °C)   SpO2: 97%

## 2022-12-15 NOTE — ANESTHESIA PREPROCEDURE EVALUATION
Procedure:  RIGHT BREAST RECONSTRUCTION AND REMOVAL OF TISSUE EXPANDER WITH PLACEMENT OF IMPLANT  CAPSULOTOMY  (Right: Breast)  LEFT MASTOPEXY FOR SYMMETRY AND POSSIBLE GALAFLEX (Left: Breast)    Relevant Problems   CARDIO   (+) Hyperlipidemia   (+) Hypertension      ENDO   (+) Hypothyroidism      GI/HEPATIC   (+) Gastroesophageal reflux disease without esophagitis      HEMATOLOGY   (+) Thrombocytopenia (HCC)      MUSCULOSKELETAL   (+) Low back pain   (+) TMJ arthritis      NEURO/PSYCH   (+) Anxiety   (+) Tension type headache      Respiratory   (+) Laryngitis      Digestive   (+) Melanosis coli      Other   (+) Abnormal laboratory test   (+) Abnormal mammogram   (+) Acquired absence of right breast and nipple   (+) BRCA negative   (+) Class 1 obesity due to excess calories with body mass index (BMI) of 33 0 to 33 9 in adult   (+) Ductal carcinoma in situ (DCIS) of right breast   (+) Edema   (+) Exposure to SARS-associated coronavirus   (+) Family history of cancer   (+) History of colon resection   (+) IgA deficiency (HCC)   (+) 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HCC)   (+) Low gammaglobulin level (HCC)   (+) Prediabetes   (+) Primary insomnia   (+) S/P knee replacement        Physical Exam    Airway    Mallampati score: II  TM Distance: >3 FB  Neck ROM: full     Dental   No notable dental hx     Cardiovascular  Cardiovascular exam normal    Pulmonary  Pulmonary exam normal     Other Findings        Anesthesia Plan  ASA Score- 2     Anesthesia Type- general with ASA Monitors  Additional Monitors:   Airway Plan: ETT  Plan Factors-Exercise tolerance (METS): >4 METS  Chart reviewed  Existing labs reviewed  Patient is not a current smoker  Patient not instructed to abstain from smoking on day of procedure  Patient did not smoke on day of surgery  Induction- intravenous  Postoperative Plan-     Informed Consent- Anesthetic plan and risks discussed with patient    I personally reviewed this patient with the CRNA  Discussed and agreed on the Anesthesia Plan with the MOISES Perera

## 2022-12-15 NOTE — ANESTHESIA POSTPROCEDURE EVALUATION
Post-Op Assessment Note    CV Status:  Stable    Pain management: inadequate     Mental Status:  Alert and awake   Hydration Status:  Euvolemic   PONV Controlled:  Controlled   Airway Patency:  Patent      Post Op Vitals Reviewed: Yes      Staff: CRNA         No notable events documented      BP   133/82   Temp      Pulse  102   Resp   20   SpO2   96

## 2022-12-16 NOTE — OP NOTE
OPERATIVE REPORT  PATIENT NAME: Chelsea Nassar    :  1960  MRN: [id number]  Pt Location:  OR ROOM 11    SURGERY DATE: 12/15/2022    Surgeon(s) and Role:     * Daniela Lazar, DO - Primary     * Barbie Cheatham PA-C - Assisting    Preop Diagnosis:  Acquired absence of right breast and nipple [Z90 11]    Post-Op Diagnosis Codes:     * Acquired absence of right breast and nipple [Z90 11]    Procedure(s) (LRB):  RIGHT BREAST RECONSTRUCTION AND REMOVAL OF TISSUE EXPANDER WITH PLACEMENT OF IMPLANT  CAPSULOTOMY  (Right)  LEFT MASTOPEXY FOR SYMMETRY AND  GALAFLEX (Left)    Specimen(s):  ID Type Source Tests Collected by Time Destination   1 : RIGHT BREAST EXPANDER Tissue Surgical Hardware/Implant TISSUE EXAM Daniela Lisset Kiara, DO 12/15/2022  2:44 PM    2 : RIGHT BREAST TISSUE Tissue Breast, Right TISSUE EXAM Daniela Vegaleigh Kiara, DO 12/15/2022  3:13 PM    3 : LEFT BREAST TISSUE  Tissue Breast, Left TISSUE EXAM [address] [place], DO 12/15/2022  3:55 PM        Estimated Blood Loss:   Minimal    Drains:  Closed/Suction Drain Right;Lateral Breast Bulb 10 Fr  (Active)   Dressing Status Clean 12/15/22 1826   Drainage Appearance Bloody 12/15/22 1750   Status To bulb suction 12/15/22 1826   Number of days: 1       [REMOVED] Urethral Catheter Latex 16 Fr   (Removed)   Collection Container Standard drainage bag 12/15/22 1415   Number of days: 0       Anesthesia Type:   General - TIVA    Operative Indications:  56 yo female presents following TE based reconstruction on the right for her second stage as well as symmetry procedure    Operative Findings:  Right allergan smooth inspira implant SCM 445cc SN [id number]  Extensive skin excision and redraping of mastectomy flaps with excision measuring 25 x 8 cm at the widest portion and complex closure length 29cm to address her significant axillary excess  Left inferior pedicle mastopexy with Ma flap (15x 7cm) for autoaugmention and reinforcement of IMF with galaflex mesh    Complications:   None    Procedure and Technique:  The patient was seen preoperatively   The procedure, risks, benefits and alternatives were discussed   Informed consent was obtained   The patient was site marked preoperatively   The patient was brought to the operating room where she was positioned supine with all of her pressure points appropriately padded   Anesthesia commenced   A timeout was performed and verified      The patient was prepped and draped in usual sterile fashion   I first turned my attention to the right side   The former incision was incised and the underlying tissue expander was encountered   The prolene suture for each of the tabs was cut and the expander was removed en bloc   Hemostasis was obtained  A pec block was performed   The medial and superior borders were dissected slightly to provide improved symmetry   The pulse-evac was used to debride and thorough irrigate the pocket  The sizer was inserted and the incision tailor tacked  The large excess of skin and axillary excess was marked and excised sharply  Hemostasis was obtained  The lateral aspect was closed using 2-0 PDS for the fascia, 3-0 monocryl for the deep dermis and 4-0 PDS to approximate the skin  The more medial aspect of the incision was closed temporarily with staples  I turned my attention to the left side  The wise pattern mastopexy markings were incised as well as a 42 mm areola cookie cutter markings   I de-epithelialized the inferior pedicle which measured as well as the laterally extending pelaez flap   This was then dissected down to pectoralis fascia   The dissection was carried superiorly along the pectoralis fascia to allow for mobilization of the pedicle more superiorly   The medial and lateral pillars were contoured  Freeda Furbish area was copiously irrigated and hemostasis was obtained   A pec block was performed    The pedicle was positioned and the pelaez flap was rotated superomedially and tacked to the fascia using 2-0 PDS   The shape and closure was tailor tacked    She was sat up to confirm appropriate shape of the breast and symmetry with the right side    The nipple areolar complex was measured and marked from the inframamary fold   Next, closure commenced using 2-0 PDS for the fascia, 3-0 monocryl for the deep dermis and 4-0 PDS for the subcuticular stitch   A 2-0 prolene was placed at the T point   The site for the NAC was incised and de-epithelialized   A cruciate incision was made   The underlying NAC was brought to the surface without undue tension and was secured in placed using 3-0 monocryl and 5-0 monocryl   The nipple areola complex appeared healthy and well perfused   I turned my attention back to the right  The sizer was removed  Again hemostasis was ensured and the pocket was copiously irrigated with antibiotic and irricept solution   A 10 Fr EFFIE drain was inserted and secured to the skin using 2-0 nylon   Under sterile steps, the silicone implant was placed into the pocket  The incision was closed using  3-0 monocryl and 4-0 PDS for the capsule, deep dermis and subcuticular layers respectively   The incisions were cleansed, glue applied and then steristrips once the glue was dry   A bra were applied        The instrument, sponge and needle count was correct an verified prior to completion of the case      The patient emerged from anesthesia and was transferred to the recovery room in stable condition  Patient Disposition:  PACU         SIGNATURE: Ashli Chacon DO  DATE: December 16, 2022  TIME: 3:30 PM    No qualified plastic surgery resident was available for the case  The [facility]OR[facility] provided assistance with retraction and suturing

## 2022-12-16 NOTE — NURSING NOTE
Pt is awake,alert,tolerated diet, Written and verbal instructions given, pt verbalizes an understanding

## 2022-12-21 ENCOUNTER — OFFICE VISIT (OUTPATIENT)
Dept: PLASTIC SURGERY | Facility: CLINIC | Age: 62
End: 2022-12-21

## 2022-12-21 ENCOUNTER — APPOINTMENT (OUTPATIENT)
Dept: LAB | Age: 62
End: 2022-12-21

## 2022-12-21 DIAGNOSIS — E87.6 HYPOKALEMIA: ICD-10-CM

## 2022-12-21 DIAGNOSIS — Z90.11 ACQUIRED ABSENCE OF RIGHT BREAST AND NIPPLE: Primary | ICD-10-CM

## 2022-12-21 LAB — POTASSIUM SERPL-SCNC: 4.3 MMOL/L (ref 3.5–5.3)

## 2022-12-21 NOTE — PROGRESS NOTES
Assessment and Plan:  Magali Mcclelland 57 y o  female s/p second stage breast recon     Healing well  Demonstrated and encouraged scar massage  Continue compressive bra  PT referral placed  RTC in 2 weeks or sooner as needed should any issues arise  Subjective:  Doing well  Denies issues  EFFIE output 15-20 ml/day  Objective:  NAD, AAOx3  Incision C/D/I, expectant edema and ecchymosis, EFFIE removed from right without difficulty    Daniela Craig, DO  Plastic and Reconstructive Surgery

## 2022-12-27 ENCOUNTER — HOSPITAL ENCOUNTER (OUTPATIENT)
Dept: RADIOLOGY | Age: 62
Discharge: HOME | End: 2022-12-27

## 2022-12-27 DIAGNOSIS — M19.90 OSTEOARTHRITIS, UNSPECIFIED OSTEOARTHRITIS TYPE, UNSPECIFIED SITE: ICD-10-CM

## 2022-12-29 ENCOUNTER — RA CDI HCC (OUTPATIENT)
Dept: OTHER | Facility: HOSPITAL | Age: 62
End: 2022-12-29

## 2022-12-29 ENCOUNTER — EVALUATION (OUTPATIENT)
Dept: PHYSICAL THERAPY | Age: 62
End: 2022-12-29

## 2022-12-29 DIAGNOSIS — M25.511 ACUTE PAIN OF BOTH SHOULDERS: Primary | ICD-10-CM

## 2022-12-29 DIAGNOSIS — Z90.11 ACQUIRED ABSENCE OF RIGHT BREAST AND NIPPLE: ICD-10-CM

## 2022-12-29 DIAGNOSIS — M25.512 ACUTE PAIN OF BOTH SHOULDERS: Primary | ICD-10-CM

## 2022-12-29 NOTE — PROGRESS NOTES
[facility] 75  coding opportunities       Chart reviewed, no opportunity found:   [address]        Patients Insurance     Medicare Insurance: [employer]

## 2022-12-29 NOTE — PROGRESS NOTES
PT Evaluation     Today's date: 2022  Patient name: Gary Prieto  : 1960  MRN: [id number]  Referring provider: Chalino Atkinson  Dx:   Encounter Diagnosis     ICD-10-CM    1  Acute pain of both shoulders  M25 511     M25 512       2  Acquired absence of right breast and nipple  Z90 11 Ambulatory Referral to Physical Therapy                     Assessment  Assessment details: Patient presents s/p breast reconstruction with decreased shoulder AROM, PROM, weakness, soft tissue restriction, and pain  Patient is an excellent candidate for PT intervention  Impairments: abnormal or restricted ROM, impaired physical strength, lacks appropriate home exercise program and pain with function  Understanding of Dx/Px/POC: excellent  Goals  Short Term goals - 4 weeks  1  Patient will be independent HEP  2   Patient will report a 50% decrease in pain complaints  3   Increase strength 1/2 grade  4   Increase ROM 5-10 degrees  Long Term goals - 8 weeks  1  Patient will report elimination of pain complaints  2   Patient will return to all work related activities without restriction  3   Patient will return to all recreational activities without restriction  4   ROM WFL  5   Strength 5/5  Plan  Planned therapy interventions: manual therapy, strengthening, stretching and therapeutic exercise  Frequency: 2x week  Duration in weeks: 6        Subjective Evaluation    History of Present Illness  Mechanism of injury: Patient s/p R breast reconstruction and symmetry procedure on 12/15/22  Current sx's - patient reporting significant axillary, breast, and lateral thoracic pain  L>R  MEDS: none currently secondary to stomach issues with ibuprofen  No previous history of shoulder issues  Patient with 10# weight restriction      Quality of life: excellent    Pain  Current pain ratin  At best pain ratin  At worst pain ratin  Progression: improved          Objective     Palpation Additional Palpation Details  Cording noted in R axilla region  Tenderness, tightness, and decreased mobility noted around b/l scars  Active Range of Motion   Left Shoulder   Flexion: 130 degrees with pain  Abduction: 128 degrees with pain    Right Shoulder   Flexion: 130 degrees   Abduction: 128 degrees     Passive Range of Motion   Left Shoulder   Abduction: 85 degrees with pain  External rotation 90°: 45 degrees with pain    Right Shoulder   Abduction: 94 degrees with pain  External rotation 45°: 35 degrees with pain    Strength/Myotome Testing     Additional Strength Details  Strength n/a secondary post-surgical precautions  Precautions: No lifting >10#      Manuals             STM, MFR to b/l axillary and pect regions                                                      Neuro Re-Ed                                                                                                        Ther Ex             Pulley's - flexion and abd             Supine cane flexion             Butterflies                                                                              Ther Activity                                       Gait Training                                       Modalities

## 2023-01-02 ENCOUNTER — NURSE TRIAGE (OUTPATIENT)
Dept: OTHER | Facility: OTHER | Age: 63
End: 2023-01-02

## 2023-01-02 NOTE — TELEPHONE ENCOUNTER
Pt called in stating she had surgery on 12 15 22 with you for a abscess of her right breast  Pt stated she noticed on Saturday that the incision started to open up  Opening is 1/4 inch and draining clear fluid  No signs or symptoms of infection noted  Pt just wanted to make Dr Jae Saba aware and is uploading a picture to her Mychart and is requesting for the doctor to review it Pt given home care advise for now  Per on call provider pt is to Place bacitracin and gauze  Wear compression or sports bra  Pt is to come to [employer] [facility]

## 2023-01-02 NOTE — TELEPHONE ENCOUNTER
Reason for Disposition  • [1] Incision gaping open AND [2] > 48 hours since wound re-opened AND [3] length of opening > 1/2 inch (12 mm)    Answer Assessment - Initial Assessment Questions  1  SYMPTOM: "What's the main symptom you're concerned about?" (e g , redness, pain, drainage)      Under right breast inplant   2  ONSET: "When did *No Answer*  start?"      Started on Saturday   3  SURGERY: "What surgery was performed?"      Implants  4  DATE of SURGERY: "When was surgery performed?"       12 15 22  5  INCISION SITE: "Where is the incision located?"      1/4 inch   6  REDNESS: "Is there any redness at the incision site?" If yes, ask: "How wide across is the redness?" (Inches, centimeters)      Opening is black per pt  7  PAIN: "Is there any pain?" If Yes, ask: "How bad is it?"  (Scale 1-10; or mild, moderate, severe)      Denies  8  BLEEDING: "Is there any bleeding?" If Yes, ask: "How much?" and "Where?"     Denies  9  DRAINAGE: "Is there any drainage from the incision site?" If yes, ask: "What color and how much?" (e g , red, cloudy, pus; drops, teaspoon)     Watery drainage with blood in it  10  FEVER: "Do you have a fever?" If Yes, ask: "What is your temperature, how was it measured, and when did it start?"        Denies  11  OTHER SYMPTOMS: "Do you have any other symptoms?" (e g , shaking chills, weakness, rash elsewhere on body)      Denies      Protocols used: POST-OP INCISION SYMPTOMS AND QUESTIONS-ADULT-AH

## 2023-01-02 NOTE — TELEPHONE ENCOUNTER
Regarding: open incision , oozing drainage  ----- Message from Beba Raphael sent at 1/2/2023  8:09 AM EST -----  "My incision is opening up, I had my procedure on the 15   There is drainage coming out "

## 2023-01-03 ENCOUNTER — OFFICE VISIT (OUTPATIENT)
Dept: PLASTIC SURGERY | Facility: CLINIC | Age: 63
End: 2023-01-03

## 2023-01-03 DIAGNOSIS — Z90.11 ACQUIRED ABSENCE OF RIGHT BREAST AND NIPPLE: Primary | ICD-10-CM

## 2023-01-03 RX ORDER — SULFAMETHOXAZOLE AND TRIMETHOPRIM 800; 160 MG/1; MG/1
1 TABLET ORAL EVERY 12 HOURS SCHEDULED
Qty: 14 TABLET | Refills: 0 | Status: SHIPPED | OUTPATIENT
Start: 2023-01-03 | End: 2023-01-10

## 2023-01-05 ENCOUNTER — OFFICE VISIT (OUTPATIENT)
Dept: PHYSICAL THERAPY | Age: 63
End: 2023-01-05

## 2023-01-05 DIAGNOSIS — M25.512 ACUTE PAIN OF BOTH SHOULDERS: Primary | ICD-10-CM

## 2023-01-05 DIAGNOSIS — M25.511 ACUTE PAIN OF BOTH SHOULDERS: Primary | ICD-10-CM

## 2023-01-05 NOTE — PROGRESS NOTES
Assessment and Plan:  Ruby Job 57 y o  female s/p breast recon with right implant and left mastopexy    Short course of abx sent to local pharmacy given topical debridement of eschar  Continue moisturization and instructed scar care  Continue to encourage scar massage  Continue compressive bra  RTC in 2 week or sooner as needed should any issues arise  Subjective:  Called last night with concern for "opening" along right medial incision  Objective:  NAD, AAOx3  Right incision with eschar noted along medial aspect, debrided and intact, no implant exposure, no active drainage, no erythema or cellulitis, no s/s of seroma    Daniela A   Kiara, DO  Plastic and Reconstructive Surgery

## 2023-01-06 ENCOUNTER — OFFICE VISIT (OUTPATIENT)
Dept: INTERNAL MEDICINE CLINIC | Facility: CLINIC | Age: 63
End: 2023-01-06

## 2023-01-06 VITALS
RESPIRATION RATE: 16 BRPM | SYSTOLIC BLOOD PRESSURE: 122 MMHG | OXYGEN SATURATION: 96 % | BODY MASS INDEX: 30.69 KG/M2 | HEART RATE: 89 BPM | WEIGHT: 166.8 LBS | HEIGHT: 62 IN | DIASTOLIC BLOOD PRESSURE: 72 MMHG

## 2023-01-06 DIAGNOSIS — Z01.818 PREOP EXAM FOR INTERNAL MEDICINE: ICD-10-CM

## 2023-01-06 DIAGNOSIS — D69.6 THROMBOCYTOPENIA (HCC): ICD-10-CM

## 2023-01-06 DIAGNOSIS — E78.5 HYPERLIPIDEMIA, UNSPECIFIED HYPERLIPIDEMIA TYPE: ICD-10-CM

## 2023-01-06 DIAGNOSIS — D80.1 LOW GAMMAGLOBULIN LEVEL (HCC): ICD-10-CM

## 2023-01-06 DIAGNOSIS — E03.9 HYPOTHYROIDISM, UNSPECIFIED TYPE: Primary | ICD-10-CM

## 2023-01-06 PROBLEM — H26.9 CATARACT: Status: ACTIVE | Noted: 2023-01-06

## 2023-01-06 NOTE — PROGRESS NOTES
Daily Note     Today's date: 2023  Patient name: Vignesh Mcintosh  : 1960  MRN: [id number]  Referring provider: Amadeo Clark  Dx:   Encounter Diagnosis     ICD-10-CM    1  Acute pain of both shoulders  M25 511     M25 512                      Subjective: Overall, sx's are improved  Objective: See treatment diary below      Assessment: Tolerated treatment well  Patient would benefit from continued PT      Plan: Continue per plan of care  Precautions: No lifting >10#      Manuals             STM, MFR to b/l axillary and pect regions   nt            PROM/stretching to b/l Shoulders 30 minutes                                      Neuro Re-Ed                                                                                                        Ther Ex             Pulley's - flexion and abd x5 minutes            Supine cane flexion 2x10            Butterflies 2x10            Supine shoulder flexion  2x10                                                                Ther Activity                                       Gait Training                                       Modalities

## 2023-01-08 PROBLEM — Z01.818 PREOP EXAM FOR INTERNAL MEDICINE: Status: ACTIVE | Noted: 2023-01-08

## 2023-01-08 NOTE — ASSESSMENT & PLAN NOTE
Hyperlipidemia controlled continue with current medical regiment recommend a low-cholesterol diet and recommend routine exercise we will continue to monitor the progress    Continue Crestor 20 mg once daily

## 2023-01-08 NOTE — ASSESSMENT & PLAN NOTE
Patient is currently doing very well stable and low risk for cataract procedure she is currently cleared necessary paperwork completed and sent back to ophthalmology

## 2023-01-09 ENCOUNTER — APPOINTMENT (OUTPATIENT)
Dept: PHYSICAL THERAPY | Age: 63
End: 2023-01-09

## 2023-01-09 DIAGNOSIS — M25.511 ACUTE PAIN OF BOTH SHOULDERS: Primary | ICD-10-CM

## 2023-01-09 DIAGNOSIS — M25.512 ACUTE PAIN OF BOTH SHOULDERS: Primary | ICD-10-CM

## 2023-01-09 NOTE — PROGRESS NOTES
Daily Note     Today's date: 2023  Patient name: Jose Guadalupe Ortiz  : 1960  MRN: [id number]  Referring provider: Shagufta Carmona  Dx:   Encounter Diagnosis     ICD-10-CM    1  Acute pain of both shoulders  M25 511     M25 512                      Subjective:Pt  reports her incision opened under right breast        Objective: See treatment diary below   would benefit from continued PT      Plan: Continue per plan of care  Precautions: No lifting >10#      Manuals            STM, MFR to b/l axillary and pect regions   nt            PROM/stretching to b/l Shoulders 30 minutes 30 min                                     Neuro Re-Ed                                                                                                        Ther Ex             Pulley's - flexion and abd x5 minutes 5 min           Supine cane flexion 2x10            Butterflies 2x10            Supine shoulder flexion  2x10            isometrics  5 sec x 5           Wall slides   10x           Standing flex/scap  20x 1#                        Ther Activity                                       Gait Training                                       Modalities

## 2023-01-11 ENCOUNTER — APPOINTMENT (OUTPATIENT)
Dept: PHYSICAL THERAPY | Age: 63
End: 2023-01-11

## 2023-01-11 DIAGNOSIS — M25.512 ACUTE PAIN OF BOTH SHOULDERS: Primary | ICD-10-CM

## 2023-01-11 DIAGNOSIS — M25.511 ACUTE PAIN OF BOTH SHOULDERS: Primary | ICD-10-CM

## 2023-01-11 NOTE — PROGRESS NOTES
Daily Note     Today's date: 2023  Patient name: Ashley Crocker  : 1960  MRN: [id number]  Referring provider: Nanette Basilio  Dx:   Encounter Diagnosis     ICD-10-CM    1  Acute pain of both shoulders  M25 511     M25 512                      Subjective: Pt  C/o tightness b/l deltoid area left more than right  Continues with small opening under right breast that is getting smaller  Objective: See treatment diary below      Assessment: Tolerated treatment well  Patient would benefit from continued PT      Plan: Continue per plan of care  Precautions: No lifting >10#      Manuals           STM, MFR to b/l axillary and pect regions   nt            PROM/stretching to b/l Shoulders 30 minutes 30 min 30min                                    Neuro Re-Ed                                                                                                        Ther Ex             Pulley's - flexion and abd x5 minutes 5 min           Supine cane flexion 2x10 2# 20x           Butterflies 2x10 20x           Supine shoulder flexion  2x10 20x           isometrics  5 sec x 5           Wall slides   10x           Standing flex/scap  20x 1#           ER /wedge  5sec x 20x           Ther Activity                                       Gait Training                                       Modalities

## 2023-01-12 ENCOUNTER — OFFICE VISIT (OUTPATIENT)
Dept: PLASTIC SURGERY | Facility: CLINIC | Age: 63
End: 2023-01-12

## 2023-01-12 DIAGNOSIS — Z90.11 ACQUIRED ABSENCE OF RIGHT BREAST AND NIPPLE: Primary | ICD-10-CM

## 2023-01-12 RX ORDER — SULFAMETHOXAZOLE AND TRIMETHOPRIM 800; 160 MG/1; MG/1
1 TABLET ORAL EVERY 12 HOURS SCHEDULED
Qty: 20 TABLET | Refills: 0 | Status: SHIPPED | OUTPATIENT
Start: 2023-01-12 | End: 2023-01-22

## 2023-01-12 NOTE — PROGRESS NOTES
POST-OP EVALUATION  1/12/2023    Will Long is a 57 y o  female is here today for routine post-operative follow up   12/15/22 4043         Procedures:        RIGHT BREAST RECONSTRUCTION AND REMOVAL OF TISSUE EXPANDER WITH PLACEMENT OF IMPLANT  CAPSULOTOMY  (Right: Breast)       LEFT MASTOPEXY FOR SYMMETRY AND  GALAFLEX (Left: Breast)       She underwent debridement of eschar by Dr Melody Arias at her previous visit  Past Medical History:   Diagnosis Date   • Abnormal electrocardiogram     Last assessed 10/04/13   • Anemia     pt states hypogammaglobulinemia, needs washed RBCs if transfused - Rx by Dr Ely Richey   • Anxiety    • Depression    • Diabetes [facility])     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  - Last assessed 11/02/17   • Disease of thyroid gland    • Diverticulitis of colon    • GERD (gastroesophageal reflux disease)    • Hyperlipidemia    • Hypertension    • Hypoglobulinemia    • PONV (postoperative nausea and vomiting)    • Slow transit constipation      Past Surgical History:   Procedure Laterality Date   • APPENDECTOMY     • BOWEL RESECTION     • BREAST BIOPSY Right 07/07/2022    stereo x 2   • BREAST BIOPSY Right 07/26/2022    stereo   • BREAST RECONSTRUCTION Right 12/15/2022    Procedure: RIGHT BREAST RECONSTRUCTION AND REMOVAL OF TISSUE EXPANDER WITH PLACEMENT OF IMPLANT   CAPSULOTOMY ;  Surgeon: Romayne Russell, DO;  Location: [address] [place] [address] OR;  Service: Plastics   • CARPAL TUNNEL RELEASE     • COLECTOMY      Resolved 08/27/09   • COLONOSCOPY     • INSERTION OF BREAST TISSUE EXPANDER Right 09/09/2022    Procedure: IMMEDIATE BREAST RECONSTRUCTION WITH TISSUE EXPANDER AND  GALAFLEX;  Surgeon: Romayne Russell, DO;  Location: [facility];  Service: Plastics   • JOINT REPLACEMENT Right    • LASIK     • MAMMO STEREOTACTIC BREAST BIOPSY RIGHT (ALL INC) Right 07/07/2022   • MAMMO STEREOTACTIC BREAST BIOPSY RIGHT (ALL INC) Right 07/26/2022   • MAMMO STEREOTACTIC BREAST BIOPSY RIGHT (ALL INC) EACH ADD Right 07/07/2022   • MASTECTOMY W/ SENTINEL NODE BIOPSY Right 09/09/2022    Procedure: MASTECTOMY WITH BIOPSY LYMPH NODE SENTINEL,Lymphoscintigraphy,Lymphatic Mapping; [address];  Surgeon: Sania Raines MD;  Location: [facility];  Service: Surgical Oncology   • OTHER SURGICAL HISTORY      Biopsy Vulvar   • MA ARTHRP KNE CONDYLE&PLATU MEDIAL&LAT COMPARTMENTS Left 02/08/2017    Procedure: TOTAL KNEE REPLACEMENT ARTHROPLASTY;  Surgeon: Deric Croft MD;  Location: [facility];  Service: Orthopedics   • MA COLONOSCOPY FLX DX W/COLLJ SPEC WHEN PFRMD N/A 05/06/2019    Procedure: COLONOSCOPY;  Surgeon: Josué Lozano MD;  Location:  GI LAB;   Service: General   • MA MASTOPEXY Left 12/15/2022    Procedure: LEFT MASTOPEXY FOR SYMMETRY AND  GALAFLEX;  Surgeon: Pete Reilly DO;  Location: [address] [place] [address] MAIN OR;  Service: Plastics   • REFRACTIVE SURGERY     • REPLACEMENT TOTAL KNEE Bilateral 10/2013   • TONSILLECTOMY      With Adenoidectomy   • TOOTH EXTRACTION     • VAGINA SURGERY      mesh        Current Outpatient Medications:   •  sulfamethoxazole-trimethoprim (BACTRIM DS) 800-160 mg per tablet, Take 1 tablet by mouth every 12 (twelve) hours for 10 days, Disp: 20 tablet, Rfl: 0  •  diphenhydrAMINE (BENADRYL) 25 mg tablet, Take 25 mg by mouth every 6 (six) hours as needed for itching, Disp: , Rfl:   •  famotidine (PEPCID) 20 mg tablet, Take 1 tablet (20 mg total) by mouth 2 (two) times a day, Disp: 90 tablet, Rfl: 1  •  levothyroxine 75 mcg tablet, TAKE ONE TABLET BY MOUTH EVERY DAY IN THE EARLY MORNING, Disp: 90 tablet, Rfl: 1  •  potassium chloride (K-DUR,KLOR-CON) 10 mEq tablet, TAKE ONE TABLET BY MOUTH TWICE A DAY (Patient taking differently: 20 mEq Daily in AM), Disp: 180 tablet, Rfl: 1  •  Psyllium (METAMUCIL PO), Take 1 Dose by mouth daily  , Disp: , Rfl:   •  rosuvastatin (CRESTOR) 5 mg tablet, Take 1 tablet (5 mg total) by mouth daily, Disp: 90 tablet, Rfl: 5  • senna (SENOKOT) 8 6 MG tablet, Take 2 tablets by mouth daily, Disp: , Rfl:   •  sertraline (ZOLOFT) 100 mg tablet, TAKE ONE TABLET BY MOUTH EVERY DAY, Disp: 90 tablet, Rfl: 1  •  traZODone (DESYREL) 50 mg tablet, Take 1 tablet (50 mg total) by mouth daily at bedtime, Disp: 90 tablet, Rfl: 1  •  triamterene-hydrochlorothiazide (DYAZIDE) 37 5-25 mg per capsule, TAKE ONE CAPSULE BY MOUTH EVERY MORNING, Disp: 90 capsule, Rfl: 1  Allergies: Penicillins, Acetazolamide, and Metronidazole    Objective      Right breast incision dehisced, with exposed implant  Approx 1 cm  Mild serous fluid  No purulence  Breast is soft, without contracture  Assessment/Plan   Diagnoses and all orders for this visit:    Acquired absence of right breast and nipple  -     sulfamethoxazole-trimethoprim (BACTRIM DS) 800-160 mg per tablet; Take 1 tablet by mouth every 12 (twelve) hours for 10 days    Continue ABX  Wound dressed with Bacitracin, gauze, tegaderm  She will see Dr Jae Saba tomorrow      Delmy Mejia PA-C

## 2023-01-13 ENCOUNTER — OFFICE VISIT (OUTPATIENT)
Dept: PLASTIC SURGERY | Facility: CLINIC | Age: 63
End: 2023-01-13

## 2023-01-13 DIAGNOSIS — Z90.11 ACQUIRED ABSENCE OF RIGHT BREAST AND NIPPLE: Primary | ICD-10-CM

## 2023-01-16 ENCOUNTER — SURGERY/PROCEDURE (OUTPATIENT)
Dept: URBAN - METROPOLITAN AREA SURGICAL CENTER 6 | Facility: SURGICAL CENTER | Age: 63
End: 2023-01-16

## 2023-01-16 DIAGNOSIS — H25.811: ICD-10-CM

## 2023-01-16 PROCEDURE — 66984 XCAPSL CTRC RMVL W/O ECP: CPT

## 2023-01-16 NOTE — PROGRESS NOTES
Patient seen and examined  Acute < 0 5 cm exposure of implant along inframammay fold  No s/s of infection  Explained to patient, that salvage can be attempted in this circumstance but she may require implant exchange in the OR  She understands the risks/benefits and would like to attempt suture and RICHELLE placement  She understands if this does not work she will require the OR  Area debrided and copiously irrigated  Suture reinforcement of skin/incision  5x10 cm RICHELLE placement with adequate seal     Will RTC in 1 week or sooner should any erythema or fever develop  Daniela A  Kiara, DO  Plastic and Reconstructive Surgery    The CPT code for this case is 37037

## 2023-01-17 ENCOUNTER — 1 DAY POST-OP (OUTPATIENT)
Dept: URBAN - METROPOLITAN AREA CLINIC 6 | Facility: CLINIC | Age: 63
End: 2023-01-17

## 2023-01-17 DIAGNOSIS — Z96.1: ICD-10-CM

## 2023-01-17 PROCEDURE — 99024 POSTOP FOLLOW-UP VISIT: CPT

## 2023-01-17 ASSESSMENT — TONOMETRY
OS_IOP_MMHG: 13
OD_IOP_MMHG: 19

## 2023-01-17 ASSESSMENT — KERATOMETRY
OS_K1POWER_DIOPTERS: 37.75
OS_AXISANGLE_DEGREES: 043
OS_AXISANGLE_DEGREES: 43
OD_K1POWER_DIOPTERS: 37.75
OD_K1POWER_DIOPTERS: 38.00
OS_K2POWER_DIOPTERS: 38.75
OD_AXISANGLE_DEGREES: 102
OS_AXISANGLE2_DEGREES: 133
OS_AXISANGLE2_DEGREES: 133
OD_AXISANGLE2_DEGREES: 12
OD_K2POWER_DIOPTERS: 38.50
OD_AXISANGLE_DEGREES: 96
OD_AXISANGLE_DEGREES: 96
OS_K1POWER_DIOPTERS: 38.00
OD_K2POWER_DIOPTERS: 38.75
OS_AXISANGLE_DEGREES: 043
OS_K2POWER_DIOPTERS: 38.75
OD_K1POWER_DIOPTERS: 37.75
OD_K2POWER_DIOPTERS: 38.75
OS_K1POWER_DIOPTERS: 37.75
OD_K1POWER_DIOPTERS: 38.00
OS_AXISANGLE_DEGREES: 43
OD_AXISANGLE2_DEGREES: 6
OS_K1POWER_DIOPTERS: 38.00
OD_AXISANGLE_DEGREES: 102
OD_AXISANGLE2_DEGREES: 12
OD_K2POWER_DIOPTERS: 38.50
OD_AXISANGLE2_DEGREES: 6

## 2023-01-17 ASSESSMENT — VISUAL ACUITY
OD_PH: 20/50-1
OD_SC: 20/70-1

## 2023-01-19 ENCOUNTER — APPOINTMENT (OUTPATIENT)
Dept: PHYSICAL THERAPY | Age: 63
End: 2023-01-19

## 2023-01-20 ENCOUNTER — OFFICE VISIT (OUTPATIENT)
Dept: PLASTIC SURGERY | Facility: CLINIC | Age: 63
End: 2023-01-20

## 2023-01-20 DIAGNOSIS — Z90.11 ACQUIRED ABSENCE OF RIGHT BREAST AND NIPPLE: Primary | ICD-10-CM

## 2023-01-20 NOTE — PROGRESS NOTES
Assessment and Plan:  Brielle Shoemaker 57 y o  female s/p second stage breast recon    Healing very nicely  No s/s of infection  Aggressive moisturization  Continue supportive bra  RTC in 1 week or as needed should any issues arise  Subjective:  Doing well  Denies issues  Objective:  NAD, AAOx3  Prevena removed, Incision C/D/I, skin with improved thickness and health    Daniela Craig, DO  Plastic and Reconstructive Surgery

## 2023-01-22 DIAGNOSIS — K21.9 GASTROESOPHAGEAL REFLUX DISEASE WITHOUT ESOPHAGITIS: ICD-10-CM

## 2023-01-23 ENCOUNTER — APPOINTMENT (OUTPATIENT)
Dept: PHYSICAL THERAPY | Age: 63
End: 2023-01-23

## 2023-01-23 RX ORDER — FAMOTIDINE 20 MG/1
20 TABLET, FILM COATED ORAL 2 TIMES DAILY
Qty: 90 TABLET | Refills: 0 | Status: SHIPPED | OUTPATIENT
Start: 2023-01-23 | End: 2023-01-26 | Stop reason: SDUPTHER

## 2023-01-26 ENCOUNTER — 1 WEEK POST-OP (OUTPATIENT)
Dept: URBAN - METROPOLITAN AREA CLINIC 6 | Facility: CLINIC | Age: 63
End: 2023-01-26

## 2023-01-26 DIAGNOSIS — Z12.31 ENCOUNTER FOR SCREENING MAMMOGRAM FOR MALIGNANT NEOPLASM OF BREAST: Primary | ICD-10-CM

## 2023-01-26 DIAGNOSIS — K21.9 GASTROESOPHAGEAL REFLUX DISEASE WITHOUT ESOPHAGITIS: ICD-10-CM

## 2023-01-26 DIAGNOSIS — Z96.1: ICD-10-CM

## 2023-01-26 DIAGNOSIS — H25.812: ICD-10-CM

## 2023-01-26 PROCEDURE — 99024 POSTOP FOLLOW-UP VISIT: CPT

## 2023-01-26 PROCEDURE — 76519 ECHO EXAM OF EYE: CPT | Mod: 26,LT

## 2023-01-26 PROCEDURE — 92250 FUNDUS PHOTOGRAPHY W/I&R: CPT | Mod: NC

## 2023-01-26 RX ORDER — FAMOTIDINE 20 MG/1
20 TABLET, FILM COATED ORAL 2 TIMES DAILY
Qty: 90 TABLET | Refills: 0 | Status: SHIPPED | OUTPATIENT
Start: 2023-01-26

## 2023-01-26 ASSESSMENT — KERATOMETRY
OD_K2POWER_DIOPTERS: 38.75
OD_AXISANGLE_DEGREES: 96
OS_K1POWER_DIOPTERS: 38.00
OS_AXISANGLE_DEGREES: 43
OS_K1POWER_DIOPTERS: 37.75
OD_AXISANGLE_DEGREES: 102
OD_K2POWER_DIOPTERS: 38.50
OS_AXISANGLE_DEGREES: 043
OS_K2POWER_DIOPTERS: 38.75
OD_AXISANGLE2_DEGREES: 12
OD_AXISANGLE2_DEGREES: 6
OS_AXISANGLE2_DEGREES: 133
OD_K1POWER_DIOPTERS: 38.00
OD_K1POWER_DIOPTERS: 37.75

## 2023-01-26 ASSESSMENT — VISUAL ACUITY
OD_PH: 20/30
OS_CC: 20/30
OD_SC: 20/50

## 2023-01-26 ASSESSMENT — TONOMETRY
OS_IOP_MMHG: 10
OD_IOP_MMHG: 10

## 2023-01-27 ENCOUNTER — OFFICE VISIT (OUTPATIENT)
Dept: PLASTIC SURGERY | Facility: CLINIC | Age: 63
End: 2023-01-27

## 2023-01-27 DIAGNOSIS — Z90.11 ACQUIRED ABSENCE OF RIGHT BREAST AND NIPPLE: Primary | ICD-10-CM

## 2023-01-27 NOTE — PROGRESS NOTES
Assessment and Plan:  Lynne Band 57 y o  female s/p right breast recon with left mastopexy for symmetry    Healing well  Demonstrated and encouraged scar massage  Continue supportive bra  RTC in 6-8 weeks or sooner as needed should any issues arise  Subjective:  Doing well  Denies issues  Objective:  NAD, AAOx3  Incision C/D/I, sutures removed, no s/s of seroma, improving standing cone deformity on left, small concavity with periosteal suture on right lateral border - scar massage demonstrated  Daniela Craig, DO  Plastic and Reconstructive Surgery

## 2023-01-30 ENCOUNTER — SURGERY/PROCEDURE (OUTPATIENT)
Dept: URBAN - METROPOLITAN AREA SURGICAL CENTER 6 | Facility: SURGICAL CENTER | Age: 63
End: 2023-01-30

## 2023-01-30 DIAGNOSIS — Z12.31 ENCOUNTER FOR SCREENING MAMMOGRAM FOR MALIGNANT NEOPLASM OF BREAST: Primary | ICD-10-CM

## 2023-01-30 DIAGNOSIS — H25.812: ICD-10-CM

## 2023-01-30 PROCEDURE — 66984 XCAPSL CTRC RMVL W/O ECP: CPT | Mod: 79,LT

## 2023-01-31 ENCOUNTER — 1 DAY POST-OP (OUTPATIENT)
Dept: URBAN - METROPOLITAN AREA CLINIC 6 | Facility: CLINIC | Age: 63
End: 2023-01-31

## 2023-01-31 DIAGNOSIS — Z96.1: ICD-10-CM

## 2023-01-31 PROCEDURE — 99024 POSTOP FOLLOW-UP VISIT: CPT

## 2023-01-31 ASSESSMENT — TONOMETRY
OS_IOP_MMHG: 18
OD_IOP_MMHG: 11

## 2023-01-31 ASSESSMENT — KERATOMETRY
OS_K1POWER_DIOPTERS: 37.75
OS_K2POWER_DIOPTERS: 38.75
OD_K1POWER_DIOPTERS: 37.75
OD_AXISANGLE2_DEGREES: 6
OD_K1POWER_DIOPTERS: 37.75
OS_K1POWER_DIOPTERS: 37.75
OD_AXISANGLE2_DEGREES: 12
OS_AXISANGLE2_DEGREES: 133
OS_AXISANGLE_DEGREES: 43
OS_K1POWER_DIOPTERS: 38.00
OD_K1POWER_DIOPTERS: 38.00
OS_K1POWER_DIOPTERS: 38.00
OS_AXISANGLE2_DEGREES: 133
OD_AXISANGLE_DEGREES: 102
OS_K2POWER_DIOPTERS: 38.75
OD_AXISANGLE_DEGREES: 96
OS_AXISANGLE_DEGREES: 043
OD_K2POWER_DIOPTERS: 38.75
OD_K2POWER_DIOPTERS: 38.50
OD_K2POWER_DIOPTERS: 38.50
OD_AXISANGLE_DEGREES: 96
OD_AXISANGLE2_DEGREES: 12
OS_AXISANGLE_DEGREES: 43
OD_K1POWER_DIOPTERS: 38.00
OD_K2POWER_DIOPTERS: 38.75
OD_AXISANGLE_DEGREES: 102
OD_AXISANGLE2_DEGREES: 6
OS_AXISANGLE_DEGREES: 043

## 2023-01-31 ASSESSMENT — VISUAL ACUITY
OS_PH: 20/60
OS_SC: 20/100
OD_SC: 20/40-1
OU_SC: J5

## 2023-02-09 ENCOUNTER — 1 WEEK POST-OP (OUTPATIENT)
Dept: URBAN - METROPOLITAN AREA CLINIC 6 | Facility: CLINIC | Age: 63
End: 2023-02-09

## 2023-02-09 DIAGNOSIS — Z96.1: ICD-10-CM

## 2023-02-09 DIAGNOSIS — Z98.890: ICD-10-CM

## 2023-02-09 DIAGNOSIS — H26.493: ICD-10-CM

## 2023-02-09 PROCEDURE — 99024 POSTOP FOLLOW-UP VISIT: CPT

## 2023-02-09 ASSESSMENT — KERATOMETRY
OD_AXISANGLE2_DEGREES: 6
OD_K2POWER_DIOPTERS: 38.75
OD_K2POWER_DIOPTERS: 38.50
OS_AXISANGLE2_DEGREES: 133
OD_AXISANGLE_DEGREES: 96
OS_K1POWER_DIOPTERS: 37.75
OS_K2POWER_DIOPTERS: 38.75
OS_AXISANGLE_DEGREES: 43
OS_K1POWER_DIOPTERS: 38.00
OD_AXISANGLE_DEGREES: 102
OD_AXISANGLE2_DEGREES: 12
OS_AXISANGLE_DEGREES: 043
OD_K1POWER_DIOPTERS: 37.75
OD_K1POWER_DIOPTERS: 38.00

## 2023-02-09 ASSESSMENT — VISUAL ACUITY
OS_PH: 20/30
OS_SC: 20/40-2
OD_SC: 20/60
OD_PH: 20/30-1

## 2023-02-09 ASSESSMENT — TONOMETRY
OS_IOP_MMHG: 13
OD_IOP_MMHG: 11

## 2023-02-10 ENCOUNTER — OFFICE VISIT (OUTPATIENT)
Dept: PLASTIC SURGERY | Facility: CLINIC | Age: 63
End: 2023-02-10

## 2023-02-10 ENCOUNTER — EVALUATION (OUTPATIENT)
Dept: PHYSICAL THERAPY | Age: 63
End: 2023-02-10

## 2023-02-10 DIAGNOSIS — Z90.11 ACQUIRED ABSENCE OF RIGHT BREAST AND NIPPLE: Primary | ICD-10-CM

## 2023-02-10 DIAGNOSIS — M25.511 ACUTE PAIN OF BOTH SHOULDERS: Primary | ICD-10-CM

## 2023-02-10 DIAGNOSIS — M25.512 ACUTE PAIN OF BOTH SHOULDERS: Primary | ICD-10-CM

## 2023-02-10 RX ORDER — SULFAMETHOXAZOLE AND TRIMETHOPRIM 800; 160 MG/1; MG/1
1 TABLET ORAL EVERY 12 HOURS SCHEDULED
Qty: 6 TABLET | Refills: 0 | Status: SHIPPED | OUTPATIENT
Start: 2023-02-10 | End: 2023-02-13

## 2023-02-10 NOTE — PROGRESS NOTES
Assessment and Plan:  Pietro Number 57 y o  female s/p right breast recon     Wash daily  Continue daily massage  Continue supportive bra  RTC in 4 weeks or sooner should any issues arise  Subjective: Woke up this am with skin along right incision appearing thinner than she recalled  Objective:  NAD, AAOx3  Incision intact but thin for approx 1 cm portion; to prevent any exposure this skin was de-epithelialized and reinforced to thicken the incision  Daniela Craig, DO  Plastic and Reconstructive Surgery

## 2023-02-11 DIAGNOSIS — I10 HYPERTENSION, UNSPECIFIED TYPE: ICD-10-CM

## 2023-02-11 DIAGNOSIS — E78.5 HYPERLIPIDEMIA, UNSPECIFIED HYPERLIPIDEMIA TYPE: ICD-10-CM

## 2023-02-11 RX ORDER — ROSUVASTATIN CALCIUM 5 MG/1
TABLET, COATED ORAL
Qty: 90 TABLET | Refills: 5 | Status: SHIPPED | OUTPATIENT
Start: 2023-02-11

## 2023-02-11 RX ORDER — POTASSIUM CHLORIDE 750 MG/1
TABLET, EXTENDED RELEASE ORAL
Qty: 180 TABLET | Refills: 1 | Status: SHIPPED | OUTPATIENT
Start: 2023-02-11

## 2023-02-13 ENCOUNTER — OFFICE VISIT (OUTPATIENT)
Dept: PHYSICAL THERAPY | Age: 63
End: 2023-02-13

## 2023-02-13 DIAGNOSIS — M25.511 ACUTE PAIN OF BOTH SHOULDERS: Primary | ICD-10-CM

## 2023-02-13 DIAGNOSIS — M25.512 ACUTE PAIN OF BOTH SHOULDERS: Primary | ICD-10-CM

## 2023-02-13 NOTE — PROGRESS NOTES
Daily Note     Today's date: 2023  Patient name: Amanuel Vital  : 1960  MRN: [id number]  Referring provider: Angela Paulino  Dx:   Encounter Diagnosis     ICD-10-CM    1  Acute pain of both shoulders  M25 511     M25 512                      Subjective: Patient reports not moving UE much at all over the weekend  Worried about affecting the skin graph  Objective: See treatment diary below      Assessment: Tolerated treatment well  Patient would benefit from continued PT      Plan: Continue per plan of care  Precautions: No lifting >10#      Manuals          STM, MFR to b/l axillary and pect regions  nt   To barrington-scapular region  PROM/stretching to b/l Shoulders 30 minutes 30 min 30min R shoulder - ER with shoulder at <20 abd                                     Neuro Re-Ed                                                                                                        Ther Ex             Pulley's - flexion and abd x5 minutes 5 min           Supine cane flexion 2x10 2# 20x           Butterflies 2x10 20x           Supine shoulder flexion  2x10 20x           isometrics  5 sec x 5           Wall slides   10x           Standing flex/scap  20x 1#           ER /wedge  5sec x 20x           Ther Activity                                       Gait Training                                       Modalities

## 2023-02-13 NOTE — PROGRESS NOTES
Daily Note     Today's date: 2023  Patient name: Patrick Flanagan  : 1960  MRN: [id number]  Referring provider: Yanna Chandra  Dx:   Encounter Diagnosis     ICD-10-CM    1  Acute pain of both shoulders  M25 511     M25 512                      Subjective: Patient returns after difficulty with wound on undersurface of breast       Objective: See treatment diary below  Decreased shoulder ROM and cording noted  R shoulder PROM: Flexion 125, ER 45 at 90 abd, IR 50 at 90 abd      Assessment: Tolerated treatment well  Patient would benefit from continued PT      Plan: Continue per plan of care  Precautions: No lifting >10#      Manuals          STM, MFR to b/l axillary and pect regions   nt            PROM/stretching to b/l Shoulders 30 minutes 30 min 30min 15 minutes         RA'd    completed                      Neuro Re-Ed                                                                                                        Ther Ex             Pulley's - flexion and abd x5 minutes 5 min           Supine cane flexion 2x10 2# 20x           Butterflies 2x10 20x           Supine shoulder flexion  2x10 20x           isometrics  5 sec x 5           Wall slides   10x           Standing flex/scap  20x 1#           ER Marquis Slick  5sec x 20x           Shld shrugs/scap retraction    2x10                                                             Ther Activity                                       Gait Training                                       Modalities

## 2023-02-17 ENCOUNTER — OFFICE VISIT (OUTPATIENT)
Dept: PHYSICAL THERAPY | Age: 63
End: 2023-02-17

## 2023-02-17 DIAGNOSIS — M25.512 ACUTE PAIN OF BOTH SHOULDERS: Primary | ICD-10-CM

## 2023-02-17 DIAGNOSIS — M25.511 ACUTE PAIN OF BOTH SHOULDERS: Primary | ICD-10-CM

## 2023-02-17 NOTE — PROGRESS NOTES
Daily Note     Today's date: 2023  Patient name: Chencho Eastman  : 1960  MRN: [id number]  Referring provider: Kailee Estrella  Dx:   Encounter Diagnosis     ICD-10-CM    1  Acute pain of both shoulders  M25 511     M25 512                      Subjective: No new complaints  Objective: See treatment diary below      Assessment: Tolerated treatment well  Patient would benefit from continued PT      Plan: Continue per plan of care  Precautions: No lifting >10#      Manuals         STM, MFR to b/l axillary and pect regions  nt   To barrington-scapular region   completed        PROM/stretching to b/l Shoulders 30 minutes 30 min 30min R shoulder - ER with shoulder at <20 abd  completed                                  Neuro Re-Ed                                                                                                        Ther Ex             Pulley's - flexion and abd x5 minutes 5 min   nt        Supine cane flexion 2x10 2# 20x   nt        Butterflies 2x10 20x   nt        Supine shoulder flexion  2x10 20x   nt        isometrics  5 sec x 5   nt        Wall slides   10x   nt        Standing flex/scap  20x 1#   nt        ER /wedge  5sec x 20x   nt        Ther Activity                                       Gait Training                                       Modalities

## 2023-02-20 DIAGNOSIS — E03.9 HYPOTHYROIDISM, UNSPECIFIED TYPE: ICD-10-CM

## 2023-02-20 DIAGNOSIS — F41.9 ANXIETY: ICD-10-CM

## 2023-02-20 DIAGNOSIS — I10 HYPERTENSION, UNSPECIFIED TYPE: ICD-10-CM

## 2023-02-20 RX ORDER — LEVOTHYROXINE SODIUM 0.07 MG/1
TABLET ORAL
Qty: 90 TABLET | Refills: 1 | Status: SHIPPED | OUTPATIENT
Start: 2023-02-20

## 2023-02-20 RX ORDER — SERTRALINE HYDROCHLORIDE 100 MG/1
TABLET, FILM COATED ORAL
Qty: 90 TABLET | Refills: 1 | Status: SHIPPED | OUTPATIENT
Start: 2023-02-20

## 2023-02-20 RX ORDER — TRIAMTERENE AND HYDROCHLOROTHIAZIDE 37.5; 25 MG/1; MG/1
CAPSULE ORAL
Qty: 90 CAPSULE | Refills: 1 | Status: SHIPPED | OUTPATIENT
Start: 2023-02-20

## 2023-02-22 ENCOUNTER — APPOINTMENT (OUTPATIENT)
Dept: PHYSICAL THERAPY | Age: 63
End: 2023-02-22

## 2023-02-27 ENCOUNTER — OFFICE VISIT (OUTPATIENT)
Dept: PHYSICAL THERAPY | Age: 63
End: 2023-02-27

## 2023-02-27 DIAGNOSIS — M25.511 ACUTE PAIN OF BOTH SHOULDERS: Primary | ICD-10-CM

## 2023-02-27 DIAGNOSIS — M25.512 ACUTE PAIN OF BOTH SHOULDERS: Primary | ICD-10-CM

## 2023-02-28 NOTE — PROGRESS NOTES
Daily Note     Today's date: 2023  Patient name: Josette Michael  : 1960  MRN: [id number]  Referring provider: Krystle Lawrence  Dx:   Encounter Diagnosis     ICD-10-CM    1  Acute pain of both shoulders  M25 511     M25 512                      Subjective: "I hope my right breast is finally healing "      Objective: See treatment diary below      Assessment: Tolerated treatment well  Patient would benefit from continued PT  Small pin point serrous drainage from inf aspect of breast improved healing in the past 2 weeks noted  Pt to monitor for additional serrous drainage  Plan: Continue per plan of care  Precautions: No lifting >10#      Manuals        STM, MFR to b/l axillary and pect regions  nt   To barrington-scapular region   completed man       PROM/stretching to b/l Shoulders 30 minutes 30 min 30min R shoulder - ER with shoulder at <20 abd  completed man       Right sh prolonged arm pull to address fascial tightness      Man  45min man total                    Neuro Re-Ed                                                                                                        Ther Ex             Pulley's - flexion and abd x5 minutes 5 min   nt 5 min       Supine cane flexion 2x10 2# 20x   nt        Butterflies 2x10 20x   nt 10 reps        Supine shoulder flexion  2x10 20x   nt        isometrics  5 sec x 5   nt        Wall slides   10x   nt 10 x        Standing flex/scap  20x 1#   nt        ER /wedge  5sec x 20x   nt        Ther Activity                                       Gait Training                                       Modalities

## 2023-03-01 DIAGNOSIS — F51.01 PRIMARY INSOMNIA: ICD-10-CM

## 2023-03-01 RX ORDER — TRAZODONE HYDROCHLORIDE 50 MG/1
TABLET ORAL
Qty: 90 TABLET | Refills: 1 | Status: SHIPPED | OUTPATIENT
Start: 2023-03-01

## 2023-03-02 ENCOUNTER — OFFICE VISIT (OUTPATIENT)
Dept: PHYSICAL THERAPY | Age: 63
End: 2023-03-02

## 2023-03-02 ENCOUNTER — OFFICE VISIT (OUTPATIENT)
Dept: PLASTIC SURGERY | Facility: CLINIC | Age: 63
End: 2023-03-02

## 2023-03-02 ENCOUNTER — PREP FOR PROCEDURE (OUTPATIENT)
Dept: PLASTIC SURGERY | Facility: CLINIC | Age: 63
End: 2023-03-02

## 2023-03-02 DIAGNOSIS — G89.28 POST-MASTECTOMY PAIN SYNDROME: ICD-10-CM

## 2023-03-02 DIAGNOSIS — Z90.11 ACQUIRED ABSENCE OF RIGHT BREAST AND NIPPLE: Primary | ICD-10-CM

## 2023-03-02 DIAGNOSIS — Z85.3 PERSONAL HISTORY OF BREAST CANCER: ICD-10-CM

## 2023-03-02 DIAGNOSIS — M25.512 ACUTE PAIN OF BOTH SHOULDERS: Primary | ICD-10-CM

## 2023-03-02 DIAGNOSIS — M25.511 ACUTE PAIN OF BOTH SHOULDERS: Primary | ICD-10-CM

## 2023-03-02 NOTE — PROGRESS NOTES
Daily Note     Today's date: 3/2/2023  Patient name: Stephani Morrison  : 1960  MRN: [id number]  Referring provider: Winifred Nguyen  Dx:   Encounter Diagnosis     ICD-10-CM    1  Acute pain of both shoulders  M25 511     M25 512       2  Post-mastectomy pain syndrome  G89 28                      Subjective: "I'm going to have another breast surgery on the right to loosen up the scar tissue on 3/14/23" Plastic surgeon still asking for PT to continue until surgery at this time  Objective: See treatment diary below      Assessment: Tolerated treatment well  Patient demonstrated fatigue post treatment   Pt was resutured under the right breast yesterday  Plan: Continue per plan of care  Precautions: No lifting >10#      Manuals 1/6 1/9 1/11 2/13 2/17 2/27 3/2      STM, MFR to b/l axillary and pect regions  nt   To barrington-scapular region   completed man man      PROM/stretching to b/l Shoulders 30 minutes 30 min 30min R shoulder - ER with shoulder at <20 abd  completed man man      Right sh prolonged arm pull to address fascial tightness      Man  45min man total Man 60 min                   Neuro Re-Ed                                                                                                        Ther Ex             Pulley's - flexion and abd x5 minutes 5 min   nt 5 min       Supine cane flexion 2x10 2# 20x   nt        Butterflies 2x10 20x   nt 10 reps        Supine shoulder flexion  2x10 20x   nt        isometrics  5 sec x 5   nt        Wall slides   10x   nt 10 x        Standing flex/scap  20x 1#   nt        ER /wedge  5sec x 20x   nt        Ther Activity                                       Gait Training                                       Modalities

## 2023-03-03 NOTE — PROGRESS NOTES
Patient seen and examined  Small portion of lateral IMF incision continues to be thin with tethered lateral chest wall scar despite scar massage  No s/s of seroma or infection  We discussed removing thin skin in office vs scar subcision and capsulotomy with replacement of implant in the OR  I do recommend this is the more lasting treatment given the degree of scar along the lateral inferior border which I believe is placing undue tension on this portion of her scar/skin  She would like to proceed  Will schedule at patient's convenience but ideally within the next few weeks as to not risk compromise to the skin or implant  The patient is in agreement with this plan and all of her questions were answered  Daniela Cragi, DO  Plastic and Reconstructive Surgery

## 2023-03-06 ENCOUNTER — OFFICE VISIT (OUTPATIENT)
Dept: PHYSICAL THERAPY | Age: 63
End: 2023-03-06

## 2023-03-06 DIAGNOSIS — M25.512 ACUTE PAIN OF BOTH SHOULDERS: ICD-10-CM

## 2023-03-06 DIAGNOSIS — M25.511 ACUTE PAIN OF BOTH SHOULDERS: ICD-10-CM

## 2023-03-06 DIAGNOSIS — G89.28 POST-MASTECTOMY PAIN SYNDROME: Primary | ICD-10-CM

## 2023-03-06 NOTE — PROGRESS NOTES
Daily Note     Today's date: 3/6/2023  Patient name: Yuan Vigil  : 1960  MRN: [id number]  Referring provider: Hortensia Esposito,*  Dx:   Encounter Diagnosis     ICD-10-CM    1  Post-mastectomy pain syndrome  G89 28       2  Acute pain of both shoulders  M25 511     M25 512                      Subjective: "I go for surgery next Tuesday to remove the scar tissue under my right breast  "      Objective: See treatment diary below      Assessment: Tolerated treatment well  Patient would benefit from continued PT      Plan: Continue per plan of care  Precautions: No lifting >10#      Manuals 1/6 1/9 1/11 2/13 2/17 2/27 3/2 3/6     STM, MFR to b/l axillary and pect regions  nt   To barrington-scapular region   completed man man Man,    Facial release of cording emphasis      PROM/stretching to b/l Shoulders 30 minutes 30 min 30min R shoulder - ER with shoulder at <20 abd  completed man man man     Right sh prolonged arm pull to address fascial tightness      Man  45min man total Man 60 min Man                   Neuro Re-Ed                                                                                                        Ther Ex             Pulley's - flexion and abd x5 minutes 5 min   nt 5 min   5min     Supine cane flexion 2x10 2# 20x   nt        Butterflies 2x10 20x   nt 10 reps   10     Supine shoulder flexion  2x10 20x   nt        isometrics  5 sec x 5   nt        Wall slides   10x   nt 10 x   10 flex, abd     Standing flex/scap  20x 1#   nt        ER /wedge  5sec x 20x   nt        Corner pec stretch        20 sec x 5     Foam roll self thoracic mobilization         5 min                                                         Modalities

## 2023-03-08 NOTE — PRE-PROCEDURE INSTRUCTIONS
Pre-Surgery Instructions:   Medication Instructions   • diphenhydrAMINE (BENADRYL) 25 mg tablet Take night before surgery   • famotidine (PEPCID) 20 mg tablet Take day of surgery  • levothyroxine 75 mcg tablet Take day of surgery  • potassium chloride (K-DUR,KLOR-CON) 10 mEq tablet Hold day of surgery  • Psyllium (METAMUCIL PO) Hold day of surgery  • rosuvastatin (CRESTOR) 5 mg tablet Take day of surgery  • senna (SENOKOT) 8 6 MG tablet Hold day of surgery  • sertraline (ZOLOFT) 100 mg tablet Take day of surgery  • traZODone (DESYREL) 50 mg tablet Take night before surgery   • triamterene-hydrochlorothiazide (DYAZIDE) 37 5-25 mg per capsule Hold day of surgery  Medication instructions for day surgery reviewed  Please use only a sip of water to take your instructed medications  Avoid all over the counter vitamins, supplements and NSAIDS ASA  for one week prior to surgery per anesthesia guidelines  Tylenol is ok to take as needed  You will receive a call one business day prior to surgery with an arrival time and hospital directions  If your surgery is scheduled on a Monday, the hospital will be calling you on the Friday prior to your surgery  If you have not heard from anyone by 8pm, please call the hospital supervisor through the hospital  at [phone]  Do not eat or drink anything after midnight the night before your surgery, including candy, mints, lifesavers, or chewing gum  Do not drink alcohol 24hrs before your surgery  Try not to smoke at least 24hrs before your surgery  Follow the pre surgery showering instructions as listed in the [facility] Surgical Experience Booklet” or otherwise provided by your surgeon's office  Do not shave the surgical area 24 hours before surgery  Do not apply any lotions, creams, including makeup, cologne, deodorant, or perfumes after showering on the day of your surgery  No contact lenses, eye make-up, or artificial eyelashes   Remove nail polish, including gel polish, and any artificial, gel, or acrylic nails if possible  Remove all jewelry including rings and body piercing jewelry  Wear causal clothing that is easy to take on and off  Consider your type of surgery  Keep any valuables, jewelry, piercings at home  Please bring any specially ordered equipment (sling, braces) if indicated  Arrange for a responsible person to drive you to and from the hospital on the day of your surgery  Visitor Guidelines discussed  Call the surgeon's office with any new illnesses, exposures, or additional questions prior to surgery  Please reference your [facility] Surgical Experience Booklet” for additional information to prepare for your upcoming surgery

## 2023-03-09 ENCOUNTER — OFFICE VISIT (OUTPATIENT)
Dept: PHYSICAL THERAPY | Age: 63
End: 2023-03-09

## 2023-03-09 DIAGNOSIS — M25.511 ACUTE PAIN OF BOTH SHOULDERS: ICD-10-CM

## 2023-03-09 DIAGNOSIS — G89.28 POST-MASTECTOMY PAIN SYNDROME: Primary | ICD-10-CM

## 2023-03-09 DIAGNOSIS — M25.512 ACUTE PAIN OF BOTH SHOULDERS: ICD-10-CM

## 2023-03-09 NOTE — PROGRESS NOTES
Daily Note     Today's date: 3/9/2023  Patient name: Jennifer Sandoval  : 1960  MRN: [id number]  Referring provider: Marcos Joel,*  Dx:   Encounter Diagnosis     ICD-10-CM    1  Post-mastectomy pain syndrome  G89 28       2  Acute pain of both shoulders  M25 511     M25 512                      Subjective: "I have surgery next Tuesday " Pt asking questions about the surgery  PT referring her to discuss this with the surgeon  Objective: See treatment diary below      Assessment: Tolerated treatment well  Patient would benefit from continued PT      Plan: Continue per plan of care  Precautions: No lifting >10#      Manuals 1/6 1/9 1/11 2/13 2/17 2/27 3/2 3/6 3/9    STM, MFR to b/l axillary and pect regions  nt   To barrington-scapular region  completed man man Man,    Facial release of cording emphasis  man    PROM/stretching to b/l Shoulders 30 minutes 30 min 30min R shoulder - ER with shoulder at <20 abd  completed man man man man    Right sh prolonged arm pull to address fascial tightness      Man  45min man total Man 60 min Man  man                 Neuro Re-Ed                                                                                                        Ther Ex             Pulley's - flexion and abd x5 minutes 5 min   nt 5 min   5min  5 min    Supine cane flexion 2x10 2# 20x   nt        Butterflies 2x10 20x   nt 10 reps   10 10    Supine shoulder flexion  2x10 20x   nt        isometrics  5 sec x 5   nt        Wall slides   10x   nt 10 x   10 flex, abd 10 flex    abd    Standing flex/scap  20x 1#   nt        ER /wedge  5sec x 20x   nt        Corner pec stretch        20 sec x 5 20 sec x 5    Foam roll self thoracic mobilization         5 min 5 min                                                        Modalities

## 2023-03-13 ENCOUNTER — ANESTHESIA EVENT (OUTPATIENT)
Dept: PERIOP | Facility: HOSPITAL | Age: 63
End: 2023-03-13

## 2023-03-13 DIAGNOSIS — Z90.11 ACQUIRED ABSENCE OF RIGHT BREAST AND NIPPLE: Primary | ICD-10-CM

## 2023-03-13 PROBLEM — Z85.3 HISTORY OF BREAST CANCER: Status: ACTIVE | Noted: 2022-08-02

## 2023-03-13 PROBLEM — Z08 ENCOUNTER FOR FOLLOW-UP EXAMINATION AFTER COMPLETED TREATMENT FOR MALIGNANT NEOPLASM: Status: ACTIVE | Noted: 2023-03-13

## 2023-03-13 RX ORDER — OXYCODONE HYDROCHLORIDE 5 MG/1
5 TABLET ORAL EVERY 6 HOURS PRN
Qty: 10 TABLET | Refills: 0 | Status: SHIPPED | OUTPATIENT
Start: 2023-03-13

## 2023-03-13 RX ORDER — SULFAMETHOXAZOLE AND TRIMETHOPRIM 800; 160 MG/1; MG/1
1 TABLET ORAL EVERY 12 HOURS SCHEDULED
Qty: 14 TABLET | Refills: 0 | Status: SHIPPED | OUTPATIENT
Start: 2023-03-13 | End: 2023-03-20

## 2023-03-13 RX ORDER — GABAPENTIN 300 MG/1
300 CAPSULE ORAL 3 TIMES DAILY
Qty: 15 CAPSULE | Refills: 0 | Status: SHIPPED | OUTPATIENT
Start: 2023-03-13 | End: 2023-03-18

## 2023-03-13 RX ORDER — IBUPROFEN 800 MG/1
800 TABLET ORAL EVERY 8 HOURS PRN
Qty: 15 TABLET | Refills: 0 | Status: SHIPPED | OUTPATIENT
Start: 2023-03-13 | End: 2023-03-14

## 2023-03-13 RX ORDER — SENNOSIDES 8.6 MG
650 CAPSULE ORAL EVERY 6 HOURS
Qty: 20 TABLET | Refills: 0 | Status: SHIPPED | OUTPATIENT
Start: 2023-03-13 | End: 2023-03-14

## 2023-03-13 RX ORDER — IBUPROFEN 800 MG/1
800 TABLET ORAL EVERY 8 HOURS PRN
Qty: 15 TABLET | Refills: 0 | Status: SHIPPED | OUTPATIENT
Start: 2023-03-13

## 2023-03-13 RX ORDER — TRAMADOL HYDROCHLORIDE 50 MG/1
50 TABLET ORAL EVERY 6 HOURS PRN
Qty: 20 TABLET | Refills: 0 | Status: SHIPPED | OUTPATIENT
Start: 2023-03-13

## 2023-03-13 RX ORDER — SENNOSIDES 8.6 MG
650 CAPSULE ORAL EVERY 6 HOURS
Qty: 20 TABLET | Refills: 0 | Status: SHIPPED | OUTPATIENT
Start: 2023-03-13 | End: 2023-03-18

## 2023-03-13 RX ORDER — GABAPENTIN 300 MG/1
300 CAPSULE ORAL 3 TIMES DAILY
Qty: 15 CAPSULE | Refills: 0 | Status: SHIPPED | OUTPATIENT
Start: 2023-03-13 | End: 2023-03-14

## 2023-03-13 NOTE — H&P
H&P - Plastic Surgery   Joanna Peace 57 y o  female MRN: [id number]  Unit/Bed#:  Encounter: [id number]           Assessment:  Acquired absence of right breast and nipple [Z90 11]       Personal history of breast cancer [Z85 3]  Plan:  RIGHT BREAST RECON REVISION WITH IMPLANT EXCHANGE AND CAPSULOTOMY (Right: Breast)        HPI:   Joanna Peace is a 57y o  year old female who presents s/p right breast recon with  left mastopexy for symmetry  She developed incision dehiscence  Most recently, she presented with small portion of lateral IMF incision continues to be thin with tethered lateral chest wall scar despite scar massage  No s/s of seroma or infection      Dr Syl Cabrera discussed removing thin skin in office vs scar subcision and capsulotomy with replacement of implant in the OR  She recommended the latter as the more lasting treatment given the degree of scar along the lateral inferior border which is placing undue tension on this portion of her scar/skin        She would like to proceed  Will schedule at patient's convenience but ideally within the next few weeks as to not risk compromise to the skin or implant  REVIEW OF SYSTEMS    GENERAL/CONSTITUTIONAL: The patient denies fever, fatigue, weakness, weight gain or weight loss  HEAD, EYES, EARS, NOSE AND THROAT: Eyes - The patient denies pain, redness, loss of vision, double or blurred vision and denies wearing glasses  The patient denies ringing in the ears, nosebleeds sinusitis, post nasal drip  Also denies frequent sore throats, hoarseness, painful swallowing  CARDIOVASCULAR: The patient denies chest pain, irregular heartbeats, palpitations, shortness of breath, heart murmurs, high blood pressure, cramps in his legs with walking, pain in his feet or toes at night or varicose veins  RESPIRATORY: The patient denies chronic cough, wheezing or night sweats    GASTROINTESTINAL: The patient denies decreased appetite, nausea, vomiting, diarrhea, constipation, blood in the stools  GENITOURINARY: The patient denies difficult urination, pain or burning with urination, blood in the urine  MUSCULOSKELETAL: The patient denies arm, thigh or calf cramps  No joint or muscle pain  No muscle weakness or tenderness  No joint swelling, neck pain, back pain or major orthopedic injuries  SKIN AND BREASTS: see hpi The patient denies easy bruising, skin redness, skin rash, hives  NEUROLOGIC: The patient denies headache, dizziness, fainting, memory loss  PSYCHIATRIC: The patient denies depression anxiety  ENDOCRINE: The patient denies intolerance to hot or cold temperature, flushing, fingernail changes, increased thirst, increased salt intake or decreased sexual desire  HEMATOLOGIC/LYMPHATIC: The patient denies anemia, bleeding tendency or clotting tendency  ALLERGIC/IMMUNOLOGIC: The patient denies rhinitis, asthma, skin sensitivity, latex allergies or sensitivity  Historical Information   Past Medical History:   Diagnosis Date   • Abnormal electrocardiogram     Last assessed 10/04/13   • Anemia     pt states hypogammaglobulinemia, needs washed RBCs if transfused - Rx by Dr Bisi Brandt   • Anxiety    • Cancer [facility])     Right breast cancer   • COVID 01/2022   • Depression    • Diabetes [facility])     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  - Last assessed 11/02/17   • Disease of thyroid gland    • Diverticulitis of colon    • GERD (gastroesophageal reflux disease)    • Hyperlipidemia    • Hypertension    • Hypoglobulinemia    • PONV (postoperative nausea and vomiting)    • Slow transit constipation      Past Surgical History:   Procedure Laterality Date   • APPENDECTOMY     • BOWEL RESECTION     • BREAST BIOPSY Right 07/07/2022    stereo x 2   • BREAST BIOPSY Right 07/26/2022    stereo   • BREAST RECONSTRUCTION Right 12/15/2022    Procedure: RIGHT BREAST RECONSTRUCTION AND REMOVAL OF TISSUE EXPANDER WITH PLACEMENT OF IMPLANT  CAPSULOTOMY ;  Surgeon: Whitney Santa DO;  Location: [address] [place] [address] MAIN OR;  Service: Plastics   • CARPAL TUNNEL RELEASE     • CATARACT EXTRACTION Bilateral    • COLECTOMY      Resolved 08/27/09   • COLONOSCOPY     • INSERTION OF BREAST TISSUE EXPANDER Right 09/09/2022    Procedure: IMMEDIATE BREAST RECONSTRUCTION WITH TISSUE EXPANDER AND  GALAFLEX;  Surgeon: Whitney Santa DO;  Location: [facility];  Service: Plastics   • JOINT REPLACEMENT Bilateral     TKR   • LASIK     • MAMMO STEREOTACTIC BREAST BIOPSY RIGHT (ALL INC) Right 07/07/2022   • MAMMO STEREOTACTIC BREAST BIOPSY RIGHT (ALL INC) Right 07/26/2022   • MAMMO STEREOTACTIC BREAST BIOPSY RIGHT (ALL INC) EACH ADD Right 07/07/2022   • MASTECTOMY Right    • MASTECTOMY W/ SENTINEL NODE BIOPSY Right 09/09/2022    Procedure: MASTECTOMY WITH BIOPSY LYMPH NODE SENTINEL,Lymphoscintigraphy,Lymphatic Mapping; [address];  Surgeon: Brian Case MD;  Location: [facility];  Service: Surgical Oncology   • OTHER SURGICAL HISTORY      Biopsy Vulvar   • IA ARTHRP KNE CONDYLE&PLATU MEDIAL&LAT COMPARTMENTS Left 02/08/2017    Procedure: TOTAL KNEE REPLACEMENT ARTHROPLASTY;  Surgeon: Aliyah Daly MD;  Location: [facility] MA[facility];  Service: Orthopedics   • IA COLONOSCOPY FLX DX W/COLLJ SPEC WHEN PFRMD N/A 05/06/2019    Procedure: COLONOSCOPY;  Surgeon: Stefanie Enamorado MD;  Location: [facility];   Service: General   • IA MASTOPEXY Left 12/15/2022    Procedure: LEFT MASTOPEXY FOR SYMMETRY AND  Ludivina Bullocks;  Surgeon: Whitney Santa DO;  Location: [address] [place] [address] MAIN OR;  Service: Plastics   • REFRACTIVE SURGERY     • REPLACEMENT TOTAL KNEE Bilateral 10/2013   • TONSILLECTOMY      With Adenoidectomy   • TOOTH EXTRACTION     • VAGINA SURGERY      mesh     Social History   Social History     Substance and Sexual Activity   Alcohol Use No     Social History     Substance and Sexual Activity   Drug Use No     Social History     Tobacco Use   Smoking Status Never   Smokeless Tobacco Never Family History:   Family History   Problem Relation Age of Onset   • No Known Problems Mother    • Basal cell carcinoma Father    • Prostate cancer Father 80   • Breast cancer Sister 54   • No Known Problems Sister    • No Known Problems Daughter    • No Known Problems Daughter    • No Known Problems Daughter    • No Known Problems Maternal Grandmother    • No Known Problems Maternal Grandfather    • Breast cancer Paternal Grandmother 50   • No Known Problems Paternal Grandfather    • Breast cancer Paternal Aunt 79   • Breast cancer Family    • Arthritis Family    • Hypertension Family    • Cancer Family        Meds/Allergies     Current Facility-Administered Medications:   •  [START ON 3/14/2023] ceFAZolin (ANCEF) 2,000 mg, gentamicin (GARAMYCIN) 160 mg in sodium chloride 0 9 % 1,000 mL OR irrigation, , Irrigation, Once, Trung, DO    Current Outpatient Medications:   •  diphenhydrAMINE (BENADRYL) 25 mg tablet, Take 25 mg by mouth every 6 (six) hours as needed for itching, Disp: , Rfl:   •  famotidine (PEPCID) 20 mg tablet, Take 1 tablet (20 mg total) by mouth 2 (two) times a day, Disp: 90 tablet, Rfl: 0  •  levothyroxine 75 mcg tablet, TAKE ONE TABLET BY MOUTH EVERY MORNING, Disp: 90 tablet, Rfl: 1  •  potassium chloride (K-DUR,KLOR-CON) 10 mEq tablet, TAKE 1 TABLET BY MOUTH TWICE A DAY, Disp: 180 tablet, Rfl: 1  •  Psyllium (METAMUCIL PO), Take 1 Dose by mouth daily  , Disp: , Rfl:   •  rosuvastatin (CRESTOR) 5 mg tablet, TAKE ONE TABLET BY MOUTH EVERY DAY (Patient taking differently: Take 5 mg by mouth every morning), Disp: 90 tablet, Rfl: 5  •  senna (SENOKOT) 8 6 MG tablet, Take 2 tablets by mouth daily, Disp: , Rfl:   •  sertraline (ZOLOFT) 100 mg tablet, TAKE ONE TABLET BY MOUTH EVERY DAY (Patient taking differently: Take 100 mg by mouth every morning), Disp: 90 tablet, Rfl: 1  •  traZODone (DESYREL) 50 mg tablet, TAKE ONE TABLET BY MOUTH AT BEDTIME, Disp: 90 tablet, Rfl: 1  • triamterene-hydrochlorothiazide (DYAZIDE) 37 5-25 mg per capsule, TAKE ONE CAPSULE BY MOUTH EVERY MORNING (Patient taking differently: Take 1 capsule by mouth every morning), Disp: 90 capsule, Rfl: 1  •  acetaminophen (TYLENOL) 650 mg CR tablet, Take 1 tablet (650 mg total) by mouth every 6 (six) hours for 5 days, Disp: 20 tablet, Rfl: 0  •  acetaminophen (TYLENOL) 650 mg CR tablet, Take 1 tablet (650 mg total) by mouth every 6 (six) hours for 5 days, Disp: 20 tablet, Rfl: 0  •  gabapentin (Neurontin) 300 mg capsule, Take 1 capsule (300 mg total) by mouth 3 (three) times a day for 5 days, Disp: 15 capsule, Rfl: 0  •  gabapentin (Neurontin) 300 mg capsule, Take 1 capsule (300 mg total) by mouth 3 (three) times a day for 5 days, Disp: 15 capsule, Rfl: 0  •  ibuprofen (MOTRIN) 800 mg tablet, Take 1 tablet (800 mg total) by mouth every 8 (eight) hours as needed for mild pain (DO NOT BEGIN UNTIL 48 HOURS AFTER SURGERY), Disp: 15 tablet, Rfl: 0  •  ibuprofen (MOTRIN) 800 mg tablet, Take 1 tablet (800 mg total) by mouth every 8 (eight) hours as needed for mild pain (DO NOT BEGIN UNTIL 48 HOURS AFTER SURGERY), Disp: 15 tablet, Rfl: 0  •  oxyCODONE (Roxicodone) 5 immediate release tablet, Take 1 tablet (5 mg total) by mouth every 6 (six) hours as needed for moderate pain Max Daily Amount: 20 mg, Disp: 10 tablet, Rfl: 0  •  sulfamethoxazole-trimethoprim (BACTRIM DS) 800-160 mg per tablet, Take 1 tablet by mouth every 12 (twelve) hours for 7 days, Disp: 14 tablet, Rfl: 0  •  traMADol (Ultram) 50 mg tablet, Take 1 tablet (50 mg total) by mouth every 6 (six) hours as needed for moderate pain, Disp: 20 tablet, Rfl: 0      Objective     General appearance: alert and oriented, in no acute distress  Head: Normocephalic, without obvious abnormality, atraumatic  Lungs: clear to auscultation bilaterally  Breasts: Small portion of lateral IMF incision continues to be thin with tethered lateral chest wall scar despite scar massage    No s/s of seroma or infection  Heart: regular rate and rhythm  Abdomen: soft, non-tender; bowel sounds normal; no masses,  no organomegaly  Extremities: extremities normal, warm and well-perfused; no cyanosis, clubbing, or edema  Neurologic: Alert and oriented X 3, normal strength and tone  Normal symmetric reflexes  Normal coordination and gait    Lab Results:   Lab Results   Component Value Date    WBC 5 31 11/29/2022    HGB 13 2 11/29/2022    HCT 40 9 11/29/2022    MCV 90 11/29/2022     (L) 11/29/2022          Lab Results   Component Value Date/Time    WOUNDCULT 1+ Growth of 04/16/2018 12:00 AM         Imaging Studies:   No results found  EKG, Pathology, and Other Studies:   Lab Results   Component Value Date/Time    [facility]  12/15/2022 02:44 PM     A  Surgical Hardware/Implant, RIGHT BREAST EXPANDER:  -GROSS EXAMINATION ONLY     B  Breast, Right, RIGHT BREAST TISSUE:  -Benign skin and subcutaneous tisue scar and previous surgical site changes   -Benign breast tissue   -Benign intradermal nevus   -Benign hemangioma  C  Breast, Left, LEFT BREAST TISSUE :  -Benign skin with epidermal inclusion cyst            No intake or output data in the 24 hours ending 03/13/23 1935    Invasive Devices     Drain  Duration           Closed/Suction Drain Right;Lateral Breast Bulb 10 Fr   88 days                VTE Prophylaxis: Sequential compression device (Venodyne)

## 2023-03-14 ENCOUNTER — HOSPITAL ENCOUNTER (OUTPATIENT)
Facility: HOSPITAL | Age: 63
Setting detail: OUTPATIENT SURGERY
Discharge: HOME | End: 2023-03-14
Attending: STUDENT IN AN ORGANIZED HEALTH CARE EDUCATION/TRAINING PROGRAM | Admitting: STUDENT IN AN ORGANIZED HEALTH CARE EDUCATION/TRAINING PROGRAM

## 2023-03-14 ENCOUNTER — ANESTHESIA (OUTPATIENT)
Dept: PERIOP | Facility: HOSPITAL | Age: 63
End: 2023-03-14

## 2023-03-14 VITALS
WEIGHT: 165 LBS | HEIGHT: 62 IN | HEART RATE: 88 BPM | BODY MASS INDEX: 30.36 KG/M2 | DIASTOLIC BLOOD PRESSURE: 76 MMHG | SYSTOLIC BLOOD PRESSURE: 120 MMHG | OXYGEN SATURATION: 100 % | RESPIRATION RATE: 20 BRPM | TEMPERATURE: 97.9 F

## 2023-03-14 DIAGNOSIS — Z85.3 PERSONAL HISTORY OF BREAST CANCER: ICD-10-CM

## 2023-03-14 DIAGNOSIS — Z90.11 ACQUIRED ABSENCE OF RIGHT BREAST AND NIPPLE: ICD-10-CM

## 2023-03-14 RX ORDER — MAGNESIUM HYDROXIDE 1200 MG/15ML
LIQUID ORAL AS NEEDED
Status: DISCONTINUED | OUTPATIENT
Start: 2023-03-14 | End: 2023-03-14 | Stop reason: HOSPADM

## 2023-03-14 RX ORDER — SODIUM CHLORIDE, SODIUM LACTATE, POTASSIUM CHLORIDE, CALCIUM CHLORIDE 600; 310; 30; 20 MG/100ML; MG/100ML; MG/100ML; MG/100ML
INJECTION, SOLUTION INTRAVENOUS CONTINUOUS PRN
Status: DISCONTINUED | OUTPATIENT
Start: 2023-03-14 | End: 2023-03-14

## 2023-03-14 RX ORDER — ONDANSETRON 2 MG/ML
4 INJECTION INTRAMUSCULAR; INTRAVENOUS ONCE AS NEEDED
Status: DISCONTINUED | OUTPATIENT
Start: 2023-03-14 | End: 2023-03-14 | Stop reason: HOSPADM

## 2023-03-14 RX ORDER — FENTANYL CITRATE/PF 50 MCG/ML
25 SYRINGE (ML) INJECTION
Status: DISCONTINUED | OUTPATIENT
Start: 2023-03-14 | End: 2023-03-14 | Stop reason: HOSPADM

## 2023-03-14 RX ORDER — FENTANYL CITRATE 50 UG/ML
INJECTION, SOLUTION INTRAMUSCULAR; INTRAVENOUS AS NEEDED
Status: DISCONTINUED | OUTPATIENT
Start: 2023-03-14 | End: 2023-03-14

## 2023-03-14 RX ORDER — PROPOFOL 10 MG/ML
INJECTION, EMULSION INTRAVENOUS AS NEEDED
Status: DISCONTINUED | OUTPATIENT
Start: 2023-03-14 | End: 2023-03-14

## 2023-03-14 RX ORDER — SODIUM CHLORIDE, SODIUM LACTATE, POTASSIUM CHLORIDE, CALCIUM CHLORIDE 600; 310; 30; 20 MG/100ML; MG/100ML; MG/100ML; MG/100ML
125 INJECTION, SOLUTION INTRAVENOUS CONTINUOUS
Status: DISCONTINUED | OUTPATIENT
Start: 2023-03-14 | End: 2023-03-14 | Stop reason: HOSPADM

## 2023-03-14 RX ORDER — BUPIVACAINE HYDROCHLORIDE 2.5 MG/ML
INJECTION, SOLUTION EPIDURAL; INFILTRATION; INTRACAUDAL AS NEEDED
Status: DISCONTINUED | OUTPATIENT
Start: 2023-03-14 | End: 2023-03-14 | Stop reason: HOSPADM

## 2023-03-14 RX ORDER — MIDAZOLAM HYDROCHLORIDE 2 MG/2ML
INJECTION, SOLUTION INTRAMUSCULAR; INTRAVENOUS AS NEEDED
Status: DISCONTINUED | OUTPATIENT
Start: 2023-03-14 | End: 2023-03-14

## 2023-03-14 RX ORDER — LIDOCAINE HYDROCHLORIDE 20 MG/ML
INJECTION, SOLUTION EPIDURAL; INFILTRATION; INTRACAUDAL; PERINEURAL AS NEEDED
Status: DISCONTINUED | OUTPATIENT
Start: 2023-03-14 | End: 2023-03-14

## 2023-03-14 RX ORDER — HYDROMORPHONE HCL IN WATER/PF 6 MG/30 ML
0.2 PATIENT CONTROLLED ANALGESIA SYRINGE INTRAVENOUS
Status: DISCONTINUED | OUTPATIENT
Start: 2023-03-14 | End: 2023-03-14 | Stop reason: HOSPADM

## 2023-03-14 RX ORDER — PROPOFOL 10 MG/ML
INJECTION, EMULSION INTRAVENOUS CONTINUOUS PRN
Status: DISCONTINUED | OUTPATIENT
Start: 2023-03-14 | End: 2023-03-14

## 2023-03-14 RX ORDER — DEXAMETHASONE SODIUM PHOSPHATE 10 MG/ML
INJECTION, SOLUTION INTRAMUSCULAR; INTRAVENOUS AS NEEDED
Status: DISCONTINUED | OUTPATIENT
Start: 2023-03-14 | End: 2023-03-14

## 2023-03-14 RX ORDER — GABAPENTIN 300 MG/1
300 CAPSULE ORAL ONCE
Status: COMPLETED | OUTPATIENT
Start: 2023-03-14 | End: 2023-03-14

## 2023-03-14 RX ORDER — OXYCODONE HYDROCHLORIDE 5 MG/1
5 TABLET ORAL ONCE AS NEEDED
Status: CANCELLED | OUTPATIENT
Start: 2023-03-14

## 2023-03-14 RX ORDER — ACETAMINOPHEN 325 MG/1
975 TABLET ORAL ONCE
Status: COMPLETED | OUTPATIENT
Start: 2023-03-14 | End: 2023-03-14

## 2023-03-14 RX ORDER — SCOLOPAMINE TRANSDERMAL SYSTEM 1 MG/1
1 PATCH, EXTENDED RELEASE TRANSDERMAL
Status: DISCONTINUED | OUTPATIENT
Start: 2023-03-14 | End: 2023-03-14 | Stop reason: HOSPADM

## 2023-03-14 RX ORDER — ONDANSETRON 2 MG/ML
INJECTION INTRAMUSCULAR; INTRAVENOUS AS NEEDED
Status: DISCONTINUED | OUTPATIENT
Start: 2023-03-14 | End: 2023-03-14

## 2023-03-14 RX ORDER — CLINDAMYCIN PHOSPHATE 900 MG/50ML
900 INJECTION INTRAVENOUS ONCE
Status: COMPLETED | OUTPATIENT
Start: 2023-03-14 | End: 2023-03-14

## 2023-03-14 RX ADMIN — GABAPENTIN 300 MG: 300 CAPSULE ORAL at 11:54

## 2023-03-14 RX ADMIN — FENTANYL CITRATE 50 MCG: 50 INJECTION INTRAMUSCULAR; INTRAVENOUS at 15:20

## 2023-03-14 RX ADMIN — DEXAMETHASONE SODIUM PHOSPHATE 10 MG: 10 INJECTION, SOLUTION INTRAMUSCULAR; INTRAVENOUS at 14:17

## 2023-03-14 RX ADMIN — CLINDAMYCIN PHOSPHATE 900 MG: 900 INJECTION, SOLUTION INTRAVENOUS at 14:03

## 2023-03-14 RX ADMIN — SODIUM CHLORIDE, SODIUM LACTATE, POTASSIUM CHLORIDE, AND CALCIUM CHLORIDE: .6; .31; .03; .02 INJECTION, SOLUTION INTRAVENOUS at 14:06

## 2023-03-14 RX ADMIN — PROPOFOL 200 MG: 10 INJECTION, EMULSION INTRAVENOUS at 14:12

## 2023-03-14 RX ADMIN — PHENYLEPHRINE HYDROCHLORIDE 30 MCG/MIN: 10 INJECTION, SOLUTION INTRAVENOUS at 14:27

## 2023-03-14 RX ADMIN — ACETAMINOPHEN 975 MG: 325 TABLET, FILM COATED ORAL at 11:54

## 2023-03-14 RX ADMIN — PROPOFOL 130 MCG/KG/MIN: 10 INJECTION, EMULSION INTRAVENOUS at 14:17

## 2023-03-14 RX ADMIN — PROPOFOL 100 MG: 10 INJECTION, EMULSION INTRAVENOUS at 14:15

## 2023-03-14 RX ADMIN — ONDANSETRON 4 MG: 2 INJECTION INTRAMUSCULAR; INTRAVENOUS at 15:07

## 2023-03-14 RX ADMIN — SCOPOLAMINE 1 PATCH: 1 SYSTEM TRANSDERMAL at 13:13

## 2023-03-14 RX ADMIN — FENTANYL CITRATE 25 MCG: 50 INJECTION INTRAMUSCULAR; INTRAVENOUS at 15:48

## 2023-03-14 RX ADMIN — PROPOFOL 90 MG: 10 INJECTION, EMULSION INTRAVENOUS at 14:14

## 2023-03-14 RX ADMIN — LIDOCAINE HYDROCHLORIDE 100 MG: 20 INJECTION, SOLUTION EPIDURAL; INFILTRATION; INTRACAUDAL at 14:12

## 2023-03-14 RX ADMIN — SODIUM CHLORIDE, SODIUM LACTATE, POTASSIUM CHLORIDE, AND CALCIUM CHLORIDE: .6; .31; .03; .02 INJECTION, SOLUTION INTRAVENOUS at 12:10

## 2023-03-14 RX ADMIN — FENTANYL CITRATE 50 MCG: 50 INJECTION INTRAMUSCULAR; INTRAVENOUS at 14:44

## 2023-03-14 RX ADMIN — MIDAZOLAM 2 MG: 1 INJECTION INTRAMUSCULAR; INTRAVENOUS at 14:03

## 2023-03-14 NOTE — ANESTHESIA POSTPROCEDURE EVALUATION
Post-Op Assessment Note    CV Status:  Stable  Pain Score: 0    Pain management: adequate     Mental Status:  Sleepy   Hydration Status:  Euvolemic   PONV Controlled:  Controlled   Airway Patency:  Patent      Post Op Vitals Reviewed: Yes      Staff: CRNA         No notable events documented      BP   124/60   Temp  98 0   Pulse  96   Resp   12   SpO2   100

## 2023-03-14 NOTE — DISCHARGE INSTR - AVS FIRST PAGE
Surgery Date: 3/14/2023                Patient: Kaleb Mishra  Surgeon: Naseem Holm, DO     Postoperative Instructions   Breast Reconstruction     Dressings:  [x] Skin glue was applied to your incision over absorbable sutures  You may feel small pieces of suture at the ends of your incision  [x] Remove dressing the second morning following your surgery and bathe as directed  Please leave tegaderm (clear dressing over your padding) and steristrips in place  [x] No dressings are required but you may cover the incision with gauze for comfort  [x] Wear your surgical bra or ACE wrap at all times except while showering  [] Strip and record your EFFIE drain output as instructed  Be sure to bring the output log to your follow up appointment  [x] Other instructions: Remove ACE wrap to shower  Replace with ACE wrap or surgical bra  Bathing:  [x] Shower 48 hours after surgery  Allow soap and water to gently wash over the incision  No scrubbing  Gently pat dry and apply dressing as needed/instructed above  [x] No submerging incision in bathtub, pool, hot tub and/or lake  Activity:  [x] No heavy lifting (> 10lbs)  [x] No strenuous exercise  [x] Walking is mandatory daily  [x] Sleeping may be more comfortable with your head elevated  [x] No driving until off pain medications and you have resumed full range of motion  Diet and Medication:  [x] Resume diet as tolerated  High protein diet is important for healing  Remain well hydrated with water and minimize sodium intake  [x] Resume preoperative medications  [x] Pain medications as prescribed  You may also begin to use ibuprofen 48 hours after surgery  [x] Finish all antibiotics as prescribed  [x] Apply ice to area as needed for pain  Do not place ice directly on skin  Do not use heat  [] Other instructions: It is expected to have some bruising, swelling and mild oozing at the incision site and of the surrounding area    If there is more than you expect, an enlarging area or you suspect an infection, please call the office  Some patients may experience a low-grade fever after surgery  If it is above 100 4, please call the office  If you do not have a postoperative office appointment scheduled, please call the office today and let the staff know Dr Corrie Staley needs to see you in 7 days  Please call [phone] (Dr Justo Betts office) or [phone] (Dr Justo Betts phone) after hours with any questions, concerns or changes

## 2023-03-14 NOTE — INTERVAL H&P NOTE
H&P reviewed  After examining the patient I find no changes in the patients condition since the H&P had been written      Vitals:    03/14/23 1141   BP: 108/57   Pulse: 69   Resp: 16   Temp: (!) 97 4 °F (36 3 °C)   SpO2: 99%

## 2023-03-14 NOTE — ANESTHESIA PREPROCEDURE EVALUATION
Procedure:  RIGHT BREAST RECON REVISION WITH IMPLANT EXCHANGE AND CAPSULOTOMY (Right: Breast)    Relevant Problems   CARDIO   (+) Hyperlipidemia   (+) Hypertension      ENDO   (+) Hypothyroidism      GI/HEPATIC   (+) Gastroesophageal reflux disease without esophagitis      HEMATOLOGY   (+) Thrombocytopenia (HCC)      MUSCULOSKELETAL   (+) Low back pain   (+) TMJ arthritis      NEURO/PSYCH   (+) Anxiety   (+) Encounter for follow-up examination after completed treatment for malignant neoplasm   (+) History of breast cancer   (+) Tension type headache     Prior grade 1 view with MAC3          Anesthesia Plan  ASA Score- 2     Anesthesia Type- general with ASA Monitors  Additional Monitors:   Airway Plan: ETT  Comment: General anesthesia, endotracheal tube; standard ASA monitors  Risks and benefits discussed with patient; patient consented and agrees to proceed  I saw and evaluated the patient  If seen with CRNA, we have discussed the anesthetic plan and I am in agreement that the plan is appropriate for the patient  TIVA  Plan Factors-    Chart reviewed  Existing labs reviewed  Induction- intravenous  Postoperative Plan-     Informed Consent- Anesthetic plan and risks discussed with patient  I personally reviewed this patient with the CRNA  Discussed and agreed on the Anesthesia Plan with the CRNA  Madisyn Garcia

## 2023-03-15 ENCOUNTER — TELEPHONE (OUTPATIENT)
Dept: OTHER | Facility: OTHER | Age: 63
End: 2023-03-15

## 2023-03-15 ENCOUNTER — TELEPHONE (OUTPATIENT)
Dept: HEMATOLOGY ONCOLOGY | Facility: CLINIC | Age: 63
End: 2023-03-15

## 2023-03-15 NOTE — TELEPHONE ENCOUNTER
She reported she just had breast reconstruction yesterday and is still feeling a bit woozy and sore

## 2023-03-15 NOTE — TELEPHONE ENCOUNTER
Patient Call Form   Reason for patient call? Patient calling in stating she can't make her first survivorship appointment and would need to reschedule  Patient states she is not feeling well enough yet after surgery  Best call back number is [phone]   Patient's primary oncologist? Lincoln Meyer, [facility]    Name of person the patient was calling for? Ml Ta    Does the patient issue require a call back? Yes   If call back required, inform patient that the message will be forwarded to the team and someone will get back to them as soon as possible    Did you relay this information to the patient?  Yes

## 2023-03-15 NOTE — TELEPHONE ENCOUNTER
Pt called in stating she had surgery yesterday and the dr wants her to come back next week  She is available Monday for an early morning appt or anytime on Wednesday  She's requesting a call back

## 2023-03-15 NOTE — OP NOTE
OPERATIVE REPORT  PATIENT NAME: Lorena Lopez    :  1960  MRN: [id number]  Pt Location: AN OR ROOM 05    SURGERY DATE: 3/14/2023    Surgeon(s) and Role:     * Daniela Leonard, DO - Primary     * Ernie Thurston PA-C - Assisting    Preop Diagnosis:  Acquired absence of right breast and nipple [Z90 11]  Personal history of breast cancer [Z85 3]    Post-Op Diagnosis Codes:     * Acquired absence of right breast and nipple [Z90 11]     * Personal history of breast cancer [Z85 3]    Procedure(s):  Right - RIGHT BREAST RECON REVISION WITH IMPLANT EXCHANGE AND CAPSULOTOMY    Specimen(s):  ID Type Source Tests Collected by Time Destination   1 : RIGHT BREAST IMPLANT - FOR GROSS EXAM Tissue Surgical Hardware/Implant TISSUE EXAM [address] [employer], DO 3/14/2023 1436    2 : right breast skin  Tissue Soft Tissue, Other TISSUE EXAM Daniela Craig, DO 3/14/2023 1503        Estimated Blood Loss:   10 mL    Drains:  None    Anesthesia Type:   General - TIVA    Operative Indications:  58 yo female with h/o right breast CA with implant based reconstruction presents with worsening scar contracture along the right lateral inframammary crease which is exhibiting tension along her lateral scar  Although this area is still intact currently, I am concerned the increasing tension is contributing to the thinning of this skin  Operative Findings:  Removal of intact silicone implant  Dense scar along inferolateral and lateral right chest wall, subcised and capsulotomy performed  Allergsubha smooth silicone inspira 147 cc SCLP SN [id number]  Autodermal flap measuring 8x3 cm for reinforcement of right incision    Complications:   None    Procedure and Technique:    The patient was seen preoperatively  The procedure, risks, benefits and alternatives were discussed  Informed consent was obtained  The patient was site marked preoperatively    The patient was brought to the operating room where she was positioned supine with all of her pressure points appropriately padded  Anesthesia commenced  A timeout was performed and verified  The patient was prepped and draped in usual sterile fashion  The lateral aspect of the former right IMF incision was incised  The underlying intact implant was removed  The pocket was smooth and without any fluid  There was thickened scar along the inferolateral aspect aspect  This was subcised and then a complete capsulotomy was performed with electrocautery  The pulse-evac was used to irrigate the pocket with 3L of irrigation  Hemostasis was obtained  A pec block was performed using exparel  The new implant was inserted using a Moreno funnel observing sterile steps after irricept was used to irrigate the pocket  The excess skin was then de-epithelialized and an autodermal flap was created to allow the incision to be moved slightly superiorly as well as be reinforced  The incision was closed using 3-0 monocryl for the deep dermis and 4-0 PDS for the skin  The area was cleansed  Glue and steristrips as well as lateral padding were applied  The instrument, sponge and needle count was correct an verified prior to completion of the case  The patient emerged from anesthesia and was transferred to the recovery room in stable condition  Patient Disposition:  PACU         SIGNATURE: Millie Warner DO  DATE: March 15, 2023  TIME: 8:08 AM      No qualified plastic surgery resident was available for the case  The GOR[facility] provided assistance with retraction and suturing

## 2023-03-16 ENCOUNTER — VBI (OUTPATIENT)
Dept: ADMINISTRATIVE | Facility: OTHER | Age: 63
End: 2023-03-16

## 2023-03-20 ENCOUNTER — APPOINTMENT (OUTPATIENT)
Dept: PHYSICAL THERAPY | Age: 63
End: 2023-03-20

## 2023-03-21 ENCOUNTER — OFFICE VISIT (OUTPATIENT)
Dept: PLASTIC SURGERY | Facility: CLINIC | Age: 63
End: 2023-03-21

## 2023-03-21 DIAGNOSIS — Z90.11 ACQUIRED ABSENCE OF RIGHT BREAST AND NIPPLE: Primary | ICD-10-CM

## 2023-03-22 NOTE — PROGRESS NOTES
Assessment and Plan:  Marsha Long 57 y o  female s/p right breast recon with capsulotomy and replacement of implant    Healing well  Demonstrated and encouraged scar massage  Continue supportive bra  RTC in 4-6 week or as needed should any issues arise  Subjective:  Doing well  Denies issues  Objective:  NAD, AAOx3  Incision C/D/I, no s/s of seroma, tethered lateral aspect of scar much improved    Daniela Craig, DO  Plastic and Reconstructive Surgery

## 2023-03-23 ENCOUNTER — APPOINTMENT (OUTPATIENT)
Dept: PHYSICAL THERAPY | Age: 63
End: 2023-03-23

## 2023-03-28 ENCOUNTER — APPOINTMENT (OUTPATIENT)
Dept: PHYSICAL THERAPY | Age: 63
End: 2023-03-28

## 2023-03-31 ENCOUNTER — APPOINTMENT (OUTPATIENT)
Dept: PHYSICAL THERAPY | Age: 63
End: 2023-03-31

## 2023-04-21 NOTE — ASSESSMENT & PLAN NOTE
Assessment and plan 1  Health maintenance annual wellness examination overall the patient is clinically stable and doing well, we encouraged the patient to follow a healthy and balanced diet  We recommend that the patient exercise routinely approximately 30 minutes 5 times per week   We have reviewed the patient's vaccines and have made recommendations for updates if necessary      Annual flu shot recommended she has had the COVID vaccine and the Shingrix vaccine her tetanus booster is up-to-date   We will be ordering screening laboratories which are age appropriate  Return to the office in  Six months    call if any problems  .

## 2023-04-25 ENCOUNTER — ONCOLOGY SURVIVORSHIP (OUTPATIENT)
Dept: SURGICAL ONCOLOGY | Facility: CLINIC | Age: 63
End: 2023-04-25

## 2023-04-25 VITALS
BODY MASS INDEX: 31.32 KG/M2 | RESPIRATION RATE: 16 BRPM | WEIGHT: 170.2 LBS | TEMPERATURE: 97 F | OXYGEN SATURATION: 96 % | SYSTOLIC BLOOD PRESSURE: 114 MMHG | DIASTOLIC BLOOD PRESSURE: 70 MMHG | HEART RATE: 72 BPM | HEIGHT: 62 IN

## 2023-04-25 DIAGNOSIS — Z08 ENCOUNTER FOR FOLLOW-UP EXAMINATION AFTER COMPLETED TREATMENT FOR MALIGNANT NEOPLASM: Primary | ICD-10-CM

## 2023-04-25 DIAGNOSIS — Z85.3 HISTORY OF BREAST CANCER: ICD-10-CM

## 2023-04-25 NOTE — PROGRESS NOTES
Assessment/Plan:    Diagnoses and all orders for this visit:    Encounter for follow-up examination after completed treatment for malignant neoplasm    History of breast cancer    Patient is a 58-year-old female that was diagnosed with a right-sided breast cancer in July 2022  Her pathology revealed DCIS, ER 90%, PA 10%  She underwent genetic testing which was negative  She underwent a right mastectomy and sentinel node biopsy with Dr Hiral Avendaño and had implant reconstruction with Dr Sadia Lancaster  She did not require adjuvant therapy  She is currently undergoing physical therapy for her right shoulder  She does have some mild cording in her right axilla today  Breast cancer survivorship visit performed today and treatment summary and care plan were reviewed with patient and her   She is up-to-date on colorectal cancer and cervical cancer screenings as well as osteoporosis screening  She is already scheduled for a left screening mammogram in June  We will plan to see her back in 6 months for a routine follow-up visit or sooner should the need arise  She was instructed to contact us with any changes or concerns in the interim  All of her questions were answered today  -     Ambulatory referral to oncology social worker;  Future        REASON FOR VISIT:   Survivorship      Previous therapy:  Oncology History   History of breast cancer   7/7/2022 Biopsy    Right breast biopsy:  - 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, Grade 2  ER 90%  PA 10%     7/26/2022 Genetic Testing    [facility] (36 genes): APC, AMY, AXIN2 BARD1, BRCA1, BRCA2, BRIP1, BMPR1A, CDH1, CDK4, CDKN2A, CHEK2, DICER1, EPCAM, GREM1, HOXB13, MLH1, MSH2, MSH3, MSH6, MUTYH, NBN, NF1, NTHL1, PALB2, PMS2, POLD1, POLE, PTEN, RAD51C, RAD51D, RECQL SMAD4, SMARCA4, STK11, TP53     Result: Variant of uncertain significance     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); heterozygous; uncertain significance       8/2/2022 -  Cancer Staged    Staging form: Breast, AJCC 8th Edition  - Clinical stage from 8/2/2022: Stage 0 (cTis (DCIS), cN0, cM0, ER+, MO+, HER2: Not Assessed) - Signed by Agustin Berumen MD on 8/2/2022  Stage prefix: Initial diagnosis  Method of lymph node assessment: Clinical  Nuclear grade: G2       9/9/2022 Surgery    Right breast mastectomy with sentinel lymph node biopsy:  - Margins clear  - 0/2 lymph nodes    Right breast expander placement  Dr Penny Summers and Dr Isabel Marnia     9/26/2022 -  Cancer Staged    Staging form: Breast, AJCC 8th Edition  - Pathologic stage from 9/26/2022: Stage 0 (pTis (DCIS), pN0(sn), cM0, ER+, MO+, HER2: Not Assessed) - Signed by Agustin Berumen MD on 9/26/2022  Stage prefix: Initial diagnosis  Method of lymph node assessment: [place] lymph node biopsy  Nuclear grade: G3       12/15/2022 Surgery    Right expander exchange to implant     3/14/2023 Surgery    Right breast revision, exchange impant           Patient ID: Tad Butts is a 57 y o  female  Presenting today for a breast cancer survivorship visit  She notices no changes on her self breast exam   She underwent a revision of her right reconstructed breast in March and has now healed well  She is currently undergoing physical therapy  She denies persistent headaches, back pain or bone pain, cough or shortness of breath, abdominal pain  Review of Systems   Constitutional: Negative for activity change, appetite change, chills, fatigue, fever and unexpected weight change  Respiratory: Negative for cough and shortness of breath  Cardiovascular: Negative for chest pain  Gastrointestinal: Negative for abdominal pain, constipation, diarrhea, nausea and vomiting  Musculoskeletal: Negative for arthralgias, back pain, gait problem and myalgias  Skin: Negative for color change and rash  Neurological: Negative for dizziness and headaches  Hematological: Negative for adenopathy  Psychiatric/Behavioral: Negative for agitation and confusion     All other systems reviewed and "are negative  Objective:    Blood pressure 114/70, pulse 72, temperature (!) 97 °F (36 1 °C), temperature source Tympanic, resp  rate 16, height 5' 2\" (1 575 m), weight 77 2 kg (170 lb 3 2 oz), SpO2 96 %, not currently breastfeeding  Body mass index is 31 13 kg/m²  Physical Exam  Vitals reviewed  Constitutional:       General: She is not in acute distress  Appearance: Normal appearance  She is well-developed  She is not diaphoretic  HENT:      Head: Normocephalic and atraumatic  Cardiovascular:      Rate and Rhythm: Normal rate and regular rhythm  Heart sounds: Normal heart sounds  Pulmonary:      Effort: Pulmonary effort is normal       Breath sounds: Normal breath sounds  Chest:   Breasts:     Right: Skin change (surgical scars) present  Left: Skin change (surgical scars s/p lift) present  No swelling, bleeding, inverted nipple, mass, nipple discharge or tenderness  Comments: Right breast implant present  No masses, skin changes, nodules  Mild cording in right axilla  Abdominal:      Palpations: Abdomen is soft  There is no mass  Tenderness: There is no abdominal tenderness  Musculoskeletal:         General: Normal range of motion  Cervical back: Normal range of motion  Lymphadenopathy:      Upper Body:      Right upper body: No supraclavicular or axillary adenopathy  Left upper body: No supraclavicular or axillary adenopathy  Skin:     General: Skin is warm and dry  Findings: No rash  Neurological:      Mental Status: She is alert and oriented to person, place, and time     Psychiatric:         Speech: Speech normal            Discussed symptoms related to disease recurrence, Yes    Evaluated for late effects related to cancer treatment, Yes, describe: discussed effects of surgery    Screening current for cervical cancer, Yes, describe: pap 5/2021, has upcoming appt    Screening current for colon cancer, Yes, describe: colonoscopy 5/2019, repeat " in 10 years    Cancer rehabilitation services addressed, Yes, describe: patient undergoing PT at present time    Screening current for osteoporosis, Yes, describe: DXA 12/2022    Oncology Treatment Summary reviewed with patient and copy provided, Yes    Referral placed for psychosocial evaluation/screening to oncology social work  Yes    I have spent 45 minutes with Patient and family today in which greater than 50% of this time was spent in counseling/coordination of care regarding breast cancer survivorship

## 2023-04-26 ENCOUNTER — PATIENT OUTREACH (OUTPATIENT)
Dept: CASE MANAGEMENT | Facility: OTHER | Age: 63
End: 2023-04-26

## 2023-04-26 ENCOUNTER — OFFICE VISIT (OUTPATIENT)
Dept: PHYSICAL THERAPY | Age: 63
End: 2023-04-26

## 2023-04-26 DIAGNOSIS — G89.28 POST-MASTECTOMY PAIN SYNDROME: Primary | ICD-10-CM

## 2023-04-26 NOTE — PROGRESS NOTES
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                      Subjective: Pt  With improved right shoulder ROM  Does c/o muscle soreness b/l shoulders  Objective: See treatment diary below      Assessment: Tolerated treatment well  Patient would benefit from continued PT      Plan: Continue per plan of care  Precautions: No lifting >10#      Manuals 4/26 1/9 1/11 2/13 2/17 2/27 3/2 3/6 3/9 4/19   STM, MFR to b/l axillary and pect regions  To barrington-scapular region  completed man man Man,    Facial release of cording emphasis  man    PROM/stretching to b/l Shoulders  30 min 30min R shoulder - ER with shoulder at <20 abd  completed man man man man Man 20 min   Right sh prolonged arm pull to address fascial tightness      Man  45min man total Man 60 min Man  man 3 min x 2                Neuro Re-Ed                                                                                                        Ther Ex             Pulley's - flexion and abd x5 minutes 5 min   nt 5 min   5min  5 min 5 min   Supine cane flexion 2x10 2# 20x   nt        Butterflies 2x10 20x   nt 10 reps   10 10 20x   Supine shoulder flexion  2x10 20x   nt        isometrics  5 sec x 5   nt     5sec x 5   Wall slides   10x   nt 10 x   10 flex, abd 10 flex    abd 10sec x 10   Standing flex/scap 1# 30x 20x 1#   nt     1# 10x   ER /wedge  5sec x 20x   nt        Corner pec stretch        20 sec x 5 20 sec x 5 20sec x 5   Foam roll self thoracic mobilization         5 min 5 min 5 min   t-band/rows/ext 30x            All pshups 30x                                      Modalities

## 2023-04-27 ENCOUNTER — APPOINTMENT (OUTPATIENT)
Dept: PHYSICAL THERAPY | Age: 63
End: 2023-04-27

## 2023-05-02 ENCOUNTER — OFFICE VISIT (OUTPATIENT)
Dept: PHYSICAL THERAPY | Age: 63
End: 2023-05-02

## 2023-05-02 DIAGNOSIS — G89.28 POST-MASTECTOMY PAIN SYNDROME: Primary | ICD-10-CM

## 2023-05-02 NOTE — PROGRESS NOTES
Daily Note     Today's date: 2023  Patient name: Megan Vargas  : 1960  MRN: [id number]  Referring provider: Juwan Mejia,*  Dx:   Encounter Diagnosis     ICD-10-CM    1  Post-mastectomy pain syndrome  G89 28                      Subjective: Improved right ue arom noted and reduction in pain post reconstructive repair  Objective: See treatment diary below      Assessment: Tolerated treatment well  Patient would benefit from continued PT      Plan: Continue per plan of care  Precautions: No lifting >10#      Manuals 4/26 5/2 1/11 2/13 2/17 2/27 3/2 3/6 3/9 4/19   STM, MFR to b/l axillary and pect regions  To barrington-scapular region  completed man man Man,    Facial release of cording emphasis  man    PROM/stretching to b/l Shoulders  30 min 30min R shoulder - ER with shoulder at <20 abd  completed man man man man Man 20 min   Right sh prolonged arm pull to address fascial tightness  man    Man  45min man total Man 60 min Man  man 3 min x 2                Neuro Re-Ed                                                                                                        Ther Ex             Pulley's - flexion and abd x5 minutes 5 min   nt 5 min   5min  5 min 5 min   Supine cane flexion 2x10 2# 20x   nt        Butterflies 2x10 20x   nt 10 reps   10 10 20x   Supine shoulder flexion  2x10 20x   nt        isometrics     nt     5sec x 5   Wall slides  Sh flex and abd  10x   nt 10 x   10 flex, abd 10 flex    abd 10sec x 10   Standing flex/scap 1# 30x 20x 1#   nt     1# 10x   ER /wedge     nt        Corner pec stretch  5 reps 20 sec hold      20 sec x 5 20 sec x 5 20sec x 5   Foam roll self thoracic mobilization   5 min      5 min 5 min 5 min   t-band/rows/ext 30x Green 3 x 10           All pshups 30x                                      Modalities

## 2023-05-04 ENCOUNTER — OFFICE VISIT (OUTPATIENT)
Dept: PHYSICAL THERAPY | Age: 63
End: 2023-05-04

## 2023-05-04 DIAGNOSIS — G89.28 POST-MASTECTOMY PAIN SYNDROME: Primary | ICD-10-CM

## 2023-05-04 NOTE — PROGRESS NOTES
Daily Note     Today's date: 2023  Patient name: Nicky Alpers  : 1960  MRN: [id number]  Referring provider: Noble Bonilla,*  Dx:   Encounter Diagnosis     ICD-10-CM    1  Post-mastectomy pain syndrome  G89 28                      Subjective: Pt  With decreased cording right axillary area  Objective: See treatment diary below      Assessment: Tolerated treatment well  Patient would benefit from continued PT      Plan: Continue per plan of care  Precautions: No lifting >10#      Manuals 4/26 5/2 5/4 2/13 2/17 2/27 3/2 3/6 3/9 4/19   STM, MFR to b/l axillary and pect regions  To barrington-scapular region  completed man man Man,    Facial release of cording emphasis  man    PROM/stretching to b/l Shoulders  30 min 15 min R shoulder - ER with shoulder at <20 abd  completed man man man man Man 20 min   Right sh prolonged arm pull to address fascial tightness  man    Man  45min man total Man 60 min Man  man 3 min x 2                Neuro Re-Ed                                                                                                        Ther Ex             Pulley's - flexion and abd x5 minutes 5 min 5 min10x  nt 5 min   5min  5 min 5 min   Supine cane flexion 2x10 2# 20x   nt        Butterflies 2x10 20x   nt 10 reps   10 10 20x   Supine shoulder flexion  2x10 20x   nt        isometrics     nt     5sec x 5   Wall slides  Sh flex and abd  10x 10x1# 30x  nt 10 x   10 flex, abd 10 flex    abd 10sec x 10   Standing flex/scap 1# 30x 20x 1#   nt     1# 10x   ER /wedge     nt        Corner pec stretch  5 reps 20 sec hold 20sec x 530x     20 sec x 5 20 sec x 5 20sec x 5   Foam roll self thoracic mobilization   5 min      5 min 5 min 5 min   t-band/rows/ext 30x Green 3 x 10           All pshups 30x  30x                                    Modalities

## 2023-05-08 ENCOUNTER — OFFICE VISIT (OUTPATIENT)
Dept: PHYSICAL THERAPY | Age: 63
End: 2023-05-08

## 2023-05-08 DIAGNOSIS — G89.28 POST-MASTECTOMY PAIN SYNDROME: Primary | ICD-10-CM

## 2023-05-08 NOTE — PROGRESS NOTES
Daily Note     Today's date: 2023  Patient name: Terrance Berrios  : 1960  MRN: [id number]  Referring provider: Michaela Vicente,*  Dx:   Encounter Diagnosis     ICD-10-CM    1  Post-mastectomy pain syndrome  G89 28                      Subjective: Pt  Reports muscle tiredness right shoulder  Objective: See treatment diary below      Assessment: Tolerated treatment well  Patient would benefit from continued PT      Plan: Continue per plan of care  Precautions: No lifting >10#      Manuals 4/26 5/2 5/4 5/8 2/17 2/27 3/2 3/6 3/9 4/19   STM, MFR to b/l axillary and pect regions  completed man man Man,    Facial release of cording emphasis  man    PROM/stretching to b/l Shoulders  30 min 15 min  completed man man man man Man 20 min   Right sh prolonged arm pull to address fascial tightness  man    Man  45min man total Man 60 min Man  man 3 min x 2                Neuro Re-Ed                                                                                                        Ther Ex             Pulley's - flexion and abd x5 minutes 5 min 5 min10x nt nt 5 min   5min  5 min 5 min   Supine cane flexion 2x10 2# 20x  3# 30x nt        Butterflies 2x10 20x   nt 10 reps   10 10 20x   Supine shoulder flexion  2x10 20x   nt        isometrics     nt     5sec x 5   Wall slides  Sh flex and abd  10x 10x1# 30x nt nt 10 x   10 flex, abd 10 flex    abd 10sec x 10   Standing flex/scap 1# 30x 20x 1#  2# 30x nt     1# 10x   ER /wedge     nt        Corner pec stretch  5 reps 20 sec hold 20sec x 530x 20sec x 5    20 sec x 5 20 sec x 5 20sec x 5   Foam roll self thoracic mobilization   5 min 5 min 5 min    5 min 5 min 5 min   t-band/rows/ext 30x Green 3 x 10 30x 30x         All pshups 30x  30x 30x         Ball on wall    50x3b/l                      Modalities

## 2023-05-09 ENCOUNTER — PATIENT OUTREACH (OUTPATIENT)
Dept: CASE MANAGEMENT | Facility: OTHER | Age: 63
End: 2023-05-09

## 2023-05-09 NOTE — PROGRESS NOTES
Biopsychosocial and Barriers Assessment Survivorship     Home/[place] [address] [facility] [place] [facility]  Emergency Contact: [phone]  Marital Status:   Interpretation concerns, speaks another language, preferred language: English  Cultural concerns: none  Ability to read or write: yes    Housin story  Home Setup: 1 step to enter  Lives With: spouse and daughter  Daily Living Activities: independent  [address]: none  Ambulation: independent    Preferred Pharmacy: [facility] order  Medication coverage: yes    Employment: retired  [place] Status/Location: [address] [place] [address] to pay bills: yes  POA/LW/AD: Has ACP    Additional Comments:  OSW placed outreach TC to pt this morning  OSW introduced self and role  Pt is a pleasant woman who in survivorship  She completed a Dt, where she scored a 2/10  She reports having some pain in her shoulders, which is related to the mastectomy, as well as some fatigue  She states that she is improving, she has been going to physical therapy  Overall she is doing well at this time  no fever/no chills

## 2023-05-11 ENCOUNTER — OFFICE VISIT (OUTPATIENT)
Dept: PHYSICAL THERAPY | Age: 63
End: 2023-05-11

## 2023-05-11 DIAGNOSIS — G89.28 POST-MASTECTOMY PAIN SYNDROME: Primary | ICD-10-CM

## 2023-05-11 NOTE — PROGRESS NOTES
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                      Subjective: Right UE tighter than left  Objective: See treatment diary below      Assessment: Tolerated treatment well  Patient would benefit from continued PT      Plan: Continue per plan of care  Precautions: No lifting >10#      Manuals 4/26 5/2 5/4 5/8 5/11 2/27 3/2 3/6 3/9 4/19   STM, MFR to b/l axillary and pect regions  man man Man,    Facial release of cording emphasis  man    PROM/stretching to b/l Shoulders  30 min 15 min  15 min man man man man Man 20 min   Right sh prolonged arm pull to address fascial tightness  man    Man  45min man total Man 60 min Man  man 3 min x 2                Neuro Re-Ed                                                                                                        Ther Ex             Pulley's - flexion and abd x5 minutes 5 min 5 min10x nt nt 5 min   5min  5 min 5 min   Supine cane flexion 2x10 2# 20x  30x 3# 30x 4# 30x        Butterflies 2x10 20x   nt 10 reps   10 10 20x   Supine shoulder flexion  2x10 20x   30x        isometrics     nt     5sec x 5   Wall slides  Sh flex and abd  10x 10x1# 30x nt nt 10 x   10 flex, abd 10 flex    abd 10sec x 10   Standing flex/scap 1# 30x 20x 1#  2# 30x 1# 30x     1# 10x   ER /wedge     nt        Corner pec stretch  5 reps 20 sec hold 20sec x 530x 20sec x 5 20sec x 5   20 sec x 5 20 sec x 5 20sec x 5   Foam roll self thoracic mobilization   5 min 5 min 5 min 5 min   5 min 5 min 5 min   t-band/rows/ext 30x Green 3 x 10 30x 30x 30x        All pshups 30x  30x 30x         Ball on wall    50x2b/l 50x2 b/l                     Modalities

## 2023-05-15 ENCOUNTER — OFFICE VISIT (OUTPATIENT)
Dept: PHYSICAL THERAPY | Age: 63
End: 2023-05-15

## 2023-05-15 DIAGNOSIS — G89.28 POST-MASTECTOMY PAIN SYNDROME: Primary | ICD-10-CM

## 2023-05-15 NOTE — PROGRESS NOTES
Daily Note     Today's date: 5/15/2023  Patient name: Cindy Gleason  : 1960  MRN: [id number]  Referring provider: Nahid Muñoz,*  Dx:   Encounter Diagnosis     ICD-10-CM    1  Post-mastectomy pain syndrome  G89 28                      Subjective: Pt  Reports decreased b/l shoulder pain  Objective: See treatment diary below      Assessment: Tolerated treatment well  Patient would benefit from continued PT      Plan: Continue per plan of care  Precautions: No lifting >10#      Manuals 4/26 5/2 5/4 5/8 5/11 5/15 3/2 3/6 3/9 4/19   STM, MFR to b/l axillary and pect regions  man Man,    Facial release of cording emphasis  man    PROM/stretching to b/l Shoulders  30 min 15 min  15 min  man man man Man 20 min   Right sh prolonged arm pull to address fascial tightness  man     Man 60 min Man  man 3 min x 2                Neuro Re-Ed                                                                                                        Ther Ex             Pulley's - flexion and abd x5 minutes 5 min 5 min10x nt nt    5min  5 min 5 min   Supine cane flexion 2x10 2# 20x  30x 3# 30x 4# 30x 4# 30x1# 30x       Butterflies 2x10 20x   nt 10 reps   10 10 20x   Supine shoulder flexion  2x10 20x   30x        isometrics     nt     5sec x 5   Wall slides  Sh flex and abd  10x 10x1# 30x nt nt 10 x   10 flex, abd 10 flex    abd 10sec x 10   Standing flex/scap 1# 30x 20x 1#  2# 30x 1# 30x     1# 10x   ER /wedge     nt        Corner pec stretch  5 reps 20 sec hold 20sec x 530x 20sec x 5 20sec x 5   20 sec x 5 20 sec x 5 20sec x 5   Foam roll self thoracic mobilization   5 min 5 min 5 min 5 min   5 min 5 min 5 min   t-band/rows/ext 30x Green 3 x 10 30x 30x 30x        All pshups 30x  30x 30x 30x        Ball on wall    50x2b/l 50x2 b/l                     Modalities

## 2023-05-17 ENCOUNTER — OFFICE VISIT (OUTPATIENT)
Dept: PHYSICAL THERAPY | Age: 63
End: 2023-05-17

## 2023-05-17 DIAGNOSIS — G89.28 POST-MASTECTOMY PAIN SYNDROME: Primary | ICD-10-CM

## 2023-05-17 NOTE — PROGRESS NOTES
+-Daily Note     Today's date: 2023  Patient name: Cortney Rodriguez  : 1960  MRN: [id number]  Referring provider: aDvey Pugh,*  Dx:   Encounter Diagnosis     ICD-10-CM    1  Post-mastectomy pain syndrome  G89 28                      Subjective: Pt  To try playing pickle ball this weekend  Objective: See treatment diary below      Assessment: Tolerated treatment well  Patient would benefit from continued PT      Plan: Continue per plan of care  Precautions: No lifting >10#      Manuals 4/26 5/2 5/4 5/8 5/11 5/15 5/17 3/6 3/9 4/19   STM, MFR to b/l axillary and pect regions  man Man,    Facial release of cording emphasis  man    PROM/stretching to b/l Shoulders  30 min 15 min  15 min  man man man Man 20 min   Right sh prolonged arm pull to address fascial tightness  man     Man 60 min Man  man 3 min x 2                Neuro Re-Ed                                                                                                        Ther Ex             Pulley's - flexion and abd x5 minutes 5 min 5 min10x nt nt    5min  5 min 5 min   Supine cane flexion 2x10 2# 20x  30x 3# 30x 4# 30x 4# 30x1# 30x 4# 30x      Butterflies 2x10 20x   nt 10 reps   10 10 20x   Supine shoulder flexion  2x10 20x   30x        isometrics     nt     5sec x 5   Wall slides  Sh flex and abd  10x 10x1# 30x nt nt 10 x   10 flex, abd 10 flex    abd 10sec x 10   Standing flex/scap 1# 30x 20x 1#  2# 30x 1# 30x  2# 30x   1# 10x   ER /wedge     nt        Corner pec stretch  5 reps 20 sec hold 20sec x 530x 20sec x 5 20sec x 5  20sec x5 20 sec x 5 20 sec x 5 20sec x 5   Foam roll self thoracic mobilization   5 min 5 min 5 min 5 min  5 min 5 min 5 min 5 min   t-band/rows/ext 30x Green 3 x 10 30x 30x 30x        All pshups 30x  30x 30x 30x  30x      Ball on wall    50x2b/l 50x2 b/l  50 3 b/l                   Modalities

## 2023-05-22 ENCOUNTER — OFFICE VISIT (OUTPATIENT)
Dept: PHYSICAL THERAPY | Age: 63
End: 2023-05-22

## 2023-05-22 DIAGNOSIS — G89.28 POST-MASTECTOMY PAIN SYNDROME: Primary | ICD-10-CM

## 2023-05-22 NOTE — PROGRESS NOTES
Daily Note     Today's date: 2023  Patient name: Alexandr Newberry  : 1960  MRN: [id number]  Referring provider: Lina Bowers,*  Dx:   Encounter Diagnosis     ICD-10-CM    1  Post-mastectomy pain syndrome  G89 28                      Subjective: Pt  Was able to play pickle ball without pain b/l shoulders  Objective: See treatment diary below      Assessment: Tolerated treatment well  Patient would benefit from continued PT      Plan: Continue per plan of care  Precautions: No lifting >10#      Manuals 4/26 5/2 5/4 5/8 5/11 5/15 5/17 5/22 3/9 4/19   STM, MFR to b/l axillary and pect regions  man   man    PROM/stretching to b/l Shoulders  30 min 15 min  15 min  man  man Man 20 min   Right sh prolonged arm pull to address fascial tightness  man     Man 60 min   man 3 min x 2                Neuro Re-Ed                                                                                                        Ther Ex             Pulley's - flexion and abd x5 minutes 5 min 5 min10x nt nt      5 min 5 min   Supine cane flexion 2x10 2# 20x  30x 3# 30x 4# 30x 4# 30x1# 30x 4# 30x      Butterflies 2x10 20x   nt 10 reps   10 10 20x   Supine shoulder flexion  2x10 20x   30x        isometrics     nt     5sec x 5   Wall slides  Sh flex and abd  10x 10x1# 30x nt nt 10 x   10 flex, abd 10 flex    abd 10sec x 10   Standing flex/scap 1# 30x 20x 1#  2# 30x 1# 30x  2# 30x 2# 30x  1# 10x   ER /wedge     nt        Corner pec stretch  5 reps 20 sec hold 20sec x 530x 20sec x 5 20sec x 5  20sec x5 20 sec x 5 20 sec x 5 20sec x 5   Foam roll self thoracic mobilization   5 min 5 min 5 min 5 min  5 min 5 min 5 min 5 min   t-band/rows/ext 30x Green 3 x 10 30x 30x 30x        All pshups 30x  30x 30x 30x  30x 30x     Ball on wall    50x2b/l 50x2 b/l  50 3 b/l 50x3 b/l                  Modalities

## 2023-05-24 ENCOUNTER — APPOINTMENT (OUTPATIENT)
Dept: PHYSICAL THERAPY | Age: 63
End: 2023-05-24
Payer: COMMERCIAL

## 2023-05-31 ENCOUNTER — APPOINTMENT (OUTPATIENT)
Dept: PHYSICAL THERAPY | Age: 63
End: 2023-05-31
Payer: COMMERCIAL

## 2023-06-03 ENCOUNTER — APPOINTMENT (OUTPATIENT)
Dept: LAB | Age: 63
End: 2023-06-03
Payer: COMMERCIAL

## 2023-06-03 DIAGNOSIS — E78.5 HYPERLIPIDEMIA, UNSPECIFIED HYPERLIPIDEMIA TYPE: ICD-10-CM

## 2023-06-03 DIAGNOSIS — D69.6 THROMBOCYTOPENIA (HCC): ICD-10-CM

## 2023-06-03 DIAGNOSIS — E03.9 HYPOTHYROIDISM, UNSPECIFIED TYPE: ICD-10-CM

## 2023-06-03 LAB
ALBUMIN SERPL BCP-MCNC: 4 G/DL (ref 3.5–5)
ALP SERPL-CCNC: 54 U/L (ref 46–116)
ALT SERPL W P-5'-P-CCNC: 18 U/L (ref 12–78)
ANION GAP SERPL CALCULATED.3IONS-SCNC: 2 MMOL/L (ref 4–13)
AST SERPL W P-5'-P-CCNC: 13 U/L (ref 5–45)
BASOPHILS # BLD AUTO: 0.04 THOUSANDS/ÂΜL (ref 0–0.1)
BASOPHILS NFR BLD AUTO: 1 % (ref 0–1)
BILIRUB SERPL-MCNC: 0.48 MG/DL (ref 0.2–1)
BUN SERPL-MCNC: 16 MG/DL (ref 5–25)
CALCIUM SERPL-MCNC: 9.1 MG/DL (ref 8.3–10.1)
CHLORIDE SERPL-SCNC: 104 MMOL/L (ref 96–108)
CHOLEST SERPL-MCNC: 173 MG/DL
CO2 SERPL-SCNC: 30 MMOL/L (ref 21–32)
CREAT SERPL-MCNC: 0.92 MG/DL (ref 0.6–1.3)
EOSINOPHIL # BLD AUTO: 0.09 THOUSAND/ÂΜL (ref 0–0.61)
EOSINOPHIL NFR BLD AUTO: 2 % (ref 0–6)
ERYTHROCYTE [DISTWIDTH] IN BLOOD BY AUTOMATED COUNT: 12.8 % (ref 11.6–15.1)
GFR SERPL CREATININE-BSD FRML MDRD: 66 ML/MIN/1.73SQ M
GLUCOSE P FAST SERPL-MCNC: 85 MG/DL (ref 65–99)
HCT VFR BLD AUTO: 43.2 % (ref 34.8–46.1)
HDLC SERPL-MCNC: 67 MG/DL
HGB BLD-MCNC: 14.2 G/DL (ref 11.5–15.4)
IMM GRANULOCYTES # BLD AUTO: 0.01 THOUSAND/UL (ref 0–0.2)
IMM GRANULOCYTES NFR BLD AUTO: 0 % (ref 0–2)
LDLC SERPL CALC-MCNC: 89 MG/DL (ref 0–100)
LYMPHOCYTES # BLD AUTO: 1.56 THOUSANDS/ÂΜL (ref 0.6–4.47)
LYMPHOCYTES NFR BLD AUTO: 36 % (ref 14–44)
MCH RBC QN AUTO: 29.8 PG (ref 26.8–34.3)
MCHC RBC AUTO-ENTMCNC: 32.9 G/DL (ref 31.4–37.4)
MCV RBC AUTO: 91 FL (ref 82–98)
MONOCYTES # BLD AUTO: 0.31 THOUSAND/ÂΜL (ref 0.17–1.22)
MONOCYTES NFR BLD AUTO: 7 % (ref 4–12)
NEUTROPHILS # BLD AUTO: 2.38 THOUSANDS/ÂΜL (ref 1.85–7.62)
NEUTS SEG NFR BLD AUTO: 54 % (ref 43–75)
NRBC BLD AUTO-RTO: 0 /100 WBCS
PLATELET # BLD AUTO: 126 THOUSANDS/UL (ref 149–390)
PMV BLD AUTO: 12.5 FL (ref 8.9–12.7)
POTASSIUM SERPL-SCNC: 3.6 MMOL/L (ref 3.5–5.3)
PROT SERPL-MCNC: 6.7 G/DL (ref 6.4–8.4)
RBC # BLD AUTO: 4.76 MILLION/UL (ref 3.81–5.12)
SODIUM SERPL-SCNC: 136 MMOL/L (ref 135–147)
TRIGL SERPL-MCNC: 86 MG/DL
TSH SERPL DL<=0.05 MIU/L-ACNC: 1.52 UIU/ML (ref 0.45–4.5)
WBC # BLD AUTO: 4.39 THOUSAND/UL (ref 4.31–10.16)

## 2023-06-03 PROCEDURE — 85025 COMPLETE CBC W/AUTO DIFF WBC: CPT

## 2023-06-03 PROCEDURE — 36415 COLL VENOUS BLD VENIPUNCTURE: CPT

## 2023-06-03 PROCEDURE — 80053 COMPREHEN METABOLIC PANEL: CPT

## 2023-06-03 PROCEDURE — 84443 ASSAY THYROID STIM HORMONE: CPT

## 2023-06-03 PROCEDURE — 80061 LIPID PANEL: CPT

## 2023-06-05 ENCOUNTER — ANNUAL EXAM (OUTPATIENT)
Dept: OBGYN CLINIC | Facility: CLINIC | Age: 63
End: 2023-06-05
Payer: COMMERCIAL

## 2023-06-05 VITALS
WEIGHT: 169.6 LBS | DIASTOLIC BLOOD PRESSURE: 80 MMHG | BODY MASS INDEX: 31.21 KG/M2 | HEIGHT: 62 IN | SYSTOLIC BLOOD PRESSURE: 138 MMHG

## 2023-06-05 DIAGNOSIS — N95.1 VAGINAL DRYNESS, MENOPAUSAL: ICD-10-CM

## 2023-06-05 DIAGNOSIS — Z12.31 ENCOUNTER FOR SCREENING MAMMOGRAM FOR MALIGNANT NEOPLASM OF BREAST: Primary | ICD-10-CM

## 2023-06-05 DIAGNOSIS — Z01.419 ENCOUNTER FOR GYNECOLOGICAL EXAMINATION WITHOUT ABNORMAL FINDING: ICD-10-CM

## 2023-06-05 DIAGNOSIS — Z01.419 PAP SMEAR, AS PART OF ROUTINE GYNECOLOGICAL EXAMINATION: ICD-10-CM

## 2023-06-05 DIAGNOSIS — N95.2 ATROPHIC VAGINITIS: ICD-10-CM

## 2023-06-05 PROCEDURE — G0145 SCR C/V CYTO,THINLAYER,RESCR: HCPCS | Performed by: OBSTETRICS & GYNECOLOGY

## 2023-06-05 PROCEDURE — G0101 CA SCREEN;PELVIC/BREAST EXAM: HCPCS | Performed by: OBSTETRICS & GYNECOLOGY

## 2023-06-05 PROCEDURE — G0476 HPV COMBO ASSAY CA SCREEN: HCPCS | Performed by: OBSTETRICS & GYNECOLOGY

## 2023-06-05 NOTE — PATIENT INSTRUCTIONS
Normal gynecological physical examination  Self-breast examination stressed  Right breast mastectomy completed on 9/9/2022  Breast reconstruction is healing well  No signs of scar dehiscence, discharge, infection  Mammogram ordered  Scheduled for tomorrow morning  Vaginal Estrace cream ordered for patient and sent to pharmacy  Discussed regular exercise, healthy diet, importance of vitamin D and calcium supplements  Discussed importance of sun block use during periods of prolonged sun exposure  All patient questions answered at today's visit  Patient will be seen in 1 year for routine gynecologic and medical examination  Patient will call office for any problems, concerns, or issues which may arise during the interim

## 2023-06-05 NOTE — PROGRESS NOTES
Assessment/Plan:    No problem-specific Assessment & Plan notes found for this encounter  Diagnoses and all orders for this visit:    Encounter for screening mammogram for malignant neoplasm of breast  -     Mammo screening bilateral w 3d & cad; Future    Encounter for gynecological examination without abnormal finding  -     Liquid-based pap, screening    Pap smear, as part of routine gynecological examination    Atrophic vaginitis          Normal gynecological physical examination  Self-breast examination stressed  Mammogram ordered  Discussed regular exercise, healthy diet, importance of vitamin D and calcium supplements  Discussed importance of sun block use during periods of prolonged sun exposure  Patient will be seen in 1 year for routine gynecologic and medical examination  Patient will call office for any problems, concerns, or issues which may arise during the interim  Subjective:          Isabel Tapia is a 64-year-old female followed for hypothyroidism, high cholesterol, and history of DCIS breast cancer, here for her yearly appointment  She underwent a mastectomy of the right breast on 9/9/2022  She recently underwent right breast reconstruction  She has a scheduled mammogram for tomorrow morning and a Pap smear was done in office today  She denies any vaginal bleeding, discharge, abdominal pain, chest pain, and shortness of breath  She has a history of atrophic vaginitis and would like to restart her Estrace vaginal cream   She has no other concerns at this time and will follow-up in 1 year for for her annual exam       Patient ID: Sanya Duenas is a 57 y o  female who presents today for her annual gynecologic and medical examination    Menstrual status: Postmenopausal    Vasomotor symptoms: None  Breast surgeon advised her that she may use a low-dose of estrogen vaginal cream for atrophic vaginitis      Patient reports normal appetite    Patient reports normal bowel and bladder habits    Patient denies any significant pelvic or abdominal pain    Patient denies any headaches, chest pain, shortness of breath fever shakes or chills    Patient denies any COVID 19 symptoms including cough or loss of taste or smell    COVID vaccine status: Fully vaccinated    Medical problems: Followed for hypertension, high cholesterol, history of DCIS breast cancer  Colonoscopy status: Up-to-date    Mammogram status: Up-to-date, closely monitored  The following portions of the patient's history were reviewed and updated as appropriate: allergies, current medications, past family history, past medical history, past social history, past surgical history and problem list     Review of Systems   Constitutional: Negative  Negative for appetite change, diaphoresis, fatigue, fever and unexpected weight change  HENT: Negative  Eyes: Negative  Respiratory: Negative  Cardiovascular: Negative  Gastrointestinal: Negative  Negative for abdominal pain, blood in stool, constipation, diarrhea, nausea and vomiting  Endocrine: Negative  Negative for cold intolerance and heat intolerance  Genitourinary: Negative  Negative for dysuria, frequency, hematuria, urgency, vaginal bleeding, vaginal discharge and vaginal pain  Musculoskeletal: Negative  Skin: Negative  Allergic/Immunologic: Negative  Neurological: Negative  Hematological: Negative  Negative for adenopathy  Psychiatric/Behavioral: Negative  Objective: There were no vitals taken for this visit  Physical Exam  Constitutional:       Appearance: Normal appearance  She is well-developed  HENT:      Head: Normocephalic  Eyes:      Pupils: Pupils are equal, round, and reactive to light  Cardiovascular:      Rate and Rhythm: Normal rate and regular rhythm  Pulmonary:      Effort: Pulmonary effort is normal       Breath sounds: Normal breath sounds     Chest:   Breasts:     Breasts are symmetrical  Right: No inverted nipple, mass, nipple discharge, skin change or tenderness  Left: No inverted nipple, mass, nipple discharge, skin change or tenderness  Abdominal:      General: Bowel sounds are normal       Palpations: Abdomen is soft  Genitourinary:     General: Normal vulva  Exam position: Supine  Labia:         Right: No rash, tenderness, lesion or injury  Left: No rash, tenderness, lesion or injury  Vagina: Normal  No erythema, tenderness or bleeding  Cervix: No discharge or friability  Uterus: Not enlarged, not fixed and not tender  Adnexa:         Right: No mass, tenderness or fullness  Left: No mass, tenderness or fullness  Rectum: Normal  Guaiac result negative  Comments: Minimal atrophic vaginitis seen on exam   Pelvic muscles intact  Musculoskeletal:         General: Normal range of motion  Cervical back: Normal range of motion and neck supple  Lymphadenopathy:      Cervical: No cervical adenopathy  Upper Body:      Right upper body: No supraclavicular adenopathy  Left upper body: No supraclavicular adenopathy  Skin:     General: Skin is warm and dry  Findings: No rash  Neurological:      General: No focal deficit present  Mental Status: She is alert and oriented to person, place, and time  Psychiatric:         Mood and Affect: Mood normal          Speech: Speech normal          Behavior: Behavior normal          Thought Content:  Thought content normal          Judgment: Judgment normal

## 2023-06-06 ENCOUNTER — HOSPITAL ENCOUNTER (OUTPATIENT)
Dept: RADIOLOGY | Age: 63
Discharge: HOME | End: 2023-06-06
Payer: COMMERCIAL

## 2023-06-06 DIAGNOSIS — Z12.31 ENCOUNTER FOR SCREENING MAMMOGRAM FOR MALIGNANT NEOPLASM OF BREAST: ICD-10-CM

## 2023-06-06 PROCEDURE — 77067 SCR MAMMO BI INCL CAD: CPT

## 2023-06-06 PROCEDURE — 77063 BREAST TOMOSYNTHESIS BI: CPT

## 2023-06-06 RX ORDER — ESTRADIOL 0.1 MG/G
CREAM VAGINAL
Qty: 42.5 G | Refills: 2 | Status: SHIPPED | OUTPATIENT
Start: 2023-06-06

## 2023-06-07 DIAGNOSIS — R92.8 ABNORMAL MAMMOGRAM: Primary | ICD-10-CM

## 2023-06-08 ENCOUNTER — HOSPITAL ENCOUNTER (OUTPATIENT)
Dept: ULTRASOUND IMAGING | Facility: CLINIC | Age: 63
Discharge: HOME | End: 2023-06-08
Payer: COMMERCIAL

## 2023-06-08 ENCOUNTER — HOSPITAL ENCOUNTER (OUTPATIENT)
Dept: MAMMOGRAPHY | Facility: CLINIC | Age: 63
Discharge: HOME | End: 2023-06-08
Payer: COMMERCIAL

## 2023-06-08 VITALS — HEIGHT: 62 IN | WEIGHT: 169 LBS | BODY MASS INDEX: 31.1 KG/M2

## 2023-06-08 DIAGNOSIS — R92.8 ABNORMAL MAMMOGRAM: ICD-10-CM

## 2023-06-08 PROCEDURE — 76642 ULTRASOUND BREAST LIMITED: CPT

## 2023-06-08 PROCEDURE — G0279 TOMOSYNTHESIS, MAMMO: HCPCS

## 2023-06-08 PROCEDURE — 77065 DX MAMMO INCL CAD UNI: CPT

## 2023-06-09 DIAGNOSIS — K21.9 GASTROESOPHAGEAL REFLUX DISEASE WITHOUT ESOPHAGITIS: ICD-10-CM

## 2023-06-09 RX ORDER — FAMOTIDINE 20 MG/1
20 TABLET, FILM COATED ORAL 2 TIMES DAILY
Qty: 90 TABLET | Refills: 1 | Status: SHIPPED | OUTPATIENT
Start: 2023-06-09 | End: 2023-06-12 | Stop reason: SDUPTHER

## 2023-06-12 DIAGNOSIS — K21.9 GASTROESOPHAGEAL REFLUX DISEASE WITHOUT ESOPHAGITIS: ICD-10-CM

## 2023-06-12 DIAGNOSIS — R92.8 ABNORMAL MAMMOGRAM: Primary | ICD-10-CM

## 2023-06-12 LAB
LAB AP GYN PRIMARY INTERPRETATION: NORMAL
Lab: NORMAL

## 2023-06-12 RX ORDER — FAMOTIDINE 20 MG/1
20 TABLET, FILM COATED ORAL 2 TIMES DAILY
Qty: 180 TABLET | Refills: 1 | Status: SHIPPED | OUTPATIENT
Start: 2023-06-12

## 2023-07-13 NOTE — PROGRESS NOTES
Assessment/Plan:    Hypothyroidism  Hypothyroidism controlled the patient is currently euthyroid I will be ordering a TSH prior to the next office visit and the patient will continue with current medical regiment; we will continue to monitor the patient's progress  Thrombocytopenia ([address] )  We will check CBC    Hyperlipidemia  Hyperlipidemia controlled continue with current medical regiment recommend a low-cholesterol diet and recommend routine exercise we will continue to monitor the progress  Continue Crestor 20 mg once daily    Low gammaglobulin level (HCC)  Currently stable and doing well reviewed laboratories we will order follow-up labs subsequent visit    Preop exam for internal medicine  Patient is currently doing very well stable and low risk for cataract procedure she is currently cleared necessary paperwork completed and sent back to ophthalmology  Problem List Items Addressed This Visit        Endocrine    Hypothyroidism - Primary     Hypothyroidism controlled the patient is currently euthyroid I will be ordering a TSH prior to the next office visit and the patient will continue with current medical regiment; we will continue to monitor the patient's progress  Relevant Orders    TSH, 3rd generation       Hematopoietic and Hemostatic    Thrombocytopenia ([address] )     We will check CBC         Relevant Orders    CBC (Includes Diff/Plt) (Refl)       Other    Low gammaglobulin level (HCC)     Currently stable and doing well reviewed laboratories we will order follow-up labs subsequent visit         Hyperlipidemia     Hyperlipidemia controlled continue with current medical regiment recommend a low-cholesterol diet and recommend routine exercise we will continue to monitor the progress    Continue Crestor 20 mg once daily         Relevant Orders    Comprehensive metabolic panel    Lipid Panel with Direct LDL reflex    Preop exam for internal medicine     Patient is currently doing very well Private car stable and low risk for cataract procedure she is currently cleared necessary paperwork completed and sent back to ophthalmology  RTO in 6 months  Subjective:      Patient ID: Indio Arnold is a 57 y o  female  HPI 60-year old female coming in for a follow up office visit regarding preop clearance, thrombocytopenia, hypothyroidism, hyperlipidemia the patient reports me compliant taking medications without untoward side effects the  The patient is here to review his medical condition, update me on the medical condition and the patient reports me no hospitalizations and no ER visits  Patient is here for preop clearance for bilateral cataracts necessary paperwork completed requested by ophthalmologist Dr Kaur Presley reports to me decreased vision and difficulty with nighttime vision  Currently patient is stable doing well no chest pain no exertional chest pain no shortness of breath no bleeding problems no clotting problems currently very stable no fever no chills  The pt need pre op Dr Mallory Miramontes,  Healthy needed restition ,  No cp , sob no f/c     The following portions of the patient's history were reviewed and updated as appropriate: allergies, current medications, past family history, past medical history, past social history, past surgical history and problem list     Review of Systems   Constitutional: Negative for activity change, appetite change and unexpected weight change  HENT: Negative for congestion and postnasal drip  Eyes: Negative for visual disturbance  Respiratory: Negative for cough and shortness of breath  Cardiovascular: Negative for chest pain  Gastrointestinal: Negative for abdominal pain, diarrhea, nausea and vomiting  Neurological: Negative for dizziness, light-headedness and headaches  Hematological: Negative for adenopathy  Difficulty with vision    Objective:    Return in about 6 months (around 7/6/2023)      Procedure: DXA bone density spine hip and pelvis    Result Date: 12/28/2022  Narrative: DXA SCAN CLINICAL HISTORY: 57 years postmenopausal female   OTHER RISK FACTORS:  SSRI therapy  PHARMACOLOGIC THERAPY FOR OSTEOPOROSIS:  None  TECHNIQUE: Bone densitometry was performed using a Hologic Horizon A  bone densitometer  Regions of interest appear properly placed  COMPARISON: 12/23/2020  RESULTS: LUMBAR SPINE Level: L2, L4  (L1, L3 vertebrae excluded from analysis due to local structural abnormalities or artifact) : BMD:  1 183  gm/cm2 T-score: 1 0 LEFT  TOTAL HIP: BMD:  0 991  gm/cm2  T-score:  0 4 LEFT  FEMORAL NECK: BMD:  0 789  gm/cm2 T score: -0 5 LEFT  FOREARM:  33% RADIUS BMD:  0 583  gm/cm2 T-score:  -1 7     Impression: 1  Low bone mass (osteopenia)  [Based on the left radius] 2  Since a DXA study from 12/23/2020, there has been: A  STATISTICALLY SIGNIFICANT DECREASE in bone mineral density of  0 039 g/cm2 (3 8)% in the left total hip  3   The 10 year risk of hip fracture is 0 3% with the 10 year risk of major osteoporotic fracture being 7 0% as calculated by the [facility] fracture risk assessment tool (FRAX, which is based on data generated by the [facility] for Metabolic Bone Diseases)  4   The current NOF guidelines recommend treating patients with a T-score of -2 5 or less in the lumbar spine or hips, or in post-menopausal women and men over the age of 48 with low bone mass (osteopenia) and a FRAX 10 year risk score of >3% for hip fracture and/or >20% for major osteoporotic fracture  5   The NOF recommends follow-up DXA in 1-2 years after initiating therapy for osteoporosis and every 2 years thereafter  More frequent evaluation is appropriate for patients with conditions associated with rapid bone loss, such as glucocorticoid therapy  The interval between DXA screenings may be longer for individuals without major risk factors and initial T-score in the normal or upper low bone mass range   The FRAX algorithm has certain limitations: -FRAX has not been validated in patients currently or previously treated with pharmacotherapy for osteoporosis  In such patients, clinical judgment must be exercised in interpreting FRAX scores  -Prior hip, vertebral and humeral fragility fractures appear to confer greater risk of subsequent fracture than fractures at other sites (this is especially true for individuals with severe vertebral fractures), but quantification of this incremental risk is not possible with FRAX  -FRAX underestimates fracture risk in patients with history of multiple fragility fractures  -FRAX may underestimate fracture risk in patients with history of frequent falls  -It is not appropriate to use FRAX to monitor treatment response  WHO CLASSIFICATION: Normal (a T-score of -1 0 or higher) Low bone mineral density (a T-score of less than -1 0 but higher than -2 5) Osteoporosis (a T-score of -2 5 or less) Severe osteoporosis (a T-score of -2 5 or less with a fragility fracture) LEAST SIGNIFICANT CHANGE (AT 95% C  I): Lumbar spine 0 036 g/cm2; 2 2% Total hip: 0 022 g/cm2; 2 6% Forearm: 0 017 g/cm2; 3 0%  Workstation performed: [id number]          Allergies   Allergen Reactions   • Penicillins Anaphylaxis     Anaphylaxis  Anaphylaxis  Other reaction(s): Anaphylaxis   • Acetazolamide Itching and Swelling     With eye drops - takes  oral sulfa w/o side effects   • Metronidazole GI Intolerance and Nausea Only       Past Medical History:   Diagnosis Date   • Abnormal electrocardiogram     Last assessed 10/04/13   • Anemia     pt states hypogammaglobulinemia, needs washed RBCs if transfused - Rx by Dr Jg Frederick   • Anxiety    • Depression    • Diabetes [facility])     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  - Last assessed 11/02/17   • Disease of thyroid gland    • Diverticulitis of colon    • GERD (gastroesophageal reflux disease)    • Hyperlipidemia    • Hypertension    • Hypoglobulinemia • PONV (postoperative nausea and vomiting)    • Slow transit constipation      Past Surgical History:   Procedure Laterality Date   • APPENDECTOMY     • BOWEL RESECTION     • BREAST BIOPSY Right 07/07/2022    stereo x 2   • BREAST BIOPSY Right 07/26/2022    stereo   • BREAST RECONSTRUCTION Right 12/15/2022    Procedure: RIGHT BREAST RECONSTRUCTION AND REMOVAL OF TISSUE EXPANDER WITH PLACEMENT OF IMPLANT  CAPSULOTOMY ;  Surgeon: Kajal Pisano DO;  Location: [facility] MAIN OR;  Service: Plastics   • CARPAL TUNNEL RELEASE     • COLECTOMY      Resolved 08/27/09   • COLONOSCOPY     • INSERTION OF BREAST TISSUE EXPANDER Right 09/09/2022    Procedure: IMMEDIATE BREAST RECONSTRUCTION WITH TISSUE EXPANDER AND  GALAFLEX;  Surgeon: Kajal Pisano DO;  Location: [facility];  Service: Plastics   • JOINT REPLACEMENT Right    • LASIK     • MAMMO STEREOTACTIC BREAST BIOPSY RIGHT (ALL INC) Right 07/07/2022   • MAMMO STEREOTACTIC BREAST BIOPSY RIGHT (ALL INC) Right 07/26/2022   • MAMMO STEREOTACTIC BREAST BIOPSY RIGHT (ALL INC) EACH ADD Right 07/07/2022   • MASTECTOMY W/ SENTINEL NODE BIOPSY Right 09/09/2022    Procedure: MASTECTOMY WITH BIOPSY LYMPH NODE SENTINEL,Lymphoscintigraphy,Lymphatic Mapping; [address];  Surgeon: Althea Hinojosa MD;  Location: [facility];  Service: Surgical Oncology   • OTHER SURGICAL HISTORY      Biopsy Vulvar   • RI ARTHRP KNE CONDYLE&PLATU MEDIAL&LAT COMPARTMENTS Left 02/08/2017    Procedure: TOTAL KNEE REPLACEMENT ARTHROPLASTY;  Surgeon: Harpal Pink MD;  Location: [facility];  Service: Orthopedics   • RI COLONOSCOPY FLX DX W/COLLJ SPEC WHEN PFRMD N/A 05/06/2019    Procedure: COLONOSCOPY;  Surgeon: Norma Bunn MD;  Location: [facility];   Service: General   • RI MASTOPEXY Left 12/15/2022    Procedure: LEFT MASTOPEXY FOR SYMMETRY AND  GALAFLEX;  Surgeon: Kajal Pisano DO;  Location: [facility] MAIN OR;  Service: Plastics   • REFRACTIVE SURGERY     • REPLACEMENT TOTAL KNEE Bilateral 10/2013 • TONSILLECTOMY      With Adenoidectomy   • TOOTH EXTRACTION     • VAGINA SURGERY      mesh     Current Outpatient Medications on File Prior to Visit   Medication Sig Dispense Refill   • diphenhydrAMINE (BENADRYL) 25 mg tablet Take 25 mg by mouth every 6 (six) hours as needed for itching     • famotidine (PEPCID) 20 mg tablet Take 1 tablet (20 mg total) by mouth 2 (two) times a day 90 tablet 1   • levothyroxine 75 mcg tablet TAKE ONE TABLET BY MOUTH EVERY DAY IN THE EARLY MORNING 90 tablet 1   • potassium chloride (K-DUR,KLOR-CON) 10 mEq tablet TAKE ONE TABLET BY MOUTH TWICE A DAY (Patient taking differently: 20 mEq Daily in AM) 180 tablet 1   • Psyllium (METAMUCIL PO) Take 1 Dose by mouth daily       • rosuvastatin (CRESTOR) 5 mg tablet Take 1 tablet (5 mg total) by mouth daily 90 tablet 5   • senna (SENOKOT) 8 6 MG tablet Take 2 tablets by mouth daily     • sertraline (ZOLOFT) 100 mg tablet TAKE ONE TABLET BY MOUTH EVERY DAY 90 tablet 1   • sulfamethoxazole-trimethoprim (BACTRIM DS) 800-160 mg per tablet Take 1 tablet by mouth every 12 (twelve) hours for 7 days 14 tablet 0   • traZODone (DESYREL) 50 mg tablet Take 1 tablet (50 mg total) by mouth daily at bedtime 90 tablet 1   • triamterene-hydrochlorothiazide (DYAZIDE) 37 5-25 mg per capsule TAKE ONE CAPSULE BY MOUTH EVERY MORNING 90 capsule 1     No current facility-administered medications on file prior to visit       Family History   Problem Relation Age of Onset   • No Known Problems Mother    • Basal cell carcinoma Father    • Prostate cancer Father 80   • Breast cancer Sister 54   • No Known Problems Sister    • No Known Problems Daughter    • No Known Problems Daughter    • No Known Problems Daughter    • No Known Problems Maternal Grandmother    • No Known Problems Maternal Grandfather    • Breast cancer Paternal Grandmother 50   • No Known Problems Paternal Grandfather    • Breast cancer Paternal Aunt 72   • Breast cancer Family    • Arthritis Family • Hypertension Family    • Cancer Family      Social History     Socioeconomic History   • Marital status: /Civil Union     Spouse name: Not on file   • Number of children: Not on file   • Years of education: Not on file   • Highest education level: Not on file   Occupational History   • Not on file   Tobacco Use   • Smoking status: Never   • Smokeless tobacco: Never   Vaping Use   • Vaping Use: Never used   Substance and Sexual Activity   • Alcohol use: No   • Drug use: No   • Sexual activity: Yes   Other Topics Concern   • Not on file   Social History Narrative    Denied: Home environment Domestic Violence        Daily Tea consumption        Caffeine use     Social Determinants of Health     Financial Resource Strain: Not on file   Food Insecurity: Not on file   Transportation Needs: Not on file   Physical Activity: Not on file   Stress: Not on file   Social Connections: Not on file   Intimate Partner Violence: Not on file   Housing Stability: Not on file     Vitals:    01/06/23 0754   BP: 122/72   Pulse: 89   Resp: 16   SpO2: 96%   Weight: 75 7 kg (166 lb 12 8 oz)   Height: 5' 2" (1 575 m)     Results for orders placed or performed in visit on 12/21/22   Potassium   Result Value Ref Range    Potassium 4 3 3 5 - 5 3 mmol/L     Weight (last 2 days)     Date/Time Weight    01/06/23 0754 75 7 (166 8)        Body mass index is 30 51 kg/m²  BP      Temp      Pulse     Resp      SpO2        Vitals:    01/06/23 0754   Weight: 75 7 kg (166 lb 12 8 oz)     Vitals:    01/06/23 0754   Weight: 75 7 kg (166 lb 12 8 oz)       /72   Pulse 89   Resp 16   Ht 5' 2" (1 575 m)   Wt 75 7 kg (166 lb 12 8 oz)   SpO2 96%   BMI 30 51 kg/m²          Physical Exam  Constitutional:       Appearance: She is well-developed  HENT:      Head: Normocephalic  Eyes:      General: No scleral icterus  Right eye: No discharge  Left eye: No discharge        Conjunctiva/sclera: Conjunctivae normal       Pupils: Pupils are equal, round, and reactive to light  Cardiovascular:      Rate and Rhythm: Normal rate and regular rhythm  Heart sounds: Normal heart sounds  No murmur heard  No friction rub  No gallop  Pulmonary:      Effort: No respiratory distress  Breath sounds: Normal breath sounds  No wheezing or rales  Abdominal:      General: Bowel sounds are normal  There is no distension  Palpations: Abdomen is soft  There is no mass  Tenderness: There is no abdominal tenderness  There is no guarding or rebound  Musculoskeletal:         General: No deformity  Cervical back: Neck supple  Lymphadenopathy:      Cervical: No cervical adenopathy  Neurological:      Mental Status: She is alert        Coordination: Coordination normal        Bilateral cataracts

## 2023-07-24 ENCOUNTER — VBI (OUTPATIENT)
Dept: ADMINISTRATIVE | Facility: OTHER | Age: 63
End: 2023-07-24

## 2023-07-25 DIAGNOSIS — F41.9 ANXIETY: ICD-10-CM

## 2023-07-25 DIAGNOSIS — E03.9 HYPOTHYROIDISM, UNSPECIFIED TYPE: ICD-10-CM

## 2023-07-25 DIAGNOSIS — I10 HYPERTENSION, UNSPECIFIED TYPE: ICD-10-CM

## 2023-07-25 RX ORDER — SERTRALINE HYDROCHLORIDE 100 MG/1
100 TABLET, FILM COATED ORAL DAILY
Qty: 90 TABLET | Refills: 1 | Status: SHIPPED | OUTPATIENT
Start: 2023-07-25

## 2023-07-25 RX ORDER — LEVOTHYROXINE SODIUM 0.07 MG/1
75 TABLET ORAL DAILY
Qty: 90 TABLET | Refills: 1 | Status: SHIPPED | OUTPATIENT
Start: 2023-07-25

## 2023-07-25 RX ORDER — TRIAMTERENE AND HYDROCHLOROTHIAZIDE 37.5; 25 MG/1; MG/1
1 CAPSULE ORAL EVERY MORNING
Qty: 90 CAPSULE | Refills: 1 | Status: SHIPPED | OUTPATIENT
Start: 2023-07-25

## 2023-07-28 ENCOUNTER — OFFICE VISIT (OUTPATIENT)
Dept: INTERNAL MEDICINE CLINIC | Facility: CLINIC | Age: 63
End: 2023-07-28
Payer: COMMERCIAL

## 2023-07-28 VITALS
DIASTOLIC BLOOD PRESSURE: 68 MMHG | RESPIRATION RATE: 16 BRPM | BODY MASS INDEX: 30.69 KG/M2 | HEART RATE: 81 BPM | WEIGHT: 166.8 LBS | OXYGEN SATURATION: 97 % | SYSTOLIC BLOOD PRESSURE: 108 MMHG | HEIGHT: 62 IN

## 2023-07-28 DIAGNOSIS — I15.9 SECONDARY HYPERTENSION: ICD-10-CM

## 2023-07-28 DIAGNOSIS — R89.9 ABNORMAL LABORATORY TEST: ICD-10-CM

## 2023-07-28 DIAGNOSIS — E66.09 CLASS 1 OBESITY DUE TO EXCESS CALORIES WITHOUT SERIOUS COMORBIDITY WITH BODY MASS INDEX (BMI) OF 33.0 TO 33.9 IN ADULT: ICD-10-CM

## 2023-07-28 DIAGNOSIS — E78.5 HYPERLIPIDEMIA, UNSPECIFIED HYPERLIPIDEMIA TYPE: ICD-10-CM

## 2023-07-28 DIAGNOSIS — Z12.11 SCREENING FOR COLON CANCER: ICD-10-CM

## 2023-07-28 DIAGNOSIS — D80.3 IGG DEFICIENCY (HCC): ICD-10-CM

## 2023-07-28 DIAGNOSIS — Z23 ENCOUNTER FOR IMMUNIZATION: Primary | ICD-10-CM

## 2023-07-28 DIAGNOSIS — Z00.00 MEDICARE ANNUAL WELLNESS VISIT, SUBSEQUENT: ICD-10-CM

## 2023-07-28 DIAGNOSIS — Z13.6 SCREENING FOR CARDIOVASCULAR CONDITION: ICD-10-CM

## 2023-07-28 DIAGNOSIS — Z96.652 STATUS POST LEFT KNEE REPLACEMENT: ICD-10-CM

## 2023-07-28 DIAGNOSIS — E03.9 HYPOTHYROIDISM, UNSPECIFIED TYPE: ICD-10-CM

## 2023-07-28 PROCEDURE — 3288F FALL RISK ASSESSMENT DOCD: CPT | Performed by: INTERNAL MEDICINE

## 2023-07-28 PROCEDURE — 90677 PCV20 VACCINE IM: CPT

## 2023-07-28 PROCEDURE — G0009 ADMIN PNEUMOCOCCAL VACCINE: HCPCS

## 2023-07-28 PROCEDURE — G0439 PPPS, SUBSEQ VISIT: HCPCS | Performed by: INTERNAL MEDICINE

## 2023-07-28 PROCEDURE — 99214 OFFICE O/P EST MOD 30 MIN: CPT | Performed by: INTERNAL MEDICINE

## 2023-07-28 RX ORDER — CLINDAMYCIN HYDROCHLORIDE 300 MG/1
CAPSULE ORAL
Qty: 20 CAPSULE | Refills: 0 | Status: SHIPPED | OUTPATIENT
Start: 2023-07-28 | End: 2023-07-28

## 2023-07-28 NOTE — PROGRESS NOTES
Assessment and Plan:     Problem List Items Addressed This Visit        Endocrine    Hypothyroidism     Hypothyroidism controlled the patient is currently euthyroid I will be ordering a TSH prior to the next office visit and the patient will continue with current medical regiment; we will continue to monitor the patient's progress. Relevant Orders    TSH, 3rd generation       Cardiovascular and Mediastinum    Hypertension       Hypertension - controlled, blood pressure stable continue Dyazide 1 tablet daily            Other    S/P knee replacement    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HCC)     Currently stable doing well no recurrent infections continue to monitor immunoglobulins         Abnormal laboratory test     We will check IgA IgG and IgM monitoring currently stable asymptomatic         Relevant Orders    Immunofixation IgA,IgG,IgM Qt. Immunofixation Serum Immunoglobulin    CBC and differential    Class 1 obesity due to excess calories with body mass index (BMI) of 33.0 to 33.9 in adult     Obesity -I have counseled patient following healthy and balanced diet, I would like the patient to lose weight, I would like the patient exercise routinely; we will continue monitor the patient's progress. Hyperlipidemia      hyperlipidemia controlled continue with current medical regiment recommend a low-cholesterol diet and recommend routine exercise we will continue to monitor the progress. Continue Crestor 5 mg once daily         Medicare annual wellness visit, subsequent     Assessment and plan 1. Medicare subsequent annual wellness examination overall the patient is clinically stable and doing well, we encouraged the patient to follow a healthy and balanced diet. We recommend that the patient exercise routinely approximately 30 minutes 5 times per week .   We have reviewed the patient's vaccines and have made recommendations for updates if necessary   annual flu shot in the fall Cologuard ordered, today patient did receive the Prevnar 20   . We will be ordering screening laboratories which are age appropriate. Return to the office in   6 months   call if any problems. Other Visit Diagnoses     Encounter for immunization    -  Primary    Relevant Orders    Pneumococcal Conjugate Vaccine 20-valent (PCV20) (Completed)    Screening for colon cancer        Relevant Orders    Cologuard    Screening for cardiovascular condition        Relevant Orders    Comprehensive metabolic panel    Lipid Panel with Direct LDL reflex      Return to office 6  months  call if any problems  BMI Counseling: Body mass index is 30.51 kg/m². The BMI is above normal. Nutrition recommendations include decreasing portion sizes and moderation in carbohydrate intake. Exercise recommendations include exercising 3-5 times per week. Rationale for BMI follow-up plan is due to patient being overweight or obese. Depression Screening and Follow-up Plan: Patient was screened for depression during today's encounter. They screened negative with a PHQ-2 score of 0. Preventive health issues were discussed with patient, and age appropriate screening tests were ordered as noted in patient's After Visit Summary. Personalized health advice and appropriate referrals for health education or preventive services given if needed, as noted in patient's After Visit Summary. History of Present Illness:     Patient presents for a Medicare Wellness Visit    HPI 76-year old female coming in for a follow up office visit regarding IgA deficiency, hypothyroidism, obesity, hyperlipidemia and hypertension; the patient reports me compliant taking medications without untoward side effects the. The patient is here to review his medical condition, update me on the medical condition and the patient reports me no hospitalizations and no ER visits.   No injuries no illnesses she is trying to follow a healthy and balanced diet she remains active here to review laboratories in detail. Overall doing well she reports me several months ago she underwent complicated breast reconstruction had difficulty with seroma requiring multiple visits with the plastic surgeon currently the seroma has resolved and she is doing better. No injuries no illnesses no falls  Patient Care Team:  Dale Cowart DO as PCP - [address] [place], [place] [address]DO chalo as PCP - [employer] ([facility])  Dale Cowart DO as PCP - PCP-[facility] ([facility])  MD Elle Raphael MD Mickeal Rogers, MD Mallissa Haddock, MD as Endoscopist  Chsatity Maravilla as Care Coordinator (Oncology)  Sandford Osler, RN as Registered Nurse (Oncology)  Dale Cowart DO (Internal Medicine)  Annabel Moise MD (General Surgery)  Que Stanton DO (Plastic Surgery)     Review of Systems:     Review of Systems   Constitutional: Negative for activity change, appetite change and unexpected weight change. HENT: Negative for congestion and postnasal drip. Eyes: Negative for visual disturbance. Respiratory: Negative for cough and shortness of breath. Cardiovascular: Negative for chest pain. Gastrointestinal: Negative for abdominal pain, diarrhea, nausea and vomiting. Neurological: Negative for dizziness, light-headedness and headaches.         Problem List:     Patient Active Problem List   Diagnosis   • S/P knee replacement   • Hypertension   • Anxiety   • Hypothyroidism   • Gastroesophageal reflux disease without esophagitis   • Prediabetes   • 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HCC)   • IgA deficiency (HCC)   • Thrombocytopenia (HCC)   • Melanosis coli   • History of colon resection   • Low back pain   • Abnormal laboratory test   • Edema   • Class 1 obesity due to excess calories with body mass index (BMI) of 33.0 to 33.9 in adult   • Low gammaglobulin level (HCC)   • TMJ arthritis   • Tension type headache   • Exposure to SARS-associated coronavirus   • Hyperlipidemia   • Laryngitis   • Primary insomnia   • Abnormal mammogram   • History of breast cancer   • Family history of cancer   • BRCA negative   • Acquired absence of right breast and nipple   • Cataract   • Preop exam for internal medicine   • Encounter for follow-up examination after completed treatment for malignant neoplasm   • Medicare annual wellness visit, subsequent      Past Medical and Surgical History:     Past Medical History:   Diagnosis Date   • Abnormal electrocardiogram     Last assessed 10/04/13   • Allergic 1990    Penicillin - anaphalaxis   • Anemia     pt states hypogammaglobulinemia, needs washed RBCs if transfused - Rx by Dr Francine Suarez   • Anxiety    • Arthritis 2005   • Breast cancer ([address])    • Cancer ([address])     Right breast cancer   • COVID 01/2022   • Depression    • Diabetes ([address])     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 - Last assessed 11/02/17   • Disease of thyroid gland    • Diverticulitis of colon    • GERD (gastroesophageal reflux disease)    • Hyperlipidemia    • Hypertension    • Hypoglobulinemia    • Obesity    • PONV (postoperative nausea and vomiting)    • Slow transit constipation      Past Surgical History:   Procedure Laterality Date   • APPENDECTOMY     • BOWEL RESECTION     • BREAST BIOPSY Right 07/07/2022    stereo x 2   • BREAST BIOPSY Right 07/26/2022    stereo   • BREAST RECONSTRUCTION Right 12/15/2022    Procedure: RIGHT BREAST RECONSTRUCTION AND REMOVAL OF TISSUE EXPANDER WITH PLACEMENT OF IMPLANT.  CAPSULOTOMY.;  Surgeon: Treasure Li DO;  Location: [address] [place] [address];  Service: Plastics   • CARPAL TUNNEL RELEASE     • CATARACT EXTRACTION Bilateral    • COLECTOMY      Resolved 08/27/09   • COLONOSCOPY     • EYE SURGERY  2007    Lasik , Cataracts-2023   • INSERTION OF BREAST TISSUE EXPANDER Right 09/09/2022    Procedure: IMMEDIATE BREAST RECONSTRUCTION WITH TISSUE EXPANDER AND  GALAFLEX;  Surgeon: Treasure Li DO;  [facility];  Service: Plastics   • JOINT REPLACEMENT Bilateral     TKR   • LASIK     • LYMPH NODE BIOPSY  9/22   • MAMMO STEREOTACTIC BREAST BIOPSY RIGHT (ALL INC) Right 07/07/2022   • MAMMO STEREOTACTIC BREAST BIOPSY RIGHT (ALL INC) Right 07/26/2022   • MAMMO STEREOTACTIC BREAST BIOPSY RIGHT (ALL INC) EACH ADD Right 07/07/2022   • MASTECTOMY Right    • MASTECTOMY W/ SENTINEL NODE BIOPSY Right 09/09/2022    Procedure: MASTECTOMY WITH BIOPSY LYMPH NODE SENTINEL,Lymphoscintigraphy,Lymphatic Mapping; [address];  Surgeon: Chantell Starks MD;  [facility];  Service: Surgical Oncology   • OTHER SURGICAL HISTORY      Biopsy Vulvar   • OH ARTHRP KNE CONDYLE&PLATU MEDIAL&LAT COMPARTMENTS Left 02/08/2017    Procedure: TOTAL KNEE REPLACEMENT ARTHROPLASTY;  Surgeon: Emilio Clancy MD;  [facility];  Service: Orthopedics   • OH COLONOSCOPY FLX DX W/COLLJ SPEC WHEN PFRMD N/A 05/06/2019    Procedure: COLONOSCOPY;  Surgeon: Tenisha Kaufman MD;  Location: [facility];   Service: General   • OH INSJ/RPLCMT BREAST IMPLANT SEP DAY MASTECTOMY Right 03/14/2023    Procedure: RIGHT BREAST RECON REVISION WITH IMPLANT EXCHANGE AND CAPSULOTOMY;  Surgeon: Jaison Zhu DO;  [facility];  Service: Plastics   • OH MASTOPEXY Left 12/15/2022    Procedure: LEFT MASTOPEXY FOR SYMMETRY AND  GALAFLEX;  Surgeon: Jaison Zhu DO;  Location: [facility];  Service: Plastics   • REFRACTIVE SURGERY     • REPLACEMENT TOTAL KNEE Bilateral 10/2013   • TONSILLECTOMY      With Adenoidectomy   • TOOTH EXTRACTION     • VAGINA SURGERY      mesh      Family History:     Family History   Problem Relation Age of Onset   • No Known Problems Mother    • Basal cell carcinoma Father    • Prostate cancer Father    • Breast cancer Sister    • No Known Problems Sister    • No Known Problems Daughter    • No Known Problems Daughter    • No Known Problems Daughter    • No Known Problems Maternal Grandmother    • No Known Problems Maternal Grandfather    • Breast cancer Paternal Grandmother    • Breast cancer Paternal Grandfather    • Breast cancer Paternal Aunt    • Breast cancer Family    • Arthritis Family    • Hypertension Family    • Cancer Family    • Breast cancer Paternal Aunt         Breast cancer      Social History:     Social History     Socioeconomic History   • Marital status: /Civil Union     Spouse name: None   • Number of children: None   • Years of education: None   • Highest education level: None   Occupational History   • None   Tobacco Use   • Smoking status: Never     Passive exposure: Never   • Smokeless tobacco: Never   Vaping Use   • Vaping Use: Never used   Substance and Sexual Activity   • Alcohol use: No   • Drug use: No   • Sexual activity: Yes     Partners: Male     Birth control/protection: Post-menopausal   Other Topics Concern   • None   Social History Narrative    Denied: Home environment Domestic Violence        Daily Tea consumption        Caffeine use     Social Determinants of Health     Financial Resource Strain: Low Risk  (7/28/2023)    Overall Financial Resource Strain (CARDIA)    • Difficulty of Paying Living Expenses: Not hard at all   Food Insecurity: Not on file   Transportation Needs: No Transportation Needs (7/28/2023)    PRAPARE - Transportation    • Lack of Transportation (Medical): No    • Lack of Transportation (Non-Medical):  No   Physical Activity: Not on file   Stress: Not on file   Social Connections: Not on file   Intimate Partner Violence: Not on file   Housing Stability: Not on file      Medications and Allergies:     Current Outpatient Medications   Medication Sig Dispense Refill   • diphenhydrAMINE (BENADRYL) 25 mg tablet Take 25 mg by mouth every 6 (six) hours as needed for itching     • estradiol (ESTRACE VAGINAL) 0.1 mg/g vaginal cream Insert one quarter of an applicator intravaginally once weekly or once every other week 42.5 g 2   • famotidine (PEPCID) 20 mg tablet Take 1 tablet (20 mg total) by mouth 2 (two) times a day 180 tablet 1   • levothyroxine 75 mcg tablet Take 1 tablet (75 mcg total) by mouth daily 90 tablet 1   • potassium chloride (K-DUR,KLOR-CON) 10 mEq tablet TAKE 1 TABLET BY MOUTH TWICE A  tablet 1   • Psyllium (METAMUCIL PO) Take 1 Dose by mouth daily       • rosuvastatin (CRESTOR) 5 mg tablet TAKE ONE TABLET BY MOUTH EVERY DAY (Patient taking differently: Take 5 mg by mouth every morning) 90 tablet 5   • senna (SENOKOT) 8.6 MG tablet Take 2 tablets by mouth daily     • sertraline (ZOLOFT) 100 mg tablet Take 1 tablet (100 mg total) by mouth daily 90 tablet 1   • traZODone (DESYREL) 50 mg tablet TAKE ONE TABLET BY MOUTH AT BEDTIME 90 tablet 1   • triamterene-hydrochlorothiazide (DYAZIDE) 37.5-25 mg per capsule Take 1 capsule by mouth every morning 90 capsule 1     No current facility-administered medications for this visit. Allergies   Allergen Reactions   • Penicillins Anaphylaxis     Anaphylaxis  Anaphylaxis  Other reaction(s):  Anaphylaxis   • Acetazolamide Itching and Swelling     With eye drops - takes  oral sulfa w/o side effects   • Metronidazole GI Intolerance and Nausea Only      Immunizations:     Immunization History   Administered Date(s) Administered   • COVID-19 MODERNA VACC 0.5 ML IM 01/29/2021, 02/26/2021, 10/29/2021   • INFLUENZA 09/04/2021   • Influenza Quadrivalent Preservative Free 3 years and older IM 10/04/2014, 10/03/2015, 09/12/2016, 10/12/2017   • Influenza, recombinant, quadrivalent,injectable, preservative free 10/18/2018, 11/08/2019, 09/04/2020, 09/26/2022   • Influenza, seasonal, injectable 10/19/2012, 10/01/2013   • Pneumococcal Conjugate 13-Valent 02/24/2016   • Pneumococcal Conjugate Vaccine 20-valent (Pcv20), Polysace 07/28/2023   • Pneumococcal Polysaccharide PPV23 03/01/2013   • Td (adult), adsorbed 07/14/2004   • Tdap 07/14/2014   • Zoster 11/30/2015   • Zoster Vaccine Recombinant 10/13/2020      Health Maintenance:         Topic Date Due   • Colorectal Cancer Screening  11/23/2021   • Breast Cancer Screening: Mammogram  06/08/2025   • Cervical Cancer Screening  06/05/2026   • HIV Screening  Completed   • Hepatitis C Screening  Completed         Topic Date Due   • COVID-19 Vaccine (4 - Booster for Moderna series) 12/24/2021   • Influenza Vaccine (1) 09/01/2023      Medicare Screening Tests and Risk Assessments:     Keaton Montes De Oca is here for her Subsequent Wellness visit. Health Risk Assessment:   Patient rates overall health as very good. Patient feels that their physical health rating is same. Patient is very satisfied with their life. Eyesight was rated as same. Hearing was rated as same. Patient feels that their emotional and mental health rating is same. Patients states they are never, rarely angry. Patient states they are sometimes unusually tired/fatigued. Pain experienced in the last 7 days has been none. Patient states that she has experienced no weight loss or gain in last 6 months. Depression Screening:   PHQ-2 Score: 0      Fall Risk Screening: In the past year, patient has experienced: no history of falling in past year      Urinary Incontinence Screening:   Patient has not leaked urine accidently in the last six months. Home Safety:  Patient does not have trouble with stairs inside or outside of their home. Patient has working smoke alarms and has working carbon monoxide detector. Home safety hazards include: none. Nutrition:   Current diet is Regular. Medications:   Patient is not currently taking any over-the-counter supplements. Patient is able to manage medications. Activities of Daily Living (ADLs)/Instrumental Activities of Daily Living (IADLs):   Walk and transfer into and out of bed and chair?: Yes  Dress and groom yourself?: Yes    Bathe or shower yourself?: Yes    Feed yourself?  Yes  Do your laundry/housekeeping?: Yes  Manage your money, pay your bills and track your expenses?: Yes  Make your own meals?: Yes    Do your own shopping?: Yes    Previous Hospitalizations:   Any hospitalizations or ED visits within the last 12 months?: Yes    How many hospitalizations have you had in the last year?: 1-2    Advance Care Planning:   Living will: Yes    Durable POA for healthcare: Yes    Advanced directive: Yes      Cognitive Screening:   Provider or family/friend/caregiver concerned regarding cognition?: No    PREVENTIVE SCREENINGS      Cardiovascular Screening:    General: Screening Not Indicated and History Lipid Disorder      Diabetes Screening:     General: Screening Current      Colorectal Cancer Screening:     General: Screening Current      Breast Cancer Screening:     General: History Breast Cancer      Cervical Cancer Screening:    General: Screening Not Indicated      Lung Cancer Screening:     General: Screening Not Indicated      Hepatitis C Screening:    General: Screening Current    Screening, Brief Intervention, and Referral to Treatment (SBIRT)    Screening  Typical number of drinks in a day: 0  Typical number of drinks in a week: 0  Interpretation: Low risk drinking behavior. AUDIT-C Screenin) How often did you have a drink containing alcohol in the past year? never  2) How many drinks did you have on a typical day when you were drinking in the past year? 0  3) How often did you have 6 or more drinks on one occasion in the past year? never    AUDIT-C Score: 0  Interpretation: Score 0-2 (female): Negative screen for alcohol misuse    Single Item Drug Screening:  How often have you used an illegal drug (including marijuana) or a prescription medication for non-medical reasons in the past year? never    Single Item Drug Screen Score: 0  Interpretation: Negative screen for possible drug use disorder    No results found.      Physical Exam:     /68 (BP Location: Left arm, Patient Position: Sitting, Cuff Size: Large)   Pulse 81   Resp 16   Ht 5' 2" (1.575 m)   Wt 75.7 kg (166 lb 12.8 oz) SpO2 97%   BMI 30.51 kg/m²     Physical Exam  Vitals and nursing note reviewed. Constitutional:       General: She is not in acute distress. Appearance: Normal appearance. She is well-developed. She is obese. She is not ill-appearing, toxic-appearing or diaphoretic. HENT:      Head: Normocephalic and atraumatic. Right Ear: External ear normal.      Left Ear: External ear normal.      Nose: Nose normal.   Eyes:      Pupils: Pupils are equal, round, and reactive to light. Cardiovascular:      Rate and Rhythm: Normal rate and regular rhythm. Heart sounds: Normal heart sounds. No murmur heard. Pulmonary:      Effort: Pulmonary effort is normal.      Breath sounds: Normal breath sounds. Abdominal:      General: There is no distension. Palpations: Abdomen is soft. Tenderness: There is no abdominal tenderness. There is no guarding. Neurological:      Mental Status: She is alert.           Briana Presley, DO

## 2023-07-28 NOTE — PATIENT INSTRUCTIONS
Medicare Preventive Visit Patient Instructions  Thank you for completing your Welcome to Medicare Visit or Medicare Annual Wellness Visit today. Your next wellness visit will be due in one year (7/28/2024). The screening/preventive services that you may require over the next 5-10 years are detailed below. Some tests may not apply to you based off risk factors and/or age. Screening tests ordered at today's visit but not completed yet may show as past due. Also, please note that scanned in results may not display below. Preventive Screenings:  Service Recommendations Previous Testing/Comments   Colorectal Cancer Screening  * Colonoscopy    * Fecal Occult Blood Test (FOBT)/Fecal Immunochemical Test (FIT)  * Fecal DNA/Cologuard Test  * Flexible Sigmoidoscopy Age: 43-73 years old   Colonoscopy: every 10 years (may be performed more frequently if at higher risk)  OR  FOBT/FIT: every 1 year  OR  Cologuard: every 3 years  OR  Sigmoidoscopy: every 5 years  Screening may be recommended earlier than age 39 if at higher risk for colorectal cancer. Also, an individualized decision between you and your healthcare provider will decide whether screening between the ages of 77-80 would be appropriate. Colonoscopy: 11/22/2021  FOBT/FIT: 11/22/2021  Cologuard: Not on file  Sigmoidoscopy: Not on file    Screening Current     Breast Cancer Screening Age: 36 years old  Frequency: every 1-2 years  Not required if history of left and right mastectomy Mammogram: 06/08/2023    History Breast Cancer   Cervical Cancer Screening Between the ages of 21-29, pap smear recommended once every 3 years. Between the ages of 32-69, can perform pap smear with HPV co-testing every 5 years.    Recommendations may differ for women with a history of total hysterectomy, cervical cancer, or abnormal pap smears in past. Pap Smear: 06/05/2023    Screening Not Indicated   Hepatitis C Screening Once for adults born between 1945 and 1965  More frequently in patients at high risk for Hepatitis C Hep C Antibody: 06/06/2018    Screening Current   Diabetes Screening 1-2 times per year if you're at risk for diabetes or have pre-diabetes Fasting glucose: 85 mg/dL (6/3/2023)  A1C: 4.9 % (11/29/2022)  Screening Current   Cholesterol Screening Once every 5 years if you don't have a lipid disorder. May order more often based on risk factors. Lipid panel: 06/03/2023    Screening Not Indicated  History Lipid Disorder     Other Preventive Screenings Covered by Medicare:  1. Abdominal Aortic Aneurysm (AAA) Screening: covered once if your at risk. You're considered to be at risk if you have a family history of AAA. 2. Lung Cancer Screening: covers low dose CT scan once per year if you meet all of the following conditions: (1) Age 48-67; (2) No signs or symptoms of lung cancer; (3) Current smoker or have quit smoking within the last 15 years; (4) You have a tobacco smoking history of at least 20 pack years (packs per day multiplied by number of years you smoked); (5) You get a written order from a healthcare provider. 3. Glaucoma Screening: covered annually if you're considered high risk: (1) You have diabetes OR (2) Family history of glaucoma OR (3)  aged 48 and older OR (3)  American aged 72 and older  3. Osteoporosis Screening: covered every 2 years if you meet one of the following conditions: (1) You're estrogen deficient and at risk for osteoporosis based off medical history and other findings; (2) Have a vertebral abnormality; (3) On glucocorticoid therapy for more than 3 months; (4) Have primary hyperparathyroidism; (5) On osteoporosis medications and need to assess response to drug therapy. · Last bone density test (DXA Scan): 12/27/2022.  5. HIV Screening: covered annually if you're between the age of 15-65. Also covered annually if you are younger than 13 and older than 72 with risk factors for HIV infection.  For pregnant patients, it is covered up to 3 times per pregnancy. Immunizations:  Immunization Recommendations   Influenza Vaccine Annual influenza vaccination during flu season is recommended for all persons aged >= 6 months who do not have contraindications   Pneumococcal Vaccine   * Pneumococcal conjugate vaccine = PCV13 (Prevnar 13), PCV15 (Vaxneuvance), PCV20 (Prevnar 20)  * Pneumococcal polysaccharide vaccine = PPSV23 (Pneumovax) Adults 20-63 years old: 1-3 doses may be recommended based on certain risk factors  Adults 72 years old: 1-2 doses may be recommended based off what pneumonia vaccine you previously received   Hepatitis B Vaccine 3 dose series if at intermediate or high risk (ex: diabetes, end stage renal disease, liver disease)   Tetanus (Td) Vaccine - COST NOT COVERED BY MEDICARE PART B Following completion of primary series, a booster dose should be given every 10 years to maintain immunity against tetanus. Td may also be given as tetanus wound prophylaxis. Tdap Vaccine - COST NOT COVERED BY MEDICARE PART B Recommended at least once for all adults. For pregnant patients, recommended with each pregnancy. Shingles Vaccine (Shingrix) - COST NOT COVERED BY MEDICARE PART B  2 shot series recommended in those aged 48 and above     Health Maintenance Due:      Topic Date Due   • Colorectal Cancer Screening  11/23/2021   • Breast Cancer Screening: Mammogram  06/08/2025   • Cervical Cancer Screening  06/05/2026   • HIV Screening  Completed   • Hepatitis C Screening  Completed     Immunizations Due:      Topic Date Due   • Pneumococcal Vaccine: 65+ Years (3 - PPSV23 if available, else PCV20) 03/01/2018   • COVID-19 Vaccine (4 - Booster for Moderna series) 12/24/2021   • Influenza Vaccine (1) 09/01/2023     Advance Directives   What are advance directives? Advance directives are legal documents that state your wishes and plans for medical care.  These plans are made ahead of time in case you lose your ability to make decisions for yourself. Advance directives can apply to any medical decision, such as the treatments you want, and if you want to donate organs. What are the types of advance directives? There are many types of advance directives, and each state has rules about how to use them. You may choose a combination of any of the following:  · Living will: This is a written record of the treatment you want. You can also choose which treatments you do not want, which to limit, and which to stop at a certain time. This includes surgery, medicine, IV fluid, and tube feedings. · Durable power of  for healthcare [facility]): This is a written record that states who you want to make healthcare choices for you when you are unable to make them for yourself. This person, called a proxy, is usually a family member or a friend. You may choose more than 1 proxy. · Do not resuscitate (DNR) order:  A DNR order is used in case your heart stops beating or you stop breathing. It is a request not to have certain forms of treatment, such as CPR. A DNR order may be included in other types of advance directives. · Medical directive: This covers the care that you want if you are in a coma, near death, or unable to make decisions for yourself. You can list the treatments you want for each condition. Treatment may include pain medicine, surgery, blood transfusions, dialysis, IV or tube feedings, and a ventilator (breathing machine). · Values history: This document has questions about your views, beliefs, and how you feel and think about life. This information can help others choose the care that you would choose. Why are advance directives important? An advance directive helps you control your care. Although spoken wishes may be used, it is better to have your wishes written down. Spoken wishes can be misunderstood, or not followed. Treatments may be given even if you do not want them.  An advance directive may make it easier for your family to make difficult choices about your care. Weight Management   Why it is important to manage your weight:  Being overweight increases your risk of health conditions such as heart disease, high blood pressure, type 2 diabetes, and certain types of cancer. It can also increase your risk for osteoarthritis, sleep apnea, and other respiratory problems. Aim for a slow, steady weight loss. Even a small amount of weight loss can lower your risk of health problems. How to lose weight safely:  A safe and healthy way to lose weight is to eat fewer calories and get regular exercise. You can lose up about 1 pound a week by decreasing the number of calories you eat by 500 calories each day. Healthy meal plan for weight management:  A healthy meal plan includes a variety of foods, contains fewer calories, and helps you stay healthy. A healthy meal plan includes the following:  · Eat whole-grain foods more often. A healthy meal plan should contain fiber. Fiber is the part of grains, fruits, and vegetables that is not broken down by your body. Whole-grain foods are healthy and provide extra fiber in your diet. Some examples of whole-grain foods are whole-wheat breads and pastas, oatmeal, brown rice, and bulgur. · Eat a variety of vegetables every day. Include dark, leafy greens such as spinach, kale, rafael greens, and mustard greens. Eat yellow and orange vegetables such as carrots, sweet potatoes, and winter squash. · Eat a variety of fruits every day. Choose fresh or canned fruit (canned in its own juice or light syrup) instead of juice. Fruit juice has very little or no fiber. · Eat low-fat dairy foods. Drink fat-free (skim) milk or 1% milk. Eat fat-free yogurt and low-fat cottage cheese. Try low-fat cheeses such as mozzarella and other reduced-fat cheeses. · Choose meat and other protein foods that are low in fat. Choose beans or other legumes such as split peas or lentils.  Choose fish, skinless poultry (chicken or turkey), or lean cuts of red meat (beef or pork). Before you cook meat or poultry, cut off any visible fat. · Use less fat and oil. Try baking foods instead of frying them. Add less fat, such as margarine, sour cream, regular salad dressing and mayonnaise to foods. Eat fewer high-fat foods. Some examples of high-fat foods include french fries, doughnuts, ice cream, and cakes. · Eat fewer sweets. Limit foods and drinks that are high in sugar. This includes candy, cookies, regular soda, and sweetened drinks. Exercise:  Exercise at least 30 minutes per day on most days of the week. Some examples of exercise include walking, biking, dancing, and swimming. You can also fit in more physical activity by taking the stairs instead of the elevator or parking farther away from stores. Ask your healthcare provider about the best exercise plan for you. © Copyright [employer] 2018 Information is for End User's use only and may not be sold, redistributed or otherwise used for commercial purposes.  All illustrations and images included in CareNotes® are the copyrighted property of [employer] Inc[employer] or [address] [place] [address]

## 2023-07-30 PROBLEM — Z00.00 MEDICARE ANNUAL WELLNESS VISIT, SUBSEQUENT: Status: ACTIVE | Noted: 2023-07-30

## 2023-07-30 NOTE — ASSESSMENT & PLAN NOTE
Assessment and plan 1. Medicare subsequent annual wellness examination overall the patient is clinically stable and doing well, we encouraged the patient to follow a healthy and balanced diet. We recommend that the patient exercise routinely approximately 30 minutes 5 times per week . We have reviewed the patient's vaccines and have made recommendations for updates if necessary   annual flu shot in the fall Cologuard ordered, today patient did receive the Prevnar 20   . We will be ordering screening laboratories which are age appropriate. Return to the office in   6 months   call if any problems.

## 2023-07-30 NOTE — ASSESSMENT & PLAN NOTE
Hypothyroidism controlled the patient is currently euthyroid I will be ordering a TSH prior to the next office visit and the patient will continue with current medical regiment; we will continue to monitor the patient's progress.

## 2023-07-30 NOTE — ASSESSMENT & PLAN NOTE
hyperlipidemia controlled continue with current medical regiment recommend a low-cholesterol diet and recommend routine exercise we will continue to monitor the progress.   Continue Crestor 5 mg once daily

## 2023-08-03 DIAGNOSIS — F51.01 PRIMARY INSOMNIA: ICD-10-CM

## 2023-08-03 RX ORDER — TRAZODONE HYDROCHLORIDE 50 MG/1
50 TABLET ORAL
Qty: 90 TABLET | Refills: 1 | Status: SHIPPED | OUTPATIENT
Start: 2023-08-03

## 2023-08-16 LAB — COLOGUARD RESULT REPORTABLE: NEGATIVE

## 2023-08-17 DIAGNOSIS — I10 HYPERTENSION, UNSPECIFIED TYPE: ICD-10-CM

## 2023-08-17 RX ORDER — POTASSIUM CHLORIDE 750 MG/1
10 TABLET, EXTENDED RELEASE ORAL 2 TIMES DAILY
Qty: 180 TABLET | Refills: 3 | Status: SHIPPED | OUTPATIENT
Start: 2023-08-17

## 2023-09-11 ENCOUNTER — OFFICE VISIT (OUTPATIENT)
Dept: PLASTIC SURGERY | Facility: CLINIC | Age: 63
End: 2023-09-11
Payer: COMMERCIAL

## 2023-09-11 DIAGNOSIS — Z90.11 ACQUIRED ABSENCE OF RIGHT BREAST AND NIPPLE: Primary | ICD-10-CM

## 2023-09-11 PROCEDURE — 99214 OFFICE O/P EST MOD 30 MIN: CPT | Performed by: STUDENT IN AN ORGANIZED HEALTH CARE EDUCATION/TRAINING PROGRAM

## 2023-09-11 NOTE — PROGRESS NOTES
Patient seen and examined. Denies issues. Right breast with breakdown of [place] flap but well healed. Left medial breast scar slightly hypertrophic. Continue scar massage on left. Would benefit from replacement of implant on right with revision of [place] flap. Could do this concurrently with brachioplasty which the patient is interested in. Booking form created. Daniela Craig, DO  Plastic and Reconstructive Surgery

## 2023-09-14 ENCOUNTER — PREP FOR PROCEDURE (OUTPATIENT)
Dept: PLASTIC SURGERY | Facility: CLINIC | Age: 63
End: 2023-09-14

## 2023-09-14 DIAGNOSIS — Z90.11 ACQUIRED ABSENCE OF RIGHT BREAST: Primary | ICD-10-CM

## 2023-09-15 ENCOUNTER — TELEPHONE (OUTPATIENT)
Age: 63
End: 2023-09-15

## 2023-09-15 NOTE — TELEPHONE ENCOUNTER
Pt would like to reschedule Plastic surgery     Transferred to [facility]

## 2023-09-27 LAB — HBA1C MFR BLD HPLC: 5.5 %

## 2023-09-28 PROBLEM — Z00.00 MEDICARE ANNUAL WELLNESS VISIT, SUBSEQUENT: Status: RESOLVED | Noted: 2023-07-30 | Resolved: 2023-09-28

## 2023-09-29 NOTE — PROGRESS NOTES
Breast Oncology Nurse Navigator    Patient called last night and left a voicemail at [phone]  She had questions about an upcoming dental procedure  She also sent a message to Dr Candice Nava staff via [phone]  Just getting to call her back now  Dr Timothy Ellis called patient earlier today and sent her a detailed message through [phone] to answer her question  Patient also reached out to Dr Ruddy Hester office  Patient will get dental clearance from her dentist prior to her breast surgery on 9/9/22  Patient has my contact information and knows to reach out with further questions  Wrist (Radial) Site Care    Refer to this sheet in the next few weeks. These instructions provide you with information about caring for yourself after your procedure. Your health care provider may also give you more specific instructions. Call your health care provider if you have any problems or questions after your procedure.    WHAT TO EXPECT AFTER THE PROCEDURE  After your procedure, it is typical to have the following:    Bruising at the radial site that usually fades within 1-2 weeks.    Blood collecting in the tissue (hematoma) that may be painful to the touch. It should usually decrease in size and tenderness within 1-2 weeks.    HOME CARE INSTRUCTIONS    Take medicines only as directed by your health care provider.    You may shower 24-48 hours after the procedure or as directed by your health care provider. Remove the bandage (dressing) and gently wash the site with plain soap and water. Pat the area dry with a clean towel. Do not rub the site, because this may cause bleeding.    Do not take baths, swim, or use a hot tub until your health care provider approves.    Check your insertion site every day for redness, swelling, or drainage.    Do not apply powder or lotion to the site.     Do not flex or bend the affected arm for 24 hours or as directed by your health care provider.    Do not push or pull heavy objects with the affected arm for 24 hours or as directed by your health care provider.    Do not lift over 10 lb (4.5 kg) for 5 days after your procedure or as directed by your health care provider.    Ask your health care provider when it is okay to:  ?  Return to work or school.  ?  Resume usual physical activities or sports.  ?  Resume sexual activity.    Do not drive home if you are discharged the same day as the procedure. Have someone else drive you.    You may drive 24 hours after the procedure unless otherwise instructed by your health care provider.    Do not operate machinery or power tools  for 24 hours after the procedure.    If your procedure was done as an outpatient procedure, which means that you went home the same day as your procedure, a responsible adult should be with you for the first 24 hours after you arrive home.    Keep all follow-up visits as directed by your health care provider. This is important.    SEEK MEDICAL CARE IF:    You have a fever.    You have chills.    You have increased bleeding from the radial site. Hold pressure on the site.    SEEK IMMEDIATE MEDICAL CARE IF:    You have unusual pain at the radial site.    You have redness, warmth, or swelling at the radial site.    You have drainage (other than a small amount of blood on the dressing) from the radial site.    The radial site is bleeding, and the bleeding does not stop after 30 minutes of holding steady pressure on the site.    Your arm or hand becomes pale, cool, tingly, or numb.    Make sure you discuss any questions you have with your health care provider.                GENERAL ANESTHESIA POST SURGERY INSTRUCTIONS    1. Rest at home (not necessarily in bed) for 24 hours following anesthesia. You may feel sleepy for the next 24 hours. Do not drive an automobile, operate heavy machinery, or make important decisions.    2. Your diet should start with clear liquids increasing to a light diet on the day of surgery. You may then resume your normal diet. Do not drink alcoholic beverages for 24 hours. If nausea occurs, limit diet to clear liquids (soda, tea, broth, Jell-O). If nausea continues for more than 12 hours, call your doctor.    3. The doctor prescribed Nothing for pain. Take as directed. If nothing was prescribed, you may take a non-prescription non- aspirin containing pain medicine such as Tylenol, Advil, etc. If pain is not relieved, call your doctor.    4. Call your doctor if there is excessive:   A. Bleeding or drainage   B. Fever-10 1°F or higher   C. Swelling or redness    5. We strongly recommend a  responsible adult to be with you for 24 hours following surgery. This recommendation is for your protection and safety.    6. Avoid smoking for 24 hours following general anesthesia.    7. Drink plenty of fluids.     8. If you any questions please call your Surgeon. If you cannot reach your doctor, call [facility] Emergency Department at [phone].    Document Released: 12/18/2006  Document Re-Released: 04/05/2010  [employer]® Patient Information ©2010 [employer].           OOPs... We make every effort to respect your privacy and dignity and may have missed taking this off prior to you going home.    If you find any of these, remove and throw away. Thank you for allowing us to be a part of your care.     Speedy recovery!    Your [facility] Care Team           Want to Say “Thank You” to a Nurse?  The ROSA[employer]A Award® was created in memory of CHEY Sutherland by his family to say thank you to bedside nurses who provide an outstanding level of care.  Submit a nomination using any method below.     OR    [email or web address]

## 2023-10-13 ENCOUNTER — CLINICAL SUPPORT (OUTPATIENT)
Dept: INTERNAL MEDICINE CLINIC | Facility: CLINIC | Age: 63
End: 2023-10-13
Payer: COMMERCIAL

## 2023-10-13 DIAGNOSIS — Z23 NEED FOR VACCINATION: Primary | ICD-10-CM

## 2023-10-13 PROCEDURE — 90662 IIV NO PRSV INCREASED AG IM: CPT

## 2023-10-13 PROCEDURE — G0008 ADMIN INFLUENZA VIRUS VAC: HCPCS

## 2023-11-08 ENCOUNTER — OFFICE VISIT (OUTPATIENT)
Dept: URGENT CARE | Age: 63
End: 2023-11-08
Payer: COMMERCIAL

## 2023-11-08 VITALS
HEIGHT: 62 IN | RESPIRATION RATE: 16 BRPM | TEMPERATURE: 97.5 F | WEIGHT: 170 LBS | OXYGEN SATURATION: 96 % | HEART RATE: 78 BPM | BODY MASS INDEX: 31.28 KG/M2

## 2023-11-08 DIAGNOSIS — Z23 ENCOUNTER FOR IMMUNIZATION: ICD-10-CM

## 2023-11-08 DIAGNOSIS — S01.511A LIP LACERATION, INITIAL ENCOUNTER: Primary | ICD-10-CM

## 2023-11-08 PROCEDURE — S9088 SERVICES PROVIDED IN URGENT: HCPCS | Performed by: STUDENT IN AN ORGANIZED HEALTH CARE EDUCATION/TRAINING PROGRAM

## 2023-11-08 PROCEDURE — 90715 TDAP VACCINE 7 YRS/> IM: CPT

## 2023-11-08 PROCEDURE — 90471 IMMUNIZATION ADMIN: CPT | Performed by: STUDENT IN AN ORGANIZED HEALTH CARE EDUCATION/TRAINING PROGRAM

## 2023-11-08 PROCEDURE — 12011 RPR F/E/E/N/L/M 2.5 CM/<: CPT | Performed by: STUDENT IN AN ORGANIZED HEALTH CARE EDUCATION/TRAINING PROGRAM

## 2023-11-08 PROCEDURE — 99213 OFFICE O/P EST LOW 20 MIN: CPT | Performed by: STUDENT IN AN ORGANIZED HEALTH CARE EDUCATION/TRAINING PROGRAM

## 2023-11-08 NOTE — PROGRESS NOTES
[facility]        NAME: Lucillie Opitz is a 70 y.o. female  : 1960    MRN: [id number]  DATE: 2023  TIME: 4:26 PM    Assessment and Orders   Lip laceration, initial encounter [F45.081F]  1. Lip laceration, initial encounter  Laceration repair      2. Encounter for immunization  Tdap Vaccine greater than or equal to 8yo            Plan and Discussion      Laceration to upper lip repaired with 3 absorbable sutures. Discussed signs of infection with patient and encouraged her to be seen in ED if these occur. Follow up in PCP within the next week for wound check. Tdap updated. Discussed ED precautions including (but not limited to)  Difficultly breathing or shortness of breath  Chest pain  Acutely worsening symptoms. Risks and benefits discussed. Patient understands and agrees with the plan. Follow up with PCP. Chief Complaint     Chief Complaint   Patient presents with    Lip Laceration     Ayaka Bless at the gym and split her upper lip. .. seen by dentist         History of Present Illness       Patient is a 69 yo F who presents with a laceration to the upper lip on the right side. Laceration occurred this morning when she fell at the gym and hit her lip on a metal bar on a piece of equipment. She did not lose consciousness. She was seen by the dentist this AM and has no injury to her teeth.          Review of Systems   Review of Systems  As stated above     Current Medications       Current Outpatient Medications:     diphenhydrAMINE (BENADRYL) 25 mg tablet, Take 25 mg by mouth every 6 (six) hours as needed for itching, Disp: , Rfl:     estradiol (ESTRACE VAGINAL) 0.1 mg/g vaginal cream, Insert one quarter of an applicator intravaginally once weekly or once every other week, Disp: 42.5 g, Rfl: 2    famotidine (PEPCID) 20 mg tablet, Take 1 tablet (20 mg total) by mouth 2 (two) times a day, Disp: 180 tablet, Rfl: 1    levothyroxine 75 mcg tablet, Take 1 tablet (75 mcg total) by mouth daily, Disp: 90 tablet, Rfl: 1    potassium chloride (K-DUR,KLOR-CON) 10 mEq tablet, Take 1 tablet (10 mEq total) by mouth 2 (two) times a day, Disp: 180 tablet, Rfl: 3    Psyllium (METAMUCIL PO), Take 1 Dose by mouth daily  , Disp: , Rfl:     rosuvastatin (CRESTOR) 5 mg tablet, TAKE ONE TABLET BY MOUTH EVERY DAY (Patient taking differently: Take 5 mg by mouth every morning), Disp: 90 tablet, Rfl: 5    senna (SENOKOT) 8.6 MG tablet, Take 2 tablets by mouth daily, Disp: , Rfl:     sertraline (ZOLOFT) 100 mg tablet, Take 1 tablet (100 mg total) by mouth daily, Disp: 90 tablet, Rfl: 1    traZODone (DESYREL) 50 mg tablet, Take 1 tablet (50 mg total) by mouth daily at bedtime, Disp: 90 tablet, Rfl: 1    triamterene-hydrochlorothiazide (DYAZIDE) 37.5-25 mg per capsule, Take 1 capsule by mouth every morning, Disp: 90 capsule, Rfl: 1    Current Allergies     Allergies as of 11/08/2023 - Reviewed 11/08/2023   Allergen Reaction Noted    Penicillins Anaphylaxis 05/18/2012    Acetazolamide Itching and Swelling     Metronidazole GI Intolerance and Nausea Only 10/19/2012            The following portions of the patient's history were reviewed and updated as appropriate: allergies, current medications, past family history, past medical history, past social history, past surgical history and problem list.     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 - Last assessed 11/02/17    Disease of thyroid gland     Diverticulitis of colon     GERD (gastroesophageal reflux disease)     Hyperlipidemia     Hypertension Hypoglobulinemia     Obesity     PONV (postoperative nausea and vomiting)     Slow transit constipation        Past Surgical History:   Procedure Laterality Date    APPENDECTOMY      BOWEL RESECTION      BREAST BIOPSY Right 07/07/2022    stereo x 2    BREAST BIOPSY Right 07/26/2022    stereo    BREAST RECONSTRUCTION Right 12/15/2022    Procedure: RIGHT BREAST RECONSTRUCTION AND REMOVAL OF TISSUE EXPANDER WITH PLACEMENT OF IMPLANT. CAPSULOTOMY.;  Surgeon: Emilee Lane DO;  Location: [address] [place] [address] MAIN OR;  Service: Plastics    CARPAL TUNNEL RELEASE      CATARACT EXTRACTION Bilateral     COLECTOMY      Resolved 08/27/09    COLONOSCOPY      EYE SURGERY  2007    Lasik , Cataracts-2023    INSERTION OF BREAST TISSUE EXPANDER Right 09/09/2022    Procedure: IMMEDIATE BREAST RECONSTRUCTION WITH TISSUE EXPANDER AND  GALAFLEX;  Surgeon: Emilee Lane DO;  [facility];  Service: Plastics    JOINT REPLACEMENT Bilateral     TKR    LASIK      LYMPH NODE BIOPSY  9/22    MAMMO STEREOTACTIC BREAST BIOPSY RIGHT (ALL INC) Right 07/07/2022    MAMMO STEREOTACTIC BREAST BIOPSY RIGHT (ALL INC) Right 07/26/2022    MAMMO STEREOTACTIC BREAST BIOPSY RIGHT (ALL INC) EACH ADD Right 07/07/2022    MASTECTOMY Right     MASTECTOMY W/ SENTINEL NODE BIOPSY Right 09/09/2022    Procedure: MASTECTOMY WITH BIOPSY LYMPH NODE SENTINEL,Lymphoscintigraphy,Lymphatic Mapping; [address];  Surgeon: Jossue Schroeder MD;  [facility];  Service: Surgical Oncology    OTHER SURGICAL HISTORY      Biopsy Vulvar    CO ARTHRP KNE CONDYLE&PLATU MEDIAL&LAT COMPARTMENTS Left 02/08/2017    Procedure: TOTAL KNEE REPLACEMENT ARTHROPLASTY;  Surgeon: Prince Arauz MD;  [facility];  Service: Orthopedics    CO COLONOSCOPY FLX DX W/COLLJ SPEC WHEN PFRMD N/A 05/06/2019    Procedure: COLONOSCOPY;  Surgeon: Yesenia Purvis MD;  Location: [facility];   Service: General    CO INSJ/RPLCMT BREAST IMPLANT SEP DAY MASTECTOMY Right 03/14/2023    Procedure: RIGHT BREAST RECON REVISION WITH IMPLANT EXCHANGE AND CAPSULOTOMY;  Surgeon: Elicia Han DO;  [facility];  Service: Plastics    MN MASTOPEXY Left 12/15/2022    Procedure: LEFT MASTOPEXY FOR SYMMETRY AND  GALAFLEX;  Surgeon: Elicia Han DO;  [facility] MA[facility] OR;  Service: Plastics    REFRACTIVE SURGERY      REPLACEMENT TOTAL KNEE Bilateral 10/2013    TONSILLECTOMY      With Adenoidectomy    TOOTH EXTRACTION      VAGINA SURGERY      mesh       Family History   Problem Relation Age of Onset    No Known Problems Mother     Basal cell carcinoma Father     Prostate cancer Father     Breast cancer Sister     No Known Problems Sister     No Known Problems Daughter     No Known Problems Daughter     No Known Problems Daughter     No Known Problems Maternal Grandmother     No Known Problems Maternal Grandfather     Breast cancer Paternal Grandmother     Breast cancer Paternal Grandfather     Breast cancer Paternal Aunt     Breast cancer Family     Arthritis Family     Hypertension Family     Cancer Family     Breast cancer Paternal Aunt         Breast cancer         Medications have been verified. Objective   Pulse 78   Temp 97.5 °F (36.4 °C)   Resp 16   Ht 5' 2" (1.575 m)   Wt 77.1 kg (170 lb)   SpO2 96%   BMI 31.09 kg/m²   No LMP recorded. Patient is postmenopausal.       Physical Exam     Physical Exam  Constitutional:       General: She is not in acute distress. HENT:      Mouth/Throat:        Comments: 1.5 cm laceration to the lip mucosa. Does not cross the [facility] border. Pulmonary:      Effort: Pulmonary effort is normal. No respiratory distress. Neurological:      General: No focal deficit present. Mental Status: She is alert and oriented to person, place, and time. Isael Juan DO     Universal Protocol:  Consent: Verbal consent obtained.   Consent given by: patient  Patient understanding: patient states understanding of the procedure being performed  Patient consent: the patient's understanding of the procedure matches consent given  Patient identity confirmed: verbally with patient  Laceration repair    Date/Time: 11/8/2023 11:00 AM    Performed by: Dilan Napoles DO  Authorized by: Dilan Napoles DO  Body area: mouth  Location details: upper lip, interior  Laceration length: 1.5 cm  Foreign bodies: no foreign bodies  Tendon involvement: none  Nerve involvement: none  Vascular damage: no    Anesthesia:  Local Anesthetic: lidocaine 1% without epinephrine  Anesthetic total: 2 mL    Wound Dehiscence:  Superficial Wound Dehiscence: simple closure      Procedure Details:  Preparation: Patient was prepped and draped in the usual sterile fashion.   Irrigation solution: saline  Irrigation method: jet lavage  Amount of cleaning: standard  Debridement: none  Degree of undermining: none  Mucous membrane closure: 4-0 Chromic gut  Number of sutures: 3  Approximation: close  Approximation difficulty: simple  Patient tolerance: patient tolerated the procedure well with no immediate complications

## 2023-11-13 ENCOUNTER — OFFICE VISIT (OUTPATIENT)
Dept: SURGICAL ONCOLOGY | Facility: CLINIC | Age: 63
End: 2023-11-13
Payer: COMMERCIAL

## 2023-11-13 VITALS
HEART RATE: 86 BPM | TEMPERATURE: 97.7 F | WEIGHT: 171.4 LBS | DIASTOLIC BLOOD PRESSURE: 72 MMHG | OXYGEN SATURATION: 96 % | SYSTOLIC BLOOD PRESSURE: 118 MMHG | HEIGHT: 62 IN | BODY MASS INDEX: 31.54 KG/M2

## 2023-11-13 DIAGNOSIS — Z08 ENCOUNTER FOR FOLLOW-UP EXAMINATION AFTER COMPLETED TREATMENT FOR MALIGNANT NEOPLASM: Primary | ICD-10-CM

## 2023-11-13 DIAGNOSIS — Z85.3 HISTORY OF BREAST CANCER: ICD-10-CM

## 2023-11-13 PROBLEM — R92.8 ABNORMAL MAMMOGRAM: Status: RESOLVED | Noted: 2022-06-28 | Resolved: 2023-11-13

## 2023-11-13 PROCEDURE — 99213 OFFICE O/P EST LOW 20 MIN: CPT | Performed by: SURGERY

## 2023-11-13 NOTE — PROGRESS NOTES
Surgical Oncology Follow Up       [facility] SURGICAL ONCOLOGY [facility]  [place] [address]    Veda Hernandez  1960  [id number]  [facility] SURGICAL ONCOLOGY [place] [id number]    Chief Complaint   Patient presents with    Follow-up       Assessment/Plan   Diagnoses and all orders for this visit:    Encounter for follow-up examination after completed treatment for malignant neoplasm    History of breast cancer        Advance Care Planning/Advance Directives:  Discussed disease status, cancer treatment plans and/or cancer treatment goals with the patient.      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 Felecia Basurto and Dr. Cindy Rosa     9/26/2022 -  Cancer Staged    Staging form: Breast, AJCC 8th Edition  - Pathologic stage from 9/26/2022: Stage 0 (pTis (DCIS), pN0(sn), cM0, ER+, UT+, HER2: Not Assessed) - Signed by Theresa Mcduffie MD on 9/26/2022  Stage prefix: Initial diagnosis  Method of lymph node assessment: [place] lymph node biopsy  Nuclear grade: G3       12/15/2022 Surgery    Right expander exchange to implant     3/14/2023 Surgery    Right breast revision, exchange impant         History of Present Illness: Breast cancer follow-up, states that she will be having the right implant exchanged  -Interval History: Contralateral mammogram    Review of Systems:  Review of Systems   Constitutional: Negative. Negative for appetite change, fever and unexpected weight change. HENT: Negative. Negative for trouble swallowing. Eyes: Negative. Respiratory: Negative. Negative for cough and shortness of breath. Cardiovascular: Negative. Negative for chest pain. Gastrointestinal: Negative. Negative for abdominal pain, nausea and vomiting. Endocrine: Negative. Genitourinary: Negative. Negative for dysuria. Musculoskeletal: Negative. Negative for arthralgias and myalgias. Skin: Negative. Allergic/Immunologic: Negative. Neurological: Negative. Negative for headaches. Hematological: Negative. Negative for adenopathy. Does not bruise/bleed easily. Psychiatric/Behavioral: Negative.          Patient Active Problem List   Diagnosis    S/P knee replacement    Hypertension    Anxiety    Hypothyroidism    Gastroesophageal reflux disease without esophagitis    Prediabetes    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HCC)    IgA deficiency (HCC)    Thrombocytopenia (HCC)    Melanosis coli    History of colon resection    Low back pain    Abnormal laboratory test    Edema    Class 1 obesity due to excess calories with body mass index (BMI) of 33.0 to 33.9 in adult    Low gammaglobulin level (HCC)    TMJ arthritis    Tension type headache    Exposure to SARS-associated coronavirus    Hyperlipidemia    Laryngitis    Primary insomnia    History of breast cancer    Family history of cancer    BRCA negative    Acquired absence of right breast and nipple    Cataract    Preop exam for internal medicine    Encounter for follow-up examination after completed treatment for malignant neoplasm     Past Medical History:   Diagnosis Date    Abnormal electrocardiogram     Last assessed 10/04/13    Allergic 1990    Penicillin - anaphalaxis    Anemia     pt states hypogammaglobulinemia, needs washed RBCs if transfused - Rx by Dr Isbell Estimable 01/2022    Depression     Diabetes ([address])     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 - Last assessed 11/02/17    Disease of thyroid gland     Diverticulitis of colon     GERD (gastroesophageal reflux disease)     Hyperlipidemia     Hypertension     Hypoglobulinemia     Obesity     PONV (postoperative nausea and vomiting)     Slow transit constipation      Past Surgical History:   Procedure Laterality Date    APPENDECTOMY      BOWEL RESECTION      BREAST BIOPSY Right 07/07/2022    stereo x 2    BREAST BIOPSY Right 07/26/2022    stereo    BREAST RECONSTRUCTION Right 12/15/2022    Procedure: RIGHT BREAST RECONSTRUCTION AND REMOVAL OF TISSUE EXPANDER WITH PLACEMENT OF IMPLANT.  CAPSULOTOMY.;  Surgeon: Lucho Bird DO;  Location: [address] [place] [address] MAIN OR;  Service: Plastics    CARPAL TUNNEL RELEASE      CATARACT EXTRACTION Bilateral     COLECTOMY      Resolved 08/27/09    COLONOSCOPY      EYE SURGERY  2007    Lasik , Cataracts-2023    INSERTION OF BREAST TISSUE EXPANDER Right 09/09/2022    Procedure: IMMEDIATE BREAST RECONSTRUCTION WITH TISSUE EXPANDER AND  Tura Deaner;  Surgeon: Lucho Bird DO;  [facility];  Service: Plastics    JOINT REPLACEMENT Bilateral     TKR    LASIK      LYMPH NODE BIOPSY  9/22    MAMMO STEREOTACTIC BREAST BIOPSY RIGHT (ALL INC) Right 07/07/2022    MAMMO STEREOTACTIC BREAST BIOPSY RIGHT (ALL INC) Right 07/26/2022    MAMMO STEREOTACTIC BREAST BIOPSY RIGHT (ALL INC) EACH ADD Right 07/07/2022    MASTECTOMY Right MASTECTOMY W/ SENTINEL NODE BIOPSY Right 09/09/2022    Procedure: MASTECTOMY WITH BIOPSY LYMPH NODE SENTINEL,Lymphoscintigraphy,Lymphatic Mapping; [address];  Surgeon: Rodrick Wagner MD;  [facility];  Service: Surgical Oncology    OTHER SURGICAL HISTORY      Biopsy Vulvar    MO ARTHRP KNE CONDYLE&PLATU MEDIAL&LAT COMPARTMENTS Left 02/08/2017    Procedure: TOTAL KNEE REPLACEMENT ARTHROPLASTY;  Surgeon: Lauryn Hernandez MD;  [facility];  Service: Orthopedics    MO COLONOSCOPY FLX DX W/COLLJ SPEC WHEN PFRMD N/A 05/06/2019    Procedure: COLONOSCOPY;  Surgeon: Rei Rudolph MD;  Location: [facility];   Service: General    MO INSJ/RPLCMT BREAST IMPLANT SEP DAY MASTECTOMY Right 03/14/2023    Procedure: RIGHT BREAST RECON REVISION WITH IMPLANT EXCHANGE AND CAPSULOTOMY;  Surgeon: Shine Tracy DO;  [facility];  Service: Plastics    MO MASTOPEXY Left 12/15/2022    Procedure: LEFT MASTOPEXY FOR SYMMETRY AND  GALAFLEX;  Surgeon: Shine Tracy DO;  Location: [address] [place] [address] MAIN OR;  Service: Plastics    REFRACTIVE SURGERY      REPLACEMENT TOTAL KNEE Bilateral 10/2013    TONSILLECTOMY      With Adenoidectomy    TOOTH EXTRACTION      VAGINA SURGERY      mesh     Family History   Problem Relation Age of Onset    No Known Problems Mother     Basal cell carcinoma Father     Prostate cancer Father     Breast cancer Sister     No Known Problems Sister     No Known Problems Daughter     No Known Problems Daughter     No Known Problems Daughter     No Known Problems Maternal Grandmother     No Known Problems Maternal Grandfather     Breast cancer Paternal Grandmother     Breast cancer Paternal Grandfather     Breast cancer Paternal Aunt     Breast cancer Family     Arthritis Family     Hypertension Family     Cancer Family     Breast cancer Paternal Aunt         Breast cancer     Social History     Socioeconomic History    Marital status: /Civil Union     Spouse name: Not on file    Number of children: Not on file    Years of education: Not on file    Highest education level: Not on file   Occupational History    Not on file   Tobacco Use    Smoking status: Never     Passive exposure: Never    Smokeless tobacco: Never   Vaping Use    Vaping Use: Never used   Substance and Sexual Activity    Alcohol use: No    Drug use: No    Sexual activity: Yes     Partners: Male     Birth control/protection: Post-menopausal   Other Topics Concern    Not on file   Social History Narrative    Denied: Home environment Domestic Violence        Daily Tea consumption        Caffeine use     Social Determinants of Health     Financial Resource Strain: Low Risk  (7/28/2023)    Overall Financial Resource Strain (CARDIA)     Difficulty of Paying Living Expenses: Not hard at all   Food Insecurity: Not on file   Transportation Needs: No Transportation Needs (7/28/2023)    PRAPARE - Transportation     Lack of Transportation (Medical): No     Lack of Transportation (Non-Medical):  No   Physical Activity: Not on file   Stress: Not on file   Social Connections: Not on file   Intimate Partner Violence: Not on file   Housing Stability: Not on file       Current Outpatient Medications:     diphenhydrAMINE (BENADRYL) 25 mg tablet, Take 25 mg by mouth every 6 (six) hours as needed for itching, Disp: , Rfl:     estradiol (ESTRACE VAGINAL) 0.1 mg/g vaginal cream, Insert one quarter of an applicator intravaginally once weekly or once every other week, Disp: 42.5 g, Rfl: 2    famotidine (PEPCID) 20 mg tablet, Take 1 tablet (20 mg total) by mouth 2 (two) times a day, Disp: 180 tablet, Rfl: 1    levothyroxine 75 mcg tablet, Take 1 tablet (75 mcg total) by mouth daily, Disp: 90 tablet, Rfl: 1    potassium chloride (K-DUR,KLOR-CON) 10 mEq tablet, Take 1 tablet (10 mEq total) by mouth 2 (two) times a day, Disp: 180 tablet, Rfl: 3    Psyllium (METAMUCIL PO), Take 1 Dose by mouth daily  , Disp: , Rfl:     rosuvastatin (CRESTOR) 5 mg tablet, TAKE ONE TABLET BY MOUTH EVERY DAY (Patient taking differently: Take 5 mg by mouth every morning), Disp: 90 tablet, Rfl: 5    senna (SENOKOT) 8.6 MG tablet, Take 2 tablets by mouth daily, Disp: , Rfl:     sertraline (ZOLOFT) 100 mg tablet, Take 1 tablet (100 mg total) by mouth daily, Disp: 90 tablet, Rfl: 1    traZODone (DESYREL) 50 mg tablet, Take 1 tablet (50 mg total) by mouth daily at bedtime, Disp: 90 tablet, Rfl: 1    triamterene-hydrochlorothiazide (DYAZIDE) 37.5-25 mg per capsule, Take 1 capsule by mouth every morning, Disp: 90 capsule, Rfl: 1  Allergies   Allergen Reactions    Penicillins Anaphylaxis     Anaphylaxis  Anaphylaxis  Other reaction(s): Anaphylaxis    Acetazolamide Itching and Swelling     With eye drops - takes  oral sulfa w/o side effects    Metronidazole GI Intolerance and Nausea Only       The following portions of the patient's history were reviewed and updated as appropriate: allergies, current medications, past family history, past medical history, past social history, past surgical history, and problem list.        Vitals:    11/13/23 0830   BP: 118/72   Pulse: 86   Temp: 97.7 °F (36.5 °C)   SpO2: 96%       Physical Exam  Constitutional:       General: She is not in acute distress. Appearance: Normal appearance. She is well-developed. HENT:      Head: Normocephalic and atraumatic. Cardiovascular:      Heart sounds: Normal heart sounds. Pulmonary:      Breath sounds: Normal breath sounds. Chest:   Breasts:     Breasts are asymmetrical.      Right: Skin change (mastectomy scar with implant) present. No swelling, bleeding, mass or tenderness. Left: Skin change (reduction pattern scar, hypertrophic medial) present. No swelling, bleeding, inverted nipple, mass, nipple discharge or tenderness. Abdominal:      Palpations: Abdomen is soft. Musculoskeletal:      Right lower leg: No edema. Left lower leg: No edema.    Lymphadenopathy:      Upper Body:      Right upper body: No supraclavicular, axillary or pectoral adenopathy. Left upper body: No supraclavicular, axillary or pectoral adenopathy. Neurological:      Mental Status: She is alert and oriented to person, place, and time. Psychiatric:         Mood and Affect: Mood normal.           Results:  Labs:      Imaging  6/8/2023 left diagnostic mammogram and ultrasound were benign    I reviewed the above imaging data. Discussion/Summary: 58-year-old female status post right mastectomy for DCIS as well as both atypical duct and atypical lobular hyperplasia all within the right breast on multisite biopsies. She had immediate reconstruction. She states that she will be having her implants exchanged. There is no evidence of disease based on exam today. Her last contralateral mammogram was benign. We will see her again in 6 months in our survivorship clinic or sooner should the need arise.

## 2023-11-14 ENCOUNTER — APPOINTMENT (OUTPATIENT)
Dept: LAB | Age: 63
End: 2023-11-14
Payer: COMMERCIAL

## 2023-11-14 DIAGNOSIS — E03.9 HYPOTHYROIDISM, UNSPECIFIED TYPE: ICD-10-CM

## 2023-11-14 DIAGNOSIS — Z90.11 ACQUIRED ABSENCE OF RIGHT BREAST: ICD-10-CM

## 2023-11-14 LAB
ANION GAP SERPL CALCULATED.3IONS-SCNC: 10 MMOL/L
ATRIAL RATE: 74 BPM
BASOPHILS # BLD AUTO: 0.04 THOUSANDS/ÂΜL (ref 0–0.1)
BASOPHILS NFR BLD AUTO: 1 % (ref 0–1)
BUN SERPL-MCNC: 17 MG/DL (ref 5–25)
CALCIUM SERPL-MCNC: 9.5 MG/DL (ref 8.4–10.2)
CHLORIDE SERPL-SCNC: 102 MMOL/L (ref 96–108)
CO2 SERPL-SCNC: 29 MMOL/L (ref 21–32)
CREAT SERPL-MCNC: 0.88 MG/DL (ref 0.6–1.3)
EOSINOPHIL # BLD AUTO: 0.13 THOUSAND/ÂΜL (ref 0–0.61)
EOSINOPHIL NFR BLD AUTO: 3 % (ref 0–6)
ERYTHROCYTE [DISTWIDTH] IN BLOOD BY AUTOMATED COUNT: 12.8 % (ref 11.6–15.1)
GFR SERPL CREATININE-BSD FRML MDRD: 69 ML/MIN/1.73SQ M
GLUCOSE P FAST SERPL-MCNC: 94 MG/DL (ref 65–99)
HCT VFR BLD AUTO: 41.7 % (ref 34.8–46.1)
HGB BLD-MCNC: 14.2 G/DL (ref 11.5–15.4)
IMM GRANULOCYTES # BLD AUTO: 0.02 THOUSAND/UL (ref 0–0.2)
IMM GRANULOCYTES NFR BLD AUTO: 0 % (ref 0–2)
LYMPHOCYTES # BLD AUTO: 1.61 THOUSANDS/ÂΜL (ref 0.6–4.47)
LYMPHOCYTES NFR BLD AUTO: 31 % (ref 14–44)
MCH RBC QN AUTO: 30.7 PG (ref 26.8–34.3)
MCHC RBC AUTO-ENTMCNC: 34.1 G/DL (ref 31.4–37.4)
MCV RBC AUTO: 90 FL (ref 82–98)
MONOCYTES # BLD AUTO: 0.35 THOUSAND/ÂΜL (ref 0.17–1.22)
MONOCYTES NFR BLD AUTO: 7 % (ref 4–12)
NEUTROPHILS # BLD AUTO: 3.07 THOUSANDS/ÂΜL (ref 1.85–7.62)
NEUTS SEG NFR BLD AUTO: 58 % (ref 43–75)
NRBC BLD AUTO-RTO: 0 /100 WBCS
P AXIS: 49 DEGREES
PLATELET # BLD AUTO: 134 THOUSANDS/UL (ref 149–390)
PMV BLD AUTO: 12.3 FL (ref 8.9–12.7)
POTASSIUM SERPL-SCNC: 3.7 MMOL/L (ref 3.5–5.3)
PR INTERVAL: 148 MS
QRS AXIS: 9 DEGREES
QRSD INTERVAL: 82 MS
QT INTERVAL: 402 MS
QTC INTERVAL: 446 MS
RBC # BLD AUTO: 4.62 MILLION/UL (ref 3.81–5.12)
SODIUM SERPL-SCNC: 141 MMOL/L (ref 135–147)
T WAVE AXIS: 35 DEGREES
TSH SERPL DL<=0.05 MIU/L-ACNC: 1.2 UIU/ML (ref 0.45–4.5)
VENTRICULAR RATE: 74 BPM
WBC # BLD AUTO: 5.22 THOUSAND/UL (ref 4.31–10.16)

## 2023-11-14 PROCEDURE — 84443 ASSAY THYROID STIM HORMONE: CPT

## 2023-11-14 PROCEDURE — 85025 COMPLETE CBC W/AUTO DIFF WBC: CPT

## 2023-11-14 PROCEDURE — 36415 COLL VENOUS BLD VENIPUNCTURE: CPT

## 2023-11-14 PROCEDURE — 80048 BASIC METABOLIC PNL TOTAL CA: CPT

## 2023-11-15 ENCOUNTER — OFFICE VISIT (OUTPATIENT)
Dept: PLASTIC SURGERY | Facility: CLINIC | Age: 63
End: 2023-11-15
Payer: COMMERCIAL

## 2023-11-15 ENCOUNTER — COSMETIC (OUTPATIENT)
Dept: PLASTIC SURGERY | Facility: CLINIC | Age: 63
End: 2023-11-15

## 2023-11-15 DIAGNOSIS — Z90.11 ACQUIRED ABSENCE OF RIGHT BREAST AND NIPPLE: Primary | ICD-10-CM

## 2023-11-15 DIAGNOSIS — Z41.1 ELECTIVE PROCEDURE FOR UNACCEPTABLE COSMETIC APPEARANCE: Primary | ICD-10-CM

## 2023-11-15 PROCEDURE — RECHECK: Performed by: STUDENT IN AN ORGANIZED HEALTH CARE EDUCATION/TRAINING PROGRAM

## 2023-11-15 PROCEDURE — 99214 OFFICE O/P EST MOD 30 MIN: CPT | Performed by: STUDENT IN AN ORGANIZED HEALTH CARE EDUCATION/TRAINING PROGRAM

## 2023-11-15 PROCEDURE — CP99022 PRE-OP COSMETIC EVALUATION: Performed by: STUDENT IN AN ORGANIZED HEALTH CARE EDUCATION/TRAINING PROGRAM

## 2023-11-15 RX ORDER — TRAMADOL HYDROCHLORIDE 50 MG/1
50 TABLET ORAL EVERY 6 HOURS PRN
Qty: 20 TABLET | Refills: 0 | Status: SHIPPED | OUTPATIENT
Start: 2023-11-15

## 2023-11-15 RX ORDER — SULFAMETHOXAZOLE AND TRIMETHOPRIM 800; 160 MG/1; MG/1
1 TABLET ORAL EVERY 12 HOURS SCHEDULED
Qty: 14 TABLET | Refills: 0 | Status: SHIPPED | OUTPATIENT
Start: 2023-11-15 | End: 2023-11-22

## 2023-11-15 RX ORDER — IBUPROFEN 800 MG/1
800 TABLET ORAL EVERY 8 HOURS PRN
Qty: 15 TABLET | Refills: 0 | Status: SHIPPED | OUTPATIENT
Start: 2023-11-15

## 2023-11-15 RX ORDER — SENNOSIDES 8.6 MG
650 CAPSULE ORAL EVERY 6 HOURS
Qty: 20 TABLET | Refills: 0 | Status: SHIPPED | OUTPATIENT
Start: 2023-11-15 | End: 2023-11-20

## 2023-11-15 RX ORDER — OXYCODONE HYDROCHLORIDE 5 MG/1
5 TABLET ORAL EVERY 6 HOURS PRN
Qty: 10 TABLET | Refills: 0 | Status: SHIPPED | OUTPATIENT
Start: 2023-11-15

## 2023-11-15 RX ORDER — GABAPENTIN 300 MG/1
300 CAPSULE ORAL 3 TIMES DAILY
Qty: 15 CAPSULE | Refills: 0 | Status: SHIPPED | OUTPATIENT
Start: 2023-11-15 | End: 2023-11-20

## 2023-11-15 NOTE — PROGRESS NOTES
Assessment and Plan:  Ms. Savi Garcia is a 70 y.o. female presenting preop for revision recon    We discussed the procedure, risks, benefits, alternatives and postoperative instructions and expectations. Informed consent obtained. Implants ordered. Patient to bring bra to surgery. All patient's questions were answered and she voiced understanding. History of Present Illness:   Ms. Savi Garcia is a 70 y.o. female presenting preop for revision recon      Review of Systems:  A 12 point ROS was performed and negative except per HPI. 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 - Last assessed 11/02/17    Disease of thyroid gland     Diverticulitis of colon     GERD (gastroesophageal reflux disease)     Hyperlipidemia     Hypertension     Hypoglobulinemia     Obesity     PONV (postoperative nausea and vomiting)     Slow transit constipation        Past Surgical History:  Past Surgical History:   Procedure Laterality Date    APPENDECTOMY      BOWEL RESECTION      BREAST BIOPSY Right 07/07/2022    stereo x 2    BREAST BIOPSY Right 07/26/2022    stereo    BREAST RECONSTRUCTION Right 12/15/2022    Procedure: RIGHT BREAST RECONSTRUCTION AND REMOVAL OF TISSUE EXPANDER WITH PLACEMENT OF IMPLANT.  CAPSULOTOMY.;  Surgeon: Matilde Odonnell DO;  Location: [address] [place] [address] OR;  Service: Plastics    CARPAL TUNNEL RELEASE      CATARACT EXTRACTION Bilateral     COLECTOMY      Resolved 08/27/09    COLONOSCOPY      EYE SURGERY  2007    Lasik , Cataracts-2023    INSERTION OF BREAST TISSUE EXPANDER Right 09/09/2022    Procedure: IMMEDIATE BREAST RECONSTRUCTION WITH TISSUE EXPANDER AND  GALAFLEX;  Surgeon: Chayo Schaeffer DO;  [facility];  Service: Plastics    JOINT REPLACEMENT Bilateral     TKR    LASIK      LYMPH NODE BIOPSY  9/22    MAMMO STEREOTACTIC BREAST BIOPSY RIGHT (ALL INC) Right 07/07/2022    MAMMO STEREOTACTIC BREAST BIOPSY RIGHT (ALL INC) Right 07/26/2022    MAMMO STEREOTACTIC BREAST BIOPSY RIGHT (ALL INC) EACH ADD Right 07/07/2022    MASTECTOMY Right     MASTECTOMY W/ SENTINEL NODE BIOPSY Right 09/09/2022    Procedure: MASTECTOMY WITH BIOPSY LYMPH NODE SENTINEL,Lymphoscintigraphy,Lymphatic Mapping; [address];  Surgeon: Beatriz Hannah MD;  [facility];  Service: Surgical Oncology    OTHER SURGICAL HISTORY      Biopsy Vulvar    AR ARTHRP KNE CONDYLE&PLATU MEDIAL&LAT COMPARTMENTS Left 02/08/2017    Procedure: TOTAL KNEE REPLACEMENT ARTHROPLASTY;  Surgeon: Brayan Max MD;  [facility];  Service: Orthopedics    AR COLONOSCOPY FLX DX W/COLLJ SPEC WHEN PFRMD N/A 05/06/2019    Procedure: COLONOSCOPY;  Surgeon: Fitz Parker MD;  [facility]: [facility];   Service: General    AR INSJ/RPLCMT BREAST IMPLANT SEP DAY MASTECTOMY Right 03/14/2023    Procedure: RIGHT BREAST RECON REVISION WITH IMPLANT EXCHANGE AND CAPSULOTOMY;  Surgeon: Chayo Schaeffer DO;  [facility];  Service: Plastics    AR MASTOPEXY Left 12/15/2022    Procedure: LEFT MASTOPEXY FOR SYMMETRY AND  Breanna Cane;  Surgeon: Chayo Schaeffer DO;  Location: [facility] MA[facility] OR;  Service: Plastics    REFRACTIVE SURGERY      REPLACEMENT TOTAL KNEE Bilateral 10/2013    TONSILLECTOMY      With Adenoidectomy    TOOTH EXTRACTION      VAGINA SURGERY      mesh       Social History:  Social History     Tobacco Use    Smoking status: Never     Passive exposure: Never    Smokeless tobacco: Never   Vaping Use    Vaping Use: Never used   Substance Use Topics    Alcohol use: No    Drug use: No       Family History:  Family History   Problem Relation Age of Onset    No Known Problems Mother     Basal cell carcinoma Father     Prostate cancer Father     Breast cancer Sister     No Known Problems Sister     No Known Problems Daughter     No Known Problems Daughter     No Known Problems Daughter     No Known Problems Maternal Grandmother     No Known Problems Maternal Grandfather     Breast cancer Paternal Grandmother     Breast cancer Paternal Grandfather     Breast cancer Paternal Aunt     Breast cancer Family     Arthritis Family     Hypertension Family     Cancer Family     Breast cancer Paternal Aunt         Breast cancer       Allergies: Allergies   Allergen Reactions    Penicillins Anaphylaxis     Anaphylaxis  Anaphylaxis  Other reaction(s):  Anaphylaxis    Acetazolamide Itching and Swelling     With eye drops - takes  oral sulfa w/o side effects    Metronidazole GI Intolerance and Nausea Only       Medications:  Current Outpatient Medications on File Prior to Visit   Medication Sig Dispense Refill    diphenhydrAMINE (BENADRYL) 25 mg tablet Take 25 mg by mouth every 6 (six) hours as needed for itching      estradiol (ESTRACE VAGINAL) 0.1 mg/g vaginal cream Insert one quarter of an applicator intravaginally once weekly or once every other week 42.5 g 2    famotidine (PEPCID) 20 mg tablet Take 1 tablet (20 mg total) by mouth 2 (two) times a day 180 tablet 1    levothyroxine 75 mcg tablet Take 1 tablet (75 mcg total) by mouth daily 90 tablet 1    potassium chloride (K-DUR,KLOR-CON) 10 mEq tablet Take 1 tablet (10 mEq total) by mouth 2 (two) times a day 180 tablet 3    Psyllium (METAMUCIL PO) Take 1 Dose by mouth daily        rosuvastatin (CRESTOR) 5 mg tablet TAKE ONE TABLET BY MOUTH EVERY DAY (Patient taking differently: Take 5 mg by mouth every morning) 90 tablet 5    senna (SENOKOT) 8.6 MG tablet Take 2 tablets by mouth daily      sertraline (ZOLOFT) 100 mg tablet Take 1 tablet (100 mg total) by mouth daily 90 tablet 1    traZODone (DESYREL) 50 mg tablet Take 1 tablet (50 mg total) by mouth daily at bedtime 90 tablet 1    triamterene-hydrochlorothiazide (DYAZIDE) 37.5-25 mg per capsule Take 1 capsule by mouth every morning 90 capsule 1     No current facility-administered medications on file prior to visit. Physical Examination:  There were no vitals taken for this visit. Estimated body mass index is 31.35 kg/m² as calculated from the following:    Height as of 11/13/23: 5' 2" (1.575 m). Weight as of 11/13/23: 77.7 kg (171 lb 6.4 oz). General: NAD, well appearing, AAOx3  HEENT: NCAT, EOMI, MMM, supple  Resp: Nonlabored  Heart: RRR  Abdomen: Soft, ND, NT  Extremities/MSK: no LE edema, no obvious deficits in ROM  Neuro: grossly intact with no obvious deficits  Skin: no obvious lesions or rashes  Breast: no palpable mass, no palpable axillary lymphadenopathy, right implant slightly low, left with nice contour but slightly hypertrophic medial scar and lateral excess    Daniela Craig, DO  Plastic and Reconstructive Surgery

## 2023-11-15 NOTE — PROGRESS NOTES
Assessment and Plan:  Ms. Nas Nieves is a 70 y.o. female presenting preop for brachioplasty    We discussed the procedure, risks, benefits, alternatives and postoperative instructions and expectations. Informed consent obtained. Patient understands her contour will not be perfect and the excess below her elbow will not be addressed. She recognizes the significant scar burden associated with brachioplasty but is willing to accept this for improvement in her contour. All patient's questions were answered and she voiced understanding. History of Present Illness:   Ms. Nas Nieves is a 70 y.o. female presenting preop for brachioplasty      Review of Systems:  A 12 point ROS was performed and negative except per HPI. 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 - Last assessed 11/02/17    Disease of thyroid gland     Diverticulitis of colon     GERD (gastroesophageal reflux disease)     Hyperlipidemia     Hypertension     Hypoglobulinemia     Obesity     PONV (postoperative nausea and vomiting)     Slow transit constipation        Past Surgical History:  Past Surgical History:   Procedure Laterality Date    APPENDECTOMY      BOWEL RESECTION      BREAST BIOPSY Right 07/07/2022    stereo x 2    BREAST BIOPSY Right 07/26/2022    stereo    BREAST RECONSTRUCTION Right 12/15/2022    Procedure: RIGHT BREAST RECONSTRUCTION AND REMOVAL OF TISSUE EXPANDER WITH PLACEMENT OF IMPLANT.  CAPSULOTOMY.;  Surgeon: Torrey Barrett DO;  Location:  MAIN OR;  Service: Plastics    CARPAL TUNNEL RELEASE      CATARACT EXTRACTION Bilateral     COLECTOMY Resolved 08/27/09    COLONOSCOPY      EYE SURGERY  2007    Lasik , Cataracts-2023    INSERTION OF BREAST TISSUE EXPANDER Right 09/09/2022    Procedure: IMMEDIATE BREAST RECONSTRUCTION WITH TISSUE EXPANDER AND  GALAFLEX;  Surgeon: Leann Castle DO;  [facility];  Service: Plastics    JOINT REPLACEMENT Bilateral     TKR    LASIK      LYMPH NODE BIOPSY  9/22    MAMMO STEREOTACTIC BREAST BIOPSY RIGHT (ALL INC) Right 07/07/2022    MAMMO STEREOTACTIC BREAST BIOPSY RIGHT (ALL INC) Right 07/26/2022    MAMMO STEREOTACTIC BREAST BIOPSY RIGHT (ALL INC) EACH ADD Right 07/07/2022    MASTECTOMY Right     MASTECTOMY W/ SENTINEL NODE BIOPSY Right 09/09/2022    Procedure: MASTECTOMY WITH BIOPSY LYMPH NODE SENTINEL,Lymphoscintigraphy,Lymphatic Mapping; [address];  Surgeon: Phong Mccormick MD;  [facility];  Service: Surgical Oncology    OTHER SURGICAL HISTORY      Biopsy Vulvar    MD ARTHRP KNE CONDYLE&PLATU MEDIAL&LAT COMPARTMENTS Left 02/08/2017    Procedure: TOTAL KNEE REPLACEMENT ARTHROPLASTY;  Surgeon: Geni Hogan MD;  [facility];  Service: Orthopedics    MD COLONOSCOPY FLX DX W/COLLJ SPEC WHEN PFRMD N/A 05/06/2019    Procedure: COLONOSCOPY;  Surgeon: Juani Galan MD;  [facility];   Service: General    MD INSJ/RPLCMT BREAST IMPLANT SEP DAY MASTECTOMY Right 03/14/2023    Procedure: RIGHT BREAST RECON REVISION WITH IMPLANT EXCHANGE AND CAPSULOTOMY;  Surgeon: Leann Castle DO;  [facility];  Service: Plastics    MD MASTOPEXY Left 12/15/2022    Procedure: LEFT MASTOPEXY FOR SYMMETRY AND  GALAFLEX;  Surgeon: Leann Castle DO;  Location: [facility] OR;  Service: Plastics    REFRACTIVE SURGERY      REPLACEMENT TOTAL KNEE Bilateral 10/2013    TONSILLECTOMY      With Adenoidectomy    TOOTH EXTRACTION      VAGINA SURGERY      mesh       Social History:  Social History     Tobacco Use    Smoking status: Never     Passive exposure: Never    Smokeless tobacco: Never Vaping Use    Vaping Use: Never used   Substance Use Topics    Alcohol use: No    Drug use: No       Family History:  Family History   Problem Relation Age of Onset    No Known Problems Mother     Basal cell carcinoma Father     Prostate cancer Father     Breast cancer Sister     No Known Problems Sister     No Known Problems Daughter     No Known Problems Daughter     No Known Problems Daughter     No Known Problems Maternal Grandmother     No Known Problems Maternal Grandfather     Breast cancer Paternal Grandmother     Breast cancer Paternal Grandfather     Breast cancer Paternal Aunt     Breast cancer Family     Arthritis Family     Hypertension Family     Cancer Family     Breast cancer Paternal Aunt         Breast cancer       Allergies: Allergies   Allergen Reactions    Penicillins Anaphylaxis     Anaphylaxis  Anaphylaxis  Other reaction(s):  Anaphylaxis    Acetazolamide Itching and Swelling     With eye drops - takes  oral sulfa w/o side effects    Metronidazole GI Intolerance and Nausea Only       Medications:  Current Outpatient Medications on File Prior to Visit   Medication Sig Dispense Refill    diphenhydrAMINE (BENADRYL) 25 mg tablet Take 25 mg by mouth every 6 (six) hours as needed for itching      estradiol (ESTRACE VAGINAL) 0.1 mg/g vaginal cream Insert one quarter of an applicator intravaginally once weekly or once every other week 42.5 g 2    famotidine (PEPCID) 20 mg tablet Take 1 tablet (20 mg total) by mouth 2 (two) times a day 180 tablet 1    levothyroxine 75 mcg tablet Take 1 tablet (75 mcg total) by mouth daily 90 tablet 1    potassium chloride (K-DUR,KLOR-CON) 10 mEq tablet Take 1 tablet (10 mEq total) by mouth 2 (two) times a day 180 tablet 3    Psyllium (METAMUCIL PO) Take 1 Dose by mouth daily        rosuvastatin (CRESTOR) 5 mg tablet TAKE ONE TABLET BY MOUTH EVERY DAY (Patient taking differently: Take 5 mg by mouth every morning) 90 tablet 5    senna (SENOKOT) 8.6 MG tablet Take 2 tablets by mouth daily      sertraline (ZOLOFT) 100 mg tablet Take 1 tablet (100 mg total) by mouth daily 90 tablet 1    traZODone (DESYREL) 50 mg tablet Take 1 tablet (50 mg total) by mouth daily at bedtime 90 tablet 1    triamterene-hydrochlorothiazide (DYAZIDE) 37.5-25 mg per capsule Take 1 capsule by mouth every morning 90 capsule 1     No current facility-administered medications on file prior to visit. Physical Examination:  There were no vitals taken for this visit. Estimated body mass index is 31.35 kg/m² as calculated from the following:    Height as of 11/13/23: 5' 2" (1.575 m). Weight as of 11/13/23: 77.7 kg (171 lb 6.4 oz). General: NAD, well appearing, AAOx3  HEENT: NCAT, EOMI, MMM, supple  Resp: Nonlabored  Heart: RRR  Abdomen: Soft, ND, NT  Extremities/MSK: no LE edema, no obvious deficits in ROM  Neuro: grossly intact with no obvious deficits  Skin: no obvious lesions or rashes    Significant excess of bilateral upper arm and axillary tissue, extension of excess skin beyond elbow    Daniela Craig DO  Plastic and Reconstructive Surgery

## 2023-11-15 NOTE — H&P (VIEW-ONLY)
Assessment and Plan:  Ms. Cherelle Hoffmann is a 70 y.o. female presenting preop for brachioplasty    We discussed the procedure, risks, benefits, alternatives and postoperative instructions and expectations. Informed consent obtained. Patient understands her contour will not be perfect and the excess below her elbow will not be addressed. She recognizes the significant scar burden associated with brachioplasty but is willing to accept this for improvement in her contour. All patient's questions were answered and she voiced understanding. History of Present Illness:   Ms. Cherelle Hoffmann is a 70 y.o. female presenting preop for brachioplasty      Review of Systems:  A 12 point ROS was performed and negative except per HPI. 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 - Last assessed 11/02/17    Disease of thyroid gland     Diverticulitis of colon     GERD (gastroesophageal reflux disease)     Hyperlipidemia     Hypertension     Hypoglobulinemia     Obesity     PONV (postoperative nausea and vomiting)     Slow transit constipation        Past Surgical History:  Past Surgical History:   Procedure Laterality Date    APPENDECTOMY      BOWEL RESECTION      BREAST BIOPSY Right 07/07/2022    stereo x 2    BREAST BIOPSY Right 07/26/2022    stereo    BREAST RECONSTRUCTION Right 12/15/2022    Procedure: RIGHT BREAST RECONSTRUCTION AND REMOVAL OF TISSUE EXPANDER WITH PLACEMENT OF IMPLANT.  CAPSULOTOMY.;  Surgeon: Lis Long DO;  Location:  MAIN OR;  Service: Plastics    CARPAL TUNNEL RELEASE      CATARACT EXTRACTION Bilateral     COLECTOMY Resolved 08/27/09    COLONOSCOPY      EYE SURGERY  2007    Lasik , Cataracts-2023    INSERTION OF BREAST TISSUE EXPANDER Right 09/09/2022    Procedure: IMMEDIATE BREAST RECONSTRUCTION WITH TISSUE EXPANDER AND  GALAFLEX;  Surgeon: Tabitha Rodriguez DO;  [facility];  Service: Plastics    JOINT REPLACEMENT Bilateral     TKR    LASIK      LYMPH NODE BIOPSY  9/22    MAMMO STEREOTACTIC BREAST BIOPSY RIGHT (ALL INC) Right 07/07/2022    MAMMO STEREOTACTIC BREAST BIOPSY RIGHT (ALL INC) Right 07/26/2022    MAMMO STEREOTACTIC BREAST BIOPSY RIGHT (ALL INC) EACH ADD Right 07/07/2022    MASTECTOMY Right     MASTECTOMY W/ SENTINEL NODE BIOPSY Right 09/09/2022    Procedure: MASTECTOMY WITH BIOPSY LYMPH NODE SENTINEL,Lymphoscintigraphy,Lymphatic Mapping; [address];  Surgeon: Desmond Horn MD;  [facility];  Service: Surgical Oncology    OTHER SURGICAL HISTORY      Biopsy Vulvar    IA ARTHRP KNE CONDYLE&PLATU MEDIAL&LAT COMPARTMENTS Left 02/08/2017    Procedure: TOTAL KNEE REPLACEMENT ARTHROPLASTY;  Surgeon: Alice Mcmullen MD;  [facility];  Service: Orthopedics    IA COLONOSCOPY FLX DX W/COLLJ SPEC WHEN PFRMD N/A 05/06/2019    Procedure: COLONOSCOPY;  Surgeon: Josefina Parada MD;  [facility];   Service: General    IA INSJ/RPLCMT BREAST IMPLANT SEP DAY MASTECTOMY Right 03/14/2023    Procedure: RIGHT BREAST RECON REVISION WITH IMPLANT EXCHANGE AND CAPSULOTOMY;  Surgeon: Tabitha Rodriguez DO;  [facility];  Service: Plastics    IA MASTOPEXY Left 12/15/2022    Procedure: LEFT MASTOPEXY FOR SYMMETRY AND  GALAFLEX;  Surgeon: Tabitha Rodriguez DO;  Location: [address] [place] [address] MAIN OR;  Service: Plastics    REFRACTIVE SURGERY      REPLACEMENT TOTAL KNEE Bilateral 10/2013    TONSILLECTOMY      With Adenoidectomy    TOOTH EXTRACTION      VAGINA SURGERY      mesh       Social History:  Social History     Tobacco Use    Smoking status: Never     Passive exposure: Never    Smokeless tobacco: Never Vaping Use    Vaping Use: Never used   Substance Use Topics    Alcohol use: No    Drug use: No       Family History:  Family History   Problem Relation Age of Onset    No Known Problems Mother     Basal cell carcinoma Father     Prostate cancer Father     Breast cancer Sister     No Known Problems Sister     No Known Problems Daughter     No Known Problems Daughter     No Known Problems Daughter     No Known Problems Maternal Grandmother     No Known Problems Maternal Grandfather     Breast cancer Paternal Grandmother     Breast cancer Paternal Grandfather     Breast cancer Paternal Aunt     Breast cancer Family     Arthritis Family     Hypertension Family     Cancer Family     Breast cancer Paternal Aunt         Breast cancer       Allergies: Allergies   Allergen Reactions    Penicillins Anaphylaxis     Anaphylaxis  Anaphylaxis  Other reaction(s):  Anaphylaxis    Acetazolamide Itching and Swelling     With eye drops - takes  oral sulfa w/o side effects    Metronidazole GI Intolerance and Nausea Only       Medications:  Current Outpatient Medications on File Prior to Visit   Medication Sig Dispense Refill    diphenhydrAMINE (BENADRYL) 25 mg tablet Take 25 mg by mouth every 6 (six) hours as needed for itching      estradiol (ESTRACE VAGINAL) 0.1 mg/g vaginal cream Insert one quarter of an applicator intravaginally once weekly or once every other week 42.5 g 2    famotidine (PEPCID) 20 mg tablet Take 1 tablet (20 mg total) by mouth 2 (two) times a day 180 tablet 1    levothyroxine 75 mcg tablet Take 1 tablet (75 mcg total) by mouth daily 90 tablet 1    potassium chloride (K-DUR,KLOR-CON) 10 mEq tablet Take 1 tablet (10 mEq total) by mouth 2 (two) times a day 180 tablet 3    Psyllium (METAMUCIL PO) Take 1 Dose by mouth daily        rosuvastatin (CRESTOR) 5 mg tablet TAKE ONE TABLET BY MOUTH EVERY DAY (Patient taking differently: Take 5 mg by mouth every morning) 90 tablet 5    senna (SENOKOT) 8.6 MG tablet Take 2 tablets by mouth daily      sertraline (ZOLOFT) 100 mg tablet Take 1 tablet (100 mg total) by mouth daily 90 tablet 1    traZODone (DESYREL) 50 mg tablet Take 1 tablet (50 mg total) by mouth daily at bedtime 90 tablet 1    triamterene-hydrochlorothiazide (DYAZIDE) 37.5-25 mg per capsule Take 1 capsule by mouth every morning 90 capsule 1     No current facility-administered medications on file prior to visit. Physical Examination:  There were no vitals taken for this visit. Estimated body mass index is 31.35 kg/m² as calculated from the following:    Height as of 11/13/23: 5' 2" (1.575 m). Weight as of 11/13/23: 77.7 kg (171 lb 6.4 oz). General: NAD, well appearing, AAOx3  HEENT: NCAT, EOMI, MMM, supple  Resp: Nonlabored  Heart: RRR  Abdomen: Soft, ND, NT  Extremities/MSK: no LE edema, no obvious deficits in ROM  Neuro: grossly intact with no obvious deficits  Skin: no obvious lesions or rashes    Significant excess of bilateral upper arm and axillary tissue, extension of excess skin beyond elbow    Daniela Craig DO  Plastic and Reconstructive Surgery

## 2023-11-16 NOTE — PRE-PROCEDURE INSTRUCTIONS
Pre-Surgery Instructions:   Medication Instructions    diphenhydrAMINE (BENADRYL) 25 mg tablet Uses PRN- DO NOT take day of surgery    estradiol (ESTRACE VAGINAL) 0.1 mg/g vaginal cream Uses PRN- DO NOT take day of surgery    famotidine (PEPCID) 20 mg tablet Take day of surgery. levothyroxine 75 mcg tablet Take day of surgery. potassium chloride (K-DUR,KLOR-CON) 10 mEq tablet Hold day of surgery. Psyllium (METAMUCIL PO) Hold day of surgery. rosuvastatin (CRESTOR) 5 mg tablet Take day of surgery. senna (SENOKOT) 8.6 MG tablet Hold day of surgery. sertraline (ZOLOFT) 100 mg tablet Take day of surgery. traZODone (DESYREL) 50 mg tablet Take night before surgery    triamterene-hydrochlorothiazide (DYAZIDE) 37.5-25 mg per capsule Hold day of surgery. Medication instructions for day surgery reviewed. Please use only a sip of water to take your instructed medications. Avoid all over the counter vitamins, supplements and NSAIDS for one week prior to surgery per anesthesia guidelines. Tylenol is ok to take as needed. You will receive a call one business day prior to surgery with an arrival time and hospital directions. If your surgery is scheduled on a Monday, the hospital will be calling you on the Friday prior to your surgery. If you have not heard from anyone by 8pm, please call the hospital supervisor through the hospital  at [phone]. Haskell Pace [phone]). Do not eat or drink anything after midnight the night before your surgery, including candy, mints, lifesavers, or chewing gum. Do not drink alcohol 24hrs before your surgery. Try not to smoke at least 24hrs before your surgery. Follow the pre surgery showering instructions as listed in the [facility] Surgical Experience Booklet” or otherwise provided by your surgeon's office. Do not use a blade to shave the surgical area 1 week before surgery. It is okay to use a clean electric clippers up to 24 hours before surgery.  Do not apply any lotions, creams, including makeup, cologne, deodorant, or perfumes after showering on the day of your surgery. Do not use dry shampoo, hair spray, hair gel, or any type of hair products. No contact lenses, eye make-up, or artificial eyelashes. Remove nail polish, including gel polish, and any artificial, gel, or acrylic nails if possible. Remove all jewelry including rings and body piercing jewelry. Wear causal clothing that is easy to take on and off. Consider your type of surgery. Keep any valuables, jewelry, piercings at home. Please bring any specially ordered equipment (sling, braces) if indicated. Arrange for a responsible person to drive you to and from the hospital on the day of your surgery. Visitor Guidelines discussed. Call the surgeon's office with any new illnesses, exposures, or additional questions prior to surgery. Please reference your [facility] Surgical Experience Booklet” for additional information to prepare for your upcoming surgery.

## 2023-11-20 DIAGNOSIS — K21.9 GASTROESOPHAGEAL REFLUX DISEASE WITHOUT ESOPHAGITIS: ICD-10-CM

## 2023-11-20 RX ORDER — FAMOTIDINE 20 MG/1
20 TABLET, FILM COATED ORAL 2 TIMES DAILY
Qty: 180 TABLET | Refills: 1 | Status: SHIPPED | OUTPATIENT
Start: 2023-11-20

## 2023-11-27 ENCOUNTER — ANESTHESIA EVENT (OUTPATIENT)
Dept: PERIOP | Facility: HOSPITAL | Age: 63
End: 2023-11-27
Payer: SELF-PAY

## 2023-11-28 ENCOUNTER — ANESTHESIA (OUTPATIENT)
Dept: PERIOP | Facility: HOSPITAL | Age: 63
End: 2023-11-28
Payer: SELF-PAY

## 2023-11-28 ENCOUNTER — HOSPITAL ENCOUNTER (OUTPATIENT)
Facility: HOSPITAL | Age: 63
Setting detail: OUTPATIENT SURGERY
Discharge: HOME | End: 2023-11-28
Attending: STUDENT IN AN ORGANIZED HEALTH CARE EDUCATION/TRAINING PROGRAM | Admitting: STUDENT IN AN ORGANIZED HEALTH CARE EDUCATION/TRAINING PROGRAM
Payer: COMMERCIAL

## 2023-11-28 VITALS
WEIGHT: 165 LBS | SYSTOLIC BLOOD PRESSURE: 156 MMHG | TEMPERATURE: 97.3 F | BODY MASS INDEX: 29.23 KG/M2 | DIASTOLIC BLOOD PRESSURE: 75 MMHG | HEIGHT: 63 IN | RESPIRATION RATE: 16 BRPM | HEART RATE: 81 BPM | OXYGEN SATURATION: 98 %

## 2023-11-28 DIAGNOSIS — Z90.11 ACQUIRED ABSENCE OF RIGHT BREAST: ICD-10-CM

## 2023-11-28 DIAGNOSIS — D05.11 DUCTAL CARCINOMA IN SITU (DCIS) OF RIGHT BREAST: Primary | ICD-10-CM

## 2023-11-28 PROCEDURE — 11406 EXC TR-EXT B9+MARG >4.0 CM: CPT | Performed by: STUDENT IN AN ORGANIZED HEALTH CARE EDUCATION/TRAINING PROGRAM

## 2023-11-28 PROCEDURE — 88302 TISSUE EXAM BY PATHOLOGIST: CPT | Performed by: PATHOLOGY

## 2023-11-28 PROCEDURE — 11406 EXC TR-EXT B9+MARG >4.0 CM: CPT | Performed by: PHYSICIAN ASSISTANT

## 2023-11-28 PROCEDURE — L8600 IMPLANT BREAST SILICONE/EQ: HCPCS | Performed by: STUDENT IN AN ORGANIZED HEALTH CARE EDUCATION/TRAINING PROGRAM

## 2023-11-28 PROCEDURE — 19380 REVJ RECONSTRUCTED BREAST: CPT | Performed by: PHYSICIAN ASSISTANT

## 2023-11-28 PROCEDURE — 19380 REVJ RECONSTRUCTED BREAST: CPT | Performed by: STUDENT IN AN ORGANIZED HEALTH CARE EDUCATION/TRAINING PROGRAM

## 2023-11-28 PROCEDURE — 15836 EXC EXCESSIVE SKIN ARM: CPT | Performed by: STUDENT IN AN ORGANIZED HEALTH CARE EDUCATION/TRAINING PROGRAM

## 2023-11-28 PROCEDURE — 19342 INSJ/RPLCMT BRST IMPLT SEP D: CPT | Performed by: PHYSICIAN ASSISTANT

## 2023-11-28 PROCEDURE — 19342 INSJ/RPLCMT BRST IMPLT SEP D: CPT | Performed by: STUDENT IN AN ORGANIZED HEALTH CARE EDUCATION/TRAINING PROGRAM

## 2023-11-28 PROCEDURE — 88300 SURGICAL PATH GROSS: CPT | Performed by: PATHOLOGY

## 2023-11-28 PROCEDURE — 15836 EXC EXCESSIVE SKIN ARM: CPT | Performed by: PHYSICIAN ASSISTANT

## 2023-11-28 PROCEDURE — 12034 INTMD RPR S/TR/EXT 7.6-12.5: CPT | Performed by: STUDENT IN AN ORGANIZED HEALTH CARE EDUCATION/TRAINING PROGRAM

## 2023-11-28 PROCEDURE — 12034 INTMD RPR S/TR/EXT 7.6-12.5: CPT | Performed by: PHYSICIAN ASSISTANT

## 2023-11-28 PROCEDURE — C9290 INJ, BUPIVACAINE LIPOSOME: HCPCS | Performed by: PHYSICIAN ASSISTANT

## 2023-11-28 RX ORDER — FENTANYL CITRATE/PF 50 MCG/ML
50 SYRINGE (ML) INJECTION
Status: DISCONTINUED | OUTPATIENT
Start: 2023-11-28 | End: 2023-11-28 | Stop reason: HOSPADM

## 2023-11-28 RX ORDER — SODIUM CHLORIDE, SODIUM LACTATE, POTASSIUM CHLORIDE, CALCIUM CHLORIDE 600; 310; 30; 20 MG/100ML; MG/100ML; MG/100ML; MG/100ML
INJECTION, SOLUTION INTRAVENOUS CONTINUOUS PRN
Status: DISCONTINUED | OUTPATIENT
Start: 2023-11-28 | End: 2023-11-28

## 2023-11-28 RX ORDER — SCOLOPAMINE TRANSDERMAL SYSTEM 1 MG/1
1 PATCH, EXTENDED RELEASE TRANSDERMAL ONCE
Status: DISCONTINUED | OUTPATIENT
Start: 2023-11-28 | End: 2023-11-28 | Stop reason: HOSPADM

## 2023-11-28 RX ORDER — DEXAMETHASONE SODIUM PHOSPHATE 10 MG/ML
INJECTION, SOLUTION INTRAMUSCULAR; INTRAVENOUS AS NEEDED
Status: DISCONTINUED | OUTPATIENT
Start: 2023-11-28 | End: 2023-11-28

## 2023-11-28 RX ORDER — PROPOFOL 10 MG/ML
INJECTION, EMULSION INTRAVENOUS CONTINUOUS PRN
Status: DISCONTINUED | OUTPATIENT
Start: 2023-11-28 | End: 2023-11-28

## 2023-11-28 RX ORDER — SODIUM CHLORIDE, SODIUM LACTATE, POTASSIUM CHLORIDE, CALCIUM CHLORIDE 600; 310; 30; 20 MG/100ML; MG/100ML; MG/100ML; MG/100ML
20 INJECTION, SOLUTION INTRAVENOUS CONTINUOUS
Status: DISCONTINUED | OUTPATIENT
Start: 2023-11-28 | End: 2023-11-28 | Stop reason: HOSPADM

## 2023-11-28 RX ORDER — OXYCODONE HYDROCHLORIDE 5 MG/1
5 TABLET ORAL ONCE AS NEEDED
Status: COMPLETED | OUTPATIENT
Start: 2023-11-28 | End: 2023-11-28

## 2023-11-28 RX ORDER — ACETAMINOPHEN 325 MG/1
975 TABLET ORAL ONCE
Status: COMPLETED | OUTPATIENT
Start: 2023-11-28 | End: 2023-11-28

## 2023-11-28 RX ORDER — GLYCOPYRROLATE 0.2 MG/ML
INJECTION INTRAMUSCULAR; INTRAVENOUS AS NEEDED
Status: DISCONTINUED | OUTPATIENT
Start: 2023-11-28 | End: 2023-11-28

## 2023-11-28 RX ORDER — SODIUM CHLORIDE, SODIUM LACTATE, POTASSIUM CHLORIDE, CALCIUM CHLORIDE 600; 310; 30; 20 MG/100ML; MG/100ML; MG/100ML; MG/100ML
50 INJECTION, SOLUTION INTRAVENOUS CONTINUOUS
Status: DISCONTINUED | OUTPATIENT
Start: 2023-11-28 | End: 2023-11-28 | Stop reason: HOSPADM

## 2023-11-28 RX ORDER — SULFAMETHOXAZOLE AND TRIMETHOPRIM 800; 160 MG/1; MG/1
1 TABLET ORAL EVERY 12 HOURS SCHEDULED
Qty: 14 TABLET | Refills: 0 | Status: SHIPPED | OUTPATIENT
Start: 2023-11-28 | End: 2023-12-05

## 2023-11-28 RX ORDER — SODIUM CHLORIDE, SODIUM LACTATE, POTASSIUM CHLORIDE, CALCIUM CHLORIDE 600; 310; 30; 20 MG/100ML; MG/100ML; MG/100ML; MG/100ML
125 INJECTION, SOLUTION INTRAVENOUS CONTINUOUS
Status: DISCONTINUED | OUTPATIENT
Start: 2023-11-28 | End: 2023-11-28 | Stop reason: HOSPADM

## 2023-11-28 RX ORDER — ENOXAPARIN SODIUM 100 MG/ML
40 INJECTION SUBCUTANEOUS ONCE
Status: COMPLETED | OUTPATIENT
Start: 2023-11-28 | End: 2023-11-28

## 2023-11-28 RX ORDER — MAGNESIUM HYDROXIDE 1200 MG/15ML
LIQUID ORAL AS NEEDED
Status: DISCONTINUED | OUTPATIENT
Start: 2023-11-28 | End: 2023-11-28 | Stop reason: HOSPADM

## 2023-11-28 RX ORDER — PROPOFOL 10 MG/ML
INJECTION, EMULSION INTRAVENOUS AS NEEDED
Status: DISCONTINUED | OUTPATIENT
Start: 2023-11-28 | End: 2023-11-28

## 2023-11-28 RX ORDER — FENTANYL CITRATE 50 UG/ML
INJECTION, SOLUTION INTRAMUSCULAR; INTRAVENOUS AS NEEDED
Status: DISCONTINUED | OUTPATIENT
Start: 2023-11-28 | End: 2023-11-28

## 2023-11-28 RX ORDER — MIDAZOLAM HYDROCHLORIDE 2 MG/2ML
INJECTION, SOLUTION INTRAMUSCULAR; INTRAVENOUS AS NEEDED
Status: DISCONTINUED | OUTPATIENT
Start: 2023-11-28 | End: 2023-11-28

## 2023-11-28 RX ORDER — ONDANSETRON 2 MG/ML
INJECTION INTRAMUSCULAR; INTRAVENOUS AS NEEDED
Status: DISCONTINUED | OUTPATIENT
Start: 2023-11-28 | End: 2023-11-28

## 2023-11-28 RX ORDER — CLINDAMYCIN PHOSPHATE 900 MG/50ML
900 INJECTION INTRAVENOUS ONCE
Status: COMPLETED | OUTPATIENT
Start: 2023-11-28 | End: 2023-11-28

## 2023-11-28 RX ORDER — LIDOCAINE HYDROCHLORIDE 10 MG/ML
INJECTION, SOLUTION EPIDURAL; INFILTRATION; INTRACAUDAL; PERINEURAL AS NEEDED
Status: DISCONTINUED | OUTPATIENT
Start: 2023-11-28 | End: 2023-11-28

## 2023-11-28 RX ORDER — GABAPENTIN 300 MG/1
300 CAPSULE ORAL ONCE
Status: COMPLETED | OUTPATIENT
Start: 2023-11-28 | End: 2023-11-28

## 2023-11-28 RX ORDER — HYDROMORPHONE HCL IN WATER/PF 6 MG/30 ML
0.2 PATIENT CONTROLLED ANALGESIA SYRINGE INTRAVENOUS
Status: DISCONTINUED | OUTPATIENT
Start: 2023-11-28 | End: 2023-11-28 | Stop reason: HOSPADM

## 2023-11-28 RX ORDER — SUCCINYLCHOLINE/SOD CL,ISO/PF 100 MG/5ML
SYRINGE (ML) INTRAVENOUS AS NEEDED
Status: DISCONTINUED | OUTPATIENT
Start: 2023-11-28 | End: 2023-11-28

## 2023-11-28 RX ORDER — ONDANSETRON 2 MG/ML
4 INJECTION INTRAMUSCULAR; INTRAVENOUS ONCE AS NEEDED
Status: DISCONTINUED | OUTPATIENT
Start: 2023-11-28 | End: 2023-11-28 | Stop reason: HOSPADM

## 2023-11-28 RX ADMIN — SODIUM CHLORIDE, SODIUM LACTATE, POTASSIUM CHLORIDE, AND CALCIUM CHLORIDE: .6; .31; .03; .02 INJECTION, SOLUTION INTRAVENOUS at 07:57

## 2023-11-28 RX ADMIN — PROPOFOL 150 MG: 10 INJECTION, EMULSION INTRAVENOUS at 08:02

## 2023-11-28 RX ADMIN — ACETAMINOPHEN 975 MG: 325 TABLET ORAL at 06:54

## 2023-11-28 RX ADMIN — PROPOFOL 50 MG: 10 INJECTION, EMULSION INTRAVENOUS at 08:14

## 2023-11-28 RX ADMIN — CLINDAMYCIN IN 5 PERCENT DEXTROSE 900 MG: 18 INJECTION, SOLUTION INTRAVENOUS at 08:08

## 2023-11-28 RX ADMIN — GABAPENTIN 300 MG: 300 CAPSULE ORAL at 06:55

## 2023-11-28 RX ADMIN — Medication 80 MG: at 08:02

## 2023-11-28 RX ADMIN — DEXAMETHASONE SODIUM PHOSPHATE 10 MG: 10 INJECTION INTRAMUSCULAR; INTRAVENOUS at 08:06

## 2023-11-28 RX ADMIN — SODIUM CHLORIDE, SODIUM LACTATE, POTASSIUM CHLORIDE, AND CALCIUM CHLORIDE: .6; .31; .03; .02 INJECTION, SOLUTION INTRAVENOUS at 08:08

## 2023-11-28 RX ADMIN — FENTANYL CITRATE 50 MCG: 50 INJECTION, SOLUTION INTRAMUSCULAR; INTRAVENOUS at 11:40

## 2023-11-28 RX ADMIN — GLYCOPYRROLATE 0.1 MG: 0.4 INJECTION INTRAMUSCULAR; INTRAVENOUS at 08:11

## 2023-11-28 RX ADMIN — SCOPALAMINE 1 PATCH: 1 PATCH, EXTENDED RELEASE TRANSDERMAL at 07:34

## 2023-11-28 RX ADMIN — ENOXAPARIN SODIUM 40 MG: 40 INJECTION SUBCUTANEOUS at 06:57

## 2023-11-28 RX ADMIN — SODIUM CHLORIDE, SODIUM LACTATE, POTASSIUM CHLORIDE, AND CALCIUM CHLORIDE: .6; .31; .03; .02 INJECTION, SOLUTION INTRAVENOUS at 09:49

## 2023-11-28 RX ADMIN — OXYCODONE HYDROCHLORIDE 5 MG: 5 TABLET ORAL at 14:02

## 2023-11-28 RX ADMIN — ONDANSETRON 4 MG: 2 INJECTION INTRAMUSCULAR; INTRAVENOUS at 08:06

## 2023-11-28 RX ADMIN — FENTANYL CITRATE 50 MCG: 50 INJECTION, SOLUTION INTRAMUSCULAR; INTRAVENOUS at 12:23

## 2023-11-28 RX ADMIN — MIDAZOLAM 2 MG: 1 INJECTION INTRAMUSCULAR; INTRAVENOUS at 07:57

## 2023-11-28 RX ADMIN — REMIFENTANIL HYDROCHLORIDE 0.2 MCG/KG/MIN: 1 INJECTION, POWDER, LYOPHILIZED, FOR SOLUTION INTRAVENOUS at 08:05

## 2023-11-28 RX ADMIN — PROPOFOL 140 MCG/KG/MIN: 10 INJECTION, EMULSION INTRAVENOUS at 08:05

## 2023-11-28 RX ADMIN — LIDOCAINE HYDROCHLORIDE 50 MG: 10 INJECTION, SOLUTION EPIDURAL; INFILTRATION; INTRACAUDAL; PERINEURAL at 08:02

## 2023-11-28 RX ADMIN — FENTANYL CITRATE 100 MCG: 50 INJECTION, SOLUTION INTRAMUSCULAR; INTRAVENOUS at 08:02

## 2023-11-28 RX ADMIN — FENTANYL CITRATE 50 MCG: 50 INJECTION, SOLUTION INTRAMUSCULAR; INTRAVENOUS at 12:05

## 2023-11-28 NOTE — ANESTHESIA POSTPROCEDURE EVALUATION
Post-Op Assessment Note    CV Status:  Stable    Pain management: satisfactory to patient       Mental Status:  Alert and awake   Hydration Status:  Stable   PONV Controlled:  None   Airway Patency:  Patent  Airway: intubated     Post Op Vitals Reviewed: Yes    No anethesia notable event occurred.     Staff: CRNA               BP   124/60   Temp  97.5   Pulse  103   Resp   14   SpO2   98

## 2023-11-28 NOTE — ANESTHESIA PREPROCEDURE EVALUATION
Procedure:  REVISION OF RIGHT BREAST RECON WITH REPLACEMENT OF IMPLANT, CAPULOTOMY, VIRA FLAP, LEFT MEDIAL SCAR REVISION (Right: Breast)  BRACHIOPLASTY (Bilateral: Arm Upper)    Relevant Problems   CARDIO   (+) Hyperlipidemia   (+) Hypertension      ENDO   (+) Hypothyroidism      GI/HEPATIC   (+) Gastroesophageal reflux disease without esophagitis      HEMATOLOGY   (+) Thrombocytopenia (HCC)      MUSCULOSKELETAL   (+) Low back pain   (+) TMJ arthritis      NEURO/PSYCH   (+) Anxiety   (+) Tension type headache     Prior grade 1 view with MAC3     Physical Exam    Airway       Dental       Cardiovascular  Cardiovascular exam normal    Pulmonary  Pulmonary exam normal     Other Findings  post-pubertal.      Anesthesia Plan  ASA Score- 2     Anesthesia Type- general with ASA Monitors. Additional Monitors:     Airway Plan: ETT. Comment: General anesthesia, endotracheal tube; standard ASA monitors. Risks and benefits discussed with patient; patient consented and agrees to proceed. I saw and evaluated the patient. If seen with CRNA, we have discussed the anesthetic plan and I am in agreement that the plan is appropriate for the patient. TIVA. Plan Factors-    Chart reviewed. Existing labs reviewed. Induction- intravenous. Postoperative Plan-     Informed Consent- Anesthetic plan and risks discussed with patient. I personally reviewed this patient with the CRNA. Discussed and agreed on the Anesthesia Plan with the CRNA. Sourav Speaker

## 2023-11-28 NOTE — DISCHARGE INSTR - AVS FIRST PAGE
Surgery Date: 11/28/2023                Patient: Shravan Christensen  Surgeon: Dr. Burman Goodell     Postoperative Instructions   Brachioplasty/Right Breast Recon, Left Medial Scar Revision       Dressings:  [x] Skin glue was applied to your incision over absorbable sutures. You may feel small pieces of suture at the ends of your incision. [x] Remove dressing the second morning following your surgery and bathe as directed. You may take the ace wraps off your arms to shower. Please replace wraps at all times except when showering. Keep steri strips on. They may fall off in the shower on their own.   [] No dressings are required but you may cover the incision with gauze for comfort. [x] Wear your surgical bra at all times except while showering. [x] Strip and record output of EFFIE drains daily. [] Other instructions:     Bathing:  [x] Shower 48 hours after surgery. Allow soap and water to gently wash over the incision. No scrubbing. Gently pat dry and apply dressing as needed/instructed above. [x] No submerging incision in bathtub, pool, hot tub and/or lake. [] Bolster dressings (yellow dressings) over your nipple areola complex MUST remain dry until seen in the office. Activity:  [x] No heavy lifting (> 10lbs). [x] No strenuous exercise. [x] Walking is mandatory daily. [] Sleeping may be more comfortable with your head elevated. Diet and Medication:  [x] Resume diet as tolerated. High protein diet is important for healing. Remain well hydrated with water and minimize sodium intake. [x] Resume preoperative medications. [x] Pain medications as prescribed. You may also begin to use ibuprofen 48 hours after surgery. [x] Finish all antibiotics as prescribed. [x] Apply ice to area as needed for pain. Do not place ice directly on skin. Do not use heat. [] Other instructions: It is expected to have some bruising, swelling and mild oozing at the incision site and of the surrounding area.   If there is more than you expect, an enlarging area or you suspect an infection, please call the office. Some patients may experience a low-grade fever after surgery. If it is above 100.4, please call the office. If you do not have a postoperative office appointment scheduled, please call the office today and let the staff know you are to be seen in 7 days. Please call [phone] with any questions, concerns or changes.       EFFIE Drain Output Log     Date Time Drain #1 Drain #2

## 2023-11-28 NOTE — OP NOTE
OPERATIVE REPORT  PATIENT NAME: Ean Armendariz    :  1960  MRN: [id number]  Pt Location:  OR ROOM 10    SURGERY DATE: 2023    Surgeon(s) and Role:     * Daniela Solis DO - Primary  Yared Vargas PA-C    Preop Diagnosis:  Acquired absence of right breast [Z90.11]    Post-Op Diagnosis Codes:     * Acquired absence of right breast [Z90.11]    Procedure(s):  Right - REVISION OF RIGHT BREAST RECON WITH REPLACEMENT OF IMPLANT. CAPULOTOMY. VIRA FLAP. LEFT MEDIAL SCAR REVISION  Bilateral - BRACHIOPLASTY    Specimen(s):  ID Type Source Tests Collected by Time Destination   1 : LEFT BREAST TISSUE Tissue Breast, Left TISSUE EXAM Daniela Vegaleigh Kiara,  2023 0856    2 : RIGHT BREAST TISSUE Tissue Breast, Right TISSUE EXAM Danielajayden Vegaleigh Kiara,  2023 0856    3 : RIGHT BREAST IMPLANT (for gross exam only) Other Surgical Hardware/Implant TISSUE EXAM Danielajayden Vegaleigh Kiara,  2023 1398        Estimated Blood Loss:   50 ml    Drains:  10 Fr EFFIE x 1 on right and left arms      Anesthesia Type:   General    Operative Indications:  69 yo female presents for revision of right and left breast reconstruction as well as removal of excess skin and subcutaneous tissue of her bilateral upper extremities    Operative Findings:  Right breast removal of intact implant  Complete circumferential capsulotomy on the right  Right revision Vira flap 10x3 cm  Creation of autodermal flap 15x4 cm for reinforcement of inferior pole  Placement of allergan smooth silicone inspira 101 [facility] [id number]  Left breast excision of scar 3x10 cm defect with 11 cm closure length  Bilateral upper extremity extending avulsion technique brachioplasty with motor and sensation intact of hands upon completion of the case    Complications:   None    Procedure and Technique:  The patient was seen preoperatively. The procedure, risks, benefits and alternatives were discussed. Informed consent was obtained.   The patient was site marked preoperatively. The patient was brought to the operating room where she was positioned supine with all of her pressure points appropriately padded. Anesthesia commenced. The patient was prepped and draped in usual sterile fashion. A timeout was performed and verified. I first turned my attention to the left breast.  The marked areas of scar were excised. Hemostasis was obtained. The area was closed in layers using 3-0 monocryl for the deep dermis and 4-0 PDS to approximate the skin. I next turned my attention to the right breast.  The former scar was excised and de-epithelialized creating an autodermal flap. The implant pocket was entered and the intact implant was removed. A pec block was performed using exparel. A complete circumferential capsulotomy was performed. Hemostasis was obtained. The inferior abdominal skin was advanced and sutured to the rib periosteum using 2-0 PDS to create a [place] flap to create an elevated inframammary fold. Next the lateral border was reinforced using 2-0 PDS. The pocket was irrigated with antibiotic solution. Under sterile steps, the Moreno funnel was used to insert the implant. The previously created autodermal flap was used to provide inferior coverage of the implant and sutured in place using 3-0 monocryl. The skin was closed using 3-0 monocryl for the deep dermis and 4-0 PDS to approximate the skin. I next turned my attention to the arms, I placed a small stab incision within the marked planned excision and tumescent was infiltrated superficially. After adequate time to set, superficial suction assisted lipectomy was performed using the #3 and #4 mercedes cannulas. This was performed bilaterally. After adequate laxity was determined by pinch test, I incised the superior marking along the brachial groove as marked on the right side.   I used a combination of avulsion and electrocautery to dissect the planned excision of excess being meticulous to stay above the muscular fascia. This dissection was carried into the axilla and connected with the axillary reduction incisions due to the significant excess of skin needing to be excised and accommodated. The incisions were tailor tacked and closure commenced over a 10 Fr drain which was secured to the skin using 2-0 prolene. The fascia was closed using a combination of 2-0 PDS and 3-0 monocryl. The deep dermis was closed using 3-0 monocryl. The skin was approximated using 4-0 PDS. The same procedure was performed on the left. The incisions were cleansed and glue was placed. Once this was dry, steristrips and a surgical bra were placed as well as ACE wrap to the arms. The instrument, sponge and needle count was correct an verified prior to completion of the case. The patient emerged from anesthesia and was transferred to the recovery room in stable condition. Patient Disposition:  PACU         SIGNATURE: Tor DO Caridad  DATE: November 28, 2023  TIME: 3:25 PM    No qualified plastic surgery resident was available for the case. The PAOSVALDO provided assistance with retraction and suturing.

## 2023-11-28 NOTE — NURSING NOTE
Pt is awake,alert,tolerated diet, assisted OOB to BR, voided. Written and verbal instructions given to pt and her , who verbalize an understanding, Pt is familiar with drain care , written and verbal in structions reviewed with pt who verbalizes an understanding and how to record on the drain flow sheet.

## 2023-11-28 NOTE — INTERVAL H&P NOTE
H&P reviewed. After examining the patient I find no changes in the patients condition since the H&P had been written.     Vitals:    11/28/23 0640   BP: 141/72   Pulse: 72   Resp: 16   Temp: (!) 97.4 °F (36.3 °C)   SpO2: 96%

## 2023-12-02 PROCEDURE — 88302 TISSUE EXAM BY PATHOLOGIST: CPT | Performed by: PATHOLOGY

## 2023-12-02 PROCEDURE — 88300 SURGICAL PATH GROSS: CPT | Performed by: PATHOLOGY

## 2023-12-07 ENCOUNTER — OFFICE VISIT (OUTPATIENT)
Dept: PLASTIC SURGERY | Facility: CLINIC | Age: 63
End: 2023-12-07

## 2023-12-07 DIAGNOSIS — Z41.1 ELECTIVE PROCEDURE FOR UNACCEPTABLE COSMETIC APPEARANCE: Primary | ICD-10-CM

## 2023-12-07 DIAGNOSIS — Z90.11 ACQUIRED ABSENCE OF RIGHT BREAST AND NIPPLE: ICD-10-CM

## 2023-12-07 NOTE — PROGRESS NOTES
Assessment and Plan:  Ac Mcarthur 70 y.o. female s/p right breast recon with replacement of implant, left medial scar revision, bilateral brachioplasty 11/28/23, presenting for first post op visit    - drains with minimal output. Bilateral arm drains removed  - patient to continue ace bandage until next appointment. I did discuss with her that she can start looking at arm compression garments online  - will have patient return in 1 week for steri strip removal and suture end removal  - continue surgical bra    Subjective:  Patient doing well. Drains have been putting out very minimal and she would like them removed. Pain has been tolerable.        Objective:  NAD, AAOx3  Bilateral arm incisions C/D/I, drains with minimal serosanguinous output, swollen, minimal bruising  Breast incisions C/D/I, no erythema, no s/s of seroma    Christina Aubery Spurling, PA-C  Plastic and Reconstructive Surgery

## 2023-12-10 DIAGNOSIS — Z90.11 ACQUIRED ABSENCE OF RIGHT BREAST AND NIPPLE: ICD-10-CM

## 2023-12-11 RX ORDER — GABAPENTIN 300 MG/1
300 CAPSULE ORAL 3 TIMES DAILY
Qty: 15 CAPSULE | Refills: 1 | Status: SHIPPED | OUTPATIENT
Start: 2023-12-11 | End: 2023-12-21

## 2023-12-18 ENCOUNTER — OFFICE VISIT (OUTPATIENT)
Dept: PLASTIC SURGERY | Facility: CLINIC | Age: 63
End: 2023-12-18

## 2023-12-18 DIAGNOSIS — Z41.1 ELECTIVE PROCEDURE FOR UNACCEPTABLE COSMETIC APPEARANCE: ICD-10-CM

## 2023-12-18 DIAGNOSIS — Z90.11 ACQUIRED ABSENCE OF RIGHT BREAST AND NIPPLE: Primary | ICD-10-CM

## 2023-12-18 PROCEDURE — 99024 POSTOP FOLLOW-UP VISIT: CPT | Performed by: PHYSICIAN ASSISTANT

## 2023-12-18 NOTE — PROGRESS NOTES
Assessment and Plan:  Keshia Carias 63 y.o. female s/p right breast recon with replacement of implant, left medial scar revision, bilateral brachioplasty 11/28/23, presenting for post op visit    - suture ends removed  - instructed patient to wear compression garment at all times except to shower  - continue ibuprofen and ice for swelling  - massage arm and breast incisions  - would not recommend lip filler at this time due to being close to surgery date  - patient interested in possible upper/lower blepharoplasty. Will have patient obtain visual field testing for upper/lower blepharoplasty if she would like to proceed.   - follow up in 2 weeks     Subjective:  Patient doing well. She does have some concerns about swelling to her distal incisions of her arms. She has not been wearing her compression garment as she thought this was making her swelling worse. Very small openings to bilateral axilla. She is interested in a possible upper/lower blepharoplasty. She also would like lip filler but would not suggest at this time due to being so close to her previous surgery date.         Objective:  NAD, AAOx3  Bilateral arm incisions with 1cm opening to bilateral axilla, swollen, moderate swelling and erythema to distal arm incisions   Breast incisions C/D/I, no erythema, no s/s of seroma  Excess skin noted to upper bilateral eyelids, R>L.      Analisa Mendez PA-C  Plastic and Reconstructive Surgery

## 2024-01-15 ENCOUNTER — OFFICE VISIT (OUTPATIENT)
Dept: PLASTIC SURGERY | Facility: CLINIC | Age: 64
End: 2024-01-15

## 2024-01-15 DIAGNOSIS — Z41.1 ELECTIVE PROCEDURE FOR UNACCEPTABLE COSMETIC APPEARANCE: ICD-10-CM

## 2024-01-15 DIAGNOSIS — Z90.11 ACQUIRED ABSENCE OF RIGHT BREAST AND NIPPLE: Primary | ICD-10-CM

## 2024-01-15 PROCEDURE — 99024 POSTOP FOLLOW-UP VISIT: CPT | Performed by: PHYSICIAN ASSISTANT

## 2024-01-15 NOTE — PROGRESS NOTES
Assessment and Plan:  Keshia Carias 63 y.o. female s/p right breast recon with replacement of implant, left medial scar revision, bilateral brachioplasty 11/28/23, presenting for post op visit    - patient healing well. Incisions closed  - continue to massage scars  - patient would like to hold off on her upper and lower blepharoplasty at this time. She will reach out if she would like to proceed  - patient would like to have filler to lips. Discussed with Dr. Craig. Ok to proceed  - stressed importance of continued weight lifting and exercise to optimize arm results  - will have patient return in 2-3 months for incision check     Subjective:    Doing well. Incisions completely closed. She has been doing scar massage. Estimate given for lower blepharoplasty, patient would like to hold off on this at this time. She has been massaging her scars. She is very happy with her breast results. Discussed her arm swelling will continue to decrease over time.         Objective:  NAD, AAOx3  Bilateral arm incisions healing well, mild swelling to distal arm incisions  Breast incisions C/D/I, no erythema, no s/s of seroma  Excess skin noted to upper bilateral eyelids, R>L.      Analisa Mendez PA-C  Plastic and Reconstructive Surgery

## 2024-01-17 DIAGNOSIS — Z00.6 ENCOUNTER FOR EXAMINATION FOR NORMAL COMPARISON OR CONTROL IN CLINICAL RESEARCH PROGRAM: ICD-10-CM

## 2024-01-23 ENCOUNTER — COSMETIC (OUTPATIENT)
Dept: PLASTIC SURGERY | Facility: CLINIC | Age: 64
End: 2024-01-23

## 2024-01-23 DIAGNOSIS — Z41.1 ELECTIVE PROCEDURE FOR UNACCEPTABLE COSMETIC APPEARANCE: Primary | ICD-10-CM

## 2024-01-23 PROCEDURE — RESTY1: Performed by: STUDENT IN AN ORGANIZED HEALTH CARE EDUCATION/TRAINING PROGRAM

## 2024-01-23 PROCEDURE — FILLER1 JUVEDERM/RESTYLANE 1 SYRINGE: Performed by: STUDENT IN AN ORGANIZED HEALTH CARE EDUCATION/TRAINING PROGRAM

## 2024-01-23 PROCEDURE — RECHECK: Performed by: STUDENT IN AN ORGANIZED HEALTH CARE EDUCATION/TRAINING PROGRAM

## 2024-01-23 NOTE — PROGRESS NOTES
After informed consent, a portion of 1 syringe of kysse was injected into the upper and lower lip.  The patient tolerated the procedure well.     Post-procedure instructions provided.  Remainder of syringe saved.     Patient to return in 2 weeks or sooner should any issues arise.     Daniela Craig, DO  Plastic and Reconstructive Surgery

## 2024-01-25 ENCOUNTER — RA CDI HCC (OUTPATIENT)
Dept: OTHER | Facility: HOSPITAL | Age: 64
End: 2024-01-25

## 2024-01-26 ENCOUNTER — OFFICE VISIT (OUTPATIENT)
Dept: OBGYN CLINIC | Facility: CLINIC | Age: 64
End: 2024-01-26
Payer: COMMERCIAL

## 2024-01-26 VITALS
WEIGHT: 170.6 LBS | DIASTOLIC BLOOD PRESSURE: 80 MMHG | BODY MASS INDEX: 31.39 KG/M2 | HEIGHT: 62 IN | SYSTOLIC BLOOD PRESSURE: 122 MMHG

## 2024-01-26 DIAGNOSIS — N90.7 INCLUSION CYST OF VULVA: Primary | ICD-10-CM

## 2024-01-26 PROCEDURE — 10060 I&D ABSCESS SIMPLE/SINGLE: CPT | Performed by: OBSTETRICS & GYNECOLOGY

## 2024-01-26 NOTE — PROGRESS NOTES
Incision and drain    Date/Time: 1/26/2024 8:30 AM    Performed by: Akbar Alicea MD  Authorized by: Akbar Alicea MD  Universal Protocol:  Consent: Verbal consent obtained. Written consent not obtained.  Risks and benefits: risks, benefits and alternatives were discussed  Consent given by: patient  Patient understanding: patient states understanding of the procedure being performed  Patient consent: the patient's understanding of the procedure matches consent given  Procedure consent: procedure consent matches procedure scheduled  Relevant documents: relevant documents present and verified  Test results: test results not available  Site marked: the operative site was not marked  Radiology Images displayed and confirmed. If images not available, report reviewed: imaging studies not available  Required items: required blood products, implants, devices, and special equipment available  Patient identity confirmed: verbally with patient    Patient location:  Other (comment) (Office visit)  Location:     Type:  Cyst (Labial inclusion cyst)    Location:  Anogenital    Anogenital location:  [place]  Pre-procedure details:     Skin preparation:  Betadine  Anesthesia (see MAR for exact dosages):     Anesthesia method:  Local infiltration    Local anesthetic:  Lidocaine 1% w/o epi  Procedure details:     Complexity:  Simple    Needle aspiration: no      Incision types:  Stab incision    Scalpel blade:  15    Approach:  Puncture    Incision depth:  Superficial    Wound management:  Probed and deloculated    Drainage characteristics: Cheese type drainage consistent with inclusion cyst.    Drainage amount:  Scant    Wound treatment:  Wound left open  Post-procedure details:     Patient tolerance of procedure:  Tolerated well, no immediate complications  Comments:      Topic: Right labial inclusion cyst     Procedure: I&D of right labial inclusion cyst    Excellent hemostasis noted     Instructions and restrictions  given     All questions answered     Further treatment and follow-up planning will be based upon final pathology results     Patient to call for any problems, questions, issues or concerns which may arise for her

## 2024-01-26 NOTE — PATIENT INSTRUCTIONS
Topic: Right labial inclusion cyst     Procedure: I&D of right labial inclusion cyst    Excellent hemostasis noted     Instructions and restrictions given     All questions answered     Further treatment and follow-up planning will be based upon final pathology results     Patient to call for any problems, questions, issues or concerns which may arise for her

## 2024-01-27 ENCOUNTER — APPOINTMENT (OUTPATIENT)
Dept: LAB | Age: 64
End: 2024-01-27
Payer: COMMERCIAL

## 2024-01-27 DIAGNOSIS — R89.9 ABNORMAL LABORATORY TEST: ICD-10-CM

## 2024-01-27 DIAGNOSIS — Z00.6 ENCOUNTER FOR EXAMINATION FOR NORMAL COMPARISON OR CONTROL IN CLINICAL RESEARCH PROGRAM: ICD-10-CM

## 2024-01-27 DIAGNOSIS — Z13.6 SCREENING FOR CARDIOVASCULAR CONDITION: ICD-10-CM

## 2024-01-27 LAB
ALBUMIN SERPL BCP-MCNC: 4.3 G/DL (ref 3.5–5)
ALP SERPL-CCNC: 40 U/L (ref 34–104)
ALT SERPL W P-5'-P-CCNC: 8 U/L (ref 7–52)
ANION GAP SERPL CALCULATED.3IONS-SCNC: 8 MMOL/L
AST SERPL W P-5'-P-CCNC: 15 U/L (ref 13–39)
BASOPHILS # BLD AUTO: 0.05 THOUSANDS/ÂΜL (ref 0–0.1)
BASOPHILS NFR BLD AUTO: 1 % (ref 0–1)
BILIRUB SERPL-MCNC: 0.66 MG/DL (ref 0.2–1)
BUN SERPL-MCNC: 19 MG/DL (ref 5–25)
CALCIUM SERPL-MCNC: 9.7 MG/DL (ref 8.4–10.2)
CHLORIDE SERPL-SCNC: 99 MMOL/L (ref 96–108)
CHOLEST SERPL-MCNC: 173 MG/DL
CO2 SERPL-SCNC: 33 MMOL/L (ref 21–32)
CREAT SERPL-MCNC: 0.82 MG/DL (ref 0.6–1.3)
EOSINOPHIL # BLD AUTO: 0.15 THOUSAND/ÂΜL (ref 0–0.61)
EOSINOPHIL NFR BLD AUTO: 3 % (ref 0–6)
ERYTHROCYTE [DISTWIDTH] IN BLOOD BY AUTOMATED COUNT: 12.6 % (ref 11.6–15.1)
GFR SERPL CREATININE-BSD FRML MDRD: 75 ML/MIN/1.73SQ M
GLUCOSE P FAST SERPL-MCNC: 91 MG/DL (ref 65–99)
HCT VFR BLD AUTO: 44.5 % (ref 34.8–46.1)
HDLC SERPL-MCNC: 70 MG/DL
HGB BLD-MCNC: 15 G/DL (ref 11.5–15.4)
IGA SERPL-MCNC: 45 MG/DL (ref 66–433)
IGG SERPL-MCNC: 640 MG/DL (ref 635–1741)
IGM SERPL-MCNC: 44 MG/DL (ref 45–281)
IMM GRANULOCYTES # BLD AUTO: 0.01 THOUSAND/UL (ref 0–0.2)
IMM GRANULOCYTES NFR BLD AUTO: 0 % (ref 0–2)
LDLC SERPL CALC-MCNC: 82 MG/DL (ref 0–100)
LYMPHOCYTES # BLD AUTO: 1.76 THOUSANDS/ÂΜL (ref 0.6–4.47)
LYMPHOCYTES NFR BLD AUTO: 36 % (ref 14–44)
MCH RBC QN AUTO: 30.5 PG (ref 26.8–34.3)
MCHC RBC AUTO-ENTMCNC: 33.7 G/DL (ref 31.4–37.4)
MCV RBC AUTO: 90 FL (ref 82–98)
MONOCYTES # BLD AUTO: 0.33 THOUSAND/ÂΜL (ref 0.17–1.22)
MONOCYTES NFR BLD AUTO: 7 % (ref 4–12)
NEUTROPHILS # BLD AUTO: 2.53 THOUSANDS/ÂΜL (ref 1.85–7.62)
NEUTS SEG NFR BLD AUTO: 53 % (ref 43–75)
NRBC BLD AUTO-RTO: 0 /100 WBCS
PLATELET # BLD AUTO: 132 THOUSANDS/UL (ref 149–390)
PMV BLD AUTO: 12.6 FL (ref 8.9–12.7)
POTASSIUM SERPL-SCNC: 3.5 MMOL/L (ref 3.5–5.3)
PROT SERPL-MCNC: 6.5 G/DL (ref 6.4–8.4)
RBC # BLD AUTO: 4.92 MILLION/UL (ref 3.81–5.12)
SODIUM SERPL-SCNC: 140 MMOL/L (ref 135–147)
TRIGL SERPL-MCNC: 105 MG/DL
WBC # BLD AUTO: 4.83 THOUSAND/UL (ref 4.31–10.16)

## 2024-01-27 PROCEDURE — 80061 LIPID PANEL: CPT

## 2024-01-27 PROCEDURE — 36415 COLL VENOUS BLD VENIPUNCTURE: CPT

## 2024-01-27 PROCEDURE — 85025 COMPLETE CBC W/AUTO DIFF WBC: CPT

## 2024-01-27 PROCEDURE — 82784 ASSAY IGA/IGD/IGG/IGM EACH: CPT

## 2024-01-27 PROCEDURE — 80053 COMPREHEN METABOLIC PANEL: CPT

## 2024-02-06 ENCOUNTER — OFFICE VISIT (OUTPATIENT)
Dept: INTERNAL MEDICINE CLINIC | Facility: CLINIC | Age: 64
End: 2024-02-06
Payer: COMMERCIAL

## 2024-02-06 ENCOUNTER — TELEPHONE (OUTPATIENT)
Age: 64
End: 2024-02-06

## 2024-02-06 ENCOUNTER — RA CDI HCC (OUTPATIENT)
Dept: OTHER | Facility: HOSPITAL | Age: 64
End: 2024-02-06

## 2024-02-06 VITALS
SYSTOLIC BLOOD PRESSURE: 110 MMHG | OXYGEN SATURATION: 97 % | DIASTOLIC BLOOD PRESSURE: 78 MMHG | WEIGHT: 169.8 LBS | HEART RATE: 82 BPM | BODY MASS INDEX: 31.06 KG/M2

## 2024-02-06 DIAGNOSIS — E03.9 HYPOTHYROIDISM, UNSPECIFIED TYPE: ICD-10-CM

## 2024-02-06 DIAGNOSIS — F41.9 ANXIETY: ICD-10-CM

## 2024-02-06 DIAGNOSIS — D83.9 VARIABLE IMMUNODEFICIENCY SYNDROME (HCC): ICD-10-CM

## 2024-02-06 DIAGNOSIS — E78.5 HYPERLIPIDEMIA, UNSPECIFIED HYPERLIPIDEMIA TYPE: ICD-10-CM

## 2024-02-06 DIAGNOSIS — J96.01 ACUTE RESPIRATORY FAILURE WITH HYPOXIA (HCC): ICD-10-CM

## 2024-02-06 DIAGNOSIS — Z78.0 ASYMPTOMATIC MENOPAUSAL STATE: ICD-10-CM

## 2024-02-06 DIAGNOSIS — D80.1 LOW GAMMAGLOBULIN LEVEL (HCC): ICD-10-CM

## 2024-02-06 DIAGNOSIS — D69.6 THROMBOCYTOPENIA (HCC): ICD-10-CM

## 2024-02-06 DIAGNOSIS — I15.9 SECONDARY HYPERTENSION: ICD-10-CM

## 2024-02-06 DIAGNOSIS — R73.03 PREDIABETES: ICD-10-CM

## 2024-02-06 DIAGNOSIS — I83.90 VARICOSE VEINS OF LOWER EXTREMITY, UNSPECIFIED LATERALITY, UNSPECIFIED WHETHER COMPLICATED: ICD-10-CM

## 2024-02-06 DIAGNOSIS — M85.80 OSTEOPENIA, UNSPECIFIED LOCATION: ICD-10-CM

## 2024-02-06 DIAGNOSIS — E66.09 CLASS 1 OBESITY DUE TO EXCESS CALORIES WITHOUT SERIOUS COMORBIDITY WITH BODY MASS INDEX (BMI) OF 33.0 TO 33.9 IN ADULT: Primary | ICD-10-CM

## 2024-02-06 PROBLEM — H26.9 CATARACT: Status: RESOLVED | Noted: 2023-01-06 | Resolved: 2024-02-06

## 2024-02-06 PROCEDURE — 99214 OFFICE O/P EST MOD 30 MIN: CPT | Performed by: INTERNAL MEDICINE

## 2024-02-06 NOTE — PROGRESS NOTES
Assessment/Plan:    Variable immunodeficiency syndrome (HCC)  Reviewed laboratories movements in the immunoglobulins we will continue to monitor every 6 months patient will be getting the COVID-vaccine at the pharmacy    Thrombocytopenia ([facility])  Currently stable and doing well platelet counts are stable continue to monitor CBC    Prediabetes  Pre diabetes -I have counseled the patient to follow a healthy balanced diet, I have counseled patient reduce carbohydrates and sweets in the diet, I would like the patient exercise routinely.  I will be checking hemoglobin A1c and comprehensive metabolic panel.  Have counseled patient about the prevention of diabetes, and the risk of progression to type 2 diabetes.    Low gammaglobulin level ([facility])  Currently stable doing well    Hypothyroidism  Hypothyroidism controlled the patient is currently euthyroid I will be ordering a TSH prior to the next office visit and the patient will continue with current medical regiment; we will continue to monitor the patient's progress.    Hyperlipidemia  Hyperlipidemia controlled continue with current medical regiment recommend a low-cholesterol diet and recommend routine exercise we will continue to monitor the progress.  Continue Crestor    Hypertension  Hypertension - controlled, I have counseled patient following healthy balance diet, I would like the patient reduce sodium, exercise routinely, I would like the patient continued the med current medical regiment and we will continue to monitor.    Class 1 obesity due to excess calories with body mass index (BMI) of 33.0 to 33.9 in adult  Obesity -I have counseled patient following healthy and balanced diet, I would like the patient to lose weight, I would like the patient exercise routinely; we will continue monitor the patient's progress.    Varicose veins of lower extremity  Asymptomatic will have patient see vascular    Osteopenia  Weightbearing exercises check DEXA scan         Problem  List Items Addressed This Visit        Endocrine    Hypothyroidism     Hypothyroidism controlled the patient is currently euthyroid I will be ordering a TSH prior to the next office visit and the patient will continue with current medical regiment; we will continue to monitor the patient's progress.         Relevant Orders    TSH, 3rd generation       Respiratory    RESOLVED: Acute respiratory failure with hypoxia (HCC)       Cardiovascular and Mediastinum    Hypertension     Hypertension - controlled, I have counseled patient following healthy balance diet, I would like the patient reduce sodium, exercise routinely, I would like the patient continued the med current medical regiment and we will continue to monitor.         Varicose veins of lower extremity     Asymptomatic will have patient see vascular         Relevant Orders    Ambulatory Referral to Vascular Surgery       Musculoskeletal and Integument    Osteopenia     Weightbearing exercises check DEXA scan         Relevant Orders    DXA bone density spine hip and pelvis       Hematopoietic and Hemostatic    Thrombocytopenia (HCC)     Currently stable and doing well platelet counts are stable continue to monitor CBC         Relevant Orders    CBC (Includes Diff/Plt) (Refl)    Immunofixation IgA,IgG,IgM Qt.Immunofixation Serum Immunoglobulin       Other    Anxiety    Prediabetes     Pre diabetes -I have counseled the patient to follow a healthy balanced diet, I have counseled patient reduce carbohydrates and sweets in the diet, I would like the patient exercise routinely.  I will be checking hemoglobin A1c and comprehensive metabolic panel.  Have counseled patient about the prevention of diabetes, and the risk of progression to type 2 diabetes.         Relevant Orders    Comprehensive metabolic panel    Class 1 obesity due to excess calories with body mass index (BMI) of 33.0 to 33.9 in adult - Primary     Obesity -I have counseled patient following healthy and  balanced diet, I would like the patient to lose weight, I would like the patient exercise routinely; we will continue monitor the patient's progress.         Low gammaglobulin level (HCC)     Currently stable doing well         Hyperlipidemia     Hyperlipidemia controlled continue with current medical regiment recommend a low-cholesterol diet and recommend routine exercise we will continue to monitor the progress.  Continue Crestor         Relevant Orders    Lipid Panel with Direct LDL reflex    Variable immunodeficiency syndrome (HCC)     Reviewed laboratories movements in the immunoglobulins we will continue to monitor every 6 months patient will be getting the COVID-vaccine at the pharmacy         Relevant Orders    Immunofixation IgA,IgG,IgM Qt.Immunofixation Serum Immunoglobulin    Asymptomatic menopausal state    Relevant Orders    DXA bone density spine hip and pelvis       RTO in 6 months call if any problems  Subjective:      Patient ID: Keshia Carias is a 63 y.o. female.    HPI 63-year old female coming in for a follow up office visit regarding obesity class I, prediabetes, hyperlipidemia, low gammaglobulin level, thrombocytopenia, hypothyroidism the patient reports me compliant taking medications without untoward side effects the.  The patient is here to review his medical condition, update me on the medical condition and the patient reports me no hospitalizations and no ER visits.  No injuries no illnesses trying to follow healthy balanced diet here to review laboratories in detail recent plastic surgery upper extremities bilateral living will healing well will be increasing activity levels more recently the pt reports recent surg  less active , diet good  varicose veins helen out in the summer    The following portions of the patient's history were reviewed and updated as appropriate: allergies, current medications, past family history, past medical history, past social history, past surgical history  "and problem list.    Review of Systems   Constitutional:  Negative for activity change, appetite change and unexpected weight change.   HENT:  Negative for congestion and postnasal drip.    Eyes:  Negative for visual disturbance.   Respiratory:  Negative for cough and shortness of breath.    Cardiovascular:  Negative for chest pain.   Gastrointestinal:  Negative for abdominal pain, diarrhea, nausea and vomiting.   Neurological:  Negative for dizziness, light-headedness and headaches.   Hematological:  Negative for adenopathy.         Objective:    Return in about 6 months (around 8/6/2024).    No results found.      Allergies   Allergen Reactions   • Penicillins Anaphylaxis     Anaphylaxis  Anaphylaxis  Other reaction(s): Anaphylaxis   • Acetazolamide Itching and Swelling     With eye drops - takes  oral sulfa w/o side effects   • Flagyl [Metronidazole] Nausea Only and GI Intolerance       Past Medical History:   Diagnosis Date   • Abnormal electrocardiogram     Last assessed 10/04/13   • Acute respiratory failure with hypoxia (HCC)    • Allergic 1990    Penicillin - anaphalaxis   • Anemia     pt states hypogammaglobulinemia, needs washed RBCs if transfused - Rx by Dr Wolfe   • Anxiety     \"controlled with zoloft\"   • Arthritis 2005   • Cancer (HCC)     DCIS Right breast-had mastectomy   • Cataract 01/06/2023   • COVID 01/2022   • Dental crowns present    • Disease of thyroid gland    • Diverticulitis of colon    • GERD (gastroesophageal reflux disease)    • Hyperlipidemia    • Hypertension    • Hypogammaglobulinemia (HCC)    • Hypoglobulinemia    • IgA deficiency (HCC)     per pt stable -\"no current need to see Dr Wolfe Hematology unless an issue\"   • 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HCC)    • IgM deficiency (HCC)    • Limb alert care status     No BP/IV Right Arm   • Obesity    • PONV (postoperative nausea and vomiting)     per pt \"patch behind ear works\"   • Prediabetes    • Slow transit constipation    • Wears glasses  " "    Past Surgical History:   Procedure Laterality Date   • APPENDECTOMY     • BOWEL RESECTION      \"for diverticulitis\"   • BREAST BIOPSY Right 07/07/2022    stereo x 2   • BREAST BIOPSY Right 07/26/2022    stereo   • BREAST RECONSTRUCTION Right 12/15/2022    Procedure: RIGHT BREAST RECONSTRUCTION AND REMOVAL OF TISSUE EXPANDER WITH PLACEMENT OF IMPLANT. CAPSULOTOMY.;  Surgeon: Daniela Craig DO;  Location:  MAIN OR;  Service: Plastics   • CARPAL TUNNEL RELEASE     • CATARACT EXTRACTION Bilateral    • COLECTOMY      Resolved 08/27/09   • COLONOSCOPY     • DENTAL IMPLANT     • EYE SURGERY  2007    Lasik , Cataracts-2023   • INSERTION OF BREAST TISSUE EXPANDER Right 09/09/2022    Procedure: IMMEDIATE BREAST RECONSTRUCTION WITH TISSUE EXPANDER AND  GALAFLEX;  Surgeon: Daniela Craig DO;  Location: [facility];  Service: Plastics   • LASIK     • LYMPH NODE BIOPSY  9/22   • MAMMO STEREOTACTIC BREAST BIOPSY RIGHT (ALL INC) Right 07/07/2022   • MAMMO STEREOTACTIC BREAST BIOPSY RIGHT (ALL INC) Right 07/26/2022   • MAMMO STEREOTACTIC BREAST BIOPSY RIGHT (ALL INC) EACH ADD Right 07/07/2022   • MASTECTOMY Right    • MASTECTOMY W/ SENTINEL NODE BIOPSY Right 09/09/2022    Procedure: MASTECTOMY WITH BIOPSY LYMPH NODE SENTINEL,Lymphoscintigraphy,Lymphatic Mapping; [address];  Surgeon: Anuradha Kelly MD;  Location: [facility];  Service: Surgical Oncology   • OTHER SURGICAL HISTORY      Biopsy Vulvar   • KS ARTHRP KNE CONDYLE&PLATU MEDIAL&LAT COMPARTMENTS Left 02/08/2017    Procedure: TOTAL KNEE REPLACEMENT ARTHROPLASTY;  Surgeon: Rakesh Melton MD;  Location:  MAIN OR;  Service: Orthopedics   • KS COLONOSCOPY FLX DX W/COLLJ SPEC WHEN PFRMD N/A 05/06/2019    Procedure: COLONOSCOPY;  Surgeon: Alan Shirley MD;  Location: [facility];  Service: General   • KS EXCISION EXCESSIVE SKIN & SUBQ TISSUE ARM Bilateral 11/28/2023    Procedure: BRACHIOPLASTY;  Surgeon: Daniela Craig DO;  Location:  MAIN OR;  " Service: Plastics   • MS INSJ/RPLCMT BREAST IMPLANT SEP DAY MASTECTOMY Right 03/14/2023    Procedure: RIGHT BREAST RECON REVISION WITH IMPLANT EXCHANGE AND CAPSULOTOMY;  Surgeon: Daniela Craig DO;  [facility]: [facility];  Service: Plastics   • MS MASTOPEXY Left 12/15/2022    Procedure: LEFT MASTOPEXY FOR SYMMETRY AND  GALAFLEX;  Surgeon: Daniela Craig DO;  Location:  MAIN OR;  Service: Plastics   • MS REVISION OF RECONSTRUCTED BREAST Right 11/28/2023    Procedure: REVISION OF RIGHT BREAST RECON WITH REPLACEMENT OF IMPLANT, CAPULOTOMY, VIRA FLAP, LEFT MEDIAL SCAR REVISION;  Surgeon: Daniela Craig DO;  Location:  MAIN OR;  Service: Plastics   • REFRACTIVE SURGERY      lasik   • REPLACEMENT TOTAL KNEE Bilateral 10/2013   • TONSILLECTOMY      With Adenoidectomy   • TOOTH EXTRACTION     • VAGINA SURGERY      mesh     Current Outpatient Medications on File Prior to Visit   Medication Sig Dispense Refill   • diphenhydrAMINE (BENADRYL) 25 mg tablet Take 25 mg by mouth daily at bedtime as needed for itching     • estradiol (ESTRACE VAGINAL) 0.1 mg/g vaginal cream Insert one quarter of an applicator intravaginally once weekly or once every other week 42.5 g 2   • famotidine (PEPCID) 20 mg tablet Take 1 tablet (20 mg total) by mouth 2 (two) times a day 180 tablet 1   • levothyroxine 75 mcg tablet Take 1 tablet (75 mcg total) by mouth daily 90 tablet 1   • potassium chloride (K-DUR,KLOR-CON) 10 mEq tablet Take 1 tablet (10 mEq total) by mouth 2 (two) times a day 180 tablet 3   • Psyllium (METAMUCIL PO) Take 1 Dose by mouth daily       • rosuvastatin (CRESTOR) 5 mg tablet TAKE ONE TABLET BY MOUTH EVERY DAY (Patient taking differently: Take 5 mg by mouth every morning) 90 tablet 5   • senna (SENOKOT) 8.6 MG tablet Take 2 tablets by mouth daily     • sertraline (ZOLOFT) 100 mg tablet Take 1 tablet (100 mg total) by mouth daily 90 tablet 1   • traZODone (DESYREL) 50 mg tablet Take 1 tablet (50 mg  total) by mouth daily at bedtime 90 tablet 1   • triamterene-hydrochlorothiazide (DYAZIDE) 37.5-25 mg per capsule Take 1 capsule by mouth every morning 90 capsule 1   • gabapentin (Neurontin) 300 mg capsule Take 1 capsule (300 mg total) by mouth 3 (three) times a day for 10 days 15 capsule 1   • ibuprofen (MOTRIN) 800 mg tablet Take 1 tablet (800 mg total) by mouth every 8 (eight) hours as needed for mild pain (DO NOT BEGIN UNTIL 48 HOURS AFTER SURGERY) 15 tablet 0   • oxyCODONE (Roxicodone) 5 immediate release tablet Take 1 tablet (5 mg total) by mouth every 6 (six) hours as needed for moderate pain Max Daily Amount: 20 mg 10 tablet 0   • traMADol (Ultram) 50 mg tablet Take 1 tablet (50 mg total) by mouth every 6 (six) hours as needed for moderate pain 20 tablet 0     No current facility-administered medications on file prior to visit.     Family History   Problem Relation Age of Onset   • No Known Problems Mother    • Basal cell carcinoma Father    • Prostate cancer Father 94   • Breast cancer Sister 55   • No Known Problems Sister    • No Known Problems Daughter    • No Known Problems Daughter    • No Known Problems Daughter    • No Known Problems Maternal Grandmother    • No Known Problems Maternal Grandfather    • Breast cancer Paternal Grandmother 48   • Breast cancer Paternal Grandfather    • Breast cancer Paternal Aunt 70   • Breast cancer Family    • Arthritis Family    • Hypertension Family    • Cancer Family    • Breast cancer Paternal Aunt         Breast cancer     Social History     Socioeconomic History   • Marital status: /Civil Union     Spouse name: Not on file   • Number of children: Not on file   • Years of education: Not on file   • Highest education level: Not on file   Occupational History   • Not on file   Tobacco Use   • Smoking status: Never     Passive exposure: Never   • Smokeless tobacco: Never   Vaping Use   • Vaping status: Never Used   Substance and Sexual Activity   • Alcohol  use: No   • Drug use: No   • Sexual activity: Not on file     Comment: defer   Other Topics Concern   • Not on file   Social History Narrative    Denied: Home environment Domestic Violence        Daily Tea consumption        Caffeine use     Social Determinants of Health     Financial Resource Strain: Low Risk  (7/28/2023)    Overall Financial Resource Strain (CARDIA)    • Difficulty of Paying Living Expenses: Not hard at all   Food Insecurity: Not on file   Transportation Needs: No Transportation Needs (7/28/2023)    PRAPARE - Transportation    • Lack of Transportation (Medical): No    • Lack of Transportation (Non-Medical): No   Physical Activity: Not on file   Stress: Not on file   Social Connections: Not on file   Intimate Partner Violence: Not on file   Housing Stability: Not on file     Vitals:    02/06/24 0824   BP: 110/78   BP Location: Left arm   Patient Position: Sitting   Cuff Size: Large   Pulse: 82   SpO2: 97%   Weight: 77 kg (169 lb 12.8 oz)     Results for orders placed or performed in visit on 01/27/24   Comprehensive metabolic panel   Result Value Ref Range    Sodium 140 135 - 147 mmol/L    Potassium 3.5 3.5 - 5.3 mmol/L    Chloride 99 96 - 108 mmol/L    CO2 33 (H) 21 - 32 mmol/L    ANION GAP 8 mmol/L    BUN 19 5 - 25 mg/dL    Creatinine 0.82 0.60 - 1.30 mg/dL    Glucose, Fasting 91 65 - 99 mg/dL    Calcium 9.7 8.4 - 10.2 mg/dL    AST 15 13 - 39 U/L    ALT 8 7 - 52 U/L    Alkaline Phosphatase 40 34 - 104 U/L    Total Protein 6.5 6.4 - 8.4 g/dL    Albumin 4.3 3.5 - 5.0 g/dL    Total Bilirubin 0.66 0.20 - 1.00 mg/dL    eGFR 75 ml/min/1.73sq m   Lipid Panel with Direct LDL reflex   Result Value Ref Range    Cholesterol 173 See Comment mg/dL    Triglycerides 105 See Comment mg/dL    HDL, Direct 70 >=50 mg/dL    LDL Calculated 82 0 - 100 mg/dL   CBC and differential   Result Value Ref Range    WBC 4.83 4.31 - 10.16 Thousand/uL    RBC 4.92 3.81 - 5.12 Million/uL    Hemoglobin 15.0 11.5 - 15.4 g/dL     Hematocrit 44.5 34.8 - 46.1 %    MCV 90 82 - 98 fL    MCH 30.5 26.8 - 34.3 pg    MCHC 33.7 31.4 - 37.4 g/dL    RDW 12.6 11.6 - 15.1 %    MPV 12.6 8.9 - 12.7 fL    Platelets 132 (L) 149 - 390 Thousands/uL    nRBC 0 /100 WBCs    Neutrophils Relative 53 43 - 75 %    Immat GRANS % 0 0 - 2 %    Lymphocytes Relative 36 14 - 44 %    Monocytes Relative 7 4 - 12 %    Eosinophils Relative 3 0 - 6 %    Basophils Relative 1 0 - 1 %    Neutrophils Absolute 2.53 1.85 - 7.62 Thousands/µL    Immature Grans Absolute 0.01 0.00 - 0.20 Thousand/uL    Lymphocytes Absolute 1.76 0.60 - 4.47 Thousands/µL    Monocytes Absolute 0.33 0.17 - 1.22 Thousand/µL    Eosinophils Absolute 0.15 0.00 - 0.61 Thousand/µL    Basophils Absolute 0.05 0.00 - 0.10 Thousands/µL   IgG, IgA, IgM   Result Value Ref Range    IGA 45 (L) 66 - 433 mg/dL     635 - 1,741 mg/dL    IGM 44 (L) 45 - 281 mg/dL     Weight (last 2 days)     Date/Time Weight    02/06/24 0824 77 (169.8)        Body mass index is 31.06 kg/m².  BP      Temp      Pulse     Resp      SpO2        Vitals:    02/06/24 0824   Weight: 77 kg (169 lb 12.8 oz)     Vitals:    02/06/24 0824   Weight: 77 kg (169 lb 12.8 oz)       /78 (BP Location: Left arm, Patient Position: Sitting, Cuff Size: Large)   Pulse 82   Wt 77 kg (169 lb 12.8 oz)   SpO2 97%   BMI 31.06 kg/m²          Physical Exam  Vitals and nursing note reviewed.   Constitutional:       General: She is not in acute distress.     Appearance: Normal appearance. She is well-developed. She is obese. She is not ill-appearing, toxic-appearing or diaphoretic.   HENT:      Head: Normocephalic.   Eyes:      General: No scleral icterus.        Right eye: No discharge.         Left eye: No discharge.      Conjunctiva/sclera: Conjunctivae normal.      Pupils: Pupils are equal, round, and reactive to light.   Cardiovascular:      Rate and Rhythm: Normal rate and regular rhythm.      Heart sounds: Normal heart sounds. No murmur heard.     No  friction rub. No gallop.   Pulmonary:      Effort: No respiratory distress.      Breath sounds: Normal breath sounds. No wheezing or rales.   Abdominal:      General: Bowel sounds are normal. There is no distension.      Palpations: Abdomen is soft. There is no mass.      Tenderness: There is no abdominal tenderness. There is no guarding or rebound.   Musculoskeletal:         General: No deformity.      Cervical back: Neck supple.   Lymphadenopathy:      Cervical: No cervical adenopathy.   Neurological:      Mental Status: She is alert.      Coordination: Coordination normal.

## 2024-02-06 NOTE — ASSESSMENT & PLAN NOTE
Hyperlipidemia controlled continue with current medical regiment recommend a low-cholesterol diet and recommend routine exercise we will continue to monitor the progress.  Continue Crestor

## 2024-02-06 NOTE — TELEPHONE ENCOUNTER
Nakul from [facility] called in for the patients latest colonoscopy results. Reached out to office and spoke with Alena. We could not find anything recent. Spoke to caller and she will reach out to the primary care provider for further assistance as caller already had most resent results on hand.

## 2024-02-06 NOTE — ASSESSMENT & PLAN NOTE
Reviewed laboratories movements in the immunoglobulins we will continue to monitor every 6 months patient will be getting the COVID-vaccine at the pharmacy

## 2024-02-07 ENCOUNTER — COSMETIC (OUTPATIENT)
Dept: PLASTIC SURGERY | Facility: CLINIC | Age: 64
End: 2024-02-07

## 2024-02-07 DIAGNOSIS — Z41.1 ELECTIVE PROCEDURE FOR UNACCEPTABLE COSMETIC APPEARANCE: Primary | ICD-10-CM

## 2024-02-07 PROCEDURE — RECHECK: Performed by: STUDENT IN AN ORGANIZED HEALTH CARE EDUCATION/TRAINING PROGRAM

## 2024-02-12 NOTE — PROGRESS NOTES
Presented for injection of remainder of syringe of kysse.  Well tolerated.    Daniela Craig, DO  Plastic and Reconstructive Surgery

## 2024-02-14 DIAGNOSIS — N95.2 ATROPHIC VAGINITIS: ICD-10-CM

## 2024-02-14 NOTE — TELEPHONE ENCOUNTER
Patient seen here for yearly 6/2023 - no prescription in chart for Estrace vaginal cream from 6/2023 appointment - phone call from [facility] mail order re: prescription for Estrace vaginal cream.  Hx breast CA 2022.  Do you want patient to use?

## 2024-02-16 RX ORDER — ESTRADIOL 0.1 MG/G
CREAM VAGINAL
Qty: 42.5 G | Refills: 1 | Status: SHIPPED | OUTPATIENT
Start: 2024-02-16

## 2024-02-16 NOTE — TELEPHONE ENCOUNTER
Please tell patient that because of the breast cancer history that we will hold the estrogen cream at this time    Please encourage liberal use of natural organic coconut oil and any of the lubricants that are available including Replens    Thanks

## 2024-02-16 NOTE — TELEPHONE ENCOUNTER
Patient has been using Estrace vaginal cream 1/4 applic 1-2 times/wk.  Last yearly note 6/2023 says ok per breast surgeon to use estrogen cream.  If agree, please sign off on prescription refill for Estrace cream to [email or web address] mail order pharmacy.  Patient informed & recommended to use once weekly/ROMAINE Alicea MD.   well nourished +wound, no pressure injuries per flowsheets

## 2024-02-25 LAB
APOB+LDLR+PCSK9 GENE MUT ANL BLD/T: NOT DETECTED
BRCA1+BRCA2 DEL+DUP + FULL MUT ANL BLD/T: NOT DETECTED
MLH1+MSH2+MSH6+PMS2 GN DEL+DUP+FUL M: NOT DETECTED

## 2024-02-27 ENCOUNTER — CONSULT (OUTPATIENT)
Dept: VASCULAR SURGERY | Facility: CLINIC | Age: 64
End: 2024-02-27
Payer: COMMERCIAL

## 2024-02-27 VITALS
BODY MASS INDEX: 30.91 KG/M2 | HEIGHT: 62 IN | HEART RATE: 71 BPM | WEIGHT: 168 LBS | DIASTOLIC BLOOD PRESSURE: 82 MMHG | SYSTOLIC BLOOD PRESSURE: 138 MMHG | RESPIRATION RATE: 18 BRPM | OXYGEN SATURATION: 95 %

## 2024-02-27 DIAGNOSIS — I83.90 VARICOSE VEINS OF LOWER EXTREMITY, UNSPECIFIED LATERALITY, UNSPECIFIED WHETHER COMPLICATED: Primary | ICD-10-CM

## 2024-02-27 DIAGNOSIS — I83.813 VARICOSE VEINS OF BOTH LOWER EXTREMITIES WITH PAIN: ICD-10-CM

## 2024-02-27 PROCEDURE — 99203 OFFICE O/P NEW LOW 30 MIN: CPT | Performed by: NURSE PRACTITIONER

## 2024-02-27 NOTE — PATIENT INSTRUCTIONS
Conservative medical management with daily use of medical grade compression stockings 20-30 mmHg.  On a.m., off in p.m.  Frequent ambulation   Leg elevation at rest   Moisturizer and diligent skin care to maintain skin integrity and prevent skin breakdown    Return PRN for new or worsening symptoms    Venous Insufficiency   AMBULATORY CARE:   Venous insufficiency  is a condition that prevents blood from flowing out of your legs and back to your heart. Veins contain valves that help blood flow in one direction. Venous insufficiency means the valves do not close correctly or fully. Blood flows back and pools in your leg. This can cause problems such as varicose veins. Venous insufficiency may also be called chronic venous insufficiency or venous stasis.  Common signs and symptoms:   Visible veins on your legs that may be small and red or large, thick, and blue         Swelling in your ankles or calves         Changes in skin color, such as dark or purple skin    An ulcer (open sore) on your leg    Leg pain that is worse when you are menstruating (women) or when you stand, and better when you elevate your legs    Burning or itching    Cramps that happen at night    Thick, hard skin on your legs and ankles    Feeling of heaviness in your legs    Seek care immediately if:   You have a wound that does not heal or is infected.    You have an injury that has broken your skin and caused your varicose veins to bleed.    Your leg is swollen and hard.    You have pain in your leg that does not go away or gets worse.    Your legs or feet are turning blue or black.    Your leg feels warm, tender, and painful. It may look swollen and red.    Call your doctor if:   You have a fever.    You have varicose veins and they are painful.    You have new or worsening leg pain, swelling, or redness.    You have new or worsening ulcers or other sores on your leg.    You have questions or concerns about your condition or care.    Treatment   may include any of the following:  Medicine  may be given to improve blood flow. The medicines may thin your blood or reduce swelling to help blood flow. You may also need medicine to treat a bacterial infection.    Ablation  is a procedure used to close varicose veins. A catheter is guided until it is near the vein. A device will then be guided to the area. The device may produce energy through radiofrequency or a laser. The energy creates heat that will close the blood vessel.    Sclerotherapy  is a procedure used to fade visible veins. Your healthcare provider will inject a liquid into a spider vein or varicose vein. The liquid causes irritation in the vein. The vein swells and sticks together. Your body will then absorb the vein.    Surgery  may be needed if other treatments do not work. Surgery may be used to repair a leg vein valve or to clip or tie off a vein so blood cannot flow through it. You may need to have a veins removed during surgery called stripping. Surgery may be used to bypass (go around) the damaged vein. Blood will flow through a vein transplanted from another part of your body.    Manage your symptoms:   Wear pressure stockings as directed.  Pressure stockings help keep blood from pooling in your leg veins. Your healthcare provider can prescribe stockings that are right for you. Do not buy over-the-counter pressure stockings unless your healthcare provider says it is okay. They may not fit correctly or may have elastic that cuts off your circulation. Ask your healthcare provider when to start wearing pressure stockings and how long to wear them each day.         Do not sit or stand for long periods of time.  If you have to sit for a long time, flex and extend your legs, feet, and ankles. Do this about 10 times every 30 minutes to help keep blood flowing. If you have to stand for a long time, take breaks and sit with your legs elevated.    Elevate your legs.  Elevate your legs above the level  of your heart to reduce swelling. Your healthcare provider may recommend that you keep your legs elevated for 30 minutes at a time. You may need to do this 3 to 4 times per day, or more if your healthcare provider recommends.         Do not smoke.  Nicotine and other chemicals in cigarettes and cigars can cause blood vessel damage. Ask your healthcare provider for information if you currently smoke and need help to quit. E-cigarettes or smokeless tobacco still contain nicotine. Talk to your healthcare provider before you use these products.    Reach or maintain a healthy weight.  Extra weight can make venous insufficiency worse. Ask your healthcare provider what a healthy weight is for you. He or she can help you create a weight loss plan if you need to lose weight.         Exercise as directed.  Walking can help increase blood flow in your calves. Ask your healthcare provider how much exercise you need each day and which exercises are best for you.         Care for your skin.  Keep your skin clean. Do not use any soaps or lotions that may dry your skin. For example, do not use products that contain fragrance or alcohol. If you have a skin ulcer, your healthcare provider may recommend a wet-to-dry bandage. To do this, apply a wet bandage to your wound and allow it to dry. This will help remove drainage from your wound each time you change the bandage. Your healthcare provider will tell you how often to change your bandage and which kind of bandage to use. Check your wound for signs of infection, such as swelling or pus.    Go to physical therapy (PT) as directed.  A physical therapist can help you increase movement and range of motion in your legs.    Follow up with your doctor as directed:  Write down your questions so you remember to ask them during your visits.  © Copyright [employer] 2023 Information is for End User's use only and may not be sold, redistributed or otherwise used for commercial purposes.  The above  information is an  only. It is not intended as medical advice for individual conditions or treatments. Talk to your doctor, nurse or pharmacist before following any medical regimen to see if it is safe and effective for you.

## 2024-02-27 NOTE — PROGRESS NOTES
"Assessment/Plan:   Problem List Items Addressed This Visit       Varicose veins of lower extremity     63-year-old female with HTN, HLD, obesity, anxiety, hypothyroid, GERD, chronic low back pain, and h/o b/l TKR presents with complaints of BLE bulging veins during summer months.    -Patient reports \" bulging, unsightly\" veins during summer months.  -She describes as heavy and aching  -On exam today there are few scattered spider telangiectasia however no dilated reticular, or truncal varicosities.  -No edema  -Denies family history of venous insufficiency or venous disease   -Patient does not wear compression  -Palpable DP pulses bilaterally    Plan:   -Conservative medical management with daily use of medical grade compression stockings 20-30 mmHg.  On a.m., off in p.m.  -Frequent ambulation   -Leg elevation at rest   -Moisturizer and diligent skin care to maintain skin integrity and prevent skin breakdown  -Return PRN         Relevant Orders    Compression Stocking    Varicose veins of lower extremity, unspecified laterality, unspecified whether complicated - Primary    Relevant Orders    Compression Stocking        Diagnoses and all orders for this visit:    Varicose veins of lower extremity, unspecified laterality, unspecified whether complicated  -     Ambulatory Referral to Vascular Surgery  -     Compression Stocking    Varicose veins of both lower extremities with pain          Subjective:      Patient ID: Keshia Carias is a 63 y.o. female    Patient is new to our practice and was referred by PCP. Pt c/o BLE bulging  veins that cause heaviness and aching pain in summer months.  Pt has had this for the past 3-4 years.  Pt does not wear compression or elevate her legs.      Chief Complaint: bulging veins in summer      HPI    Keshia Carias is seen for evaluation of: []Varicose veins/legs  []Spider veins/legs  []Spider veins/face  []Venous stasis ulcer  []Superficial thrombophlebitis  [x]Other - " bulging veins in summer     She complains of []none  [x]bulging veins  []dilated veins  []discolored veins         There is [x]no edema              []right leg edema  []left leg edema       []bilateral lower extremity edema     There is   []no leg pain          []right leg pain  []left leg pain         [x]bilateral leg pain  []bilateral leg pain; L>R   []bilateral leg pain; R>L     Pain is described as [x]aching              []itching  []sharp                []burning  []throbbing         []stinging  [x]heavy                []dull  []other -      Symptoms have been ongoing for:  3-4 years in summer months   There is  [x]no pertinent medical history  []history of DVT  []PE  []superficial venous thrombosis     Prior treatment includes [x]none  []EVLT  []OTC stockings  []prescription compression stockings  []vein ligation  []vein stripping  []stab phlebectomy  []sclerotherapy injections  []Other -      Current treatment includes [x]none  []compression socks  []avoiding tight clothing  []leg elevation  []rest  []exercise  []weight management  []skin care  []periodic evaluation   []Other-     Treatment has been []effective  []ineffective     Review of Systems   Constitutional: Negative.    HENT: Negative.     Eyes: Negative.    Respiratory: Negative.     Cardiovascular:  Positive for leg swelling.        Painful veins   Gastrointestinal: Negative.    Endocrine: Negative.    Genitourinary: Negative.    Musculoskeletal: Negative.    Skin: Negative.    Allergic/Immunologic: Negative.    Neurological: Negative.    Hematological: Negative.    Psychiatric/Behavioral: Negative.     I have reviewed and made appropriate changes to the review of systems input by the medical assistant.         The following portions of the patient's history were reviewed and updated as appropriate: allergies, current medications, past family history, past medical history, past social history, past surgical history, and problem list.  Objective  "    Physical Exam  Vitals reviewed.   Constitutional:       General: She is not in acute distress.  Cardiovascular:      Rate and Rhythm: Normal rate.      Pulses: Normal pulses.           Posterior tibial pulses are 2+ on the right side and 2+ on the left side.   Pulmonary:      Effort: Pulmonary effort is normal. No respiratory distress.   Musculoskeletal:         General: Normal range of motion.      Right lower leg: No edema.      Left lower leg: No edema.   Skin:     General: Skin is warm and dry.      Capillary Refill: Capillary refill takes less than 2 seconds.      Comments: Few scattered spider siomara  No truncal varicosities   Neurological:      Mental Status: She is alert and oriented to person, place, and time.      Sensory: No sensory deficit.      Motor: No weakness.   Psychiatric:         Behavior: Behavior normal.       I have spent a total time of 25 minutes on 02/27/24 in caring for this patient including Risks and benefits of tx options, Instructions for management, Patient and family education, Importance of tx compliance, Impressions, Documenting in the medical record, and Obtaining or reviewing history  .    Objective:     Vitals:    02/27/24 0850   BP: 138/82   BP Location: Left arm   Patient Position: Sitting   Pulse: 71   Resp: 18   SpO2: 95%   Weight: 76.2 kg (168 lb)   Height: 5' 2\" (1.575 m)       Patient Active Problem List   Diagnosis    S/P knee replacement    Hypertension    Anxiety    Hypothyroidism    Gastroesophageal reflux disease without esophagitis    Prediabetes    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HCC)    IgA deficiency (HCC)    Thrombocytopenia (HCC)    Melanosis coli    History of colon resection    Low back pain    Abnormal laboratory test    Edema    Class 1 obesity due to excess calories with body mass index (BMI) of 33.0 to 33.9 in adult    Low gammaglobulin level (HCC)    TMJ arthritis    Tension type headache    Exposure to SARS-associated coronavirus    Hyperlipidemia    Laryngitis    " "Primary insomnia    History of breast cancer    Family history of cancer    BRCA negative    Acquired absence of right breast and nipple    Preop exam for internal medicine    Encounter for follow-up examination after completed treatment for malignant neoplasm    Elective procedure for unacceptable cosmetic appearance    Variable immunodeficiency syndrome (HCC)    Osteopenia    Asymptomatic menopausal state    Varicose veins of lower extremity    Varicose veins of lower extremity, unspecified laterality, unspecified whether complicated       Past Surgical History:   Procedure Laterality Date    APPENDECTOMY      BOWEL RESECTION      \"for diverticulitis\"    BREAST BIOPSY Right 07/07/2022    stereo x 2    BREAST BIOPSY Right 07/26/2022    stereo    BREAST RECONSTRUCTION Right 12/15/2022    Procedure: RIGHT BREAST RECONSTRUCTION AND REMOVAL OF TISSUE EXPANDER WITH PLACEMENT OF IMPLANT. CAPSULOTOMY.;  Surgeon: Daniela Craig DO;  Location:  MAIN OR;  Service: Plastics    CARPAL TUNNEL RELEASE      CATARACT EXTRACTION Bilateral     COLECTOMY      Resolved 08/27/09    COLONOSCOPY      DENTAL IMPLANT      EYE SURGERY  2007    Lasik , Cataracts-2023    INSERTION OF BREAST TISSUE EXPANDER Right 09/09/2022    Procedure: IMMEDIATE BREAST RECONSTRUCTION WITH TISSUE EXPANDER AND  GALAFLEX;  Surgeon: Daniela Craig DO;  Location: [facility];  Service: Plastics    LASIK      LYMPH NODE BIOPSY  9/22    MAMMO STEREOTACTIC BREAST BIOPSY RIGHT (ALL INC) Right 07/07/2022    MAMMO STEREOTACTIC BREAST BIOPSY RIGHT (ALL INC) Right 07/26/2022    MAMMO STEREOTACTIC BREAST BIOPSY RIGHT (ALL INC) EACH ADD Right 07/07/2022    MASTECTOMY Right     MASTECTOMY W/ SENTINEL NODE BIOPSY Right 09/09/2022    Procedure: MASTECTOMY WITH BIOPSY LYMPH NODE SENTINEL,Lymphoscintigraphy,Lymphatic Mapping; [address];  Surgeon: Anuradha Kelly MD;  Location[facility];  Service: Surgical Oncology    OTHER SURGICAL HISTORY      Biopsy Vulvar "    SC ARTHRP KNE CONDYLE&PLATU MEDIAL&LAT COMPARTMENTS Left 02/08/2017    Procedure: TOTAL KNEE REPLACEMENT ARTHROPLASTY;  Surgeon: Rakesh Melton MD;  Location: [facility];  Service: Orthopedics    SC COLONOSCOPY FLX DX W/COLLJ SPEC WHEN PFRMD N/A 05/06/2019    Procedure: COLONOSCOPY;  Surgeon: Alan Shirley MD;  Location:  GI LAB;  Service: General    SC EXCISION EXCESSIVE SKIN & SUBQ TISSUE ARM Bilateral 11/28/2023    Procedure: BRACHIOPLASTY;  Surgeon: Daniela Craig DO;  Location:  MAIN OR;  Service: Plastics    SC INSJ/RPLCMT BREAST IMPLANT SEP DAY MASTECTOMY Right 03/14/2023    Procedure: RIGHT BREAST RECON REVISION WITH IMPLANT EXCHANGE AND CAPSULOTOMY;  Surgeon: Daniela Craig DO;  Location: [facility];  Service: Plastics    SC MASTOPEXY Left 12/15/2022    Procedure: LEFT MASTOPEXY FOR SYMMETRY AND  GALAFLEX;  Surgeon: Daniela Craig DO;  Location:  MAIN OR;  Service: Plastics    SC REVISION OF RECONSTRUCTED BREAST Right 11/28/2023    Procedure: REVISION OF RIGHT BREAST RECON WITH REPLACEMENT OF IMPLANT, CAPULOTOMY, VIRA FLAP, LEFT MEDIAL SCAR REVISION;  Surgeon: Daniela Craig DO;  Location:  MAIN OR;  Service: Plastics    REFRACTIVE SURGERY      lasik    REPLACEMENT TOTAL KNEE Bilateral 10/2013    TONSILLECTOMY      With Adenoidectomy    TOOTH EXTRACTION      VAGINA SURGERY      mesh       Family History   Problem Relation Age of Onset    No Known Problems Mother     Basal cell carcinoma Father     Prostate cancer Father 94    Breast cancer Sister 55    No Known Problems Sister     No Known Problems Daughter     No Known Problems Daughter     No Known Problems Daughter     No Known Problems Maternal Grandmother     No Known Problems Maternal Grandfather     Breast cancer Paternal Grandmother 48    Breast cancer Paternal Grandfather     Breast cancer Paternal Aunt 70    Breast cancer Family     Arthritis Family     Hypertension Family     Cancer Family     Breast  cancer Paternal Aunt         Breast cancer       Social History     Socioeconomic History    Marital status: /Civil Union     Spouse name: Not on file    Number of children: Not on file    Years of education: Not on file    Highest education level: Not on file   Occupational History    Not on file   Tobacco Use    Smoking status: Never     Passive exposure: Never    Smokeless tobacco: Never   Vaping Use    Vaping status: Never Used   Substance and Sexual Activity    Alcohol use: No    Drug use: No    Sexual activity: Not on file     Comment: defer   Other Topics Concern    Not on file   Social History Narrative    Denied: Home environment Domestic Violence        Daily Tea consumption        Caffeine use     Social Determinants of Health     Financial Resource Strain: Low Risk  (7/28/2023)    Overall Financial Resource Strain (CARDIA)     Difficulty of Paying Living Expenses: Not hard at all   Food Insecurity: Not on file   Transportation Needs: No Transportation Needs (7/28/2023)    PRAPARE - Transportation     Lack of Transportation (Medical): No     Lack of Transportation (Non-Medical): No   Physical Activity: Not on file   Stress: Not on file   Social Connections: Not on file   Intimate Partner Violence: Not on file   Housing Stability: Not on file       Allergies   Allergen Reactions    Penicillins Anaphylaxis     Anaphylaxis  Anaphylaxis  Other reaction(s): Anaphylaxis    Acetazolamide Itching and Swelling     With eye drops - takes  oral sulfa w/o side effects    Flagyl [Metronidazole] Nausea Only and GI Intolerance         Current Outpatient Medications:     diphenhydrAMINE (BENADRYL) 25 mg tablet, Take 25 mg by mouth daily at bedtime as needed for itching, Disp: , Rfl:     estradiol (ESTRACE VAGINAL) 0.1 mg/g vaginal cream, Insert one quarter of an applicator intravaginally once weekly, Disp: 42.5 g, Rfl: 1    famotidine (PEPCID) 20 mg tablet, Take 1 tablet (20 mg total) by mouth 2 (two) times a day,  Disp: 180 tablet, Rfl: 1    levothyroxine 75 mcg tablet, Take 1 tablet (75 mcg total) by mouth daily, Disp: 90 tablet, Rfl: 1    potassium chloride (K-DUR,KLOR-CON) 10 mEq tablet, Take 1 tablet (10 mEq total) by mouth 2 (two) times a day, Disp: 180 tablet, Rfl: 3    Psyllium (METAMUCIL PO), Take 1 Dose by mouth daily  , Disp: , Rfl:     rosuvastatin (CRESTOR) 5 mg tablet, TAKE ONE TABLET BY MOUTH EVERY DAY (Patient taking differently: Take 5 mg by mouth every morning), Disp: 90 tablet, Rfl: 5    senna (SENOKOT) 8.6 MG tablet, Take 2 tablets by mouth daily, Disp: , Rfl:     sertraline (ZOLOFT) 100 mg tablet, Take 1 tablet (100 mg total) by mouth daily, Disp: 90 tablet, Rfl: 1    traZODone (DESYREL) 50 mg tablet, Take 1 tablet (50 mg total) by mouth daily at bedtime, Disp: 90 tablet, Rfl: 1    triamterene-hydrochlorothiazide (DYAZIDE) 37.5-25 mg per capsule, Take 1 capsule by mouth every morning, Disp: 90 capsule, Rfl: 1    gabapentin (Neurontin) 300 mg capsule, Take 1 capsule (300 mg total) by mouth 3 (three) times a day for 10 days, Disp: 15 capsule, Rfl: 1    ibuprofen (MOTRIN) 800 mg tablet, Take 1 tablet (800 mg total) by mouth every 8 (eight) hours as needed for mild pain (DO NOT BEGIN UNTIL 48 HOURS AFTER SURGERY), Disp: 15 tablet, Rfl: 0    oxyCODONE (Roxicodone) 5 immediate release tablet, Take 1 tablet (5 mg total) by mouth every 6 (six) hours as needed for moderate pain Max Daily Amount: 20 mg, Disp: 10 tablet, Rfl: 0    traMADol (Ultram) 50 mg tablet, Take 1 tablet (50 mg total) by mouth every 6 (six) hours as needed for moderate pain, Disp: 20 tablet, Rfl: 0    Vitals:    02/27/24 0850   BP: 138/82   Pulse: 71   Resp: 18   SpO2: 95%

## 2024-02-27 NOTE — ASSESSMENT & PLAN NOTE
"63-year-old female with HTN, HLD, obesity, anxiety, hypothyroid, GERD, chronic low back pain, and h/o b/l TKR presents with complaints of BLE bulging veins during summer months.    -Patient reports \" bulging, unsightly\" veins during summer months.  -She describes as heavy and aching  -On exam today there are few scattered spider telangiectasia however no dilated reticular, or truncal varicosities.  -No edema  -Denies family history of venous insufficiency or venous disease   -Patient does not wear compression  -Palpable DP pulses bilaterally    Plan:   -Conservative medical management with daily use of medical grade compression stockings 20-30 mmHg.  On a.m., off in p.m.  -Frequent ambulation   -Leg elevation at rest   -Moisturizer and diligent skin care to maintain skin integrity and prevent skin breakdown  -Return PRN  "

## 2024-03-04 DIAGNOSIS — E03.9 HYPOTHYROIDISM, UNSPECIFIED TYPE: ICD-10-CM

## 2024-03-04 DIAGNOSIS — F41.9 ANXIETY: ICD-10-CM

## 2024-03-04 DIAGNOSIS — I10 HYPERTENSION, UNSPECIFIED TYPE: ICD-10-CM

## 2024-03-04 RX ORDER — TRIAMTERENE AND HYDROCHLOROTHIAZIDE 37.5; 25 MG/1; MG/1
1 CAPSULE ORAL EVERY MORNING
Qty: 90 CAPSULE | Refills: 1 | Status: SHIPPED | OUTPATIENT
Start: 2024-03-04

## 2024-03-04 RX ORDER — LEVOTHYROXINE SODIUM 0.07 MG/1
75 TABLET ORAL DAILY
Qty: 90 TABLET | Refills: 1 | Status: SHIPPED | OUTPATIENT
Start: 2024-03-04

## 2024-03-04 RX ORDER — SERTRALINE HYDROCHLORIDE 100 MG/1
100 TABLET, FILM COATED ORAL DAILY
Qty: 90 TABLET | Refills: 1 | Status: SHIPPED | OUTPATIENT
Start: 2024-03-04

## 2024-03-12 DIAGNOSIS — F51.01 PRIMARY INSOMNIA: ICD-10-CM

## 2024-03-12 RX ORDER — TRAZODONE HYDROCHLORIDE 50 MG/1
50 TABLET ORAL
Qty: 90 TABLET | Refills: 1 | Status: SHIPPED | OUTPATIENT
Start: 2024-03-12

## 2024-03-15 ENCOUNTER — COSMETIC (OUTPATIENT)
Dept: PLASTIC SURGERY | Facility: CLINIC | Age: 64
End: 2024-03-15

## 2024-03-15 DIAGNOSIS — H02.403 ACQUIRED PTOSIS OF EYELID, BILATERAL: ICD-10-CM

## 2024-03-15 DIAGNOSIS — Z90.11 ACQUIRED ABSENCE OF RIGHT BREAST AND NIPPLE: Primary | ICD-10-CM

## 2024-03-15 PROCEDURE — 99213 OFFICE O/P EST LOW 20 MIN: CPT | Performed by: PHYSICIAN ASSISTANT

## 2024-03-15 NOTE — PROGRESS NOTES
Assessment and Plan:  Keshia Carias 63 y.o. female s/p right breast recon with replacement of implant, left medial scar revision, bilateral brachioplasty 11/28/23, presenting for scar check    - incisions completely healed  - she is eager to have her right nipple tattooed. I will send her recommendations for tattoo artists. I did suggest she wait until closer to 6 months to have tattoo done  - she would like to proceed with bilateral upper/lower bleph. She is currently saving to proceed with procedure. We will send visual field testing referral once she has decided to proceed  - will send repeat bra clinic order  - continue with scar massage and moisturizing. She has been using silicone strips which she believes have been lightening her scars. Educated her on strict sun avoidance of her scars  - continue lifting weights. She is currently lifting 5 pound weights. Encouraged her to increase weight to optimize results.   - will have patient return at 6 month jaden     Subjective:    Patient is very happy with her results. She is eager to have her right nipple tattoo. She is lifting 5 pound weights. She has been massaging her scars and using silicone tape.      Objective:  NAD, AAOx3  Bilateral arm incisions healing well, slight tightness of scar to right axillary region   Breast incisions C/D/I, no erythema, no s/s of seroma  Excess skin noted to upper bilateral eyelids, R>L.      ALLISON AriasC  Plastic and Reconstructive Surgery

## 2024-03-18 ENCOUNTER — TELEPHONE (OUTPATIENT)
Dept: OBGYN CLINIC | Facility: OTHER | Age: 64
End: 2024-03-18

## 2024-04-08 ENCOUNTER — CLINICAL SUPPORT (OUTPATIENT)
Dept: OBGYN CLINIC | Facility: OTHER | Age: 64
End: 2024-04-08

## 2024-04-08 DIAGNOSIS — Z85.3 HISTORY OF BREAST CANCER: Primary | ICD-10-CM

## 2024-04-08 NOTE — PROGRESS NOTES
Mastectomy Bra Fitting Order Details    Keshia Carias  1960  [id number]    Reason For Visit  Mastectomy geovanny    Precautions   History of breast cancer     Subjective  States that she had a 3rd exchange in November 2023.     Surgery Type: Mastectomy    Lymph node removal yes    Date of surgery 9/9/23 (mastectomy)    Surgical side right    Objective  Svitlana has a well healed chest wall and is symmetrical between R and L breast.     Assessment  Band - 17 x 2 = 34 + 4 = 38  Cup - 19 x  2 = 38  38 A or B     Plan  Ship to home. Was educated to wear and care of her producrs.    Order Details  Eusebia L x 1 - received on 4/8/24  Eusebia L x 1 - ship to home  Valetta size 12 x 1 nude - ship to home  Valetta size 12 x 1 mocha - ship to home

## 2024-04-23 DIAGNOSIS — E78.5 HYPERLIPIDEMIA, UNSPECIFIED HYPERLIPIDEMIA TYPE: ICD-10-CM

## 2024-04-24 RX ORDER — ROSUVASTATIN CALCIUM 5 MG/1
5 TABLET, COATED ORAL DAILY
Qty: 90 TABLET | Refills: 1 | Status: SHIPPED | OUTPATIENT
Start: 2024-04-24

## 2024-05-16 DIAGNOSIS — K21.9 GASTROESOPHAGEAL REFLUX DISEASE WITHOUT ESOPHAGITIS: ICD-10-CM

## 2024-05-16 RX ORDER — FAMOTIDINE 20 MG/1
20 TABLET, FILM COATED ORAL 2 TIMES DAILY
Qty: 180 TABLET | Refills: 1 | Status: SHIPPED | OUTPATIENT
Start: 2024-05-16

## 2024-05-17 ENCOUNTER — COSMETIC (OUTPATIENT)
Dept: PLASTIC SURGERY | Facility: CLINIC | Age: 64
End: 2024-05-17
Payer: COMMERCIAL

## 2024-05-17 DIAGNOSIS — Z90.11 ACQUIRED ABSENCE OF RIGHT BREAST AND NIPPLE: Primary | ICD-10-CM

## 2024-05-17 PROCEDURE — 99213 OFFICE O/P EST LOW 20 MIN: CPT | Performed by: PHYSICIAN ASSISTANT

## 2024-05-20 ENCOUNTER — TELEPHONE (OUTPATIENT)
Dept: HEMATOLOGY ONCOLOGY | Facility: CLINIC | Age: 64
End: 2024-05-20

## 2024-05-20 NOTE — PROGRESS NOTES
Assessment and Plan:  Keshia Carias 63 y.o. female s/p right breast recon with replacement of implant, left medial scar revision, bilateral brachioplasty 11/28/23, presenting for scar check    - patient is very happy with her breasts. She is scheduled to have a nipple tattoo placed next week  - pertaining to her arms, will plan for scar revision in office. Patient would like to schedule this for this fall. Will have scheduling call patient  - continue to massage and moisturize incisions. Call if any questions/concerns    Subjective:    Patient is very happy with her results. She is very happy to have her nipple tattoo in the near future. She does mention excess skin to bilateral arms which we discussed we can do scar excision in the office.     Objective:  NAD, AAOx3  Bilateral arm incisions healed well, scars soft under arms, scars reddened/purple  Bilateral breast scars c/d/I, proper symmetry     Analisa Mendez PA-C  Plastic and Reconstructive Surgery

## 2024-05-20 NOTE — TELEPHONE ENCOUNTER
Appointment Confirmation   Who are you speaking with? Patient   If it is not the patient, are they listed on an active communication consent form? N/A   Which provider is the appointment scheduled with?  GISSELLE Hassan   When is the appointment scheduled?  Please list date and time 5/21/24 9:30 AM   At which location is the appointment scheduled to take place? [place]   Did caller verbalize understanding of appointment details? Yes

## 2024-05-21 ENCOUNTER — ONCOLOGY SURVIVORSHIP (OUTPATIENT)
Dept: SURGICAL ONCOLOGY | Facility: CLINIC | Age: 64
End: 2024-05-21
Payer: COMMERCIAL

## 2024-05-21 VITALS
HEIGHT: 62 IN | WEIGHT: 171.6 LBS | BODY MASS INDEX: 31.58 KG/M2 | DIASTOLIC BLOOD PRESSURE: 72 MMHG | HEART RATE: 67 BPM | SYSTOLIC BLOOD PRESSURE: 110 MMHG | TEMPERATURE: 97.8 F | RESPIRATION RATE: 16 BRPM | OXYGEN SATURATION: 94 %

## 2024-05-21 DIAGNOSIS — Z85.3 HISTORY OF BREAST CANCER: ICD-10-CM

## 2024-05-21 DIAGNOSIS — Z08 ENCOUNTER FOR FOLLOW-UP EXAMINATION AFTER COMPLETED TREATMENT FOR MALIGNANT NEOPLASM: Primary | ICD-10-CM

## 2024-05-21 PROCEDURE — 99213 OFFICE O/P EST LOW 20 MIN: CPT | Performed by: NURSE PRACTITIONER

## 2024-05-21 NOTE — PROGRESS NOTES
Assessment/Plan:    Diagnoses and all orders for this visit:    Encounter for follow-up examination after completed treatment for malignant neoplasm    History of breast cancer      Patient is a 63-year-old female that was diagnosed with a right-sided breast cancer in July 2022.  Her pathology revealed DCIS, ER 90%, KS 10%.  She underwent genetic testing which was negative.  She underwent a right mastectomy and sentinel node biopsy with  and had implant reconstruction with Dr. Craig.  She did not require adjuvant therapy.  She had a left diagnostic mammogram and ultrasound in June 2023 which was BI-RADS 2.  She is already ordered/scheduled for a left-sided mammogram this coming June.  She offers no new complaints today and there are no worrisome findings on today's clinical exam.  She is scheduled for nipple tattooing of the right reconstructed breast in the upcoming weeks.  She is up-to-date on colorectal and cervical cancer screening as well as osteoporosis screening.  We will see her back in 6 months for a routine follow-up visit or sooner should the need arise.  She was instructed to contact me with any changes or concerns in the interim.  All of her questions were answered today.  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 Craig     9/26/2022 -  Cancer Staged    Staging form: Breast, AJCC 8th Edition  - Pathologic stage from 9/26/2022: Stage 0 (pTis (DCIS), pN0(sn), cM0, ER+, NV+, HER2: Not Assessed) - Signed by Anuradha Kelly MD on 9/26/2022  Stage prefix: Initial diagnosis  Method of lymph node assessment: [place] lymph node biopsy  Nuclear grade: G3       12/15/2022 Surgery    Right expander exchange to implant     3/14/2023 Surgery    Right breast revision, exchange impant           Patient ID: Keshia Carias is a 63 y.o. female  Presenting today for a follow-up breast cancer survivorship visit.  She states that she has undergone her third reconstructive surgery.  She is scheduled for nipple tattooing in approximately 2 weeks in [place].  She offers no new complaints today.  Denies persistent headaches, back pain or bone pain, cough or shortness of breath, abdominal pain.          Review of Systems   Constitutional:  Negative for activity change, appetite change, chills, fatigue, fever and unexpected weight change.   Respiratory:  Negative for cough and shortness of breath.    Cardiovascular:  Negative for chest pain.   Gastrointestinal:  Negative for abdominal pain, constipation, diarrhea, nausea and vomiting.   Musculoskeletal:  Negative for arthralgias, back pain, gait problem and myalgias.   Skin:  Negative for color change and rash.   Neurological:  Negative for dizziness and headaches.   Hematological:  Negative for adenopathy.   Psychiatric/Behavioral:  Negative for agitation and confusion.    All other systems reviewed and are  "negative.      Objective:    Height 5' 2\" (1.575 m), not currently breastfeeding.  Body mass index is 30.73 kg/m².      Physical Exam  Vitals reviewed.   Constitutional:       General: She is not in acute distress.     Appearance: Normal appearance. She is well-developed. She is not diaphoretic.   HENT:      Head: Normocephalic and atraumatic.   Cardiovascular:      Rate and Rhythm: Normal rate and regular rhythm.      Heart sounds: Normal heart sounds.   Pulmonary:      Effort: Pulmonary effort is normal.      Breath sounds: Normal breath sounds.   Chest:   Breasts:     Right: Skin change (surgical scars) present.      Left: Skin change (surgical scars s/p lift) present. No swelling, bleeding, inverted nipple, mass, nipple discharge or tenderness.      Comments: Right breast implant present. No masses, skin changes, nodules.    Abdominal:      Palpations: Abdomen is soft. There is no mass.      Tenderness: There is no abdominal tenderness.   Musculoskeletal:         General: Normal range of motion.      Cervical back: Normal range of motion.   Lymphadenopathy:      Upper Body:      Right upper body: No supraclavicular or axillary adenopathy.      Left upper body: No supraclavicular or axillary adenopathy.   Skin:     General: Skin is warm and dry.      Findings: No rash.   Neurological:      Mental Status: She is alert and oriented to person, place, and time.   Psychiatric:         Speech: Speech normal.         Discussed symptoms related to disease recurrence, Yes    Evaluated for late effects related to cancer treatment, Yes    Screening current for cervical cancer, Yes, describe: up to date    Screening current for colon cancer, Yes, describe: up to date    Cancer rehabilitation services addressed, No    Screening current for osteoporosis, Yes, describe: up to date          "

## 2024-06-10 ENCOUNTER — HOSPITAL ENCOUNTER (OUTPATIENT)
Dept: MAMMOGRAPHY | Facility: CLINIC | Age: 64
Discharge: HOME | End: 2024-06-10
Payer: COMMERCIAL

## 2024-06-10 ENCOUNTER — HOSPITAL ENCOUNTER (OUTPATIENT)
Dept: ULTRASOUND IMAGING | Facility: CLINIC | Age: 64
Discharge: HOME | End: 2024-06-10
Payer: COMMERCIAL

## 2024-06-10 VITALS — WEIGHT: 171 LBS | BODY MASS INDEX: 31.47 KG/M2 | HEIGHT: 62 IN

## 2024-06-10 DIAGNOSIS — R92.8 ABNORMAL MAMMOGRAM: ICD-10-CM

## 2024-06-10 PROCEDURE — G0279 TOMOSYNTHESIS, MAMMO: HCPCS

## 2024-06-10 PROCEDURE — 77065 DX MAMMO INCL CAD UNI: CPT

## 2024-06-24 ENCOUNTER — OFFICE VISIT (OUTPATIENT)
Dept: URGENT CARE | Age: 64
End: 2024-06-24
Payer: COMMERCIAL

## 2024-06-24 VITALS
TEMPERATURE: 98.3 F | RESPIRATION RATE: 16 BRPM | BODY MASS INDEX: 32.2 KG/M2 | SYSTOLIC BLOOD PRESSURE: 114 MMHG | WEIGHT: 175 LBS | DIASTOLIC BLOOD PRESSURE: 70 MMHG | OXYGEN SATURATION: 98 % | HEIGHT: 62 IN | HEART RATE: 78 BPM

## 2024-06-24 DIAGNOSIS — H61.21 HEARING LOSS OF RIGHT EAR DUE TO CERUMEN IMPACTION: Primary | ICD-10-CM

## 2024-06-24 DIAGNOSIS — H60.501 ACUTE OTITIS EXTERNA OF RIGHT EAR, UNSPECIFIED TYPE: ICD-10-CM

## 2024-06-24 PROCEDURE — S9088 SERVICES PROVIDED IN URGENT: HCPCS | Performed by: STUDENT IN AN ORGANIZED HEALTH CARE EDUCATION/TRAINING PROGRAM

## 2024-06-24 PROCEDURE — 69210 REMOVE IMPACTED EAR WAX UNI: CPT | Performed by: STUDENT IN AN ORGANIZED HEALTH CARE EDUCATION/TRAINING PROGRAM

## 2024-06-24 PROCEDURE — 99213 OFFICE O/P EST LOW 20 MIN: CPT | Performed by: STUDENT IN AN ORGANIZED HEALTH CARE EDUCATION/TRAINING PROGRAM

## 2024-06-24 RX ORDER — OFLOXACIN 3 MG/ML
10 SOLUTION AURICULAR (OTIC) DAILY
Qty: 3.5 ML | Refills: 0 | Status: SHIPPED | OUTPATIENT
Start: 2024-06-24 | End: 2024-07-01

## 2024-06-24 NOTE — PATIENT INSTRUCTIONS
Today we removed earwax from the right ear.  I am prescribing an antibiotic eardrop.  Avoid swimming for the next few days.  If your symptoms are not fully resolving, please follow-up with your PCP for recheck.

## 2024-06-24 NOTE — PROGRESS NOTES
[facility]        NAME: Keshia Carias is a 64 y.o. female  : 1960    MRN: [id number]  DATE: 2024  TIME: 8:59 AM    Assessment and Plan   Hearing loss of right ear due to cerumen impaction [H61.21]  1. Hearing loss of right ear due to cerumen impaction  Ear cerumen removal      2. Acute otitis externa of right ear, unspecified type  ofloxacin (FLOXIN) 0.3 % otic solution      Cerumen removed, patient reported relief and improvement with hearing, still with some erythema of her canal, Rx ofloxacin.      Patient Instructions       Follow up with PCP in 3-5 days.  Proceed to  ER if symptoms worsen.    If tests have been performed at [facility], our office will contact you with results if changes need to be made to the care plan discussed with you at the visit.  You can review your full results on [facility].    Chief Complaint     Chief Complaint   Patient presents with    Ear Fullness     Patient states the past four days she has had a clogged ear. Has been using hydrogen peroxide however it has not helped much.          History of Present Illness       Patient presents for right ear feeling clogged, decreased hearing, developing pain over the last few days.  She tried hydroperoxide at home last few days, has not helped much.  Previously had cerumen impaction that felt similar years ago.        Review of Systems   Review of Systems   All other systems reviewed and are negative.        Current Medications       Current Outpatient Medications:     diphenhydrAMINE (BENADRYL) 25 mg tablet, Take 25 mg by mouth daily at bedtime as needed for itching, Disp: , Rfl:     estradiol (ESTRACE VAGINAL) 0.1 mg/g vaginal cream, Insert one quarter of an applicator intravaginally once weekly, Disp: 42.5 g, Rfl: 1    famotidine (PEPCID) 20 mg tablet, Take 1 tablet (20 mg total) by mouth 2 (two) times a day, Disp: 180 tablet, Rfl: 1    levothyroxine 75 mcg tablet, Take 1 tablet by mouth daily, Disp: 90  tablet, Rfl: 1    ofloxacin (FLOXIN) 0.3 % otic solution, Administer 10 drops to the right ear daily for 7 days, Disp: 3.5 mL, Rfl: 0    potassium chloride (K-DUR,KLOR-CON) 10 mEq tablet, Take 1 tablet (10 mEq total) by mouth 2 (two) times a day, Disp: 180 tablet, Rfl: 3    Psyllium (METAMUCIL PO), Take 1 Dose by mouth daily  , Disp: , Rfl:     rosuvastatin (CRESTOR) 5 mg tablet, Take 1 tablet (5 mg total) by mouth daily, Disp: 90 tablet, Rfl: 1    senna (SENOKOT) 8.6 MG tablet, Take 2 tablets by mouth daily, Disp: , Rfl:     sertraline (ZOLOFT) 100 mg tablet, Take 1 tablet (100 mg total) by mouth daily, Disp: 90 tablet, Rfl: 1    traZODone (DESYREL) 50 mg tablet, Take 1 tablet (50 mg total) by mouth daily at bedtime, Disp: 90 tablet, Rfl: 1    triamterene-hydrochlorothiazide (DYAZIDE) 37.5-25 mg per capsule, Take 1 capsule by mouth every morning, Disp: 90 capsule, Rfl: 1    gabapentin (Neurontin) 300 mg capsule, Take 1 capsule (300 mg total) by mouth 3 (three) times a day for 10 days, Disp: 15 capsule, Rfl: 1    ibuprofen (MOTRIN) 800 mg tablet, Take 1 tablet (800 mg total) by mouth every 8 (eight) hours as needed for mild pain (DO NOT BEGIN UNTIL 48 HOURS AFTER SURGERY), Disp: 15 tablet, Rfl: 0    oxyCODONE (Roxicodone) 5 immediate release tablet, Take 1 tablet (5 mg total) by mouth every 6 (six) hours as needed for moderate pain Max Daily Amount: 20 mg, Disp: 10 tablet, Rfl: 0    traMADol (Ultram) 50 mg tablet, Take 1 tablet (50 mg total) by mouth every 6 (six) hours as needed for moderate pain, Disp: 20 tablet, Rfl: 0    Current Allergies     Allergies as of 06/24/2024 - Reviewed 06/24/2024   Allergen Reaction Noted    Penicillins Anaphylaxis 05/18/2012    Acetazolamide Itching and Swelling     Flagyl [metronidazole] Nausea Only and GI Intolerance 10/19/2012            The following portions of the patient's history were reviewed and updated as appropriate: allergies, current medications, past family history,  "past medical history, past social history, past surgical history and problem list.     Past Medical History:   Diagnosis Date    Abnormal electrocardiogram     Last assessed 10/04/13    Acute respiratory failure with hypoxia (HCC)     Allergic 1990    Penicillin - anaphalaxis    Anemia     pt states hypogammaglobulinemia, needs washed RBCs if transfused - Rx by Dr Wolfe    Anxiety     \"controlled with zoloft\"    Arthritis 2005    Cancer (HCC)     DCIS Right breast-had mastectomy    Cataract 01/06/2023    COVID 01/2022    Dental crowns present     Disease of thyroid gland     Diverticulitis of colon     GERD (gastroesophageal reflux disease)     Hyperlipidemia     Hypertension     Hypogammaglobulinemia (HCC)     Hypoglobulinemia     IgA deficiency (HCC)     per pt stable -\"no current need to see Dr Wolfe Hematology unless an issue\"    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HCC)     IgM deficiency (HCC)     Limb alert care status     No BP/IV Right Arm    Obesity     PONV (postoperative nausea and vomiting)     per pt \"patch behind ear works\"    Prediabetes     Slow transit constipation     Wears glasses        Past Surgical History:   Procedure Laterality Date    APPENDECTOMY      BOWEL RESECTION      \"for diverticulitis\"    BREAST BIOPSY Right 07/07/2022    stereo x 2    BREAST BIOPSY Right 07/26/2022    stereo    BREAST RECONSTRUCTION Right 12/15/2022    Procedure: RIGHT BREAST RECONSTRUCTION AND REMOVAL OF TISSUE EXPANDER WITH PLACEMENT OF IMPLANT. CAPSULOTOMY.;  Surgeon: Daniela Craig DO;  Location:  MAIN OR;  Service: Plastics    CARPAL TUNNEL RELEASE      CATARACT EXTRACTION Bilateral     COLECTOMY      Resolved 08/27/09    COLONOSCOPY      DENTAL IMPLANT      EYE SURGERY  2007    Lasik , Cataracts-2023    INSERTION OF BREAST TISSUE EXPANDER Right 09/09/2022    Procedure: IMMEDIATE BREAST RECONSTRUCTION WITH TISSUE EXPANDER AND  GALAFLEX;  Surgeon: Daniela Craig DO;  Location: [facility];  Service: Plastics    " LASIK      LYMPH NODE BIOPSY  9/22    MAMMO STEREOTACTIC BREAST BIOPSY RIGHT (ALL INC) Right 07/07/2022    MAMMO STEREOTACTIC BREAST BIOPSY RIGHT (ALL INC) Right 07/26/2022    MAMMO STEREOTACTIC BREAST BIOPSY RIGHT (ALL INC) EACH ADD Right 07/07/2022    MASTECTOMY Right     MASTECTOMY W/ SENTINEL NODE BIOPSY Right 09/09/2022    Procedure: MASTECTOMY WITH BIOPSY LYMPH NODE SENTINEL,Lymphoscintigraphy,Lymphatic Mapping; [address];  Surgeon: Anuradha Kelly MD;  Location: [facility];  Service: Surgical Oncology    OTHER SURGICAL HISTORY      Biopsy Vulvar    MS ARTHRP KNE CONDYLE&PLATU MEDIAL&LAT COMPARTMENTS Left 02/08/2017    Procedure: TOTAL KNEE REPLACEMENT ARTHROPLASTY;  Surgeon: Rakesh Melton MD;  Location: [facility];  Service: Orthopedics    MS COLONOSCOPY FLX DX W/COLLJ SPEC WHEN PFRMD N/A 05/06/2019    Procedure: COLONOSCOPY;  Surgeon: Alan Shirley MD;  Location: [facility];  Service: General    MS EXCISION EXCESSIVE SKIN & SUBQ TISSUE ARM Bilateral 11/28/2023    Procedure: BRACHIOPLASTY;  Surgeon: Daniela Craig DO;  Location:  MAIN OR;  Service: Plastics    MS INSJ/RPLCMT BREAST IMPLANT SEP DAY MASTECTOMY Right 03/14/2023    Procedure: RIGHT BREAST RECON REVISION WITH IMPLANT EXCHANGE AND CAPSULOTOMY;  Surgeon: Daniela Craig DO;  Location: [facility];  Service: Plastics    MS MASTOPEXY Left 12/15/2022    Procedure: LEFT MASTOPEXY FOR SYMMETRY AND  GALAFLEX;  Surgeon: Daniela Craig DO;  Location: [facility];  Service: Plastics    MS REVISION OF RECONSTRUCTED BREAST Right 11/28/2023    Procedure: REVISION OF RIGHT BREAST RECON WITH REPLACEMENT OF IMPLANT, CAPULOTOMY, VIRA FLAP, LEFT MEDIAL SCAR REVISION;  Surgeon: Daniela Craig DO;  Location: [facility];  Service: Plastics    REFRACTIVE SURGERY      lasik    REPLACEMENT TOTAL KNEE Bilateral 10/2013    TONSILLECTOMY      With Adenoidectomy    TOOTH EXTRACTION      VAGINA SURGERY      mesh       Family History   Problem  "Relation Age of Onset    No Known Problems Mother     Basal cell carcinoma Father     Prostate cancer Father 94    Breast cancer Sister 55    No Known Problems Sister     No Known Problems Daughter     No Known Problems Daughter     No Known Problems Daughter     No Known Problems Maternal Grandmother     No Known Problems Maternal Grandfather     Breast cancer Paternal Grandmother 48    Breast cancer Paternal Grandfather     Breast cancer Paternal Aunt 70    Breast cancer Family     Arthritis Family     Hypertension Family     Cancer Family     Breast cancer Paternal Aunt         Breast cancer         Medications have been verified.        Objective   /70   Pulse 78   Temp 98.3 °F (36.8 °C)   Resp 16   Ht 5' 2\" (1.575 m)   Wt 79.4 kg (175 lb)   SpO2 98%   BMI 32.01 kg/m²   No LMP recorded. Patient is postmenopausal.       Physical Exam     Physical Exam  Vitals and nursing note reviewed.   Constitutional:       General: She is not in acute distress.     Appearance: She is not toxic-appearing.   HENT:      Head: Normocephalic and atraumatic.      Right Ear: Tympanic membrane, ear canal and external ear normal.      Left Ear: External ear normal. There is impacted cerumen.      Nose: Nose normal.      Mouth/Throat:      Mouth: Mucous membranes are moist.   Eyes:      Extraocular Movements: Extraocular movements intact.      Conjunctiva/sclera: Conjunctivae normal.   Skin:     General: Skin is warm and dry.   Neurological:      Mental Status: She is alert.         Ear cerumen removal    Date/Time: 6/24/2024 8:00 AM    Performed by: Marlene Canales DO  Authorized by: Marlene Canales DO  Universal Protocol:  Consent: Verbal consent obtained.  Risks and benefits: risks, benefits and alternatives were discussed  Consent given by: patient  Patient understanding: patient states understanding of the procedure being performed  Patient identity confirmed: verbally with patient    Patient location:  " Clinic  Procedure details:     Location:  R ear    Procedure type: irrigation with instrumentation      Instrumentation: curette      Approach:  Natural orifice  Post-procedure details:     Complication:  None    Hearing quality:  Improved    Patient tolerance of procedure:  Tolerated well, no immediate complications  Comments:      After cerumen removed, canal noted to have some erythema as well as erythema surrounding TM, however no effusion, no bulging

## 2024-07-23 ENCOUNTER — OFFICE VISIT (OUTPATIENT)
Dept: DERMATOLOGY | Facility: CLINIC | Age: 64
End: 2024-07-23
Payer: COMMERCIAL

## 2024-07-23 VITALS — TEMPERATURE: 98.3 F | BODY MASS INDEX: 32.2 KG/M2 | WEIGHT: 175 LBS | HEIGHT: 62 IN

## 2024-07-23 DIAGNOSIS — L73.8 SEBACEOUS HYPERPLASIA: ICD-10-CM

## 2024-07-23 DIAGNOSIS — D23.9 DERMATOFIBROMA: Primary | ICD-10-CM

## 2024-07-23 DIAGNOSIS — L91.0 KELOID SCAR: ICD-10-CM

## 2024-07-23 DIAGNOSIS — L81.4 LENTIGO: ICD-10-CM

## 2024-07-23 DIAGNOSIS — L57.0 KERATOSIS, ACTINIC: ICD-10-CM

## 2024-07-23 DIAGNOSIS — L72.0 MILIUM: ICD-10-CM

## 2024-07-23 PROCEDURE — 99203 OFFICE O/P NEW LOW 30 MIN: CPT

## 2024-07-23 PROCEDURE — 17000 DESTRUCT PREMALG LESION: CPT

## 2024-07-23 NOTE — PROGRESS NOTES
"[facility] Dermatology Clinic Note     Patient Name: Keshia Carias  Encounter Date: 7/23/24      Have you been cared for by a [facility] Dermatologist in the last 3 years and, if so, which description applies to you?    NO.   I am considered a \"new\" patient and must complete all patient intake questions. I am FEMALE/of child-bearing potential.    REVIEW OF SYSTEMS:  Have you recently had or currently have any of the following? Recent fever or chills? No  Any non-healing wound? No  Are you pregnant or planning to become pregnant? No  Are you currently or planning to be nursing or breast feeding? No   PAST MEDICAL HISTORY:  Have you personally ever had or currently have any of the following?  If \"YES,\" then please provide more detail. Skin cancer (such as Melanoma, Basal Cell Carcinoma, Squamous Cell Carcinoma?  No  Tuberculosis, HIV/AIDS, Hepatitis B or C: No  Radiation Treatment No   HISTORY OF IMMUNOSUPPRESSION:   Do you have a history of any of the following:  Systemic Immunosuppression such as Diabetes, Biologic or Immunotherapy, Chemotherapy, Organ Transplantation, Bone Marrow Transplantation?  No    Answering \"YES\" requires the addition of the dotphrase \"IMMUNOSUPPRESSED\" as the first diagnosis of the patient's visit.   FAMILY HISTORY:  Any \"first degree relatives\" (parent, brother, sister, or child) with the following?    Skin Cancer, Pancreatic or Other Cancer? YES, Parents-possibly Basal cell, patient-breast cancer (mastectomy, no chemo or radiation)   PATIENT EXPERIENCE:    Do you want the Dermatologist to perform a COMPLETE skin exam today including a clinical examination under the \"bra and underwear\" areas?  Yes  If necessary, do we have your permission to call and leave a detailed message on your Preferred Phone number that includes your specific medical information?  Yes      Allergies   Allergen Reactions    Penicillins Anaphylaxis     Anaphylaxis  Anaphylaxis  Other reaction(s): Anaphylaxis    " Acetazolamide Itching and Swelling     With eye drops - takes  oral sulfa w/o side effects    Flagyl [Metronidazole] Nausea Only and GI Intolerance      Current Outpatient Medications:     diphenhydrAMINE (BENADRYL) 25 mg tablet, Take 25 mg by mouth daily at bedtime as needed for itching, Disp: , Rfl:     estradiol (ESTRACE VAGINAL) 0.1 mg/g vaginal cream, Insert one quarter of an applicator intravaginally once weekly, Disp: 42.5 g, Rfl: 1    famotidine (PEPCID) 20 mg tablet, Take 1 tablet (20 mg total) by mouth 2 (two) times a day, Disp: 180 tablet, Rfl: 1    levothyroxine 75 mcg tablet, Take 1 tablet by mouth daily, Disp: 90 tablet, Rfl: 1    Psyllium (METAMUCIL PO), Take 1 Dose by mouth daily  , Disp: , Rfl:     rosuvastatin (CRESTOR) 5 mg tablet, Take 1 tablet (5 mg total) by mouth daily, Disp: 90 tablet, Rfl: 1    senna (SENOKOT) 8.6 MG tablet, Take 2 tablets by mouth daily, Disp: , Rfl:     sertraline (ZOLOFT) 100 mg tablet, Take 1 tablet (100 mg total) by mouth daily, Disp: 90 tablet, Rfl: 1    traZODone (DESYREL) 50 mg tablet, Take 1 tablet (50 mg total) by mouth daily at bedtime, Disp: 90 tablet, Rfl: 1    triamterene-hydrochlorothiazide (DYAZIDE) 37.5-25 mg per capsule, Take 1 capsule by mouth every morning, Disp: 90 capsule, Rfl: 1    gabapentin (Neurontin) 300 mg capsule, Take 1 capsule (300 mg total) by mouth 3 (three) times a day for 10 days, Disp: 15 capsule, Rfl: 1    ibuprofen (MOTRIN) 800 mg tablet, Take 1 tablet (800 mg total) by mouth every 8 (eight) hours as needed for mild pain (DO NOT BEGIN UNTIL 48 HOURS AFTER SURGERY), Disp: 15 tablet, Rfl: 0    oxyCODONE (Roxicodone) 5 immediate release tablet, Take 1 tablet (5 mg total) by mouth every 6 (six) hours as needed for moderate pain Max Daily Amount: 20 mg, Disp: 10 tablet, Rfl: 0    potassium chloride (K-DUR,KLOR-CON) 10 mEq tablet, Take 1 tablet (10 mEq total) by mouth 2 (two) times a day, Disp: 180 tablet, Rfl: 3    traMADol (Ultram) 50 mg  tablet, Take 1 tablet (50 mg total) by mouth every 6 (six) hours as needed for moderate pain, Disp: 20 tablet, Rfl: 0          Whom besides the patient is providing clinical information about today's encounter?   NO ADDITIONAL HISTORIAN (patient alone provided history)    Physical Exam and Assessment/Plan by Diagnosis:  CHIEF COMPLAINT    64 year old male or female patient presents today for New Patient.  Patient has a No history of skin cancer     ACTINIC KERATOSIS    Physical Exam:  Anatomic Location Affected:  superior to right eyebrow   Morphological Description:  Scaly pink papule    Assessment and Plan:  Based on a thorough discussion of this condition and the management approach to it (including a comprehensive discussion of the known risks, side effects and potential benefits of treatment), the patient (family) agrees to implement the following specific plan:  When outside we recommend using a wide brim hat, sunglasses, long sleeve and pants, sunscreen with SPF 30+ with reapplication every 2 hours, or SPF specific clothing   liquid nitrogen to treat areas. Consent obtained. Expect area to blister, crust, and then fall off within 2 weeks. Please use vaseline.                                       PROCEDURE:  DESTRUCTION OF PRE-MALIGNANT LESIONS  After a thorough discussion of treatment options and risk/benefits/alternatives (including but not limited to local pain, scarring, dyspigmentation, blistering, and possible superinfection), verbal and written consent were obtained and the aforementioned lesions were treated on with cryotherapy using liquid nitrogen x 1 cycle for 5-10 seconds.    TOTAL NUMBER of 1 pre-malignant lesions were treated today on the ANATOMIC LOCATION:  superior to right eyebrow .     The patient tolerated the procedure well, and after-care instructions were provided.    HYPERTROPHIC SCAR    Physical Exam:  Anatomic Location Affected:  left distal upper arm & inferior to left breast, medial  aspect   Morphological Description:   Pertinent Positives:  Pertinent Negatives:    Additional History of Present Condition:  Had cosmetic arm lift procedure in November and breast implant surgery >1 year ago d/t mastectomy from breast cancer. Scars are slightly tender and raised on medial portion of scar under left breast and on distal portion of scar on left arm    Assessment and Plan:  Based on a thorough discussion of this condition and the management approach to it (including a comprehensive discussion of the known risks, side effects and potential benefits of treatment), the patient (family) agrees to implement the following specific plan:  Discussed option of ILK injection in office  Discussed option to use silicone scar sheets on raised areas to help soften.   Patient opts to use silicone scar sheets at this time    A keloid scar is a firm, smooth, hard growth due to spontaneous scar formation. It can arise soon after an injury, or develop months later. Keloids may be uncomfortable or itchy and extend well beyond the original wound. They may form on any part of the body, although the upper chest and shoulders are especially prone to them.    The precise reason that wound healing sometimes leads to keloid formation is under investigation but is not yet clear.  While most people never form keloids, others develop them after minor injuries, burns, insect bites and acne spots. Dark skinned people form keloids more easily than Caucasians. A keloid is harmless to general health and does not change into skin cancer.    The following measures are helpful in at least some patients.  Emollients (creams and oils)  Polyurethane or silicone scar reduction patches  Silicone gel  Oral or topical tranilast (an inhibitor of collagen synthesis)  Pressure dressings  Surgical excision (but in keloids, excision may result in a new keloid even larger than the original one)  Intralesional corticosteroid injection, repeated every  few weeks  Intralesional 5-fluorouracil  Cryotherapy  Superficial X-ray treatment soon after surgery.  Pulsed dye laser   Skin needling  Subcision    Scar dressings should be worn for 12-24 hours per day, for at least 8 to 12 weeks, and perhaps for much longer.    LENTIGO    Physical Exam:  Anatomic Location Affected:  trunk, face and extremities   Morphological Description:  light brown macules   Pertinent Positives:  Pertinent Negatives:    Additional History of Present Condition:  present on exam     Assessment and Plan:  Based on a thorough discussion of this condition and the management approach to it (including a comprehensive discussion of the known risks, side effects and potential benefits of treatment), the patient (family) agrees to implement the following specific plan:  Benign-assurance provided     What is a lentigo?  A lentigo is a pigmented flat or slightly raised lesion with a clearly defined edge. Unlike an ephelis (freckle), it does not fade in the winter months. There are several kinds of lentigo.  The name lentigo originally referred to its appearance resembling a small lentil. The plural of lentigo is lentigines, although “lentigos” is also in common use.    Who gets lentigines?  Lentigines can affect males and females of all ages and races. Solar lentigines are especially prevalent in fair skinned adults. Lentigines associated with syndromes are present at birth or arise during childhood.    What causes lentigines?  Common forms of lentigo are due to exposure to ultraviolet radiation:  Sun damage including sunburn   Indoor tanning   Phototherapy, especially photochemotherapy (PUVA)    Ionizing radiation, eg radiation therapy, can also cause lentigines.  Several familial syndromes associated with widespread lentigines originate from mutations in Raymundo-MAP kinase, mTOR signaling and PTEN pathways.    What are the clinical features of lentigines?  Lentigines have been classified into several  different types depending on what they look like, where they appear on the body, causative factors, and whether they are associated to other diseases or conditions.  Lentigines may be solitary or more often, multiple. Most lentigines are smaller than 5 mm in diameter.    Lentigo simplex  A precursor to junctional naevus   Arises during childhood and early adult life   Found on trunk and limbs   Small brown round or oval macule or thin plaque   Jagged or smooth edge   May have a dry surface   May disappear in time  Solar lentigo  A precursor to seborrhoeic keratosis   Found on chronically sun exposed sites such as hands, face, lower legs   May also follow sunburn to shoulders   Yellow, light or dark brown regular or irregular macule or thin plaque   May have a dry surface   Often has moth-eaten outline   Can slowly enlarge to several centimeters in diameter   May disappear, often through the process known as lichenoid keratosis   When atypical in appearance, may be difficult to distinguish from melanoma in situ  Ink spot lentigo  Also known as reticulated lentigo   Few in number compared to solar lentigines   Follows sunburn in very fair skinned individuals   Dark brown to black irregular ink spot-like macule  PUVA lentigo  Similar to ink spot lentigo but follows photochemotherapy (PUVA)   Location anywhere exposed to PUVA  Tanning bed lentigo  Similar to ink spot lentigo but follows indoor tanning   Location anywhere exposed to tanning bed  Radiation lentigo  Occurs in site of irradiation (accidental or therapeutic)   Associated with late-stage radiation dermatitis: epidermal atrophy, subcutaneous fibrosis, keratosis, telangiectasias  Melanotic macule  Mucosal surfaces or adjacent glabrous skin eg lip, vulva, penis, anus   Light to dark brown   Also called mucosal melanosis  Generalised lentigines  Found on any exposed or covered site from early childhood   Small macules may merge to form larger patches   Not  associated with a syndrome   Also called lentigines profusa, multiple lentigines  Agminated lentigines  Naevoid eruption of lentigos confined to a single segmental area   Sharp demarcation in midline   May have associated neurological and developmental abnormalities  Patterned lentigines  Inherited tendency to lentigines on face, lips, buttocks, palms, soles   Recognised mainly in people of  ethnicity  Centrofacial neurodysraphic lentiginosis  Associated with mental retardation  Lentiginosis syndromes  Syndromes include LEOPARD/Kristopher, Peutz-Jeghers, Laugier-Hunziker, Moynahan, Xeroderma pigmentosum, myxoma syndromes (LEMOS, NAME, Grady), Ruvalcaba-Myhre-Beal, Bannayan-Zonnana syndrome, Cowden disease (multiple hamartoma syndrome )   Inheritance is autosomal dominant; sporadic cases common   Widespread lentigines present at birth or arise in early childhood   Associated with neural, endocrine, and mesenchymal tumors    How is the diagnosis made?  Lentigines are usually diagnosed clinically by their typical appearance. Concern regarding possibility of melanoma may lead to:  Dermatoscopy   Diagnostic excision biopsy    Histopathology of a lentigo shows:  Thickened epidermis   An increased number of melanocytes along the basal layer of epidermis   Unlike junctional melanocytic naevus, there are no nests of melanocytes   Increased melanin pigment within the keratinocytes   Additional features depending on type of lentigo    In contrast, an ephelis (freckle) shows sun-induced increased melanin within the keratinocytes, without an increase in number of cells.  What is the treatment for lentigines?  Most lentigines are left alone. Attempts to lighten them may not be successful. The following approaches are used:  SPF 50+ broad-spectrum sunscreen   Hydroquinone bleaching cream   Alpha hydroxy acids   Vitamin C   Retinoids   Azelaic acid   Chemical peels  Individual lesions can be permanently removed  using:  Cryotherapy   Intense pulsed light   Pigment lasers    How can lentigines be prevented?  Lentigines associated with exposure ultraviolet radiation can be prevented by very careful sun protection. Clothing is more successful at preventing new lentigines than are sunscreens.    What is the outlook for lentigines?  Lentigines usually persist. They may increase in number with age and sun exposure. Some in sun-protected sites may fade and disappear.     SEBACEOUS HYPERPLASIA    Physical Exam:  Anatomic Location Affected:  left cheek   Morphological Description:  pink yellow papule with central dell  Pertinent Positives:  Pertinent Negatives:    Additional History of Present Condition:  has been stable size for many years    Assessment and Plan:  Based on a thorough discussion of this condition and the management approach to it (including a comprehensive discussion of the known risks, side effects and potential benefits of treatment), the patient (family) agrees to implement the following specific plan:  Monitor for any changes   Benign-assurance provided     Sebaceous Hyperplasia  Sebaceous hyperplasia is a common, benign condition of enlarged oil secreting (sebaceous) glands commonly found on the forehead and cheeks of middle-aged and elderly patients. They normally appear as small yellow bumps up to 3mm in diameter that can be single or multiple. The bumps on the face often display a centrall dell. Occasionally, these bumps can occur on the chest, areola, mouth, and genitals. Rarely, they can grow to take a giant form, or be arranged linearly.     Causes of sebaceous hyperplasia  Sebaceous hyperplasic is a form of benign hair follicle tumor and can often be confused with basal cell carcinoma. It can be more prevalent in immunosuppressed patients such as those undergoing organ transplantation. In the rare London-Reagan syndrome, sebaceous hyperplasia occurs in association with internal cancers.  Lesions of  sebaceous hyperplasia are benign, with no known potential for malignant transformation, but they may be associated with nonmelanoma skin cancer in transplantation patients.     How we do diagnose sebaceous hyperplasia?  Your dermatologist may take a closer look at the bumps with a device called a dermatoscope. Common features include a central hair follicle surrounded by yellowish lobules with prominent blood vessels.     What is the treatment of sebaceous hyperplasia?   Since sebaceous hyperplasia is benign with no known potential for transformation into cancer, treatment is mostly for cosmetic reasons or if the lesions become irritated. Options include   Light electrocautery or laser vaporization  Oral isotrentinoin is effective for extensive or disfiguring spots, but do not prevent recurrence   Antiandrogens may be used in females to decrease the size and improve overall appearance of bumps      MILIUM     Physical Exam:  Anatomic Location Affected:  groin  Morphological Description:  milky white papules  Pertinent Positives:  Pertinent Negatives:    Additional History of Present Condition:  present on exam     Assessment and Plan:  Based on a thorough discussion of this condition and the management approach to it (including a comprehensive discussion of the known risks, side effects and potential benefits of treatment), the patient (family) agrees to implement the following specific plan:  Benign-assurance provided     Assessment and Plan  A milium is a small cyst containing keratin (the skin protein); they are usually multiple and are then known as milia. These harmless cysts present as tiny pearly-white bumps just under the surface of the skin.  Milia are common in all ages and both sexes. They most often arise on the face and are particularly prominent on the eyelids and cheeks, but they may occur elsewhere.  There are various kinds of milia.   milia: Affect 40-50% of  babies, few to numerous  lesions, often seen on the nose, but may also arise inside the mouth on the mucosa (Flaca pearls) or palate (Maria Luz nodules) or more widely on the scalp, face and upper trunk, Heal spontaneously within a few weeks of birth.  Primary milia in children and adults: found around eyelids, cheeks, forehead and genitalia, in young children, a row of milia may appear along the nasal crease, may clear in a few weeks or persist for months or longer.    Juvenile milia: associated with Rombo syndrome, basal cell naevus syndrome, Cavfm-Tspmm-Ltkixdow syndrome, pachyonychia congenita, Benton syndrome and other genetic disorders, may be congenital (present at birth) or appear later in life.  Milia en plaque: multiple milia appear on within an inflamed plaque up to several centimeters in diameter, usually found on an eyelid, behind the ear, on a cheek or jaw.  3. Affect children and adults, especially middle-aged women.  4. Sometimes associated with another skin disease including pseudoxanthoma elasticum, discoid lupus erythematosus, lichen planus.  Multiple eruptive milia: crops of numerous milia appear over a few weeks to months, lesions may be asymptomatic or itchy, most often affect the face, upper arms and upper trunk.  Traumatic milia: occur at the site of injury as skin heals, arise from eccrine sweat ducts, examples include thermal burns, dermabrasion, blistering rashes such as bullous pemphigoid, often seen on the back of hands and fingers in porphyria cutanea tarda, a milia-like calcified nodule may develop after  heel stick blood test.   Milia associated with drugs: may rarely follow the use of topical medication, such as phenols, hydroquinone, 5-fluorouracil cream, and a corticosteroid.    Milia have a characteristic appearance. However, on occasion, a skin biopsy may be performed. This shows a small epidermoid cyst coming from a vellus hair follicle.  Milia should be distinguished from other types of cyst,  "comedones, xanthelasma and syringomas. Colloid milia are velázquez coloured bumps on cheeks and temples associated with excessive exposure to sunlight.  They should also be distinguished from milia-like cysts noted on dermoscopy in seborrhoeic keratoses, papillomatous moles and some basal cell carcinomas.  Milia do not need to be treated unless they are a cause for concern for the patient. They often clear up by themselves within a few months. Where possible, further trauma should be minimised to reduce the development of new lesions.  The lesion may be de-roofed using a sterile needle or blade and the contents squeezed or pricked out.  They may be destroyed using diathermy and curettage, or cryotherapy.  For widespread lesions, topical retinoids may be helpful.  Chemical peels, dermabrasion and laser ablation have been reported to be effective when used for very extensive milia.  Milia en plaque may improve with minocycline (a tetracycline antibiotic).     DERMATOFIBROMA    Physical Exam:  Anatomic Location Affected:  right posterior upper arm   Morphological Description:  pink firm papule with positive lateral dimple sign  Pertinent Positives:  Pertinent Negatives:    Additional History of Present Condition:  present on exam     Assessment and Plan:  Based on a thorough discussion of this condition and the management approach to it (including a comprehensive discussion of the known risks, side effects and potential benefits of treatment), the patient (family) agrees to implement the following specific plan:  Reassured benign    Assessment and Plan:  A dermatofibroma is a common benign fibrous nodule that most often arises on the skin of the lower legs.  A dermatofibroma is also called a \"cutaneous fibrous histiocytoma.\"  Dermatofibromas occur at all ages and in people of every ethnicity. They are more common in women than in men.    It is not clear if dermatofibroma is a reactive process or if it is a neoplasm. The " "lesions are made up of proliferating fibroblasts. Histiocytes may also be involved.  They are sometimes attributed to an insect bite or ingrownhair or local trauma, but not consistently. They may be more numerous in patients with altered immunity.    Dermatofibromas most often occur on the legs and arms, but may also arise on the trunk or any site of the body.  Typical clinical features include the following:  People may have 1 or up to 15 lesions.  Size varies from 0.5-1.5 cm diameter; most lesions are 7-10 mm diameter.  They are firm nodules tethered to the skin surface and mobile over subcutaneous tissue.  The skin \"dimples\" on pinching the lesion.  Color may be pink to light brown in white skin, and dark brown to black in dark skin; some appear paler in the center.  They do not usually cause symptoms, but they are sometimes painful or itchy.  Because they are often raised lesions, they may be traumatized, for example by a razor.  Occasionally dozens may erupt within a few months, usually in the setting of immunosuppression (for example autoimmune disease, cancer or certain medications).  Dermatofibroma does not give rise to cancer. However, occasionally, it may be mistaken for dermatofibrosarcoma or desmoplastic melanoma.    A dermatofibroma is harmless and seldom causes any symptoms. Usually, only reassurance is needed. If it is nuisance or causing concern, the lesion can be removed surgically, resulting in a scar that is, by definition, usually longer in diameter than the widest portion of the dermatofibroma.  Cryotherapy, shave biopsy and laser surgery are rarely completely successful.  Skin punch biopsy or incisional biopsy may be undertaken if there is an atypical feature such as recent enlargement, ulceration, or asymmetrical structures and colours on dermatoscopy.       Scribe Attestation      I,:  Isaura Gonsalez am acting as a scribe while in the presence of the attending physician.:       I,:  Geni " GORAN Soria personally performed the services described in this documentation    as scribed in my presence.:

## 2024-07-23 NOTE — PATIENT INSTRUCTIONS
ACTINIC KERATOSIS      Assessment and Plan:  Based on a thorough discussion of this condition and the management approach to it (including a comprehensive discussion of the known risks, side effects and potential benefits of treatment), the patient (family) agrees to implement the following specific plan:  When outside we recommend using a wide brim hat, sunglasses, long sleeve and pants, sunscreen with SPF 30+ with reapplication every 2 hours, or SPF specific clothing   liquid nitrogen to treat areas. Consent obtained. Expect area to blister, crust, and then fall off within 2 weeks. Please use vaseline.                                     PROCEDURE:  DESTRUCTION OF PRE-MALIGNANT LESIONS  After a thorough discussion of treatment options and risk/benefits/alternatives (including but not limited to local pain, scarring, dyspigmentation, blistering, and possible superinfection), verbal and written consent were obtained and the aforementioned lesions were treated on with cryotherapy using liquid nitrogen x 1 cycle for 5-10 seconds.    The patient tolerated the procedure well, and after-care instructions were provided.        KELOID SCAR      Assessment and Plan:  Based on a thorough discussion of this condition and the management approach to it (including a comprehensive discussion of the known risks, side effects and potential benefits of treatment), the patient (family) agrees to implement the following specific plan:  Discussed injection     A keloid scar is a firm, smooth, hard growth due to spontaneous scar formation. It can arise soon after an injury, or develop months later. Keloids may be uncomfortable or itchy and extend well beyond the original wound. They may form on any part of the body, although the upper chest and shoulders are especially prone to them.    The precise reason that wound healing sometimes leads to keloid formation is under investigation but is not yet clear.  While most people never form  keloids, others develop them after minor injuries, burns, insect bites and acne spots. Dark skinned people form keloids more easily than Caucasians. A keloid is harmless to general health and does not change into skin cancer.    The following measures are helpful in at least some patients.  Emollients (creams and oils)  Polyurethane or silicone scar reduction patches  Silicone gel  Oral or topical tranilast (an inhibitor of collagen synthesis)  Pressure dressings  Surgical excision (but in keloids, excision may result in a new keloid even larger than the original one)  Intralesional corticosteroid injection, repeated every few weeks  Intralesional 5-fluorouracil  Cryotherapy  Superficial X-ray treatment soon after surgery.  Pulsed dye laser   Skin needling  Subcision    Scar dressings should be worn for 12-24 hours per day, for at least 8 to 12 weeks, and perhaps for much longer.         LENTIGO    Assessment and Plan:  Based on a thorough discussion of this condition and the management approach to it (including a comprehensive discussion of the known risks, side effects and potential benefits of treatment), the patient (family) agrees to implement the following specific plan:  Benign-assurance provided     What is a lentigo?  A lentigo is a pigmented flat or slightly raised lesion with a clearly defined edge. Unlike an ephelis (freckle), it does not fade in the winter months. There are several kinds of lentigo.  The name lentigo originally referred to its appearance resembling a small lentil. The plural of lentigo is lentigines, although “lentigos” is also in common use.    Who gets lentigines?  Lentigines can affect males and females of all ages and races. Solar lentigines are especially prevalent in fair skinned adults. Lentigines associated with syndromes are present at birth or arise during childhood.    What causes lentigines?  Common forms of lentigo are due to exposure to ultraviolet radiation:  Sun damage  including sunburn   Indoor tanning   Phototherapy, especially photochemotherapy (PUVA)    Ionizing radiation, eg radiation therapy, can also cause lentigines.  Several familial syndromes associated with widespread lentigines originate from mutations in Raymundo-MAP kinase, mTOR signaling and PTEN pathways.    What are the clinical features of lentigines?  Lentigines have been classified into several different types depending on what they look like, where they appear on the body, causative factors, and whether they are associated to other diseases or conditions.  Lentigines may be solitary or more often, multiple. Most lentigines are smaller than 5 mm in diameter.    Lentigo simplex  A precursor to junctional naevus   Arises during childhood and early adult life   Found on trunk and limbs   Small brown round or oval macule or thin plaque   Jagged or smooth edge   May have a dry surface   May disappear in time  Solar lentigo  A precursor to seborrhoeic keratosis   Found on chronically sun exposed sites such as hands, face, lower legs   May also follow sunburn to shoulders   Yellow, light or dark brown regular or irregular macule or thin plaque   May have a dry surface   Often has moth-eaten outline   Can slowly enlarge to several centimeters in diameter   May disappear, often through the process known as lichenoid keratosis   When atypical in appearance, may be difficult to distinguish from melanoma in situ  Ink spot lentigo  Also known as reticulated lentigo   Few in number compared to solar lentigines   Follows sunburn in very fair skinned individuals   Dark brown to black irregular ink spot-like macule  PUVA lentigo  Similar to ink spot lentigo but follows photochemotherapy (PUVA)   Location anywhere exposed to PUVA  Tanning bed lentigo  Similar to ink spot lentigo but follows indoor tanning   Location anywhere exposed to tanning bed  Radiation lentigo  Occurs in site of irradiation (accidental or therapeutic)    Associated with late-stage radiation dermatitis: epidermal atrophy, subcutaneous fibrosis, keratosis, telangiectasias  Melanotic macule  Mucosal surfaces or adjacent glabrous skin eg lip, vulva, penis, anus   Light to dark brown   Also called mucosal melanosis  Generalised lentigines  Found on any exposed or covered site from early childhood   Small macules may merge to form larger patches   Not associated with a syndrome   Also called lentigines profusa, multiple lentigines  Agminated lentigines  Naevoid eruption of lentigos confined to a single segmental area   Sharp demarcation in midline   May have associated neurological and developmental abnormalities  Patterned lentigines  Inherited tendency to lentigines on face, lips, buttocks, palms, soles   Recognised mainly in people of  ethnicity  Centrofacial neurodysraphic lentiginosis  Associated with mental retardation  Lentiginosis syndromes  Syndromes include LEOPARD/Cleveland, Peutz-Jeghers, Laugier-Hunziker, Moynahan, Xeroderma pigmentosum, myxoma syndromes (LEMOS, NAME, Grady), Ruvalcaba-Myhre-Beal, Bannayan-Zonnana syndrome, Cowden disease (multiple hamartoma syndrome )   Inheritance is autosomal dominant; sporadic cases common   Widespread lentigines present at birth or arise in early childhood   Associated with neural, endocrine, and mesenchymal tumors    How is the diagnosis made?  Lentigines are usually diagnosed clinically by their typical appearance. Concern regarding possibility of melanoma may lead to:  Dermatoscopy   Diagnostic excision biopsy    Histopathology of a lentigo shows:  Thickened epidermis   An increased number of melanocytes along the basal layer of epidermis   Unlike junctional melanocytic naevus, there are no nests of melanocytes   Increased melanin pigment within the keratinocytes   Additional features depending on type of lentigo    In contrast, an ephelis (freckle) shows sun-induced increased melanin within the keratinocytes,  without an increase in number of cells.  What is the treatment for lentigines?  Most lentigines are left alone. Attempts to lighten them may not be successful. The following approaches are used:  SPF 50+ broad-spectrum sunscreen   Hydroquinone bleaching cream   Alpha hydroxy acids   Vitamin C   Retinoids   Azelaic acid   Chemical peels  Individual lesions can be permanently removed using:  Cryotherapy   Intense pulsed light   Pigment lasers    How can lentigines be prevented?  Lentigines associated with exposure ultraviolet radiation can be prevented by very careful sun protection. Clothing is more successful at preventing new lentigines than are sunscreens.    What is the outlook for lentigines?  Lentigines usually persist. They may increase in number with age and sun exposure. Some in sun-protected sites may fade and disappear.     SEBACEOUS HYPERPLASIA  Assessment and Plan:  Based on a thorough discussion of this condition and the management approach to it (including a comprehensive discussion of the known risks, side effects and potential benefits of treatment), the patient (family) agrees to implement the following specific plan:  Monitor for any changes   Benign-assurance provided     Sebaceous Hyperplasia  Sebaceous hyperplasia is a common, benign condition of enlarged oil secreting (sebaceous) glands commonly found on the forehead and cheeks of middle-aged and elderly patients. They normally appear as small yellow bumps up to 3mm in diameter that can be single or multiple. The bumps on the face often display a centrall dell. Occasionally, these bumps can occur on the chest, areola, mouth, and genitals. Rarely, they can grow to take a giant form, or be arranged linearly.     Causes of sebaceous hyperplasia  Sebaceous hyperplasic is a form of benign hair follicle tumor and can often be confused with basal cell carcinoma. It can be more prevalent in immunosuppressed patients such as those undergoing organ  transplantation. In the rare London-Reagan syndrome, sebaceous hyperplasia occurs in association with internal cancers.  Lesions of sebaceous hyperplasia are benign, with no known potential for malignant transformation, but they may be associated with nonmelanoma skin cancer in transplantation patients.     How we do diagnose sebaceous hyperplasia?  Your dermatologist may take a closer look at the bumps with a device called a dermatoscope. Common features include a central hair follicle surrounded by yellowish lobules with prominent blood vessels.     What is the treatment of sebaceous hyperplasia?   Since sebaceous hyperplasia is benign with no known potential for transformation into cancer, treatment is mostly for cosmetic reasons or if the lesions become irritated. Options include   Light electrocautery or laser vaporization  Oral isotrentinoin is effective for extensive or disfiguring spots, but do not prevent recurrence   Antiandrogens may be used in females to decrease the size and improve overall appearance of bumps      MILIUM       Assessment and Plan:  Based on a thorough discussion of this condition and the management approach to it (including a comprehensive discussion of the known risks, side effects and potential benefits of treatment), the patient (family) agrees to implement the following specific plan:  Benign-assurance provided     Assessment and Plan  A milium is a small cyst containing keratin (the skin protein); they are usually multiple and are then known as milia. These harmless cysts present as tiny pearly-white bumps just under the surface of the skin.  Milia are common in all ages and both sexes. They most often arise on the face and are particularly prominent on the eyelids and cheeks, but they may occur elsewhere.  There are various kinds of milia.   milia: Affect 40-50% of  babies, few to numerous lesions, often seen on the nose, but may also arise inside the mouth on the  mucosa (Flaca pearls) or palate (Maria Luz nodules) or more widely on the scalp, face and upper trunk, Heal spontaneously within a few weeks of birth.  Primary milia in children and adults: found around eyelids, cheeks, forehead and genitalia, in young children, a row of milia may appear along the nasal crease, may clear in a few weeks or persist for months or longer.    Juvenile milia: associated with Rombo syndrome, basal cell naevus syndrome, Xkzvy-Tjrrs-Pblfzbzf syndrome, pachyonychia congenita, Benton syndrome and other genetic disorders, may be congenital (present at birth) or appear later in life.  Milia en plaque: multiple milia appear on within an inflamed plaque up to several centimeters in diameter, usually found on an eyelid, behind the ear, on a cheek or jaw.  3. Affect children and adults, especially middle-aged women.  4. Sometimes associated with another skin disease including pseudoxanthoma elasticum, discoid lupus erythematosus, lichen planus.  Multiple eruptive milia: crops of numerous milia appear over a few weeks to months, lesions may be asymptomatic or itchy, most often affect the face, upper arms and upper trunk.  Traumatic milia: occur at the site of injury as skin heals, arise from eccrine sweat ducts, examples include thermal burns, dermabrasion, blistering rashes such as bullous pemphigoid, often seen on the back of hands and fingers in porphyria cutanea tarda, a milia-like calcified nodule may develop after  heel stick blood test.   Milia associated with drugs: may rarely follow the use of topical medication, such as phenols, hydroquinone, 5-fluorouracil cream, and a corticosteroid.    Milia have a characteristic appearance. However, on occasion, a skin biopsy may be performed. This shows a small epidermoid cyst coming from a vellus hair follicle.  Milia should be distinguished from other types of cyst, comedones, xanthelasma and syringomas. Colloid milia are velázquez coloured bumps  "on cheeks and temples associated with excessive exposure to sunlight.  They should also be distinguished from milia-like cysts noted on dermoscopy in seborrhoeic keratoses, papillomatous moles and some basal cell carcinomas.  Milia do not need to be treated unless they are a cause for concern for the patient. They often clear up by themselves within a few months. Where possible, further trauma should be minimised to reduce the development of new lesions.  The lesion may be de-roofed using a sterile needle or blade and the contents squeezed or pricked out.  They may be destroyed using diathermy and curettage, or cryotherapy.  For widespread lesions, topical retinoids may be helpful.  Chemical peels, dermabrasion and laser ablation have been reported to be effective when used for very extensive milia.  Milia en plaque may improve with minocycline (a tetracycline antibiotic).     DERMATOFIBROMA      Assessment and Plan:  Based on a thorough discussion of this condition and the management approach to it (including a comprehensive discussion of the known risks, side effects and potential benefits of treatment), the patient (family) agrees to implement the following specific plan:  Discussed cosmetic procedure     Assessment and Plan:  A dermatofibroma is a common benign fibrous nodule that most often arises on the skin of the lower legs.  A dermatofibroma is also called a \"cutaneous fibrous histiocytoma.\"  Dermatofibromas occur at all ages and in people of every ethnicity. They are more common in women than in men.    It is not clear if dermatofibroma is a reactive process or if it is a neoplasm. The lesions are made up of proliferating fibroblasts. Histiocytes may also be involved.  They are sometimes attributed to an insect bite or ingrownhair or local trauma, but not consistently. They may be more numerous in patients with altered immunity.    Dermatofibromas most often occur on the legs and arms, but may also arise " "on the trunk or any site of the body.  Typical clinical features include the following:  People may have 1 or up to 15 lesions.  Size varies from 0.5-1.5 cm diameter; most lesions are 7-10 mm diameter.  They are firm nodules tethered to the skin surface and mobile over subcutaneous tissue.  The skin \"dimples\" on pinching the lesion.  Color may be pink to light brown in white skin, and dark brown to black in dark skin; some appear paler in the center.  They do not usually cause symptoms, but they are sometimes painful or itchy.  Because they are often raised lesions, they may be traumatized, for example by a razor.  Occasionally dozens may erupt within a few months, usually in the setting of immunosuppression (for example autoimmune disease, cancer or certain medications).  Dermatofibroma does not give rise to cancer. However, occasionally, it may be mistaken for dermatofibrosarcoma or desmoplastic melanoma.    A dermatofibroma is harmless and seldom causes any symptoms. Usually, only reassurance is needed. If it is nuisance or causing concern, the lesion can be removed surgically, resulting in a scar that is, by definition, usually longer in diameter than the widest portion of the dermatofibroma.  Cryotherapy, shave biopsy and laser surgery are rarely completely successful.  Skin punch biopsy or incisional biopsy may be undertaken if there is an atypical feature such as recent enlargement, ulceration, or asymmetrical structures and colours on dermatoscopy.   "

## 2024-08-05 ENCOUNTER — RA CDI HCC (OUTPATIENT)
Dept: OTHER | Facility: HOSPITAL | Age: 64
End: 2024-08-05

## 2024-08-05 NOTE — PROGRESS NOTES
HCC coding opportunities       Chart reviewed, no opportunity found: CHART REVIEWED, NO OPPORTUNITY FOUND      [facility] Review  Patients Insurance     Medicare Insurance: [facility] [employer]

## 2024-08-06 ENCOUNTER — APPOINTMENT (OUTPATIENT)
Dept: LAB | Age: 64
End: 2024-08-06
Payer: COMMERCIAL

## 2024-08-06 DIAGNOSIS — R73.03 PREDIABETES: ICD-10-CM

## 2024-08-06 DIAGNOSIS — D83.9 VARIABLE IMMUNODEFICIENCY SYNDROME (HCC): ICD-10-CM

## 2024-08-06 DIAGNOSIS — E03.9 HYPOTHYROIDISM, UNSPECIFIED TYPE: ICD-10-CM

## 2024-08-06 DIAGNOSIS — D69.6 THROMBOCYTOPENIA (HCC): ICD-10-CM

## 2024-08-06 DIAGNOSIS — E78.5 HYPERLIPIDEMIA, UNSPECIFIED HYPERLIPIDEMIA TYPE: ICD-10-CM

## 2024-08-06 LAB
ALBUMIN SERPL BCG-MCNC: 4.1 G/DL (ref 3.5–5)
ALP SERPL-CCNC: 43 U/L (ref 34–104)
ALT SERPL W P-5'-P-CCNC: 9 U/L (ref 7–52)
ANION GAP SERPL CALCULATED.3IONS-SCNC: 6 MMOL/L (ref 4–13)
AST SERPL W P-5'-P-CCNC: 15 U/L (ref 13–39)
BASOPHILS # BLD AUTO: 0.03 THOUSANDS/ÂΜL (ref 0–0.1)
BASOPHILS NFR BLD AUTO: 1 % (ref 0–1)
BILIRUB SERPL-MCNC: 0.66 MG/DL (ref 0.2–1)
BUN SERPL-MCNC: 17 MG/DL (ref 5–25)
CALCIUM SERPL-MCNC: 9.4 MG/DL (ref 8.4–10.2)
CHLORIDE SERPL-SCNC: 101 MMOL/L (ref 96–108)
CHOLEST SERPL-MCNC: 163 MG/DL
CO2 SERPL-SCNC: 32 MMOL/L (ref 21–32)
CREAT SERPL-MCNC: 0.8 MG/DL (ref 0.6–1.3)
EOSINOPHIL # BLD AUTO: 0.1 THOUSAND/ÂΜL (ref 0–0.61)
EOSINOPHIL NFR BLD AUTO: 2 % (ref 0–6)
ERYTHROCYTE [DISTWIDTH] IN BLOOD BY AUTOMATED COUNT: 13 % (ref 11.6–15.1)
GFR SERPL CREATININE-BSD FRML MDRD: 77 ML/MIN/1.73SQ M
GLUCOSE P FAST SERPL-MCNC: 87 MG/DL (ref 65–99)
HCT VFR BLD AUTO: 44 % (ref 34.8–46.1)
HDLC SERPL-MCNC: 70 MG/DL
HGB BLD-MCNC: 14.9 G/DL (ref 11.5–15.4)
IGA SERPL-MCNC: 41 MG/DL (ref 66–433)
IGG SERPL-MCNC: 623 MG/DL (ref 635–1741)
IGM SERPL-MCNC: 44 MG/DL (ref 45–281)
IMM GRANULOCYTES # BLD AUTO: 0.01 THOUSAND/UL (ref 0–0.2)
IMM GRANULOCYTES NFR BLD AUTO: 0 % (ref 0–2)
LDLC SERPL CALC-MCNC: 68 MG/DL (ref 0–100)
LYMPHOCYTES # BLD AUTO: 1.42 THOUSANDS/ÂΜL (ref 0.6–4.47)
LYMPHOCYTES NFR BLD AUTO: 31 % (ref 14–44)
MCH RBC QN AUTO: 30.8 PG (ref 26.8–34.3)
MCHC RBC AUTO-ENTMCNC: 33.9 G/DL (ref 31.4–37.4)
MCV RBC AUTO: 91 FL (ref 82–98)
MONOCYTES # BLD AUTO: 0.3 THOUSAND/ÂΜL (ref 0.17–1.22)
MONOCYTES NFR BLD AUTO: 7 % (ref 4–12)
NEUTROPHILS # BLD AUTO: 2.73 THOUSANDS/ÂΜL (ref 1.85–7.62)
NEUTS SEG NFR BLD AUTO: 59 % (ref 43–75)
NRBC BLD AUTO-RTO: 0 /100 WBCS
PLATELET # BLD AUTO: 129 THOUSANDS/UL (ref 149–390)
PMV BLD AUTO: 12.5 FL (ref 8.9–12.7)
POTASSIUM SERPL-SCNC: 3.8 MMOL/L (ref 3.5–5.3)
PROT SERPL-MCNC: 6.3 G/DL (ref 6.4–8.4)
RBC # BLD AUTO: 4.83 MILLION/UL (ref 3.81–5.12)
SODIUM SERPL-SCNC: 139 MMOL/L (ref 135–147)
TRIGL SERPL-MCNC: 124 MG/DL
TSH SERPL DL<=0.05 MIU/L-ACNC: 1.03 UIU/ML (ref 0.45–4.5)
WBC # BLD AUTO: 4.59 THOUSAND/UL (ref 4.31–10.16)

## 2024-08-06 PROCEDURE — 80053 COMPREHEN METABOLIC PANEL: CPT

## 2024-08-06 PROCEDURE — 36415 COLL VENOUS BLD VENIPUNCTURE: CPT

## 2024-08-06 PROCEDURE — 80061 LIPID PANEL: CPT

## 2024-08-06 PROCEDURE — 84165 PROTEIN E-PHORESIS SERUM: CPT

## 2024-08-06 PROCEDURE — 84443 ASSAY THYROID STIM HORMONE: CPT

## 2024-08-06 PROCEDURE — 82784 ASSAY IGA/IGD/IGG/IGM EACH: CPT

## 2024-08-06 PROCEDURE — 85025 COMPLETE CBC W/AUTO DIFF WBC: CPT

## 2024-08-07 DIAGNOSIS — N95.2 ATROPHIC VAGINITIS: ICD-10-CM

## 2024-08-07 LAB
ALBUMIN SERPL ELPH-MCNC: 4.12 G/DL (ref 3.2–5.1)
ALBUMIN SERPL ELPH-MCNC: 66.5 % (ref 48–70)
ALPHA1 GLOB SERPL ELPH-MCNC: 0.24 G/DL (ref 0.15–0.47)
ALPHA1 GLOB SERPL ELPH-MCNC: 3.8 % (ref 1.8–7)
ALPHA2 GLOB SERPL ELPH-MCNC: 0.7 G/DL (ref 0.42–1.04)
ALPHA2 GLOB SERPL ELPH-MCNC: 11.3 % (ref 5.9–14.9)
BETA GLOB ABNORMAL SERPL ELPH-MCNC: 0.37 G/DL (ref 0.31–0.57)
BETA1 GLOB SERPL ELPH-MCNC: 5.9 % (ref 4.7–7.7)
BETA2 GLOB SERPL ELPH-MCNC: 3.5 % (ref 3.1–7.9)
BETA2+GAMMA GLOB SERPL ELPH-MCNC: 0.22 G/DL (ref 0.2–0.58)
GAMMA GLOB ABNORMAL SERPL ELPH-MCNC: 0.56 G/DL (ref 0.4–1.66)
GAMMA GLOB SERPL ELPH-MCNC: 9 % (ref 6.9–22.3)
IGG/ALB SER: 1.99 {RATIO} (ref 1.1–1.8)
PROT PATTERN SERPL ELPH-IMP: ABNORMAL
PROT SERPL-MCNC: 6.2 G/DL (ref 6.4–8.2)

## 2024-08-07 PROCEDURE — 84165 PROTEIN E-PHORESIS SERUM: CPT | Performed by: PATHOLOGY

## 2024-08-07 RX ORDER — ESTRADIOL 0.1 MG/G
CREAM VAGINAL
Qty: 42.5 G | Refills: 1 | Status: SHIPPED | OUTPATIENT
Start: 2024-08-07

## 2024-08-08 ENCOUNTER — RA CDI HCC (OUTPATIENT)
Dept: OTHER | Facility: HOSPITAL | Age: 64
End: 2024-08-08

## 2024-08-16 ENCOUNTER — OFFICE VISIT (OUTPATIENT)
Dept: INTERNAL MEDICINE CLINIC | Facility: CLINIC | Age: 64
End: 2024-08-16
Payer: COMMERCIAL

## 2024-08-16 VITALS
DIASTOLIC BLOOD PRESSURE: 80 MMHG | WEIGHT: 172 LBS | SYSTOLIC BLOOD PRESSURE: 118 MMHG | RESPIRATION RATE: 18 BRPM | TEMPERATURE: 98 F | HEIGHT: 62 IN | HEART RATE: 67 BPM | OXYGEN SATURATION: 96 % | BODY MASS INDEX: 31.65 KG/M2

## 2024-08-16 DIAGNOSIS — R73.03 PREDIABETES: ICD-10-CM

## 2024-08-16 DIAGNOSIS — D69.6 THROMBOCYTOPENIA (HCC): ICD-10-CM

## 2024-08-16 DIAGNOSIS — Z00.00 MEDICARE ANNUAL WELLNESS VISIT, SUBSEQUENT: Primary | ICD-10-CM

## 2024-08-16 DIAGNOSIS — I15.9 SECONDARY HYPERTENSION: ICD-10-CM

## 2024-08-16 DIAGNOSIS — D80.2 IGA DEFICIENCY (HCC): ICD-10-CM

## 2024-08-16 DIAGNOSIS — Z12.11 SCREENING FOR COLON CANCER: ICD-10-CM

## 2024-08-16 DIAGNOSIS — D80.3 IGG DEFICIENCY (HCC): ICD-10-CM

## 2024-08-16 DIAGNOSIS — E66.09 CLASS 1 OBESITY DUE TO EXCESS CALORIES WITHOUT SERIOUS COMORBIDITY WITH BODY MASS INDEX (BMI) OF 33.0 TO 33.9 IN ADULT: ICD-10-CM

## 2024-08-16 DIAGNOSIS — Z96.652 STATUS POST LEFT KNEE REPLACEMENT: ICD-10-CM

## 2024-08-16 DIAGNOSIS — E03.9 HYPOTHYROIDISM, UNSPECIFIED TYPE: ICD-10-CM

## 2024-08-16 DIAGNOSIS — Z13.6 SCREENING FOR CARDIOVASCULAR CONDITION: ICD-10-CM

## 2024-08-16 PROCEDURE — G0439 PPPS, SUBSEQ VISIT: HCPCS | Performed by: INTERNAL MEDICINE

## 2024-08-16 PROCEDURE — 99214 OFFICE O/P EST MOD 30 MIN: CPT | Performed by: INTERNAL MEDICINE

## 2024-08-16 NOTE — PATIENT INSTRUCTIONS
Medicare Preventive Visit Patient Instructions  Thank you for completing your Welcome to Medicare Visit or Medicare Annual Wellness Visit today. Your next wellness visit will be due in one year (8/17/2025).  The screening/preventive services that you may require over the next 5-10 years are detailed below. Some tests may not apply to you based off risk factors and/or age. Screening tests ordered at today's visit but not completed yet may show as past due. Also, please note that scanned in results may not display below.  Preventive Screenings:  Service Recommendations Previous Testing/Comments   Colorectal Cancer Screening  * Colonoscopy    * Fecal Occult Blood Test (FOBT)/Fecal Immunochemical Test (FIT)  * Fecal DNA/Cologuard Test  * Flexible Sigmoidoscopy Age: 45-75 years old   Colonoscopy: every 10 years (may be performed more frequently if at higher risk)  OR  FOBT/FIT: every 1 year  OR  Cologuard: every 3 years  OR  Sigmoidoscopy: every 5 years  Screening may be recommended earlier than age 45 if at higher risk for colorectal cancer. Also, an individualized decision between you and your healthcare provider will decide whether screening between the ages of 76-85 would be appropriate. Colonoscopy: 05/06/2019  FOBT/FIT: 11/22/2021  Cologuard: 08/07/2023  Sigmoidoscopy: Not on file    Screening Current     Breast Cancer Screening Age: 40+ years old  Frequency: every 1-2 years  Not required if history of left and right mastectomy Mammogram: 06/10/2024    Screening Current   Cervical Cancer Screening Between the ages of 21-29, pap smear recommended once every 3 years.   Between the ages of 30-65, can perform pap smear with HPV co-testing every 5 years.   Recommendations may differ for women with a history of total hysterectomy, cervical cancer, or abnormal pap smears in past. Pap Smear: 06/05/2023    Screening Not Indicated   Hepatitis C Screening Once for adults born between 1945 and 1965  More frequently in patients  at high risk for Hepatitis C Hep C Antibody: 06/06/2018    Screening Current   Diabetes Screening 1-2 times per year if you're at risk for diabetes or have pre-diabetes Fasting glucose: 87 mg/dL (8/6/2024)  A1C: 5.5 (9/27/2023)  Screening Current   Cholesterol Screening Once every 5 years if you don't have a lipid disorder. May order more often based on risk factors. Lipid panel: 08/06/2024    Screening Not Indicated  History Lipid Disorder     Other Preventive Screenings Covered by Medicare:  Abdominal Aortic Aneurysm (AAA) Screening: covered once if your at risk. You're considered to be at risk if you have a family history of AAA.  Lung Cancer Screening: covers low dose CT scan once per year if you meet all of the following conditions: (1) Age 55-77; (2) No signs or symptoms of lung cancer; (3) Current smoker or have quit smoking within the last 15 years; (4) You have a tobacco smoking history of at least 20 pack years (packs per day multiplied by number of years you smoked); (5) You get a written order from a healthcare provider.  Glaucoma Screening: covered annually if you're considered high risk: (1) You have diabetes OR (2) Family history of glaucoma OR (3)  aged 50 and older OR (4)  American aged 65 and older  Osteoporosis Screening: covered every 2 years if you meet one of the following conditions: (1) You're estrogen deficient and at risk for osteoporosis based off medical history and other findings; (2) Have a vertebral abnormality; (3) On glucocorticoid therapy for more than 3 months; (4) Have primary hyperparathyroidism; (5) On osteoporosis medications and need to assess response to drug therapy.   Last bone density test (DXA Scan): 12/27/2022.  HIV Screening: covered annually if you're between the age of 15-65. Also covered annually if you are younger than 15 and older than 65 with risk factors for HIV infection. For pregnant patients, it is covered up to 3 times per  pregnancy.    Immunizations:  Immunization Recommendations   Influenza Vaccine Annual influenza vaccination during flu season is recommended for all persons aged >= 6 months who do not have contraindications   Pneumococcal Vaccine   * Pneumococcal conjugate vaccine = PCV13 (Prevnar 13), PCV15 (Vaxneuvance), PCV20 (Prevnar 20)  * Pneumococcal polysaccharide vaccine = PPSV23 (Pneumovax) Adults 19-63 yo with certain risk factors or if 65+ yo  If never received any pneumonia vaccine: recommend Prevnar 20 (PCV20)  Give PCV20 if previously received 1 dose of PCV13 or PPSV23   Hepatitis B Vaccine 3 dose series if at intermediate or high risk (ex: diabetes, end stage renal disease, liver disease)   Respiratory syncytial virus (RSV) Vaccine - COVERED BY MEDICARE PART D  * RSVPreF3 (Arexvy) CDC recommends that adults 60 years of age and older may receive a single dose of RSV vaccine using shared clinical decision-making (SCDM)   Tetanus (Td) Vaccine - COST NOT COVERED BY MEDICARE PART B Following completion of primary series, a booster dose should be given every 10 years to maintain immunity against tetanus. Td may also be given as tetanus wound prophylaxis.   Tdap Vaccine - COST NOT COVERED BY MEDICARE PART B Recommended at least once for all adults. For pregnant patients, recommended with each pregnancy.   Shingles Vaccine (Shingrix) - COST NOT COVERED BY MEDICARE PART B  2 shot series recommended in those 19 years and older who have or will have weakened immune systems or those 50 years and older     Health Maintenance Due:      Topic Date Due   • Colorectal Cancer Screening  08/08/2023   • Breast Cancer Screening: Mammogram  06/10/2026   • Hepatitis C Screening  Completed   • Cervical Cancer Screening  Discontinued     Immunizations Due:      Topic Date Due   • COVID-19 Vaccine (4 - 2023-24 season) 09/01/2023   • Influenza Vaccine (1) 09/01/2024     Advance Directives   What are advance directives?  Advance directives are  legal documents that state your wishes and plans for medical care. These plans are made ahead of time in case you lose your ability to make decisions for yourself. Advance directives can apply to any medical decision, such as the treatments you want, and if you want to donate organs.   What are the types of advance directives?  There are many types of advance directives, and each state has rules about how to use them. You may choose a combination of any of the following:  Living will:  This is a written record of the treatment you want. You can also choose which treatments you do not want, which to limit, and which to stop at a certain time. This includes surgery, medicine, IV fluid, and tube feedings.   Durable power of  for healthcare (DPAHC):  This is a written record that states who you want to make healthcare choices for you when you are unable to make them for yourself. This person, called a proxy, is usually a family member or a friend. You may choose more than 1 proxy.  Do not resuscitate (DNR) order:  A DNR order is used in case your heart stops beating or you stop breathing. It is a request not to have certain forms of treatment, such as CPR. A DNR order may be included in other types of advance directives.  Medical directive:  This covers the care that you want if you are in a coma, near death, or unable to make decisions for yourself. You can list the treatments you want for each condition. Treatment may include pain medicine, surgery, blood transfusions, dialysis, IV or tube feedings, and a ventilator (breathing machine).  Values history:  This document has questions about your views, beliefs, and how you feel and think about life. This information can help others choose the care that you would choose.  Why are advance directives important?  An advance directive helps you control your care. Although spoken wishes may be used, it is better to have your wishes written down. Spoken wishes can be  misunderstood, or not followed. Treatments may be given even if you do not want them. An advance directive may make it easier for your family to make difficult choices about your care.   Fall Prevention    Fall prevention  includes ways to make your home and other areas safer. It also includes ways you can move more carefully to prevent a fall. Health conditions that cause changes in your blood pressure, vision, or muscle strength and coordination may increase your risk for falls. Medicines may also increase your risk for falls if they make you dizzy, weak, or sleepy.   Fall prevention tips:   Stand or sit up slowly.    Use assistive devices as directed.    Wear shoes that fit well and have soles that .    Wear a personal alarm.    Stay active.    Manage your medical conditions.    Home Safety Tips:  Add items to prevent falls in the bathroom.    Keep paths clear.    Install bright lights in your home.    Keep items you use often on shelves within reach.    Paint or place reflective tape on the edges of your stairs.    Weight Management   Why it is important to manage your weight:  Being overweight increases your risk of health conditions such as heart disease, high blood pressure, type 2 diabetes, and certain types of cancer. It can also increase your risk for osteoarthritis, sleep apnea, and other respiratory problems. Aim for a slow, steady weight loss. Even a small amount of weight loss can lower your risk of health problems.  How to lose weight safely:  A safe and healthy way to lose weight is to eat fewer calories and get regular exercise. You can lose up about 1 pound a week by decreasing the number of calories you eat by 500 calories each day.   Healthy meal plan for weight management:  A healthy meal plan includes a variety of foods, contains fewer calories, and helps you stay healthy. A healthy meal plan includes the following:  Eat whole-grain foods more often.  A healthy meal plan should contain  fiber. Fiber is the part of grains, fruits, and vegetables that is not broken down by your body. Whole-grain foods are healthy and provide extra fiber in your diet. Some examples of whole-grain foods are whole-wheat breads and pastas, oatmeal, brown rice, and bulgur.  Eat a variety of vegetables every day.  Include dark, leafy greens such as spinach, kale, rafael greens, and mustard greens. Eat yellow and orange vegetables such as carrots, sweet potatoes, and winter squash.   Eat a variety of fruits every day.  Choose fresh or canned fruit (canned in its own juice or light syrup) instead of juice. Fruit juice has very little or no fiber.  Eat low-fat dairy foods.  Drink fat-free (skim) milk or 1% milk. Eat fat-free yogurt and low-fat cottage cheese. Try low-fat cheeses such as mozzarella and other reduced-fat cheeses.  Choose meat and other protein foods that are low in fat.  Choose beans or other legumes such as split peas or lentils. Choose fish, skinless poultry (chicken or turkey), or lean cuts of red meat (beef or pork). Before you cook meat or poultry, cut off any visible fat.   Use less fat and oil.  Try baking foods instead of frying them. Add less fat, such as margarine, sour cream, regular salad dressing and mayonnaise to foods. Eat fewer high-fat foods. Some examples of high-fat foods include french fries, doughnuts, ice cream, and cakes.  Eat fewer sweets.  Limit foods and drinks that are high in sugar. This includes candy, cookies, regular soda, and sweetened drinks.  Exercise:  Exercise at least 30 minutes per day on most days of the week. Some examples of exercise include walking, biking, dancing, and swimming. You can also fit in more physical activity by taking the stairs instead of the elevator or parking farther away from stores. Ask your healthcare provider about the best exercise plan for you.      © Copyright [employer] 2018 Information is for End User's use only and may not be sold,  redistributed or otherwise used for commercial purposes. All illustrations and images included in CareNotes® are the copyrighted property of [email or web address] or [employer]

## 2024-08-16 NOTE — PROGRESS NOTES
Ambulatory Visit  Name: Keshia Carias      : 1960      MRN: [id number]  Encounter Provider: Pablo Merchant DO  Encounter Date: 2024   [facility]    Assessment & Plan   1. Medicare annual wellness visit, subsequent  Assessment & Plan:  Assessment and plan 1.  Medicare subsequent annual wellness examination overall the patient is clinically stable and doing well, we encouraged the patient to follow a healthy and balanced diet.  We recommend that the patient exercise routinely approximately 30 minutes 5 times per week .  We have reviewed the patient's vaccines and have made recommendations for updates if necessary   annual flu shot   .  We will be ordering screening laboratories which are age appropriate.  Return to the office in    6 months call if any problems.  2. Secondary hypertension  Assessment & Plan:  Hypertension - controlled, I have counseled patient following healthy balance diet, I would like the patient reduce sodium, exercise routinely, I would like the patient continued the med current medical regiment and we will continue to monitor.  3. Hypothyroidism, unspecified type  Assessment & Plan:  Hypothyroidism controlled the patient is currently euthyroid I will be ordering a TSH prior to the next office visit and the patient will continue with current medical regiment; we will continue to monitor the patient's progress.  Orders:  -     TSH, 3rd generation; Future; Expected date: 2025  4. Prediabetes  Assessment & Plan:  Pre diabetes -I have counseled the patient to follow a healthy balanced diet, I have counseled patient reduce carbohydrates and sweets in the diet, I would like the patient exercise routinely.  I will be checking hemoglobin A1c and comprehensive metabolic panel.  Have counseled patient about the prevention of diabetes, and the risk of progression to type 2 diabetes.  Orders:  -     Hemoglobin A1C; Future; Expected  date: 02/16/2025  5. 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HCC)  Assessment & Plan:  Currently asymptomatic we will continue to monitor quantitative immunoglobulins  Orders:  -     Immunofixation IgA,IgG,IgM Qt.Immunofixation Serum Immunoglobulin; Future; Expected date: 02/16/2025  6. IgA deficiency (HCC)  -     Immunofixation IgA,IgG,IgM Qt.Immunofixation Serum Immunoglobulin; Future; Expected date: 02/16/2025  7. Thrombocytopenia (HCC)  Assessment & Plan:  Currently stable doing well continue to monitor CBC  Orders:  -     CBC (Includes Diff/Plt) (Refl); Future  8. Class 1 obesity due to excess calories without serious comorbidity with body mass index (BMI) of 33.0 to 33.9 in adult  Assessment & Plan:  Obesity -I have counseled patient following healthy and balanced diet, I would like the patient to lose weight, I would like the patient exercise routinely; we will continue monitor the patient's progress.  9. Screening for cardiovascular condition  -     Comprehensive metabolic panel; Future; Expected date: 02/16/2025  -     Lipid Panel with Direct LDL reflex; Future; Expected date: 02/16/2025  10. Screening for colon cancer  11. Status post left knee replacement  Assessment & Plan:  Currently stable and doing very well patient is very active exercising routinely.      Depression Screening and Follow-up Plan: Patient was screened for depression during today's encounter. They screened negative with a PHQ-2 score of 0.    Falls Plan of Care: balance, strength, and gait training instructions were provided.       Preventive health issues were discussed with patient, and age appropriate screening tests were ordered as noted in patient's After Visit Summary. Personalized health advice and appropriate referrals for health education or preventive services given if needed, as noted in patient's After Visit Summary.    History of Present Illness     HPI 64-year old female coming in for a follow up office visit regarding hypertension,  hypothyroidism, prediabetes, IgG/A deficiency, thrombocytopenia and class I obesity and knee replacement; the patient reports me compliant taking medications without untoward side effects the.  The patient is here to review his medical condition, update me on the medical condition and the patient reports me no hospitalizations and no ER visits.  No injuries no illnesses following a healthy and balanced diet overall feels very well she does report to me a recent accidental fall requiring ER evaluation and lip laceration no head or neck injury no syncope.  Here to review laboratories in detail.  Patient Care Team:  Pablo Merchant DO as PCP - General  Pablo Merchant DO as PCP - PCP-[facility] ([facility])  Pablo Merchant DO as PCP - PCP-[facility] ([facility])  MD Pablo Chavira DO Marcus Averbach, MD James Joseph McGuire, MD Mark Edward Schadt, MD as Endoscopist  Marie Ramirez as Care Coordinator (Oncology)  Ciera Ladneros RN as Registered Nurse (Oncology)  Pablo Merchant DO (Internal Medicine)  Anuradha Kelly MD (General Surgery)  Daniela Craig DO (Plastic Surgery)  GISSELLE Traylor (Oncology)    Review of Systems   Constitutional:  Negative for activity change, appetite change and unexpected weight change.   HENT:  Negative for congestion and postnasal drip.    Eyes:  Negative for visual disturbance.   Respiratory:  Negative for cough and shortness of breath.    Cardiovascular:  Negative for chest pain.   Gastrointestinal:  Negative for abdominal pain, diarrhea, nausea and vomiting.   Neurological:  Negative for dizziness, light-headedness and headaches.   Hematological:  Negative for adenopathy.     Medical History Reviewed by provider this encounter:  Tobacco  Allergies  Meds  Problems  Med Hx  Surg Hx  Fam Hx       Annual Wellness Visit Questionnaire   Keshia is here for her Subsequent Wellness visit.     Health Risk Assessment:   Patient  rates overall health as very good. Patient feels that their physical health rating is same. Patient is satisfied with their life. Eyesight was rated as same. Hearing was rated as same. Patient feels that their emotional and mental health rating is same. Patients states they are never, rarely angry. Patient states they are never, rarely unusually tired/fatigued. Pain experienced in the last 7 days has been none. Patient states that she has experienced no weight loss or gain in last 6 months.     Depression Screening:   PHQ-2 Score: 0      Fall Risk Screening:   In the past year, patient has experienced: history of falling in past year    Number of falls: 1  Injured during fall?: Yes    Feels unsteady when standing or walking?: No    Worried about falling?: No      Urinary Incontinence Screening:   Patient has not leaked urine accidently in the last six months.     Home Safety:  Patient does not have trouble with stairs inside or outside of their home. Patient has working smoke alarms and has working carbon monoxide detector. Home safety hazards include: none.     Nutrition:   Current diet is Regular.     Medications:   Patient is not currently taking any over-the-counter supplements. Patient is able to manage medications.     Activities of Daily Living (ADLs)/Instrumental Activities of Daily Living (IADLs):   Walk and transfer into and out of bed and chair?: Yes  Dress and groom yourself?: Yes    Bathe or shower yourself?: Yes    Feed yourself? Yes  Do your laundry/housekeeping?: Yes  Manage your money, pay your bills and track your expenses?: Yes  Make your own meals?: Yes    Do your own shopping?: Yes    Durable Medical Equipment Suppliers  [employer]    Previous Hospitalizations:   Any hospitalizations or ED visits within the last 12 months?: Yes    How many hospitalizations have you had in the last year?: 1-2    Advance Care Planning:   Living will: Yes    Durable POA for healthcare: Yes    Advanced directive: Yes       Cognitive Screening:   Provider or family/friend/caregiver concerned regarding cognition?: No    PREVENTIVE SCREENINGS      Cardiovascular Screening:    General: Screening Not Indicated and History Lipid Disorder      Diabetes Screening:     General: Screening Current      Colorectal Cancer Screening:     General: Screening Current      Breast Cancer Screening:     General: Screening Current      Cervical Cancer Screening:    General: Screening Not Indicated      Lung Cancer Screening:     General: Screening Not Indicated      Hepatitis C Screening:    General: Screening Current    Screening, Brief Intervention, and Referral to Treatment (SBIRT)    Screening  Typical number of drinks in a day: 0  Typical number of drinks in a week: 0  Interpretation: Low risk drinking behavior.    AUDIT-C Screenin) How often did you have a drink containing alcohol in the past year? never  2) How many drinks did you have on a typical day when you were drinking in the past year? 0  3) How often did you have 6 or more drinks on one occasion in the past year? never    AUDIT-C Score: 0  Interpretation: Score 0-2 (female): Negative screen for alcohol misuse    Single Item Drug Screening:  How often have you used an illegal drug (including marijuana) or a prescription medication for non-medical reasons in the past year? never    Single Item Drug Screen Score: 0  Interpretation: Negative screen for possible drug use disorder    Social Determinants of Health     Financial Resource Strain: Low Risk  (2023)    Overall Financial Resource Strain (CARDIA)    • Difficulty of Paying Living Expenses: Not hard at all   Food Insecurity: No Food Insecurity (2024)    Hunger Vital Sign    • Worried About Running Out of Food in the Last Year: Never true    • Ran Out of Food in the Last Year: Never true   Transportation Needs: No Transportation Needs (2024)    PRAPARE - Transportation    • Lack of Transportation (Medical): No    •  "Lack of Transportation (Non-Medical): No   Housing Stability: Low Risk  (8/16/2024)    Housing Stability Vital Sign    • Unable to Pay for Housing in the Last Year: No    • Number of Times Moved in the Last Year: 1    • Homeless in the Last Year: No   Utilities: Not At Risk (8/16/2024)    [facility] Utilities    • Threatened with loss of utilities: No     No results found.    Objective     /80 (BP Location: Left arm, Patient Position: Sitting, Cuff Size: Large)   Pulse 67   Temp 98 °F (36.7 °C) (Tympanic)   Resp 18   Ht 5' 2\" (1.575 m)   Wt 78 kg (172 lb)   SpO2 96%   BMI 31.46 kg/m²     Physical Exam  Vitals and nursing note reviewed.   Constitutional:       General: She is not in acute distress.     Appearance: Normal appearance. She is well-developed. She is obese. She is not ill-appearing, toxic-appearing or diaphoretic.   HENT:      Head: Normocephalic and atraumatic.      Right Ear: External ear normal.      Left Ear: External ear normal.      Nose: Nose normal.      Mouth/Throat:      Mouth: Oropharynx is clear and moist.   Eyes:      Pupils: Pupils are equal, round, and reactive to light.   Cardiovascular:      Rate and Rhythm: Normal rate and regular rhythm.      Heart sounds: Normal heart sounds. No murmur heard.  Pulmonary:      Effort: Pulmonary effort is normal.      Breath sounds: Normal breath sounds.   Abdominal:      General: There is no distension.      Palpations: Abdomen is soft.      Tenderness: There is no abdominal tenderness. There is no guarding.   Musculoskeletal:         General: No edema.   Neurological:      Mental Status: She is alert.   Psychiatric:         Mood and Affect: Mood and affect normal.           "

## 2024-08-18 PROBLEM — Z00.00 MEDICARE ANNUAL WELLNESS VISIT, SUBSEQUENT: Status: ACTIVE | Noted: 2024-08-18

## 2024-08-18 NOTE — ASSESSMENT & PLAN NOTE
Assessment and plan 1.  Medicare subsequent annual wellness examination overall the patient is clinically stable and doing well, we encouraged the patient to follow a healthy and balanced diet.  We recommend that the patient exercise routinely approximately 30 minutes 5 times per week .  We have reviewed the patient's vaccines and have made recommendations for updates if necessary   annual flu shot   .  We will be ordering screening laboratories which are age appropriate.  Return to the office in    6 months call if any problems.

## 2024-08-27 DIAGNOSIS — F41.9 ANXIETY: ICD-10-CM

## 2024-08-27 DIAGNOSIS — I10 HYPERTENSION, UNSPECIFIED TYPE: ICD-10-CM

## 2024-08-27 DIAGNOSIS — E03.9 HYPOTHYROIDISM, UNSPECIFIED TYPE: ICD-10-CM

## 2024-08-28 RX ORDER — TRIAMTERENE AND HYDROCHLOROTHIAZIDE 37.5; 25 MG/1; MG/1
1 CAPSULE ORAL EVERY MORNING
Qty: 90 CAPSULE | Refills: 1 | Status: SHIPPED | OUTPATIENT
Start: 2024-08-28

## 2024-08-28 RX ORDER — LEVOTHYROXINE SODIUM 75 UG/1
75 TABLET ORAL DAILY
Qty: 90 TABLET | Refills: 1 | Status: SHIPPED | OUTPATIENT
Start: 2024-08-28

## 2024-08-28 RX ORDER — SERTRALINE HYDROCHLORIDE 100 MG/1
100 TABLET, FILM COATED ORAL DAILY
Qty: 90 TABLET | Refills: 1 | Status: SHIPPED | OUTPATIENT
Start: 2024-08-28

## 2024-09-03 DIAGNOSIS — F51.01 PRIMARY INSOMNIA: ICD-10-CM

## 2024-09-03 RX ORDER — TRAZODONE HYDROCHLORIDE 50 MG/1
50 TABLET, FILM COATED ORAL
Qty: 90 TABLET | Refills: 1 | Status: SHIPPED | OUTPATIENT
Start: 2024-09-03

## 2024-09-17 PROBLEM — Z00.00 MEDICARE ANNUAL WELLNESS VISIT, SUBSEQUENT: Status: RESOLVED | Noted: 2024-08-18 | Resolved: 2024-09-17

## 2024-09-27 ENCOUNTER — TELEPHONE (OUTPATIENT)
Age: 64
End: 2024-09-27

## 2024-09-27 NOTE — TELEPHONE ENCOUNTER
Patient called asking to speak Dr Craig or her PA about a surgery to the eye that they discussed before. She knows she needs to do field vision test but would like someone to call her please

## 2024-09-30 NOTE — TELEPHONE ENCOUNTER
Rec'd call from patient requesting to speak to Dyana in regards to surgery schedules and information of where she can go to get the vision field test done.    Call transferred to Dyana.

## 2024-10-14 ENCOUNTER — CLINICAL SUPPORT (OUTPATIENT)
Dept: OBGYN CLINIC | Facility: OTHER | Age: 64
End: 2024-10-14

## 2024-10-14 DIAGNOSIS — Z85.3 HISTORY OF BREAST CANCER: Primary | ICD-10-CM

## 2024-10-14 DIAGNOSIS — E78.5 HYPERLIPIDEMIA, UNSPECIFIED HYPERLIPIDEMIA TYPE: ICD-10-CM

## 2024-10-14 RX ORDER — ROSUVASTATIN CALCIUM 5 MG/1
5 TABLET, COATED ORAL DAILY
Qty: 90 TABLET | Refills: 1 | Status: SHIPPED | OUTPATIENT
Start: 2024-10-14

## 2024-10-14 NOTE — PROGRESS NOTES
"Mastectomy Bra Fitting Order Details    Keshia Carias  1960  [id number]    Reason For Visit  Mastectomy bras     Precautions   History of breast cancer     Subjective  Svitlana states that she has been wearing her Valetta camisole .     Surgery Type: Mastectomy    Lymph node removal yes    Date of surgery 9/9/2023    Surgical side right    Objective  Svitlana is wearing Valetta camisole today. She would like to size up for this . Please with the fit of Monica bra.     Assessment  R cup - 19\"  L cup - 20\"     Plan  Ship to home. Svitlana was educated on the wear and care of her products.     Order Details   Deanne dark coppoal size 14 x 1   Monica XL x 1   Ship to home    "

## 2024-10-24 ENCOUNTER — TELEPHONE (OUTPATIENT)
Age: 64
End: 2024-10-24

## 2024-11-08 DIAGNOSIS — K21.9 GASTROESOPHAGEAL REFLUX DISEASE WITHOUT ESOPHAGITIS: ICD-10-CM

## 2024-11-08 RX ORDER — FAMOTIDINE 20 MG/1
20 TABLET, FILM COATED ORAL 2 TIMES DAILY
Qty: 180 TABLET | Refills: 1 | Status: SHIPPED | OUTPATIENT
Start: 2024-11-08

## 2024-11-12 ENCOUNTER — APPOINTMENT (OUTPATIENT)
Dept: LAB | Age: 64
End: 2024-11-12
Payer: COMMERCIAL

## 2024-11-12 ENCOUNTER — OFFICE VISIT (OUTPATIENT)
Dept: INTERNAL MEDICINE CLINIC | Facility: CLINIC | Age: 64
End: 2024-11-12
Payer: COMMERCIAL

## 2024-11-12 VITALS
BODY MASS INDEX: 31.93 KG/M2 | WEIGHT: 174.6 LBS | OXYGEN SATURATION: 95 % | HEART RATE: 94 BPM | DIASTOLIC BLOOD PRESSURE: 64 MMHG | SYSTOLIC BLOOD PRESSURE: 118 MMHG

## 2024-11-12 DIAGNOSIS — R35.0 URINARY FREQUENCY: ICD-10-CM

## 2024-11-12 DIAGNOSIS — R39.9 UTI SYMPTOMS: Primary | ICD-10-CM

## 2024-11-12 DIAGNOSIS — Z13.1 SCREENING FOR DIABETES MELLITUS: ICD-10-CM

## 2024-11-12 DIAGNOSIS — R39.9 UTI SYMPTOMS: ICD-10-CM

## 2024-11-12 LAB
ANION GAP SERPL CALCULATED.3IONS-SCNC: 8 MMOL/L (ref 4–13)
BUN SERPL-MCNC: 16 MG/DL (ref 5–25)
CALCIUM SERPL-MCNC: 9.3 MG/DL (ref 8.4–10.2)
CHLORIDE SERPL-SCNC: 102 MMOL/L (ref 96–108)
CO2 SERPL-SCNC: 31 MMOL/L (ref 21–32)
CREAT SERPL-MCNC: 0.89 MG/DL (ref 0.6–1.3)
EST. AVERAGE GLUCOSE BLD GHB EST-MCNC: 100 MG/DL
GFR SERPL CREATININE-BSD FRML MDRD: 67 ML/MIN/1.73SQ M
GLUCOSE SERPL-MCNC: 106 MG/DL (ref 65–140)
HBA1C MFR BLD: 5.1 %
POTASSIUM SERPL-SCNC: 3.7 MMOL/L (ref 3.5–5.3)
SL AMB  POCT GLUCOSE, UA: NORMAL
SL AMB LEUKOCYTE ESTERASE,UA: NORMAL
SL AMB POCT BILIRUBIN,UA: NORMAL
SL AMB POCT BLOOD,UA: NORMAL
SL AMB POCT CLARITY,UA: CLEAR
SL AMB POCT COLOR,UA: YELLOW
SL AMB POCT KETONES,UA: NORMAL
SL AMB POCT NITRITE,UA: NORMAL
SL AMB POCT PH,UA: 7
SL AMB POCT SPECIFIC GRAVITY,UA: 1.02
SL AMB POCT URINE PROTEIN: NORMAL
SL AMB POCT UROBILINOGEN: NORMAL
SODIUM SERPL-SCNC: 141 MMOL/L (ref 135–147)

## 2024-11-12 PROCEDURE — G2211 COMPLEX E/M VISIT ADD ON: HCPCS | Performed by: INTERNAL MEDICINE

## 2024-11-12 PROCEDURE — 99214 OFFICE O/P EST MOD 30 MIN: CPT | Performed by: INTERNAL MEDICINE

## 2024-11-12 PROCEDURE — 80048 BASIC METABOLIC PNL TOTAL CA: CPT

## 2024-11-12 PROCEDURE — 83036 HEMOGLOBIN GLYCOSYLATED A1C: CPT

## 2024-11-12 PROCEDURE — 36415 COLL VENOUS BLD VENIPUNCTURE: CPT

## 2024-11-12 PROCEDURE — 87086 URINE CULTURE/COLONY COUNT: CPT | Performed by: INTERNAL MEDICINE

## 2024-11-12 PROCEDURE — 81002 URINALYSIS NONAUTO W/O SCOPE: CPT | Performed by: INTERNAL MEDICINE

## 2024-11-12 RX ORDER — CEFADROXIL 500 MG/1
500 CAPSULE ORAL 2 TIMES DAILY
Qty: 14 CAPSULE | Refills: 0 | Status: SHIPPED | OUTPATIENT
Start: 2024-11-12 | End: 2024-11-19

## 2024-11-12 NOTE — PROGRESS NOTES
Name: Keshia Carias      : 1960      MRN: [id number]  Encounter Provider: Porter Ramirez MD  Encounter Date: 2024   [facility] department: [facility]    Assessment & Plan     1. UTI symptoms  -     POCT urine dip  -     Urine culture  -     Basic metabolic panel; Future  -     Hemoglobin A1C; Future  -     cefadroxil (DURICEF) 500 mg capsule; Take 1 capsule (500 mg total) by mouth 2 (two) times a day for 7 days  2. Urinary frequency  -     Urine culture  -     Basic metabolic panel; Future  -     Hemoglobin A1C; Future  -     cefadroxil (DURICEF) 500 mg capsule; Take 1 capsule (500 mg total) by mouth 2 (two) times a day for 7 days  3. Screening for diabetes mellitus  -     Hemoglobin A1C; Future  -POC bland in appearance.  An urine culture has been sent.  A prescription for cefadroxil has been sent to the patient's pharmacy.  If her urine culture results return positive she will be instructed to start antibiotics.  -A BMP and hemoglobin A1c have been ordered.  -Urinary frequency possibly secondary to atrophic vaginitis vs OAB?  -If above workup returns negative and referral will be made to urology for further evaluation.       Subjective      HPI  Patient presents today as an acute visit complaining of urinary frequency and urgency over the past 3 days.  She reports she states she has the urge to urinate every 20 minutes.  She states the last time she experienced symptoms similar to this was 2 years ago when she had an acute UTI at which time her urine culture came back positive for Klebsiella.  Of note, she has a history of atrophic vaginitis for which she currently uses estradiol cream.  She denies any burning with urination or flank pain.    All other systems negative except for pertinent findings noted in HPI.       Current Outpatient Medications on File Prior to Visit   Medication Sig    diphenhydrAMINE (BENADRYL) 25 mg tablet Take 25 mg by mouth daily at bedtime  as needed for itching    estradiol (ESTRACE) 0.1 mg/g vaginal cream Insert one quarter of an applicator intravaginally once weekly    famotidine (PEPCID) 20 mg tablet Take 1 tablet (20 mg total) by mouth 2 (two) times a day    levothyroxine 75 mcg tablet Take 1 tablet by mouth daily    potassium chloride (K-DUR,KLOR-CON) 10 mEq tablet Take 1 tablet (10 mEq total) by mouth 2 (two) times a day    Psyllium (METAMUCIL PO) Take 1 Dose by mouth daily      rosuvastatin (CRESTOR) 5 mg tablet Take 1 tablet by mouth daily    senna (SENOKOT) 8.6 MG tablet Take 2 tablets by mouth daily    sertraline (ZOLOFT) 100 mg tablet Take 1 tablet (100 mg total) by mouth daily    traZODone (DESYREL) 50 mg tablet Take 1 tablet (50 mg total) by mouth daily at bedtime    triamterene-hydrochlorothiazide (DYAZIDE) 37.5-25 mg per capsule Take 1 capsule by mouth every morning       Objective     /64 (BP Location: Left arm, Patient Position: Sitting, Cuff Size: Large)   Pulse 94   Wt 79.2 kg (174 lb 9.6 oz)   SpO2 95%   BMI 31.93 kg/m²     BP Readings from Last 3 Encounters:   11/12/24 118/64   08/16/24 118/80   06/24/24 114/70        Wt Readings from Last 3 Encounters:   11/12/24 79.2 kg (174 lb 9.6 oz)   08/16/24 78 kg (172 lb)   07/23/24 79.4 kg (175 lb)       Physical Exam    General: NAD  HEENT: NCAT, EOMI, normal conjunctiva  Cardiovascular: RRR, normal S1 and S2, no m/r/g  Pulmonary: Normal respiratory effort, no wheezes, rales or rhonchi  GI: Soft, nontender, nondistended, normoactive bowel sounds, no CVA tenderness  xtremities: No lower extremity edema  Skin: Normal skin color, no rashes     Porter Ramirez MD

## 2024-11-13 ENCOUNTER — RESULTS FOLLOW-UP (OUTPATIENT)
Dept: INTERNAL MEDICINE CLINIC | Facility: CLINIC | Age: 64
End: 2024-11-13

## 2024-11-13 LAB — BACTERIA UR CULT: NORMAL

## 2025-01-06 ENCOUNTER — HOSPITAL ENCOUNTER (OUTPATIENT)
Dept: RADIOLOGY | Age: 65
Discharge: HOME | End: 2025-01-06
Payer: COMMERCIAL

## 2025-01-06 ENCOUNTER — TELEPHONE (OUTPATIENT)
Dept: OBGYN CLINIC | Facility: OTHER | Age: 65
End: 2025-01-06

## 2025-01-06 VITALS — WEIGHT: 174 LBS | BODY MASS INDEX: 32.02 KG/M2 | HEIGHT: 62 IN

## 2025-01-06 DIAGNOSIS — M85.80 OSTEOPENIA, UNSPECIFIED LOCATION: ICD-10-CM

## 2025-01-06 DIAGNOSIS — Z78.0 ASYMPTOMATIC MENOPAUSAL STATE: ICD-10-CM

## 2025-01-06 LAB
DME PARACHUTE DELIVERY DATE REQUESTED: NORMAL
DME PARACHUTE ITEM DESCRIPTION: NORMAL
DME PARACHUTE ORDER STATUS: NORMAL
DME PARACHUTE SUPPLIER NAME: NORMAL
DME PARACHUTE SUPPLIER PHONE: NORMAL

## 2025-01-06 PROCEDURE — 77080 DXA BONE DENSITY AXIAL: CPT

## 2025-01-06 NOTE — TELEPHONE ENCOUNTER
I spoke with patient she would like us to call her insurance and get benefits then call her and she can place a re-order of bras.

## 2025-01-07 ENCOUNTER — RESULTS FOLLOW-UP (OUTPATIENT)
Dept: INTERNAL MEDICINE CLINIC | Facility: CLINIC | Age: 65
End: 2025-01-07

## 2025-01-07 ENCOUNTER — TELEPHONE (OUTPATIENT)
Dept: OBGYN CLINIC | Facility: OTHER | Age: 65
End: 2025-01-07

## 2025-01-07 NOTE — TELEPHONE ENCOUNTER
I received a call back back patient and she placed a re-order for bras.     VALLETTA MOCHA 14 x1  VALLETTA WHITE 14 x1

## 2025-01-07 NOTE — TELEPHONE ENCOUNTER
I called patient and left message to provide insurance information to her and that way we can place a re-order for her. Call back #: [phone].

## 2025-01-08 ENCOUNTER — OFFICE VISIT (OUTPATIENT)
Dept: SURGICAL ONCOLOGY | Facility: CLINIC | Age: 65
End: 2025-01-08
Payer: COMMERCIAL

## 2025-01-08 VITALS
SYSTOLIC BLOOD PRESSURE: 112 MMHG | TEMPERATURE: 97.3 F | HEART RATE: 90 BPM | DIASTOLIC BLOOD PRESSURE: 60 MMHG | BODY MASS INDEX: 32.2 KG/M2 | WEIGHT: 175 LBS | OXYGEN SATURATION: 93 % | HEIGHT: 62 IN

## 2025-01-08 DIAGNOSIS — E78.5 HYPERLIPIDEMIA, UNSPECIFIED HYPERLIPIDEMIA TYPE: ICD-10-CM

## 2025-01-08 DIAGNOSIS — I10 HYPERTENSION, UNSPECIFIED TYPE: ICD-10-CM

## 2025-01-08 DIAGNOSIS — Z08 ENCOUNTER FOR FOLLOW-UP EXAMINATION AFTER COMPLETED TREATMENT FOR MALIGNANT NEOPLASM: Primary | ICD-10-CM

## 2025-01-08 DIAGNOSIS — F51.01 PRIMARY INSOMNIA: ICD-10-CM

## 2025-01-08 DIAGNOSIS — K21.9 GASTROESOPHAGEAL REFLUX DISEASE WITHOUT ESOPHAGITIS: ICD-10-CM

## 2025-01-08 DIAGNOSIS — N95.2 ATROPHIC VAGINITIS: ICD-10-CM

## 2025-01-08 DIAGNOSIS — F41.9 ANXIETY: ICD-10-CM

## 2025-01-08 DIAGNOSIS — Z85.3 HISTORY OF BREAST CANCER: ICD-10-CM

## 2025-01-08 DIAGNOSIS — E03.9 HYPOTHYROIDISM, UNSPECIFIED TYPE: ICD-10-CM

## 2025-01-08 DIAGNOSIS — Z12.31 VISIT FOR SCREENING MAMMOGRAM: ICD-10-CM

## 2025-01-08 PROCEDURE — 99213 OFFICE O/P EST LOW 20 MIN: CPT | Performed by: NURSE PRACTITIONER

## 2025-01-08 RX ORDER — ESTRADIOL 0.1 MG/G
CREAM VAGINAL
Qty: 42.5 G | Refills: 0 | Status: SHIPPED | OUTPATIENT
Start: 2025-01-08

## 2025-01-08 NOTE — TELEPHONE ENCOUNTER
Patient needs an appointment. Please contact the patient to schedule an appointment. Last office visit: 1/26/24

## 2025-01-08 NOTE — ASSESSMENT & PLAN NOTE
Patient is a 64-year-old female that was diagnosed with a right-sided breast cancer in July 2022.  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  She underwent genetic testing which was negative.  She underwent a right mastectomy and sentinel node biopsy with  and had implant reconstruction with Dr. Craig.  She did not require adjuvant therapy.  She had a left diagnostic mammogram in June of 2024 which was BI-RADS 2, category 3 density.  She offers no new complaints today and there are no worrisome findings on today's clinical exam.  She notes some breast asymmetry. I encouraged her to follow-up with her plastic surgeon for further discussion.  I will make arrangements for a left-sided screening mammogram and we will see her back in 6 months for a follow-up visit as previously scheduled.  She was instructed to contact us with any changes or concerns in the interim.  All of her questions were answered today.

## 2025-01-08 NOTE — PROGRESS NOTES
Name: Keshia Carias      : 1960      MRN: [id number]  Encounter Provider: GISSELLE Traylor  Encounter Date: 2025   Encounter department: [facility] [place]  :  Assessment & Plan  Encounter for follow-up examination after completed treatment for malignant neoplasm         History of breast cancer  Patient is a 64-year-old female that was diagnosed with a right-sided breast cancer in 2022.  Her pathology revealed DCIS, ER 90%, WI 10%.  She underwent genetic testing which was negative.  She underwent a right mastectomy and sentinel node biopsy with  and had implant reconstruction with Dr. Craig.  She did not require adjuvant therapy.  She had a left diagnostic mammogram in 2024 which was BI-RADS 2, category 3 density.  She offers no new complaints today and there are no worrisome findings on today's clinical exam.  She notes some breast asymmetry. I encouraged her to follow-up with her plastic surgeon for further discussion.  I will make arrangements for a left-sided screening mammogram and we will see her back in 6 months for a follow-up visit as previously scheduled.  She was instructed to contact us with any changes or concerns in the interim.  All of her questions were answered today.      Visit for screening mammogram    Orders:  •  Mammo screening left w 3d and cad; Future            History of Present Illness   Keshia Carias is a 64 y.o. year old female who presents today for follow-up visit.  She has not appreciated any changes on self breast exam.  She had a nipple tattoo performed.  She notices the right breast is smaller than the left breast.  She denies persistent headaches, back pain or bone pain, cough or shortness of breath, abdominal pain..     Oncology History   Cancer Staging   History of breast cancer  Staging form: Breast, AJCC 8th Edition  - Clinical stage from 2022: Stage 0 (cTis (DCIS), cN0, cM0, ER+, WI+,  HER2: Not Assessed) - Signed by Anuradha Kelly MD on 8/2/2022  Stage prefix: Initial diagnosis  Method of lymph node assessment: Clinical  Nuclear grade: G2  - Pathologic stage from 9/26/2022: Stage 0 (pTis (DCIS), pN0(sn), cM0, ER+, WI+, HER2: Not Assessed) - Signed by Anuradha Kelly MD on 9/26/2022  Stage prefix: Initial diagnosis  Method of lymph node assessment: [place] lymph node biopsy  Nuclear grade: G3  Oncology History   History of breast cancer   7/7/2022 Biopsy    Right breast biopsy:  - 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, Grade 2  ER 90%  WI 10%     7/26/2022 Genetic Testing    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 Craig     9/26/2022 -  Cancer Staged    Staging form: Breast, AJCC 8th Edition  - Pathologic stage from 9/26/2022: Stage 0 (pTis (DCIS), pN0(sn), cM0, ER+, WI+, HER2: Not Assessed) - Signed by Anuradha Kelly MD on 9/26/2022  Stage prefix: Initial diagnosis  Method of lymph node assessment: [place] lymph node biopsy  Nuclear grade: G3       12/15/2022 Surgery    Right expander exchange to implant     3/14/2023 Surgery    Right breast revision, exchange impant        Review of Systems  "  Constitutional:  Negative for activity change, appetite change, chills, fatigue, fever and unexpected weight change.   Respiratory:  Negative for cough and shortness of breath.    Cardiovascular:  Negative for chest pain.   Gastrointestinal:  Negative for abdominal pain, constipation, diarrhea, nausea and vomiting.   Musculoskeletal:  Negative for arthralgias, back pain, gait problem and myalgias.   Skin:  Negative for color change and rash.   Neurological:  Negative for dizziness and headaches.   Hematological:  Negative for adenopathy.   Psychiatric/Behavioral:  Negative for agitation and confusion.    All other systems reviewed and are negative.   A complete review of systems is negative other than that noted above in the HPI.           Objective   /60 (BP Location: Left arm, Patient Position: Sitting, Cuff Size: Large)   Pulse 90   Temp (!) 97.3 °F (36.3 °C) (Temporal)   Ht 5' 2\" (1.575 m)   Wt 79.4 kg (175 lb)   SpO2 93%   BMI 32.01 kg/m²     Pain Screening:  Pain Score: 0-No pain  ECOG    Physical Exam  Vitals reviewed.   Constitutional:       General: She is not in acute distress.     Appearance: Normal appearance. She is well-developed. She is not diaphoretic.   HENT:      Head: Normocephalic and atraumatic.   Cardiovascular:      Rate and Rhythm: Normal rate and regular rhythm.      Heart sounds: Normal heart sounds.   Pulmonary:      Effort: Pulmonary effort is normal.      Breath sounds: Normal breath sounds.   Chest:   Breasts:     Right: Skin change (surgical scars) present.      Left: Skin change (surgical scars s/p lift) present. No swelling, bleeding, inverted nipple, mass, nipple discharge or tenderness.      Comments: Right breast implant present. No masses, skin changes, nodules.    Abdominal:      Palpations: Abdomen is soft. There is no mass.      Tenderness: There is no abdominal tenderness.   Musculoskeletal:         General: Normal range of motion.      Cervical back: Normal " range of motion.   Lymphadenopathy:      Upper Body:      Right upper body: No supraclavicular or axillary adenopathy.      Left upper body: No supraclavicular or axillary adenopathy.   Skin:     General: Skin is warm and dry.      Findings: No rash.   Neurological:      Mental Status: She is alert and oriented to person, place, and time.   Psychiatric:         Speech: Speech normal.

## 2025-01-09 RX ORDER — LEVOTHYROXINE SODIUM 75 UG/1
75 TABLET ORAL DAILY
Qty: 90 TABLET | Refills: 0 | Status: SHIPPED | OUTPATIENT
Start: 2025-01-09

## 2025-01-09 RX ORDER — TRIAMTERENE AND HYDROCHLOROTHIAZIDE 37.5; 25 MG/1; MG/1
1 CAPSULE ORAL EVERY MORNING
Qty: 90 CAPSULE | Refills: 0 | Status: SHIPPED | OUTPATIENT
Start: 2025-01-09

## 2025-01-09 RX ORDER — ROSUVASTATIN CALCIUM 5 MG/1
5 TABLET, COATED ORAL DAILY
Qty: 90 TABLET | Refills: 0 | Status: SHIPPED | OUTPATIENT
Start: 2025-01-09

## 2025-01-09 RX ORDER — TRAZODONE HYDROCHLORIDE 50 MG/1
50 TABLET, FILM COATED ORAL
Qty: 90 TABLET | Refills: 0 | Status: SHIPPED | OUTPATIENT
Start: 2025-01-09

## 2025-01-09 RX ORDER — SERTRALINE HYDROCHLORIDE 100 MG/1
100 TABLET, FILM COATED ORAL DAILY
Qty: 90 TABLET | Refills: 0 | Status: SHIPPED | OUTPATIENT
Start: 2025-01-09

## 2025-01-09 RX ORDER — POTASSIUM CHLORIDE 750 MG/1
10 TABLET, EXTENDED RELEASE ORAL 2 TIMES DAILY
Qty: 180 TABLET | Refills: 0 | Status: SHIPPED | OUTPATIENT
Start: 2025-01-09

## 2025-01-09 RX ORDER — FAMOTIDINE 20 MG/1
20 TABLET, FILM COATED ORAL 2 TIMES DAILY
Qty: 180 TABLET | Refills: 0 | Status: SHIPPED | OUTPATIENT
Start: 2025-01-09

## 2025-01-15 ENCOUNTER — TELEPHONE (OUTPATIENT)
Dept: PLASTIC SURGERY | Facility: CLINIC | Age: 65
End: 2025-01-15

## 2025-01-17 NOTE — TELEPHONE ENCOUNTER
Rec'd return call from patient. Patient aware scheduling coordinators not available at the moment and will wait for a cb.    Please contact patient at your earliest convenience to schedule surgery with Dr. Craig. Thank you.

## 2025-01-28 ENCOUNTER — PREP FOR PROCEDURE (OUTPATIENT)
Dept: PLASTIC SURGERY | Facility: CLINIC | Age: 65
End: 2025-01-28

## 2025-01-28 DIAGNOSIS — Z41.1 ENCOUNTER FOR COSMETIC SURGERY: ICD-10-CM

## 2025-01-28 DIAGNOSIS — H02.813: Primary | ICD-10-CM

## 2025-01-28 DIAGNOSIS — H02.34 BLEPHAROCHALASIS LEFT UPPER EYELID: ICD-10-CM

## 2025-02-14 ENCOUNTER — RESULTS FOLLOW-UP (OUTPATIENT)
Dept: INTERNAL MEDICINE CLINIC | Facility: CLINIC | Age: 65
End: 2025-02-14

## 2025-02-14 ENCOUNTER — APPOINTMENT (OUTPATIENT)
Dept: LAB | Age: 65
End: 2025-02-14
Payer: COMMERCIAL

## 2025-02-14 DIAGNOSIS — D80.2 IGA DEFICIENCY (HCC): ICD-10-CM

## 2025-02-14 DIAGNOSIS — E03.9 HYPOTHYROIDISM, UNSPECIFIED TYPE: ICD-10-CM

## 2025-02-14 DIAGNOSIS — D80.3 IGG DEFICIENCY (HCC): ICD-10-CM

## 2025-02-14 DIAGNOSIS — Z13.6 SCREENING FOR CARDIOVASCULAR CONDITION: ICD-10-CM

## 2025-02-14 DIAGNOSIS — D69.6 THROMBOCYTOPENIA (HCC): ICD-10-CM

## 2025-02-14 LAB
ALBUMIN SERPL BCG-MCNC: 4.3 G/DL (ref 3.5–5)
ALP SERPL-CCNC: 35 U/L (ref 34–104)
ALT SERPL W P-5'-P-CCNC: 8 U/L (ref 7–52)
ANION GAP SERPL CALCULATED.3IONS-SCNC: 7 MMOL/L (ref 4–13)
AST SERPL W P-5'-P-CCNC: 13 U/L (ref 13–39)
BASOPHILS # BLD AUTO: 0.04 THOUSANDS/ÂΜL (ref 0–0.1)
BASOPHILS NFR BLD AUTO: 1 % (ref 0–1)
BILIRUB SERPL-MCNC: 0.66 MG/DL (ref 0.2–1)
BUN SERPL-MCNC: 16 MG/DL (ref 5–25)
CALCIUM SERPL-MCNC: 9.5 MG/DL (ref 8.4–10.2)
CHLORIDE SERPL-SCNC: 101 MMOL/L (ref 96–108)
CHOLEST SERPL-MCNC: 154 MG/DL (ref ?–200)
CO2 SERPL-SCNC: 32 MMOL/L (ref 21–32)
CREAT SERPL-MCNC: 0.92 MG/DL (ref 0.6–1.3)
EOSINOPHIL # BLD AUTO: 0.12 THOUSAND/ÂΜL (ref 0–0.61)
EOSINOPHIL NFR BLD AUTO: 3 % (ref 0–6)
ERYTHROCYTE [DISTWIDTH] IN BLOOD BY AUTOMATED COUNT: 12.5 % (ref 11.6–15.1)
GFR SERPL CREATININE-BSD FRML MDRD: 65 ML/MIN/1.73SQ M
GLUCOSE P FAST SERPL-MCNC: 95 MG/DL (ref 65–99)
HCT VFR BLD AUTO: 43.3 % (ref 34.8–46.1)
HDLC SERPL-MCNC: 65 MG/DL
HGB BLD-MCNC: 14.5 G/DL (ref 11.5–15.4)
IGA SERPL-MCNC: 38 MG/DL (ref 66–433)
IGG SERPL-MCNC: 605 MG/DL (ref 635–1741)
IGM SERPL-MCNC: 45 MG/DL (ref 45–281)
IMM GRANULOCYTES # BLD AUTO: 0.02 THOUSAND/UL (ref 0–0.2)
IMM GRANULOCYTES NFR BLD AUTO: 1 % (ref 0–2)
LDLC SERPL CALC-MCNC: 71 MG/DL (ref 0–100)
LYMPHOCYTES # BLD AUTO: 1.46 THOUSANDS/ÂΜL (ref 0.6–4.47)
LYMPHOCYTES NFR BLD AUTO: 34 % (ref 14–44)
MCH RBC QN AUTO: 30.1 PG (ref 26.8–34.3)
MCHC RBC AUTO-ENTMCNC: 33.5 G/DL (ref 31.4–37.4)
MCV RBC AUTO: 90 FL (ref 82–98)
MONOCYTES # BLD AUTO: 0.29 THOUSAND/ÂΜL (ref 0.17–1.22)
MONOCYTES NFR BLD AUTO: 7 % (ref 4–12)
NEUTROPHILS # BLD AUTO: 2.41 THOUSANDS/ÂΜL (ref 1.85–7.62)
NEUTS SEG NFR BLD AUTO: 54 % (ref 43–75)
NRBC BLD AUTO-RTO: 0 /100 WBCS
PLATELET # BLD AUTO: 124 THOUSANDS/UL (ref 149–390)
PMV BLD AUTO: 12 FL (ref 8.9–12.7)
POTASSIUM SERPL-SCNC: 3.5 MMOL/L (ref 3.5–5.3)
PROT SERPL-MCNC: 6.3 G/DL (ref 6.4–8.4)
RBC # BLD AUTO: 4.81 MILLION/UL (ref 3.81–5.12)
SODIUM SERPL-SCNC: 140 MMOL/L (ref 135–147)
TRIGL SERPL-MCNC: 91 MG/DL (ref ?–150)
TSH SERPL DL<=0.05 MIU/L-ACNC: 1.03 UIU/ML (ref 0.45–4.5)
WBC # BLD AUTO: 4.34 THOUSAND/UL (ref 4.31–10.16)

## 2025-02-14 PROCEDURE — 80061 LIPID PANEL: CPT

## 2025-02-14 PROCEDURE — 82784 ASSAY IGA/IGD/IGG/IGM EACH: CPT

## 2025-02-14 PROCEDURE — 85025 COMPLETE CBC W/AUTO DIFF WBC: CPT

## 2025-02-14 PROCEDURE — 84443 ASSAY THYROID STIM HORMONE: CPT

## 2025-02-14 PROCEDURE — 80053 COMPREHEN METABOLIC PANEL: CPT

## 2025-02-14 PROCEDURE — 36415 COLL VENOUS BLD VENIPUNCTURE: CPT

## 2025-02-15 LAB
DME PARACHUTE DELIVERY DATE ACTUAL: NORMAL
DME PARACHUTE DELIVERY DATE REQUESTED: NORMAL
DME PARACHUTE ITEM DESCRIPTION: NORMAL
DME PARACHUTE ORDER STATUS: NORMAL
DME PARACHUTE SUPPLIER NAME: NORMAL
DME PARACHUTE SUPPLIER PHONE: NORMAL

## 2025-02-17 ENCOUNTER — RA CDI HCC (OUTPATIENT)
Dept: OTHER | Facility: HOSPITAL | Age: 65
End: 2025-02-17

## 2025-02-17 NOTE — PROGRESS NOTES
HCC coding opportunities          Chart Reviewed number of suggestions sent to Provider: 2    D80.2  D80.3     Patients Insurance     Medicare Insurance: [employer]

## 2025-02-21 ENCOUNTER — OFFICE VISIT (OUTPATIENT)
Dept: INTERNAL MEDICINE CLINIC | Facility: CLINIC | Age: 65
End: 2025-02-21
Payer: COMMERCIAL

## 2025-02-21 VITALS
OXYGEN SATURATION: 97 % | DIASTOLIC BLOOD PRESSURE: 60 MMHG | SYSTOLIC BLOOD PRESSURE: 104 MMHG | HEIGHT: 62 IN | WEIGHT: 172 LBS | TEMPERATURE: 96.7 F | BODY MASS INDEX: 31.65 KG/M2 | HEART RATE: 58 BPM

## 2025-02-21 DIAGNOSIS — I10 HYPERTENSION, UNSPECIFIED TYPE: ICD-10-CM

## 2025-02-21 DIAGNOSIS — F51.01 PRIMARY INSOMNIA: ICD-10-CM

## 2025-02-21 DIAGNOSIS — M25.551 RIGHT HIP PAIN: Primary | ICD-10-CM

## 2025-02-21 DIAGNOSIS — Z13.6 SCREENING FOR CARDIOVASCULAR CONDITION: ICD-10-CM

## 2025-02-21 DIAGNOSIS — Z12.11 SCREENING FOR COLON CANCER: ICD-10-CM

## 2025-02-21 DIAGNOSIS — E78.5 HYPERLIPIDEMIA, UNSPECIFIED HYPERLIPIDEMIA TYPE: ICD-10-CM

## 2025-02-21 DIAGNOSIS — K21.9 GASTROESOPHAGEAL REFLUX DISEASE WITHOUT ESOPHAGITIS: ICD-10-CM

## 2025-02-21 DIAGNOSIS — D80.3 IGG DEFICIENCY (HCC): ICD-10-CM

## 2025-02-21 DIAGNOSIS — D80.2 IGA DEFICIENCY (HCC): ICD-10-CM

## 2025-02-21 DIAGNOSIS — E03.9 HYPOTHYROIDISM, UNSPECIFIED TYPE: ICD-10-CM

## 2025-02-21 DIAGNOSIS — R73.03 PREDIABETES: ICD-10-CM

## 2025-02-21 DIAGNOSIS — D69.6 THROMBOCYTOPENIA (HCC): ICD-10-CM

## 2025-02-21 DIAGNOSIS — F41.9 ANXIETY: ICD-10-CM

## 2025-02-21 DIAGNOSIS — D83.9 VARIABLE IMMUNODEFICIENCY SYNDROME (HCC): ICD-10-CM

## 2025-02-21 PROCEDURE — 99214 OFFICE O/P EST MOD 30 MIN: CPT | Performed by: INTERNAL MEDICINE

## 2025-02-21 PROCEDURE — G2211 COMPLEX E/M VISIT ADD ON: HCPCS | Performed by: INTERNAL MEDICINE

## 2025-02-21 RX ORDER — FAMOTIDINE 20 MG/1
20 TABLET, FILM COATED ORAL 2 TIMES DAILY
Qty: 180 TABLET | Refills: 3 | Status: SHIPPED | OUTPATIENT
Start: 2025-02-21

## 2025-02-21 RX ORDER — SERTRALINE HYDROCHLORIDE 100 MG/1
100 TABLET, FILM COATED ORAL DAILY
Qty: 90 TABLET | Refills: 3 | Status: SHIPPED | OUTPATIENT
Start: 2025-02-21

## 2025-02-21 RX ORDER — TRAZODONE HYDROCHLORIDE 50 MG/1
50 TABLET ORAL
Qty: 90 TABLET | Refills: 3 | Status: SHIPPED | OUTPATIENT
Start: 2025-02-21

## 2025-02-21 RX ORDER — POTASSIUM CHLORIDE 750 MG/1
10 TABLET, EXTENDED RELEASE ORAL 2 TIMES DAILY
Qty: 180 TABLET | Refills: 3 | Status: SHIPPED | OUTPATIENT
Start: 2025-02-21

## 2025-02-21 RX ORDER — TRIAMTERENE AND HYDROCHLOROTHIAZIDE 37.5; 25 MG/1; MG/1
1 CAPSULE ORAL EVERY MORNING
Qty: 90 CAPSULE | Refills: 3 | Status: SHIPPED | OUTPATIENT
Start: 2025-02-21

## 2025-02-21 RX ORDER — ROSUVASTATIN CALCIUM 5 MG/1
5 TABLET, COATED ORAL DAILY
Qty: 90 TABLET | Refills: 3 | Status: SHIPPED | OUTPATIENT
Start: 2025-02-21

## 2025-02-21 NOTE — ASSESSMENT & PLAN NOTE
Hyperlipidemia controlled continue with current medical regiment recommend a low-cholesterol diet and recommend routine exercise we will continue to monitor the progress.  Orders:  •  rosuvastatin (CRESTOR) 5 mg tablet; Take 1 tablet (5 mg total) by mouth daily

## 2025-02-21 NOTE — ASSESSMENT & PLAN NOTE
Clinically stable and doing well continue the current medical regiment will continue monitor.  Orders:  •  sertraline (ZOLOFT) 100 mg tablet; Take 1 tablet (100 mg total) by mouth daily

## 2025-02-21 NOTE — ASSESSMENT & PLAN NOTE
Platelet count currently stable reviewed laboratories will continue monitor CBC currently an active order

## 2025-02-21 NOTE — PROGRESS NOTES
Name: Keshia Carias      : 1960      MRN: [id number]  Encounter Provider: Pablo Merchant DO  Encounter Date: 2025   Encounter department: [facility]  :  Assessment & Plan  Variable immunodeficiency syndrome (HCC)  No recurrent infections immunoglobulins stable will continue monitor       Right hip pain  Suspect arthritis mild pain that shoots into the groin with external rotation of the hip right side check x-ray right hip  Orders:  •  XR hip/pelv 2-3 vws right if performed; Future    Screening for colon cancer    Orders:  •  Ambulatory Referral to General Surgery; Future    IgA deficiency (HCC)  Currently stable       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HCC)    Orders:  •  IgG, IgA, IgM; Future    Prediabetes  Pre diabetes -I have counseled the patient to follow a healthy balanced diet, I have counseled patient reduce carbohydrates and sweets in the diet, I would like the patient exercise routinely.  I will be checking hemoglobin A1c and comprehensive metabolic panel.  Have counseled patient about the prevention of diabetes, and the risk of progression to type 2 diabetes.  Orders:  •  Comprehensive metabolic panel; Future  •  Hemoglobin A1C; Future    Thrombocytopenia (HCC)  Platelet count currently stable reviewed laboratories will continue monitor CBC currently an active order       Hypothyroidism, unspecified type  Hypothyroidism controlled the patient is currently euthyroid I will be ordering a TSH prior to the next office visit and the patient will continue with current medical regiment; we will continue to monitor the patient's progress.  Orders:  •  TSH, 3rd generation; Future    Screening for cardiovascular condition    Orders:  •  Lipid Panel with Direct LDL reflex; Future    Hypertension, unspecified type  Hypertension - controlled, I have counseled patient following healthy balance diet, I would like the patient reduce sodium, exercise routinely, I would like the patient  continued the med current medical regiment and we will continue to monitor.  Refills provided blood pressure 104/60  Orders:  •  triamterene-hydrochlorothiazide (DYAZIDE) 37.5-25 mg per capsule; Take 1 capsule by mouth every morning  •  potassium chloride (Klor-Con M10) 10 mEq tablet; Take 1 tablet (10 mEq total) by mouth 2 (two) times a day    Gastroesophageal reflux disease without esophagitis  Clinically stable and doing well continue the current medical regiment will continue monitor.  Refill for Pepcid 20 mg provide  Orders:  •  famotidine (PEPCID) 20 mg tablet; Take 1 tablet (20 mg total) by mouth 2 (two) times a day    Hyperlipidemia, unspecified hyperlipidemia type  Hyperlipidemia controlled continue with current medical regiment recommend a low-cholesterol diet and recommend routine exercise we will continue to monitor the progress.  Orders:  •  rosuvastatin (CRESTOR) 5 mg tablet; Take 1 tablet (5 mg total) by mouth daily    Anxiety  Clinically stable and doing well continue the current medical regiment will continue monitor.  Orders:  •  sertraline (ZOLOFT) 100 mg tablet; Take 1 tablet (100 mg total) by mouth daily    Primary insomnia  Clinically stable and doing well continue the current medical regiment will continue monitor.  Orders:  •  traZODone (DESYREL) 50 mg tablet; Take 1 tablet (50 mg total) by mouth daily at bedtime    RTO in 6 months call if any problems       History of Present Illness   HPI 64-year old female coming in for a follow up office visit regarding right hip pain, prediabetes hypothyroidism, GERD, hyperlipidemia anxiety, insomnia low immunoglobulins; the patient reports me compliant taking medications without untoward side effects the.  The patient is here to review his medical condition, update me on the medical condition and the patient reports me no hospitalizations and no ER visits.  No injuries no illnesses overall doing very well following a healthy and balanced diet exercising  "routinely has noticed some mild right hip pain no injury right hip pain mild  diet , gym 5/week.  Here to review laboratories in detail needs refills of medications  Review of Systems   Constitutional:  Negative for activity change, appetite change and unexpected weight change.   HENT:  Negative for congestion and postnasal drip.    Eyes:  Negative for visual disturbance.   Respiratory:  Negative for cough and shortness of breath.    Cardiovascular:  Negative for chest pain.   Gastrointestinal:  Negative for abdominal pain, diarrhea, nausea and vomiting.   Neurological:  Negative for dizziness, light-headedness and headaches.   Hematological:  Negative for adenopathy.       Objective   /60 (BP Location: Left arm, Patient Position: Sitting, Cuff Size: Standard)   Pulse 58   Temp (!) 96.7 °F (35.9 °C) (Tympanic)   Ht 5' 2\" (1.575 m)   Wt 78 kg (172 lb)   SpO2 97%   BMI 31.46 kg/m²   Examination of the right hip full range of motion there is pain with external rotation that shoots into the groin region  Physical Exam  Constitutional:       Appearance: She is well-developed.   HENT:      Head: Normocephalic and atraumatic.      Right Ear: External ear normal.      Left Ear: External ear normal.      Nose: Nose normal.   Eyes:      Pupils: Pupils are equal, round, and reactive to light.   Cardiovascular:      Rate and Rhythm: Normal rate and regular rhythm.      Heart sounds: Normal heart sounds. No murmur heard.  Pulmonary:      Effort: Pulmonary effort is normal.      Breath sounds: Normal breath sounds.   Abdominal:      General: There is no distension.      Palpations: Abdomen is soft.      Tenderness: There is no abdominal tenderness. There is no guarding.       Administrative Statements   I have spent a total time of 30 minutes in caring for this patient on the day of the visit/encounter including Diagnostic results, Risk factor reductions, Impressions, Counseling / Coordination of care, Documenting in " the medical record, Reviewing/placing orders in the medical record (including tests, medications, and/or procedures), and Obtaining or reviewing history  .

## 2025-02-21 NOTE — ASSESSMENT & PLAN NOTE
Hypothyroidism controlled the patient is currently euthyroid I will be ordering a TSH prior to the next office visit and the patient will continue with current medical regiment; we will continue to monitor the patient's progress.  Orders:  •  TSH, 3rd generation; Future

## 2025-02-21 NOTE — ASSESSMENT & PLAN NOTE
Clinically stable and doing well continue the current medical regiment will continue monitor.  Orders:  •  traZODone (DESYREL) 50 mg tablet; Take 1 tablet (50 mg total) by mouth daily at bedtime

## 2025-02-21 NOTE — ASSESSMENT & PLAN NOTE
Clinically stable and doing well continue the current medical regiment will continue monitor.  Refill for Pepcid 20 mg provide  Orders:  •  famotidine (PEPCID) 20 mg tablet; Take 1 tablet (20 mg total) by mouth 2 (two) times a day

## 2025-02-21 NOTE — ASSESSMENT & PLAN NOTE
Hypertension - controlled, I have counseled patient following healthy balance diet, I would like the patient reduce sodium, exercise routinely, I would like the patient continued the med current medical regiment and we will continue to monitor.  Refills provided blood pressure 104/60  Orders:  •  triamterene-hydrochlorothiazide (DYAZIDE) 37.5-25 mg per capsule; Take 1 capsule by mouth every morning  •  potassium chloride (Klor-Con M10) 10 mEq tablet; Take 1 tablet (10 mEq total) by mouth 2 (two) times a day

## 2025-02-24 ENCOUNTER — APPOINTMENT (OUTPATIENT)
Dept: LAB | Age: 65
End: 2025-02-24
Payer: COMMERCIAL

## 2025-02-24 ENCOUNTER — RESULTS FOLLOW-UP (OUTPATIENT)
Dept: INTERNAL MEDICINE CLINIC | Facility: CLINIC | Age: 65
End: 2025-02-24

## 2025-02-24 ENCOUNTER — APPOINTMENT (OUTPATIENT)
Dept: RADIOLOGY | Age: 65
End: 2025-02-24
Payer: COMMERCIAL

## 2025-02-24 DIAGNOSIS — Z41.1 ENCOUNTER FOR COSMETIC SURGERY: ICD-10-CM

## 2025-02-24 DIAGNOSIS — M25.551 RIGHT HIP PAIN: ICD-10-CM

## 2025-02-24 DIAGNOSIS — H02.813: ICD-10-CM

## 2025-02-24 LAB
ANION GAP SERPL CALCULATED.3IONS-SCNC: 9 MMOL/L (ref 4–13)
ATRIAL RATE: 69 BPM
BASOPHILS # BLD AUTO: 0.04 THOUSANDS/ÂΜL (ref 0–0.1)
BASOPHILS NFR BLD AUTO: 1 % (ref 0–1)
BUN SERPL-MCNC: 18 MG/DL (ref 5–25)
CALCIUM SERPL-MCNC: 9.7 MG/DL (ref 8.4–10.2)
CHLORIDE SERPL-SCNC: 101 MMOL/L (ref 96–108)
CO2 SERPL-SCNC: 31 MMOL/L (ref 21–32)
CREAT SERPL-MCNC: 0.86 MG/DL (ref 0.6–1.3)
EOSINOPHIL # BLD AUTO: 0.14 THOUSAND/ÂΜL (ref 0–0.61)
EOSINOPHIL NFR BLD AUTO: 3 % (ref 0–6)
ERYTHROCYTE [DISTWIDTH] IN BLOOD BY AUTOMATED COUNT: 12.6 % (ref 11.6–15.1)
GFR SERPL CREATININE-BSD FRML MDRD: 70 ML/MIN/1.73SQ M
GLUCOSE P FAST SERPL-MCNC: 109 MG/DL (ref 65–99)
HCT VFR BLD AUTO: 43.8 % (ref 34.8–46.1)
HGB BLD-MCNC: 14.9 G/DL (ref 11.5–15.4)
IMM GRANULOCYTES # BLD AUTO: 0.01 THOUSAND/UL (ref 0–0.2)
IMM GRANULOCYTES NFR BLD AUTO: 0 % (ref 0–2)
LYMPHOCYTES # BLD AUTO: 1.43 THOUSANDS/ÂΜL (ref 0.6–4.47)
LYMPHOCYTES NFR BLD AUTO: 30 % (ref 14–44)
MCH RBC QN AUTO: 30.5 PG (ref 26.8–34.3)
MCHC RBC AUTO-ENTMCNC: 34 G/DL (ref 31.4–37.4)
MCV RBC AUTO: 90 FL (ref 82–98)
MONOCYTES # BLD AUTO: 0.3 THOUSAND/ÂΜL (ref 0.17–1.22)
MONOCYTES NFR BLD AUTO: 6 % (ref 4–12)
NEUTROPHILS # BLD AUTO: 2.92 THOUSANDS/ÂΜL (ref 1.85–7.62)
NEUTS SEG NFR BLD AUTO: 60 % (ref 43–75)
NRBC BLD AUTO-RTO: 0 /100 WBCS
P AXIS: 38 DEGREES
PLATELET # BLD AUTO: 125 THOUSANDS/UL (ref 149–390)
PMV BLD AUTO: 12.2 FL (ref 8.9–12.7)
POTASSIUM SERPL-SCNC: 3.4 MMOL/L (ref 3.5–5.3)
PR INTERVAL: 162 MS
QRS AXIS: -13 DEGREES
QRSD INTERVAL: 86 MS
QT INTERVAL: 420 MS
QTC INTERVAL: 450 MS
RBC # BLD AUTO: 4.89 MILLION/UL (ref 3.81–5.12)
SODIUM SERPL-SCNC: 141 MMOL/L (ref 135–147)
T WAVE AXIS: 21 DEGREES
VENTRICULAR RATE: 69 BPM
WBC # BLD AUTO: 4.84 THOUSAND/UL (ref 4.31–10.16)

## 2025-02-24 PROCEDURE — 73502 X-RAY EXAM HIP UNI 2-3 VIEWS: CPT

## 2025-02-24 PROCEDURE — 85025 COMPLETE CBC W/AUTO DIFF WBC: CPT

## 2025-02-24 PROCEDURE — 36415 COLL VENOUS BLD VENIPUNCTURE: CPT

## 2025-02-24 PROCEDURE — 80048 BASIC METABOLIC PNL TOTAL CA: CPT

## 2025-02-24 PROCEDURE — 93010 ELECTROCARDIOGRAM REPORT: CPT | Performed by: INTERNAL MEDICINE

## 2025-02-26 ENCOUNTER — OFFICE VISIT (OUTPATIENT)
Age: 65
End: 2025-02-26
Payer: COMMERCIAL

## 2025-02-26 ENCOUNTER — COSMETIC (OUTPATIENT)
Age: 65
End: 2025-02-26

## 2025-02-26 DIAGNOSIS — H02.834 DERMATOCHALASIS OF BOTH UPPER EYELIDS: Primary | ICD-10-CM

## 2025-02-26 DIAGNOSIS — Z41.1 ELECTIVE PROCEDURE FOR UNACCEPTABLE COSMETIC APPEARANCE: Primary | ICD-10-CM

## 2025-02-26 DIAGNOSIS — H02.831 DERMATOCHALASIS OF BOTH UPPER EYELIDS: Primary | ICD-10-CM

## 2025-02-26 PROCEDURE — RECHECK: Performed by: STUDENT IN AN ORGANIZED HEALTH CARE EDUCATION/TRAINING PROGRAM

## 2025-02-26 PROCEDURE — 99214 OFFICE O/P EST MOD 30 MIN: CPT | Performed by: STUDENT IN AN ORGANIZED HEALTH CARE EDUCATION/TRAINING PROGRAM

## 2025-02-26 PROCEDURE — CP99022 PRE-OP COSMETIC EVALUATION: Performed by: STUDENT IN AN ORGANIZED HEALTH CARE EDUCATION/TRAINING PROGRAM

## 2025-02-26 NOTE — PROGRESS NOTES
"Assessment and Plan:  Ms. Carias is a 64 y.o. female presenting preop for upper and lower blepharoplasty scar revision of bilateral medial breasts    We discussed the procedure, risks, benefits, alternatives and postoperative instructions and expectations.  Informed consent obtained.  All patient's questions were answered and she voiced understanding.    History of Present Illness:   Ms. Carias is a 64 y.o. female presenting preop for upper and lower blepharoplasty scar revision of bilateral medial breasts      Review of Systems:  A 12 point ROS was performed and negative except per HPI.    Past Medical History:  Past Medical History:   Diagnosis Date    Abnormal electrocardiogram     Last assessed 10/04/13    Acute respiratory failure with hypoxia (HCC) 2/6/2024    Allergic 1990    Penicillin - anaphalaxis    Anemia     pt states hypogammaglobulinemia, needs washed RBCs if transfused - Rx by Dr Wolfe    Anxiety     \"controlled with zoloft\"    Arthritis 2005    Cancer (HCC)     DCIS Right breast-had mastectomy    Cataract 01/06/2023    COVID 01/2022    Dental crowns present     Depression     Disease of thyroid gland     Diverticulitis of colon     GERD (gastroesophageal reflux disease)     Hyperlipidemia     Hypertension     Hypogammaglobulinemia (HCC)     Hypoglobulinemia     IgA deficiency (HCC)     per pt stable -\"no current need to see Dr Wolfe Hematology unless an issue\"    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HCC)     IgM deficiency (HCC)     Limb alert care status     No BP/IV Right Arm    Obesity     PONV (postoperative nausea and vomiting)     per pt \"patch behind ear works\"    Prediabetes     Slow transit constipation     Wears glasses        Past Surgical History:  Past Surgical History:   Procedure Laterality Date    APPENDECTOMY      BOWEL RESECTION      \"for diverticulitis\"    BREAST BIOPSY Right 07/07/2022    stereo x 2    BREAST BIOPSY Right 07/26/2022    stereo    BREAST RECONSTRUCTION Right 12/15/2022    " Procedure: RIGHT BREAST RECONSTRUCTION AND REMOVAL OF TISSUE EXPANDER WITH PLACEMENT OF IMPLANT. CAPSULOTOMY.;  Surgeon: Daniela Craig DO;  Location:  [facility] OR;  Service: Plastics    CARPAL TUNNEL RELEASE      CATARACT EXTRACTION Bilateral     COLECTOMY      Resolved 08/27/09    COLONOSCOPY      DENTAL IMPLANT      EYE SURGERY  2007    Lasik , Cataracts-2023    INSERTION OF BREAST TISSUE EXPANDER Right 09/09/2022    Procedure: IMMEDIATE BREAST RECONSTRUCTION WITH TISSUE EXPANDER AND  GALAFLEX;  Surgeon: Daniela Craig DO;  [facility];  Service: Plastics    JOINT REPLACEMENT  2013,2017    LASIK      LYMPH NODE BIOPSY  9/22    MAMMO STEREOTACTIC BREAST BIOPSY RIGHT (ALL INC) Right 07/07/2022    MAMMO STEREOTACTIC BREAST BIOPSY RIGHT (ALL INC) Right 07/26/2022    MAMMO STEREOTACTIC BREAST BIOPSY RIGHT (ALL INC) EACH ADD Right 07/07/2022    MASTECTOMY Right     MASTECTOMY W/ SENTINEL NODE BIOPSY Right 09/09/2022    Procedure: MASTECTOMY WITH BIOPSY LYMPH NODE SENTINEL,Lymphoscintigraphy,Lymphatic Mapping; [address] MED;  Surgeon: Anuradha Kelly MD;  [facility];  Service: Surgical Oncology    OTHER SURGICAL HISTORY      Biopsy Vulvar    CT ARTHRP KNE CONDYLE&PLATU MEDIAL&LAT COMPARTMENTS Left 02/08/2017    Procedure: TOTAL KNEE REPLACEMENT ARTHROPLASTY;  Surgeon: Rakesh Melton MD;  [facility];  Service: Orthopedics    CT COLONOSCOPY FLX DX W/COLLJ SPEC WHEN PFRMD N/A 05/06/2019    Procedure: COLONOSCOPY;  Surgeon: Alan Shirley MD;  [facility];  Service: General    CT EXCISION EXCESSIVE SKIN & SUBQ TISSUE ARM Bilateral 11/28/2023    Procedure: BRACHIOPLASTY;  Surgeon: Daniela Craig DO;  Location:  [facility];  Service: Plastics    CT INSJ/RPLCMT BREAST IMPLANT SEP DAY MASTECTOMY Right 03/14/2023    Procedure: RIGHT BREAST RECON REVISION WITH IMPLANT EXCHANGE AND CAPSULOTOMY;  Surgeon: Daniela Craig DO;  [facility];  Service: Plastics    CT  MASTOPEXY Left 12/15/2022    Procedure: LEFT MASTOPEXY FOR SYMMETRY AND  GALAFLEX;  Surgeon: Daniela Craig DO;  Location:  [facility];  Service: Plastics    [place] REVISION OF RECONSTRUCTED BREAST Right 11/28/2023    Procedure: REVISION OF RIGHT BREAST RECON WITH REPLACEMENT OF IMPLANT, CAPULOTOMY, VIRA FLAP, LEFT MEDIAL SCAR REVISION;  Surgeon: Daniela Craig DO;  Location:  [facility];  Service: Plastics    REFRACTIVE SURGERY      lasik    REPLACEMENT TOTAL KNEE Bilateral 10/2013    TONSILLECTOMY      With Adenoidectomy    TOOTH EXTRACTION      VAGINA SURGERY      mesh       Social History:  Social History     Tobacco Use    Smoking status: Never     Passive exposure: Never    Smokeless tobacco: Never   Vaping Use    Vaping status: Never Used   Substance Use Topics    Alcohol use: No    Drug use: No       Family History:  Family History   Problem Relation Age of Onset    No Known Problems Mother     Basal cell carcinoma Father     Prostate cancer Father 94    Breast cancer Sister 55    No Known Problems Sister     No Known Problems Daughter     No Known Problems Daughter     No Known Problems Daughter     No Known Problems Maternal Grandmother     No Known Problems Maternal Grandfather     Breast cancer Paternal Grandmother 48    Breast cancer Paternal Grandfather     Breast cancer Paternal Aunt 70    Breast cancer Family     Arthritis Family     Hypertension Family     Cancer Family     Breast cancer Paternal Aunt         Breast cancer       Allergies:  Allergies   Allergen Reactions    Penicillins Anaphylaxis     Anaphylaxis  Anaphylaxis  Other reaction(s): Anaphylaxis    Acetazolamide Itching and Swelling     With eye drops - takes  oral sulfa w/o side effects    Flagyl [Metronidazole] Nausea Only and GI Intolerance       Medications:  Current Outpatient Medications on File Prior to Visit   Medication Sig Dispense Refill    diphenhydrAMINE (BENADRYL) 25 mg tablet Take 25 mg by mouth daily at bedtime  "as needed for itching      estradiol (ESTRACE) 0.1 mg/g vaginal cream Insert one quarter of an applicator intravaginally once weekly 42.5 g 0    famotidine (PEPCID) 20 mg tablet Take 1 tablet (20 mg total) by mouth 2 (two) times a day 180 tablet 3    levothyroxine 75 mcg tablet Take 1 tablet (75 mcg total) by mouth daily 90 tablet 0    potassium chloride (Klor-Con M10) 10 mEq tablet Take 1 tablet (10 mEq total) by mouth 2 (two) times a day 180 tablet 3    Psyllium (METAMUCIL PO) Take 1 Dose by mouth daily        rosuvastatin (CRESTOR) 5 mg tablet Take 1 tablet (5 mg total) by mouth daily 90 tablet 3    senna (SENOKOT) 8.6 MG tablet Take 2 tablets by mouth daily      sertraline (ZOLOFT) 100 mg tablet Take 1 tablet (100 mg total) by mouth daily 90 tablet 3    traZODone (DESYREL) 50 mg tablet Take 1 tablet (50 mg total) by mouth daily at bedtime 90 tablet 3    triamterene-hydrochlorothiazide (DYAZIDE) 37.5-25 mg per capsule Take 1 capsule by mouth every morning 90 capsule 3     No current facility-administered medications on file prior to visit.         Physical Examination:  There were no vitals taken for this visit.  Estimated body mass index is 31.46 kg/m² as calculated from the following:    Height as of 2/21/25: 5' 2\" (1.575 m).    Weight as of 2/21/25: 78 kg (172 lb).  General: NAD, well appearing, AAOx3  HEENT: NCAT, EOMI, MMM, supple  Resp: Nonlabored  Heart: RRR  Abdomen: Soft, ND, NT  Extremities/MSK: no LE edema, no obvious deficits in ROM  Neuro: grossly intact with no obvious deficits  Skin: no obvious lesions or rashes  Breast: no palpable mass, no palpable axillary lymphadenopathy, mild hypertrophic scarring of bilateral medial IMF incisions    Bilaterally upper and lower excess skin of eyelids, bilateral TTD, mild left lower canthal laxity > right, increased medial fat of right upper lid greater than left      Daniela Craig, DO  Plastic and Reconstructive Surgery    "

## 2025-02-28 NOTE — H&P (VIEW-ONLY)
"Assessment and Plan:  Ms. Carias is a 64 y.o. female presenting preop for upper and lower blepharoplasty scar revision of bilateral medial breasts    We discussed the procedure, risks, benefits, alternatives and postoperative instructions and expectations.  Informed consent obtained.  All patient's questions were answered and she voiced understanding.    History of Present Illness:   Ms. Carias is a 64 y.o. female presenting preop for upper and lower blepharoplasty scar revision of bilateral medial breasts      Review of Systems:  A 12 point ROS was performed and negative except per HPI.    Past Medical History:  Past Medical History:   Diagnosis Date    Abnormal electrocardiogram     Last assessed 10/04/13    Acute respiratory failure with hypoxia (HCC) 2/6/2024    Allergic 1990    Penicillin - anaphalaxis    Anemia     pt states hypogammaglobulinemia, needs washed RBCs if transfused - Rx by Dr Wolfe    Anxiety     \"controlled with zoloft\"    Arthritis 2005    Cancer (HCC)     DCIS Right breast-had mastectomy    Cataract 01/06/2023    COVID 01/2022    Dental crowns present     Depression     Disease of thyroid gland     Diverticulitis of colon     GERD (gastroesophageal reflux disease)     Hyperlipidemia     Hypertension     Hypogammaglobulinemia (HCC)     Hypoglobulinemia     IgA deficiency (HCC)     per pt stable -\"no current need to see Dr Wolfe Hematology unless an issue\"    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HCC)     IgM deficiency (HCC)     Limb alert care status     No BP/IV Right Arm    Obesity     PONV (postoperative nausea and vomiting)     per pt \"patch behind ear works\"    Prediabetes     Slow transit constipation     Wears glasses        Past Surgical History:  Past Surgical History:   Procedure Laterality Date    APPENDECTOMY      BOWEL RESECTION      \"for diverticulitis\"    BREAST BIOPSY Right 07/07/2022    stereo x 2    BREAST BIOPSY Right 07/26/2022    stereo    BREAST RECONSTRUCTION Right 12/15/2022    " Procedure: RIGHT BREAST RECONSTRUCTION AND REMOVAL OF TISSUE EXPANDER WITH PLACEMENT OF IMPLANT. CAPSULOTOMY.;  Surgeon: Daniela Craig DO;  Location:  [facility];  Service: Plastics    CARPAL TUNNEL RELEASE      CATARACT EXTRACTION Bilateral     COLECTOMY      Resolved 08/27/09    COLONOSCOPY      DENTAL IMPLANT      EYE SURGERY  2007    Lasik , Cataracts-2023    INSERTION OF BREAST TISSUE EXPANDER Right 09/09/2022    Procedure: IMMEDIATE BREAST RECONSTRUCTION WITH TISSUE EXPANDER AND  GALAFLEX;  Surgeon: Daniela Craig DO;  [facility];  Service: Plastics    JOINT REPLACEMENT  2013,2017    LASIK      LYMPH NODE BIOPSY  9/22    MAMMO STEREOTACTIC BREAST BIOPSY RIGHT (ALL INC) Right 07/07/2022    MAMMO STEREOTACTIC BREAST BIOPSY RIGHT (ALL INC) Right 07/26/2022    MAMMO STEREOTACTIC BREAST BIOPSY RIGHT (ALL INC) EACH ADD Right 07/07/2022    MASTECTOMY Right     MASTECTOMY W/ SENTINEL NODE BIOPSY Right 09/09/2022    Procedure: MASTECTOMY WITH BIOPSY LYMPH NODE SENTINEL,Lymphoscintigraphy,Lymphatic Mapping; [address];  Surgeon: Anuradha Kelly MD;  [facility];  Service: Surgical Oncology    OTHER SURGICAL HISTORY      Biopsy Vulvar    IL ARTHRP KNE CONDYLE&PLATU MEDIAL&LAT COMPARTMENTS Left 02/08/2017    Procedure: TOTAL KNEE REPLACEMENT ARTHROPLASTY;  Surgeon: Rakesh Melton MD;  [facility];  Service: Orthopedics    IL COLONOSCOPY FLX DX W/COLLJ SPEC WHEN PFRMD N/A 05/06/2019    Procedure: COLONOSCOPY;  Surgeon: Alan Shirley MD;  [facility];  Service: General    IL EXCISION EXCESSIVE SKIN & SUBQ TISSUE ARM Bilateral 11/28/2023    Procedure: BRACHIOPLASTY;  Surgeon: Daniela Craig DO;  Location:  [facility];  Service: Plastics    IL INSJ/RPLCMT BREAST IMPLANT SEP DAY MASTECTOMY Right 03/14/2023    Procedure: RIGHT BREAST RECON REVISION WITH IMPLANT EXCHANGE AND CAPSULOTOMY;  Surgeon: Daniela Craig DO;  [facility];  Service: Plastics    IL  MASTOPEXY Left 12/15/2022    Procedure: LEFT MASTOPEXY FOR SYMMETRY AND  GALAFLEX;  Surgeon: Daniela Craig DO;  Location:  [facility];  Service: Plastics    [place] REVISION OF RECONSTRUCTED BREAST Right 11/28/2023    Procedure: REVISION OF RIGHT BREAST RECON WITH REPLACEMENT OF IMPLANT, CAPULOTOMY, VIRA FLAP, LEFT MEDIAL SCAR REVISION;  Surgeon: Daniela Craig DO;  Location:  [facility];  Service: Plastics    REFRACTIVE SURGERY      lasik    REPLACEMENT TOTAL KNEE Bilateral 10/2013    TONSILLECTOMY      With Adenoidectomy    TOOTH EXTRACTION      VAGINA SURGERY      mesh       Social History:  Social History     Tobacco Use    Smoking status: Never     Passive exposure: Never    Smokeless tobacco: Never   Vaping Use    Vaping status: Never Used   Substance Use Topics    Alcohol use: No    Drug use: No       Family History:  Family History   Problem Relation Age of Onset    No Known Problems Mother     Basal cell carcinoma Father     Prostate cancer Father 94    Breast cancer Sister 55    No Known Problems Sister     No Known Problems Daughter     No Known Problems Daughter     No Known Problems Daughter     No Known Problems Maternal Grandmother     No Known Problems Maternal Grandfather     Breast cancer Paternal Grandmother 48    Breast cancer Paternal Grandfather     Breast cancer Paternal Aunt 70    Breast cancer Family     Arthritis Family     Hypertension Family     Cancer Family     Breast cancer Paternal Aunt         Breast cancer       Allergies:  Allergies   Allergen Reactions    Penicillins Anaphylaxis     Anaphylaxis  Anaphylaxis  Other reaction(s): Anaphylaxis    Acetazolamide Itching and Swelling     With eye drops - takes  oral sulfa w/o side effects    Flagyl [Metronidazole] Nausea Only and GI Intolerance       Medications:  Current Outpatient Medications on File Prior to Visit   Medication Sig Dispense Refill    diphenhydrAMINE (BENADRYL) 25 mg tablet Take 25 mg by mouth daily at bedtime  "as needed for itching      estradiol (ESTRACE) 0.1 mg/g vaginal cream Insert one quarter of an applicator intravaginally once weekly 42.5 g 0    famotidine (PEPCID) 20 mg tablet Take 1 tablet (20 mg total) by mouth 2 (two) times a day 180 tablet 3    levothyroxine 75 mcg tablet Take 1 tablet (75 mcg total) by mouth daily 90 tablet 0    potassium chloride (Klor-Con M10) 10 mEq tablet Take 1 tablet (10 mEq total) by mouth 2 (two) times a day 180 tablet 3    Psyllium (METAMUCIL PO) Take 1 Dose by mouth daily        rosuvastatin (CRESTOR) 5 mg tablet Take 1 tablet (5 mg total) by mouth daily 90 tablet 3    senna (SENOKOT) 8.6 MG tablet Take 2 tablets by mouth daily      sertraline (ZOLOFT) 100 mg tablet Take 1 tablet (100 mg total) by mouth daily 90 tablet 3    traZODone (DESYREL) 50 mg tablet Take 1 tablet (50 mg total) by mouth daily at bedtime 90 tablet 3    triamterene-hydrochlorothiazide (DYAZIDE) 37.5-25 mg per capsule Take 1 capsule by mouth every morning 90 capsule 3     No current facility-administered medications on file prior to visit.         Physical Examination:  There were no vitals taken for this visit.  Estimated body mass index is 31.46 kg/m² as calculated from the following:    Height as of 2/21/25: 5' 2\" (1.575 m).    Weight as of 2/21/25: 78 kg (172 lb).  General: NAD, well appearing, AAOx3  HEENT: NCAT, EOMI, MMM, supple  Resp: Nonlabored  Heart: RRR  Abdomen: Soft, ND, NT  Extremities/MSK: no LE edema, no obvious deficits in ROM  Neuro: grossly intact with no obvious deficits  Skin: no obvious lesions or rashes  Breast: no palpable mass, no palpable axillary lymphadenopathy, mild hypertrophic scarring of bilateral medial IMF incisions    Bilaterally upper and lower excess skin of eyelids, bilateral TTD, mild left lower canthal laxity > right, increased medial fat of right upper lid greater than left      Daniela Craig, DO  Plastic and Reconstructive Surgery  "

## 2025-02-28 NOTE — PROGRESS NOTES
"Assessment and Plan:  Ms. Carias is a 64 y.o. female presenting preop for upper and lower blepharoplasty scar revision of bilateral medial breasts    We discussed the procedure, risks, benefits, alternatives and postoperative instructions and expectations.  Informed consent obtained.  All patient's questions were answered and she voiced understanding.    History of Present Illness:   Ms. Carias is a 64 y.o. female presenting preop for upper and lower blepharoplasty scar revision of bilateral medial breasts      Review of Systems:  A 12 point ROS was performed and negative except per HPI.    Past Medical History:  Past Medical History:   Diagnosis Date    Abnormal electrocardiogram     Last assessed 10/04/13    Acute respiratory failure with hypoxia (HCC) 2/6/2024    Allergic 1990    Penicillin - anaphalaxis    Anemia     pt states hypogammaglobulinemia, needs washed RBCs if transfused - Rx by Dr Wolfe    Anxiety     \"controlled with zoloft\"    Arthritis 2005    Cancer (HCC)     DCIS Right breast-had mastectomy    Cataract 01/06/2023    COVID 01/2022    Dental crowns present     Depression     Disease of thyroid gland     Diverticulitis of colon     GERD (gastroesophageal reflux disease)     Hyperlipidemia     Hypertension     Hypogammaglobulinemia (HCC)     Hypoglobulinemia     IgA deficiency (HCC)     per pt stable -\"no current need to see Dr Wolfe Hematology unless an issue\"    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HCC)     IgM deficiency (HCC)     Limb alert care status     No BP/IV Right Arm    Obesity     PONV (postoperative nausea and vomiting)     per pt \"patch behind ear works\"    Prediabetes     Slow transit constipation     Wears glasses        Past Surgical History:  Past Surgical History:   Procedure Laterality Date    APPENDECTOMY      BOWEL RESECTION      \"for diverticulitis\"    BREAST BIOPSY Right 07/07/2022    stereo x 2    BREAST BIOPSY Right 07/26/2022    stereo    BREAST RECONSTRUCTION Right 12/15/2022    " Procedure: RIGHT BREAST RECONSTRUCTION AND REMOVAL OF TISSUE EXPANDER WITH PLACEMENT OF IMPLANT. CAPSULOTOMY.;  Surgeon: Daniela Craig DO;  Location:  [facility];  Service: Plastics    CARPAL TUNNEL RELEASE      CATARACT EXTRACTION Bilateral     COLECTOMY      Resolved 08/27/09    COLONOSCOPY      DENTAL IMPLANT      EYE SURGERY  2007    Lasik , Cataracts-2023    INSERTION OF BREAST TISSUE EXPANDER Right 09/09/2022    Procedure: IMMEDIATE BREAST RECONSTRUCTION WITH TISSUE EXPANDER AND  GALAFLEX;  Surgeon: Daniela Craig DO;  [facility];  Service: Plastics    JOINT REPLACEMENT  2013,2017    LASIK      LYMPH NODE BIOPSY  9/22    MAMMO STEREOTACTIC BREAST BIOPSY RIGHT (ALL INC) Right 07/07/2022    MAMMO STEREOTACTIC BREAST BIOPSY RIGHT (ALL INC) Right 07/26/2022    MAMMO STEREOTACTIC BREAST BIOPSY RIGHT (ALL INC) EACH ADD Right 07/07/2022    MASTECTOMY Right     MASTECTOMY W/ SENTINEL NODE BIOPSY Right 09/09/2022    Procedure: MASTECTOMY WITH BIOPSY LYMPH NODE SENTINEL,Lymphoscintigraphy,Lymphatic Mapping; [address];  Surgeon: Anuradha Kelly MD;  [facility];  Service: Surgical Oncology    OTHER SURGICAL HISTORY      Biopsy Vulvar    TX ARTHRP KNE CONDYLE&PLATU MEDIAL&LAT COMPARTMENTS Left 02/08/2017    Procedure: TOTAL KNEE REPLACEMENT ARTHROPLASTY;  Surgeon: Rakesh Melton MD;  [facility];  Service: Orthopedics    TX COLONOSCOPY FLX DX W/COLLJ SPEC WHEN PFRMD N/A 05/06/2019    Procedure: COLONOSCOPY;  Surgeon: Alan Shirley MD;  [facility];  Service: General    TX EXCISION EXCESSIVE SKIN & SUBQ TISSUE ARM Bilateral 11/28/2023    Procedure: BRACHIOPLASTY;  Surgeon: Daniela Craig DO;  Location:  [facility];  Service: Plastics    TX INSJ/RPLCMT BREAST IMPLANT SEP DAY MASTECTOMY Right 03/14/2023    Procedure: RIGHT BREAST RECON REVISION WITH IMPLANT EXCHANGE AND CAPSULOTOMY;  Surgeon: Daniela Craig DO;  [facility];  Service: Plastics    TX  MASTOPEXY Left 12/15/2022    Procedure: LEFT MASTOPEXY FOR SYMMETRY AND  GALAFLEX;  Surgeon: Daniela Craig DO;  Location:  [facility];  Service: Plastics    SD REVISION OF RECONSTRUCTED BREAST Right 11/28/2023    Procedure: REVISION OF RIGHT BREAST RECON WITH REPLACEMENT OF IMPLANT, CAPULOTOMY, VIRA FLAP, LEFT MEDIAL SCAR REVISION;  Surgeon: Daniela Craig DO;  Location:  [facility];  Service: Plastics    REFRACTIVE SURGERY      lasik    REPLACEMENT TOTAL KNEE Bilateral 10/2013    TONSILLECTOMY      With Adenoidectomy    TOOTH EXTRACTION      VAGINA SURGERY      mesh       Social History:  Social History     Tobacco Use    Smoking status: Never     Passive exposure: Never    Smokeless tobacco: Never   Vaping Use    Vaping status: Never Used   Substance Use Topics    Alcohol use: No    Drug use: No       Family History:  Family History   Problem Relation Age of Onset    No Known Problems Mother     Basal cell carcinoma Father     Prostate cancer Father 94    Breast cancer Sister 55    No Known Problems Sister     No Known Problems Daughter     No Known Problems Daughter     No Known Problems Daughter     No Known Problems Maternal Grandmother     No Known Problems Maternal Grandfather     Breast cancer Paternal Grandmother 48    Breast cancer Paternal Grandfather     Breast cancer Paternal Aunt 70    Breast cancer Family     Arthritis Family     Hypertension Family     Cancer Family     Breast cancer Paternal Aunt         Breast cancer       Allergies:  Allergies   Allergen Reactions    Penicillins Anaphylaxis     Anaphylaxis  Anaphylaxis  Other reaction(s): Anaphylaxis    Acetazolamide Itching and Swelling     With eye drops - takes  oral sulfa w/o side effects    Flagyl [Metronidazole] Nausea Only and GI Intolerance       Medications:  Current Outpatient Medications on File Prior to Visit   Medication Sig Dispense Refill    diphenhydrAMINE (BENADRYL) 25 mg tablet Take 25 mg by mouth daily at bedtime  "as needed for itching      estradiol (ESTRACE) 0.1 mg/g vaginal cream Insert one quarter of an applicator intravaginally once weekly 42.5 g 0    famotidine (PEPCID) 20 mg tablet Take 1 tablet (20 mg total) by mouth 2 (two) times a day 180 tablet 3    levothyroxine 75 mcg tablet Take 1 tablet (75 mcg total) by mouth daily 90 tablet 0    potassium chloride (Klor-Con M10) 10 mEq tablet Take 1 tablet (10 mEq total) by mouth 2 (two) times a day 180 tablet 3    Psyllium (METAMUCIL PO) Take 1 Dose by mouth daily        rosuvastatin (CRESTOR) 5 mg tablet Take 1 tablet (5 mg total) by mouth daily 90 tablet 3    senna (SENOKOT) 8.6 MG tablet Take 2 tablets by mouth daily      sertraline (ZOLOFT) 100 mg tablet Take 1 tablet (100 mg total) by mouth daily 90 tablet 3    traZODone (DESYREL) 50 mg tablet Take 1 tablet (50 mg total) by mouth daily at bedtime 90 tablet 3    triamterene-hydrochlorothiazide (DYAZIDE) 37.5-25 mg per capsule Take 1 capsule by mouth every morning 90 capsule 3     No current facility-administered medications on file prior to visit.         Physical Examination:  There were no vitals taken for this visit.  Estimated body mass index is 31.46 kg/m² as calculated from the following:    Height as of 2/21/25: 5' 2\" (1.575 m).    Weight as of 2/21/25: 78 kg (172 lb).  General: NAD, well appearing, AAOx3  HEENT: NCAT, EOMI, MMM, supple  Resp: Nonlabored  Heart: RRR  Abdomen: Soft, ND, NT  Extremities/MSK: no LE edema, no obvious deficits in ROM  Neuro: grossly intact with no obvious deficits  Skin: no obvious lesions or rashes  Breast: no palpable mass, no palpable axillary lymphadenopathy, mild hypertrophic scarring of bilateral medial IMF incisions    Bilaterally upper and lower excess skin of eyelids, bilateral TTD, mild left lower canthal laxity > right, increased medial fat of right upper lid greater than left      Daniela Craig, DO  Plastic and Reconstructive Surgery  "

## 2025-03-04 DIAGNOSIS — H02.32 BLEPHAROCHALASIS OF UPPER AND LOWER EYELIDS OF BOTH EYES: Primary | ICD-10-CM

## 2025-03-04 DIAGNOSIS — H02.34 BLEPHAROCHALASIS OF UPPER AND LOWER EYELIDS OF BOTH EYES: Primary | ICD-10-CM

## 2025-03-04 DIAGNOSIS — H02.31 BLEPHAROCHALASIS OF UPPER AND LOWER EYELIDS OF BOTH EYES: Primary | ICD-10-CM

## 2025-03-04 DIAGNOSIS — H02.35 BLEPHAROCHALASIS OF UPPER AND LOWER EYELIDS OF BOTH EYES: Primary | ICD-10-CM

## 2025-03-04 RX ORDER — GABAPENTIN 300 MG/1
300 CAPSULE ORAL 3 TIMES DAILY
Qty: 15 CAPSULE | Refills: 0 | Status: SHIPPED | OUTPATIENT
Start: 2025-03-04 | End: 2025-03-09

## 2025-03-04 RX ORDER — IBUPROFEN 800 MG/1
800 TABLET, FILM COATED ORAL EVERY 8 HOURS SCHEDULED
Qty: 15 TABLET | Refills: 0 | Status: SHIPPED | OUTPATIENT
Start: 2025-03-04 | End: 2025-03-09

## 2025-03-04 RX ORDER — TRAMADOL HYDROCHLORIDE 50 MG/1
50 TABLET ORAL EVERY 6 HOURS PRN
Qty: 10 TABLET | Refills: 0 | Status: SHIPPED | OUTPATIENT
Start: 2025-03-04

## 2025-03-04 RX ORDER — ACETAMINOPHEN 500 MG
500 TABLET ORAL EVERY 6 HOURS
Qty: 20 TABLET | Refills: 0 | Status: SHIPPED | OUTPATIENT
Start: 2025-03-04 | End: 2025-03-10

## 2025-03-10 ENCOUNTER — CONSULT (OUTPATIENT)
Dept: SURGERY | Facility: CLINIC | Age: 65
End: 2025-03-10
Payer: COMMERCIAL

## 2025-03-10 ENCOUNTER — ANESTHESIA EVENT (OUTPATIENT)
Dept: PERIOP | Facility: HOSPITAL | Age: 65
End: 2025-03-10
Payer: COMMERCIAL

## 2025-03-10 VITALS
HEIGHT: 62 IN | HEART RATE: 84 BPM | BODY MASS INDEX: 31.28 KG/M2 | SYSTOLIC BLOOD PRESSURE: 107 MMHG | DIASTOLIC BLOOD PRESSURE: 68 MMHG | OXYGEN SATURATION: 97 % | WEIGHT: 170 LBS

## 2025-03-10 DIAGNOSIS — Z12.11 SCREENING FOR COLON CANCER: ICD-10-CM

## 2025-03-10 PROCEDURE — 99213 OFFICE O/P EST LOW 20 MIN: CPT | Performed by: SURGERY

## 2025-03-10 NOTE — PROGRESS NOTES
"Name: Keshia Carias      : 1960      MRN: [id number]  Encounter Provider: Alan Shirley MD  Encounter Date: 3/10/2025   Encounter department: [facility]  :  Assessment & Plan  Screening for colon cancer    Orders:    Ambulatory Referral to General Surgery        History of Present Illness   HPI  Keshia Carias is a 64 y.o. female who presents for pre colonoscopy consult.  Denies problems.  Last colonoscopy was 2019.  Patient presents for updated prescription for screening colonoscopy.  Denies any blood in her stools.  No change in bowel habits.  She does have a history of recent history for breast cancer which may increase her risk for colon cancer.  Updated screening recommended.  Risks and benefits explained.      Review of Systems   Constitutional: Negative.  Negative for activity change.   HENT: Negative.     Eyes: Negative.    Respiratory: Negative.     Cardiovascular: Negative.    Gastrointestinal: Negative.    Endocrine: Negative.    Genitourinary: Negative.    Musculoskeletal: Negative.    Skin: Negative.    Allergic/Immunologic: Negative.    Neurological: Negative.    Psychiatric/Behavioral:  Negative for agitation, behavioral problems and confusion. The patient is not nervous/anxious.           Objective   /68   Pulse 84   Ht 5' 2\" (1.575 m)   Wt 77.1 kg (170 lb)   SpO2 97%   BMI 31.09 kg/m²      Physical Exam  Vitals and nursing note reviewed.   Constitutional:       General: She is not in acute distress.     Appearance: She is well-developed.   HENT:      Head: Normocephalic and atraumatic.   Cardiovascular:      Rate and Rhythm: Normal rate and regular rhythm.      Heart sounds: No murmur heard.  Pulmonary:      Effort: Pulmonary effort is normal. No respiratory distress.      Breath sounds: Normal breath sounds.   Abdominal:      Palpations: Abdomen is soft.      Tenderness: There is no abdominal tenderness.   Musculoskeletal:         " General: No swelling.      Cervical back: Neck supple.   Skin:     General: Skin is warm and dry.   Neurological:      Mental Status: She is alert.   Psychiatric:         Mood and Affect: Mood normal.

## 2025-03-10 NOTE — PRE-PROCEDURE INSTRUCTIONS
Pre-Surgery Instructions:   Medication Instructions    acetaminophen (TYLENOL) 500 mg tablet Uses PRN- OK to take day of surgery    diphenhydrAMINE (BENADRYL) 25 mg tablet Take night before surgery    estradiol (ESTRACE) 0.1 mg/g vaginal cream Hold day of surgery.    famotidine (PEPCID) 20 mg tablet Take day of surgery.    levothyroxine 75 mcg tablet Take day of surgery.    potassium chloride (Klor-Con M10) 10 mEq tablet Hold day of surgery.    Psyllium (METAMUCIL PO) Hold day of surgery.    rosuvastatin (CRESTOR) 5 mg tablet Take night before surgery    senna (SENOKOT) 8.6 MG tablet Hold day of surgery.    sertraline (ZOLOFT) 100 mg tablet Take day of surgery.    traZODone (DESYREL) 50 mg tablet Take night before surgery    triamterene-hydrochlorothiazide (DYAZIDE) 37.5-25 mg per capsule Hold day of surgery.   Medication instructions for day of surgery reviewed. Please take all instructed medications with only a sip of water.       You will receive a call one business day prior to surgery with an arrival time and hospital directions. If your surgery is scheduled on a Monday, the hospital will be calling you on the Friday prior to your surgery. If you have not heard from anyone by 8pm, please call the hospital supervisor through the hospital  at [phone] or [place] [phone]).    Do not eat or drink anything after midnight the night before your surgery, including candy, mints, lifesavers, or chewing gum. Do not drink alcohol 24hrs before your surgery. Try not to smoke at least 24hrs before your surgery.       Follow the pre surgery showering instructions as listed in the “My Surgical Experience Booklet” or otherwise provided by your surgeon's office. Do not use a blade to shave the surgical area 1 week before surgery. It is okay to use a clean electric clippers up to 24 hours before surgery. Do not apply any lotions, creams, including makeup, cologne, deodorant, or perfumes after showering on the day  of your surgery. Do not use dry shampoo, hair spray, hair gel, or any type of hair products.     No contact lenses, eye make-up, or artificial eyelashes. Remove nail polish, including gel polish, and any artificial, gel, or acrylic nails if possible. Remove all jewelry including rings and body piercing jewelry.     Wear causal clothing that is easy to take on and off. Consider your type of surgery.    Keep any valuables, jewelry, piercings at home. Please bring any specially ordered equipment (sling, braces) if indicated.    Arrange for a responsible person to drive you to and from the hospital on the day of your surgery. Please confirm the visitor policy for the day of your procedure when you receive your phone call with an arrival time.     Call the surgeon's office with any new illnesses, exposures, or additional questions prior to surgery.    Please reference your “My Surgical Experience Booklet” for additional information to prepare for your upcoming surgery.

## 2025-03-13 ENCOUNTER — HOSPITAL ENCOUNTER (OUTPATIENT)
Facility: HOSPITAL | Age: 65
Setting detail: OUTPATIENT SURGERY
Discharge: HOME | End: 2025-03-13
Attending: STUDENT IN AN ORGANIZED HEALTH CARE EDUCATION/TRAINING PROGRAM | Admitting: STUDENT IN AN ORGANIZED HEALTH CARE EDUCATION/TRAINING PROGRAM
Payer: COMMERCIAL

## 2025-03-13 ENCOUNTER — ANESTHESIA (OUTPATIENT)
Dept: PERIOP | Facility: HOSPITAL | Age: 65
End: 2025-03-13
Payer: COMMERCIAL

## 2025-03-13 VITALS
SYSTOLIC BLOOD PRESSURE: 128 MMHG | BODY MASS INDEX: 31.28 KG/M2 | RESPIRATION RATE: 16 BRPM | TEMPERATURE: 98.1 F | DIASTOLIC BLOOD PRESSURE: 91 MMHG | WEIGHT: 170 LBS | HEIGHT: 62 IN | OXYGEN SATURATION: 96 % | HEART RATE: 92 BPM

## 2025-03-13 DIAGNOSIS — H02.34 BLEPHAROCHALASIS LEFT UPPER EYELID: ICD-10-CM

## 2025-03-13 DIAGNOSIS — Z90.11 ACQUIRED ABSENCE OF RIGHT BREAST: ICD-10-CM

## 2025-03-13 DIAGNOSIS — H02.32 BLEPHAROCHALASIS OF UPPER AND LOWER EYELIDS OF BOTH EYES: Primary | ICD-10-CM

## 2025-03-13 DIAGNOSIS — H02.813: ICD-10-CM

## 2025-03-13 DIAGNOSIS — Z41.1 ENCOUNTER FOR COSMETIC SURGERY: ICD-10-CM

## 2025-03-13 DIAGNOSIS — H02.34 BLEPHAROCHALASIS OF UPPER AND LOWER EYELIDS OF BOTH EYES: Primary | ICD-10-CM

## 2025-03-13 DIAGNOSIS — H02.31 BLEPHAROCHALASIS OF UPPER AND LOWER EYELIDS OF BOTH EYES: Primary | ICD-10-CM

## 2025-03-13 DIAGNOSIS — H02.35 BLEPHAROCHALASIS OF UPPER AND LOWER EYELIDS OF BOTH EYES: Primary | ICD-10-CM

## 2025-03-13 PROBLEM — R11.2 PONV (POSTOPERATIVE NAUSEA AND VOMITING): Status: ACTIVE | Noted: 2025-03-13

## 2025-03-13 PROBLEM — F32.A DEPRESSION: Status: ACTIVE | Noted: 2025-03-13

## 2025-03-13 PROBLEM — Z98.890 PONV (POSTOPERATIVE NAUSEA AND VOMITING): Status: ACTIVE | Noted: 2025-03-13

## 2025-03-13 PROCEDURE — 12035 INTMD RPR S/A/T/EXT 12.6-20: CPT

## 2025-03-13 PROCEDURE — 15821 BLEPHARP LWR EYELID FAT PAD: CPT | Performed by: STUDENT IN AN ORGANIZED HEALTH CARE EDUCATION/TRAINING PROGRAM

## 2025-03-13 PROCEDURE — 11406 EXC TR-EXT B9+MARG >4.0 CM: CPT

## 2025-03-13 PROCEDURE — 15823 BLEPHARP UPR EYELID XCSV SKN: CPT

## 2025-03-13 PROCEDURE — 15823 BLEPHARP UPR EYELID XCSV SKN: CPT | Performed by: STUDENT IN AN ORGANIZED HEALTH CARE EDUCATION/TRAINING PROGRAM

## 2025-03-13 PROCEDURE — 11406 EXC TR-EXT B9+MARG >4.0 CM: CPT | Performed by: STUDENT IN AN ORGANIZED HEALTH CARE EDUCATION/TRAINING PROGRAM

## 2025-03-13 PROCEDURE — 12035 INTMD RPR S/A/T/EXT 12.6-20: CPT | Performed by: STUDENT IN AN ORGANIZED HEALTH CARE EDUCATION/TRAINING PROGRAM

## 2025-03-13 PROCEDURE — 88302 TISSUE EXAM BY PATHOLOGIST: CPT | Performed by: PATHOLOGY

## 2025-03-13 RX ORDER — ONDANSETRON 2 MG/ML
4 INJECTION INTRAMUSCULAR; INTRAVENOUS ONCE AS NEEDED
Status: DISCONTINUED | OUTPATIENT
Start: 2025-03-13 | End: 2025-03-13 | Stop reason: HOSPADM

## 2025-03-13 RX ORDER — EPHEDRINE SULFATE 50 MG/ML
INJECTION INTRAVENOUS AS NEEDED
Status: DISCONTINUED | OUTPATIENT
Start: 2025-03-13 | End: 2025-03-13

## 2025-03-13 RX ORDER — PHENYLEPHRINE HCL IN 0.9% NACL 1 MG/10 ML
SYRINGE (ML) INTRAVENOUS AS NEEDED
Status: DISCONTINUED | OUTPATIENT
Start: 2025-03-13 | End: 2025-03-13

## 2025-03-13 RX ORDER — OXYCODONE HYDROCHLORIDE 5 MG/1
5 TABLET ORAL ONCE AS NEEDED
Status: COMPLETED | OUTPATIENT
Start: 2025-03-13 | End: 2025-03-13

## 2025-03-13 RX ORDER — FENTANYL CITRATE 50 UG/ML
INJECTION, SOLUTION INTRAMUSCULAR; INTRAVENOUS AS NEEDED
Status: DISCONTINUED | OUTPATIENT
Start: 2025-03-13 | End: 2025-03-13

## 2025-03-13 RX ORDER — OXYCODONE HYDROCHLORIDE 5 MG/1
5 TABLET ORAL EVERY 6 HOURS PRN
Qty: 10 TABLET | Refills: 0 | Status: SHIPPED | OUTPATIENT
Start: 2025-03-13

## 2025-03-13 RX ORDER — CLINDAMYCIN PHOSPHATE 900 MG/50ML
900 INJECTION, SOLUTION INTRAVENOUS ONCE
Status: DISCONTINUED | OUTPATIENT
Start: 2025-03-13 | End: 2025-03-13 | Stop reason: HOSPADM

## 2025-03-13 RX ORDER — LIDOCAINE HYDROCHLORIDE AND EPINEPHRINE 10; 10 MG/ML; UG/ML
INJECTION, SOLUTION INFILTRATION; PERINEURAL AS NEEDED
Status: DISCONTINUED | OUTPATIENT
Start: 2025-03-13 | End: 2025-03-13 | Stop reason: HOSPADM

## 2025-03-13 RX ORDER — DEXAMETHASONE SODIUM PHOSPHATE 10 MG/ML
INJECTION, SOLUTION INTRAMUSCULAR; INTRAVENOUS AS NEEDED
Status: DISCONTINUED | OUTPATIENT
Start: 2025-03-13 | End: 2025-03-13

## 2025-03-13 RX ORDER — SCOPOLAMINE 1 MG/3D
1 PATCH, EXTENDED RELEASE TRANSDERMAL
Status: DISCONTINUED | OUTPATIENT
Start: 2025-03-13 | End: 2025-03-13 | Stop reason: HOSPADM

## 2025-03-13 RX ORDER — LIDOCAINE HYDROCHLORIDE 10 MG/ML
INJECTION, SOLUTION EPIDURAL; INFILTRATION; INTRACAUDAL; PERINEURAL AS NEEDED
Status: DISCONTINUED | OUTPATIENT
Start: 2025-03-13 | End: 2025-03-13

## 2025-03-13 RX ORDER — CLINDAMYCIN PHOSPHATE 900 MG/50ML
INJECTION, SOLUTION INTRAVENOUS AS NEEDED
Status: DISCONTINUED | OUTPATIENT
Start: 2025-03-13 | End: 2025-03-13

## 2025-03-13 RX ORDER — MAGNESIUM HYDROXIDE 1200 MG/15ML
LIQUID ORAL AS NEEDED
Status: DISCONTINUED | OUTPATIENT
Start: 2025-03-13 | End: 2025-03-13 | Stop reason: HOSPADM

## 2025-03-13 RX ORDER — ONDANSETRON 2 MG/ML
INJECTION INTRAMUSCULAR; INTRAVENOUS AS NEEDED
Status: DISCONTINUED | OUTPATIENT
Start: 2025-03-13 | End: 2025-03-13

## 2025-03-13 RX ORDER — PROPOFOL 10 MG/ML
INJECTION, EMULSION INTRAVENOUS CONTINUOUS PRN
Status: DISCONTINUED | OUTPATIENT
Start: 2025-03-13 | End: 2025-03-13

## 2025-03-13 RX ORDER — ONDANSETRON 4 MG/1
4 TABLET, ORALLY DISINTEGRATING ORAL ONCE AS NEEDED
Status: DISCONTINUED | OUTPATIENT
Start: 2025-03-13 | End: 2025-03-13 | Stop reason: HOSPADM

## 2025-03-13 RX ORDER — MIDAZOLAM HYDROCHLORIDE 2 MG/2ML
INJECTION, SOLUTION INTRAMUSCULAR; INTRAVENOUS AS NEEDED
Status: DISCONTINUED | OUTPATIENT
Start: 2025-03-13 | End: 2025-03-13

## 2025-03-13 RX ORDER — BALANCED SALT SOLUTION 6.4; .75; .48; .3; 3.9; 1.7 MG/ML; MG/ML; MG/ML; MG/ML; MG/ML; MG/ML
SOLUTION OPHTHALMIC AS NEEDED
Status: DISCONTINUED | OUTPATIENT
Start: 2025-03-13 | End: 2025-03-13 | Stop reason: HOSPADM

## 2025-03-13 RX ORDER — FENTANYL CITRATE/PF 50 MCG/ML
25 SYRINGE (ML) INJECTION
Status: DISCONTINUED | OUTPATIENT
Start: 2025-03-13 | End: 2025-03-13 | Stop reason: HOSPADM

## 2025-03-13 RX ORDER — TRAMADOL HYDROCHLORIDE 50 MG/1
50 TABLET ORAL ONCE AS NEEDED
Status: DISCONTINUED | OUTPATIENT
Start: 2025-03-13 | End: 2025-03-13 | Stop reason: HOSPADM

## 2025-03-13 RX ORDER — NEOMYCIN SULFATE, POLYMYXIN B SULFATE, AND DEXAMETHASONE 3.5; 10000; 1 MG/G; [USP'U]/G; MG/G
OINTMENT OPHTHALMIC AS NEEDED
Status: DISCONTINUED | OUTPATIENT
Start: 2025-03-13 | End: 2025-03-13 | Stop reason: HOSPADM

## 2025-03-13 RX ORDER — SODIUM CHLORIDE, SODIUM LACTATE, POTASSIUM CHLORIDE, CALCIUM CHLORIDE 600; 310; 30; 20 MG/100ML; MG/100ML; MG/100ML; MG/100ML
INJECTION, SOLUTION INTRAVENOUS CONTINUOUS PRN
Status: DISCONTINUED | OUTPATIENT
Start: 2025-03-13 | End: 2025-03-13

## 2025-03-13 RX ADMIN — Medication 100 MCG: at 08:15

## 2025-03-13 RX ADMIN — EPHEDRINE SULFATE 10 MG: 50 INJECTION INTRAVENOUS at 08:52

## 2025-03-13 RX ADMIN — Medication 100 MCG: at 08:25

## 2025-03-13 RX ADMIN — SCOPOLAMINE 1 PATCH: 1.5 PATCH, EXTENDED RELEASE TRANSDERMAL at 07:17

## 2025-03-13 RX ADMIN — FENTANYL CITRATE 100 MCG: 50 INJECTION, SOLUTION INTRAMUSCULAR; INTRAVENOUS at 07:24

## 2025-03-13 RX ADMIN — Medication 100 MCG: at 07:46

## 2025-03-13 RX ADMIN — OXYCODONE HYDROCHLORIDE 5 MG: 5 TABLET ORAL at 12:09

## 2025-03-13 RX ADMIN — MIDAZOLAM 2 MG: 1 INJECTION INTRAMUSCULAR; INTRAVENOUS at 07:21

## 2025-03-13 RX ADMIN — DEXAMETHASONE SODIUM PHOSPHATE 10 MG: 10 INJECTION INTRAMUSCULAR; INTRAVENOUS at 07:43

## 2025-03-13 RX ADMIN — PROPOFOL 150 MCG/KG/MIN: 10 INJECTION, EMULSION INTRAVENOUS at 07:40

## 2025-03-13 RX ADMIN — CLINDAMYCIN PHOSPHATE 900 MG: 900 INJECTION, SOLUTION INTRAVENOUS at 07:32

## 2025-03-13 RX ADMIN — PROPOFOL 200 MG: 10 INJECTION, EMULSION INTRAVENOUS at 07:24

## 2025-03-13 RX ADMIN — ONDANSETRON 4 MG: 2 INJECTION INTRAMUSCULAR; INTRAVENOUS at 07:43

## 2025-03-13 RX ADMIN — Medication 100 MCG: at 08:10

## 2025-03-13 RX ADMIN — SODIUM CHLORIDE, SODIUM LACTATE, POTASSIUM CHLORIDE, AND CALCIUM CHLORIDE: .6; .31; .03; .02 INJECTION, SOLUTION INTRAVENOUS at 07:12

## 2025-03-13 RX ADMIN — LIDOCAINE HYDROCHLORIDE 50 MG: 10 INJECTION, SOLUTION EPIDURAL; INFILTRATION; INTRACAUDAL at 07:24

## 2025-03-13 NOTE — DISCHARGE INSTR - AVS FIRST PAGE
Surgery Date: 3/13/2025                Patient: Keshia Carias  Surgeon: Dr. Daniela Craig     Postoperative Instructions   Blepharoplasty    Dressings:  [x] Non-absorbable sutures were used to close your incision. They will need to be removed in office.  [x] Dab a small amount of the prescribed ointment along your incisions after bathing.  [x] No dressings are required postoperatively.  [x] Other instructions: It is imperative that steri strips stay in place until you are seen in the office on Monday, 3/17.    Bathing:  [x] Shower 24-48 hours after surgery.  Allow soap and water to gently run over the incision.  No scrubbing.  Gently pat dry and apply dressing as needed/instructed above.  [x] No submerging incision in bathtub, pool, hot tub and/or lake.    Activity:  [x] No heavy lifting (> 10lbs).  [x] No strenuous exercise.  [x] Walking is permitted and encouraged.  [x] No driving until off pain medications.  [x] Recommend sleeping with head of bed elevated.  [x] No contact lenses or makeup surrounding the eyes until cleared at postoperative office appointment.    Diet and Medication:  [x] Resume diet as tolerated.  High protein diet is important for healing.  Remain well hydrated and minimize sodium intake.  [x] Resume preoperative medications.  [x] Pain medications as prescribed.  You may also begin to use ibuprofen 24 hours after surgery.  [x] Apply ice to area as needed.  Do not place ice directly on skin.  Do not use heat.  [] Other instructions:     It is expected to have some bruising, swelling and mild oozing at the incision site and of the surrounding area.  If there is more than you expect, an enlarging area or you suspect an infection, please call the office.    Some patients may experience a low-grade fever after surgery.  If it is above 100.4, please call the office.    If you do not have a postoperative office appointment scheduled, please call the office today and let the staff know Dr. Craig/her  PA needs to see you Monday, 3/17 for suture removal.    Please call [phone] with any questions, concerns or changes.      Surgery Date: 3/13/2025                Patient: Keshia GOMEZ Cigchristinbobby  Surgeon: Dr. Daniela Craig     Postoperative Instructions for Outpatient Surgery  Scar revision     Dressings:  [x] Skin glue was applied to your incision over absorbable sutures.  You may feel small pieces of suture at the ends of your incision.    [x] Incision is closed with nonabsorbable sutures.    [x] Remove dressing the first morning following your surgery and bathe as directed.  [x] No dressings are required but you may cover the incision with band-aid or gauze for comfort.  [x] Other instructions: Please wear your surgical bra at all times except when bathing     Bathing:  [x] Shower 24 hours after surgery.  Allow soap and water to gently wash over the incision.  No scrubbing.  Gently pat dry and apply dressing as needed/instructed above.  [x] No submerging incision in bathtub, pool, hot tub and/or lake.     Activity:  [x] No heavy lifting (> 10lbs).  [x] No strenuous exercise.  [x] Walking is permitted and encouraged.  [x] Strict sun avoidance/protection of incision site.  [] Other instructions:      Medication:  [x] Resume preoperative medications.  [x] You may not drive until off your pain medications.  [x] Apply ice to area as needed for pain.  Do not place ice directly on skin.  [] Other instructions:      It is expected to have some bruising, swelling and mild oozing at the incision site and the surrounding area.  If there is more than you expect or you suspect an infection, please call the office.     Some patients may experience a low-grade fever after surgery.  If it is above 100.4, please call the office.     If you do not have a postoperative office appointment scheduled, please call the office today and let the staff know Dr. Craig/her PA needs to see you Monday, 3/17.    Please call [phone] with any  questions, concerns or changes.

## 2025-03-13 NOTE — INTERVAL H&P NOTE
H&P reviewed. After examining the patient I find no changes in the patients condition since the H&P had been written.    Vitals:    03/13/25 0614   BP: 123/80   Pulse: 88   Resp: 18   Temp: (!) 97 °F (36.1 °C)   SpO2: 96%

## 2025-03-13 NOTE — ANESTHESIA PREPROCEDURE EVALUATION
Procedure:  BLEPHAROPLASTY UPPER (Bilateral: Eye)  BLEPHAROPLASTY LOWER (Bilateral: Eye)    Relevant Problems   ANESTHESIA   (+) PONV (postoperative nausea and vomiting)      CARDIO   (+) Hyperlipidemia   (+) Hypertension   (+) Varicose veins of lower extremity   (+) Varicose veins of lower extremity, unspecified laterality, unspecified whether complicated      ENDO   (+) Hypothyroidism      GI/HEPATIC   (+) Gastroesophageal reflux disease without esophagitis      HEMATOLOGY   (+) Thrombocytopenia (HCC)      MUSCULOSKELETAL   (+) Low back pain   (+) TMJ arthritis      NEURO/PSYCH   (+) Anxiety   (+) Depression   (+) Tension type headache      Neurology/Sleep   (+) Primary insomnia      Oncology   (+) History of breast cancer      Other   (+) Class 1 obesity due to excess calories with body mass index (BMI) of 33.0 to 33.9 in adult   (+) IgA deficiency (HCC)   (+) 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HCC)   (+) Low gammaglobulin level (HCC)        Physical Exam    Airway    Mallampati score: IV  TM Distance: >3 FB  Neck ROM: full     Dental   No notable dental hx     Cardiovascular      Pulmonary      Other Findings  post-pubertal.      Anesthesia Plan  ASA Score- 2     Anesthesia Type- general with ASA Monitors.         Additional Monitors:     Airway Plan: LMA.           Plan Factors-Exercise tolerance (METS): >4 METS.    Chart reviewed.    Patient summary reviewed.    Patient is not a current smoker.  Patient did not smoke on day of surgery.            Induction- intravenous.    Postoperative Plan- Plan for postoperative opioid use.     Perioperative Resuscitation Plan - Level 1 - Full Code.       Informed Consent- Anesthetic plan and risks discussed with patient (Paper Consent Obtained).  I personally reviewed this patient with the CRNA. Discussed and agreed on the Anesthesia Plan with the CRNA..      NPO Status:  Vitals Value Taken Time   Date of last liquid 03/12/25 03/13/25 0611   Time of last liquid 1700 03/13/25 0611   Date of  last solid 03/12/25 03/13/25 0611   Time of last solid 1700 03/13/25 0611

## 2025-03-13 NOTE — ANESTHESIA POSTPROCEDURE EVALUATION
Post-Op Assessment Note    Last Filed PACU Vitals:  Vitals Value Taken Time   Temp 97    Pulse 112 03/13/25 1054   /80 03/13/25 1053   Resp 17 03/13/25 1054   SpO2 98 % 03/13/25 1054   Vitals shown include unfiled device data.

## 2025-03-13 NOTE — OP NOTE
OPERATIVE REPORT  PATIENT NAME: Keshia Carias    :  1960  MRN: [id number]  Pt Location:  OR ROOM 12    SURGERY DATE: 3/13/2025    Surgeons and Role:     * DO Ayan Bass Primary    Preop Diagnosis:  Encounter for cosmetic surgery [Z41.1]  Blepharochalasis left upper eyelid [H02.34]  H/o breast cancer    Postop Diagnosis:  Encounter for cosmetic surgery [Z41.1]  Blepharochalasis left upper eyelid [H02.34]  H/o breast cancer    Procedure(s):  Bilateral - BLEPHAROPLASTY UPPER  Bilateral - BLEPHAROPLASTY LOWER  Bilateral medial breast scar revision    Specimen(s):  * No specimens in log *    Estimated Blood Loss:   20 ml    Drains:  None    Anesthesia Type:   General/TIVA    Operative Indications:  63 yo female with history of breast cancer presents for medial breast scar revision.  Her right side has excess skin and her left has a fibrotic scar which can be irritative.  She also presents with functionally impairing upper lid dermatochalasis and unacceptable cosmetic appearance of her lower eyelids.  She does have canthal laxity and knowing her preponderance for loose skin, I will plan for canthopexy on both sides.    Operative Findings:  Right and left upper blepharoplasty with cauterization of excess fat  Right and left lower blepharoplasty with redraping of fat for blending of lid/cheek junction  Right and left canthopexy for lid laxity  Bilateral tarsorrhapy sutures placed  Right reconstruction chest skin excision 9x5 cm with closure length 10.2 cm  Left chest scar excision 6x2 cm with closure length 6.5 cm      Complications:   None    Procedure and Technique:  The patient was seen preoperatively.  The procedure, risks, benefits and alternatives were discussed.  Informed consent was obtained.  The patient was site marked preoperatively.  The patient was brought to the operating room where she was positioned supine with all of her pressure points appropriately padded.  Anesthesia commenced.   A timeout was performed and verified.     The patient was prepped and draped in usual sterile fashion.  I first turned my attention to the lower blepharoplasty on the right.  A small stab incision was made laterally and sharply extended medially incising only the skin.  A portion of the pretarsal orbicularis was left intact and then more inferiorly, a skin and muscle flap was created.  This was dissected inferiorly until the septum was entered exposing the excess herniated fat.  The orbicularis retaining ligament was incised and then the redraped to better blend the lid/cheek junction by using 5-0 monocryl along the inferior aspect and this was brought through th skin.  Hemostasis was obtained.  I performed a lateral canthopexy to the orbital rim periosteum using 5-0 PDS.  The skin/muscle flap was resuspended laterally to the orbital rim using 5-0 monocryl.  This allowed the visualization of the excess skin which was trimmed.  Caution was paid not to perform this excessively as the skin was just gently touching along the incision.  The skin was closed using 5-0 monocryl and 6-0 chromic.  The same procedure was performed on the left.  The transcutaneous monocryl was secured using steristrips.  Given her swelling and lid laxity, I decided to place bilateral tarsorhhaphy sutures using 5-0 prolene lateral to the pupillary line and these were secured to the forehead using mastisol and steristrips.    I turned my attention to the upper blepharoplasty.  A caliper was used to confirm measurements and a pinch test was used to see a gentle up turn of the upper lash line. The upper eyelids were infiltrated with local anesthesia with care not to disrupt the markings. The right sided markings were incised and the excess skin was excised.  Hemostasis was obtained.  Next the septum was entered and the medial fat was encountered.  A small amount of excess was cauterized.  Hemostasis was ensured.  The incision was closed using 5-0  monocryl for the deep dermis.  Next the skin was closed using a combination of interrupted and running 6-0 prolene.  The same procedure was performed on the left side.    Lastly, I turned my attention to the medial breast scars bilaterally.  They were infiltrated with local anesthetic.  After adequate time to set, the excess skin on the right along her former scar and fibrotic scar on the left were incised and the right was de-epithelialized.  Hemostasis was obtained.  The deep dermis was closed using 3-0 monocryl.  The skin was approximated using 4-0 PDS.  The area was cleansed and glue was applied.  Once dry, steristrips were placed.    The instrument, sponge and needle count was correct and verified upon completion of the case.    The patient emerged from anesthesia and was transferred to PACU in stable condition.    Once awake, I confirmed her visual function.    Patient Disposition:  PACU       SIGNATURE: Danielajayden Vegajazlyn Craig DO  DATE: March 13, 2025  TIME: 6:52 AM    No qualified plastic surgery resident was available for the case.  The GOR[facility] provided assistance with retraction and suturing.

## 2025-03-13 NOTE — ANESTHESIA POSTPROCEDURE EVALUATION
Post-Op Assessment Note    CV Status:  Stable    Pain management: adequate       Mental Status:  Alert and awake   Hydration Status:  Euvolemic   PONV Controlled:  Controlled   Airway Patency:  Patent     Post Op Vitals Reviewed: Yes    No anethesia notable event occurred.    Staff: Anesthesiologist           Last Filed PACU Vitals:  Vitals Value Taken Time   Temp 97 °F (36.1 °C) 03/13/25 1046   Pulse 100 03/13/25 1122   /60 03/13/25 1119   Resp 13 03/13/25 1122   SpO2 95 % 03/13/25 1122   Vitals shown include unfiled device data.    Modified Som:     Vitals Value Taken Time   Activity 0 03/13/25 1046   Respiration 2 03/13/25 1046   Circulation 2 03/13/25 1046   Consciousness 0 03/13/25 1046   Oxygen Saturation 1 03/13/25 1046     Modified Som Score: 5

## 2025-03-13 NOTE — NURSING NOTE
Pt is awake,alert,tolerated diet ,assisted OOB to BR, voided QS. Written and verbal instructions given to pt and her , who verbalize an understanding. Pt seen by Dr Craig.

## 2025-03-13 NOTE — ANESTHESIA POSTPROCEDURE EVALUATION
Post-Op Assessment Note    CV Status:  Stable  Pain Score: 0    Pain management: adequate       Mental Status:  Awake and sleepy   Hydration Status:  Euvolemic   PONV Controlled:  Controlled   Airway Patency:  Patent     Post Op Vitals Reviewed: Yes    No anethesia notable event occurred.    Staff: CRNA       Last Filed PACU Vitals:  Vitals Value Taken Time   Temp     Pulse 112 03/13/25 1054   /80 03/13/25 1053   Resp 17 03/13/25 1054   SpO2 98 % 03/13/25 1054   Vitals shown include unfiled device data.

## 2025-03-17 ENCOUNTER — OFFICE VISIT (OUTPATIENT)
Age: 65
End: 2025-03-17

## 2025-03-17 DIAGNOSIS — H02.831 DERMATOCHALASIS OF BOTH UPPER EYELIDS: Primary | ICD-10-CM

## 2025-03-17 DIAGNOSIS — H02.834 DERMATOCHALASIS OF BOTH UPPER EYELIDS: Primary | ICD-10-CM

## 2025-03-17 PROCEDURE — 99024 POSTOP FOLLOW-UP VISIT: CPT | Performed by: STUDENT IN AN ORGANIZED HEALTH CARE EDUCATION/TRAINING PROGRAM

## 2025-03-17 PROCEDURE — 99024 POSTOP FOLLOW-UP VISIT: CPT

## 2025-03-18 PROCEDURE — 88302 TISSUE EXAM BY PATHOLOGIST: CPT | Performed by: PATHOLOGY

## 2025-03-18 NOTE — PROGRESS NOTES
Patient seen and examined.  Overall doing well.  Minimal pain.  Vision without issues.  Has been icing.     Sutures in place.  Expectant edema and ecchymosis.  Tarsorrhaphy sutures removed bilaterally.  No ectropion noted.     RTC Wednesday for suture removal.  Continue ice, elevation, pain meds as required     Daniela Craig,   Plastic and Reconstructive Surgery

## 2025-03-19 ENCOUNTER — OFFICE VISIT (OUTPATIENT)
Age: 65
End: 2025-03-19

## 2025-03-19 DIAGNOSIS — H02.403 ACQUIRED PTOSIS OF EYELID, BILATERAL: Primary | ICD-10-CM

## 2025-03-19 DIAGNOSIS — Z41.1 ELECTIVE PROCEDURE FOR UNACCEPTABLE COSMETIC APPEARANCE: Primary | ICD-10-CM

## 2025-03-19 PROCEDURE — 99024 POSTOP FOLLOW-UP VISIT: CPT

## 2025-03-19 PROCEDURE — RECHECK

## 2025-03-19 NOTE — PROGRESS NOTES
"[facility] Plastic and Reconstructive Surgery  [phone]     Patient Identification: Keshia Carias is a 64 y.o. female      History of Present Illness: The patient is a 64 y.o.  year-old female  who presents to the office for 1 week post op. Patient is 6 days s/p Blepharoplasty Upper - Bilateral, Blepharoplasty Lower - Bilateral, and Revision Bilateral Medial Breast Scars - Bilateral  on 3/13/2025 by Dr. Craig. She reports very minimal pain from the procedure. States she only had to take oxycodone once the night of the procedure. Has since only been taking ibuprofen prn. She notes bruising is improving. She has been continuing to ice eyes. She denies any visual complaints, blurry vision, or dry eyes. Denies fevers, chills, or drainage.         Medical History        Past Medical History:   Diagnosis Date    Abnormal electrocardiogram       Last assessed 10/04/13    Acute respiratory failure with hypoxia ([facility]) 02/06/2024    Allergic 1990     Penicillin - anaphalaxis    Anemia       pt states hypogammaglobulinemia, needs washed RBCs if transfused - Rx by Dr Wolfe    Anxiety       \"controlled with zoloft\"    Arthritis 2005    Breast cancer (HCC) 7/7/22    Cancer (HCC)       DCIS Right breast-had mastectomy    Cataract 01/06/2023    COVID 01/2022    CTS (carpal tunnel syndrome)      Dental crowns present      Depression      Disease of thyroid gland      Diverticulitis of colon      GERD (gastroesophageal reflux disease)      Hyperlipidemia      Hypertension      Hypogammaglobulinemia (HCC)      Hypoglobulinemia      IgA deficiency (HCC)       per pt stable -\"no current need to see Dr Wolfe Hematology unless an issue\"    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HCC)      IgM deficiency (HCC)      Limb alert care status       No BP/IV Right Arm    Obesity      PONV (postoperative nausea and vomiting)       per pt \"patch behind ear works\"    Prediabetes      Slow transit constipation      Wears glasses      Wound dehiscence            "      Review of Systems  Constitutional: Denies fevers, chills or pain.  Eyes: + bruising/swelling. Denies visual problems, blurry vision, or dry eyes.   Skin: Denies any warmth, erythema, edema or mucopurulent drainage.      Physical Exam  General: AAOx3, NAD, well appearing  Eyes: Expected amount of post op edema and ecchymosis present of upper and lower lids bilaterally extending into midface. L eye with small amount subconjunctival hemorrhage medially. Suture ends present, removed. See media  Breast: Surgical incisions are clean and dry with intact steri strips.      Assessment and Plan:  The patient is an 64 y.o.  year-old female who presents to the office for 1 week post op. Patient is 6 days s/p Blepharoplasty Upper - Bilateral, Blepharoplasty Lower - Bilateral, and Revision Bilateral Medial Breast Scars - Bilateral  on 3/13/2025 by Dr. Craig.     Dr. Craig present at today's visit and assisted with the assessment and plan.     -At today's visit non absorbable and absorbable sutures were removed from bilateral blepharoplasty incisions  -Start using Aquaphor ointment over bilateral upper and lower blepharoplasty incisions  -Wash incisions daily with baby shampoo or gentle cleanser, pat dry, apply Aquaphor to incisions  -Begin gentle massage of lateral eyes  -May start oral arnica or topical arnica cream, do not apply over incisions, to speed up healing of bruising   -Continue to ice as needed, not to apply directly to skin  -The patient is to return 1 week for follow up and suture end removal from bilateral breast incisions.  -The patient is to call the office with any questions or concerns. All of the patient's questions were answered at this time and they agree with the plan of care.        Qing Putnam PA-C  [facility] Plastic and Reconstructive Surgery

## 2025-03-19 NOTE — PROGRESS NOTES
"[facility] Plastic and Reconstructive Surgery  [phone]    Patient Identification: Keshia Carias is a 64 y.o. female     History of Present Illness: The patient is a 64 y.o.  year-old female  who presents to the office for 1 week post op. Patient is 6 days s/p Blepharoplasty Upper - Bilateral, Blepharoplasty Lower - Bilateral, and Revision Bilateral Medial Breast Scars - Bilateral  on 3/13/2025 by Dr. Craig. She reports very minimal pain from the procedure. States she only had to take oxycodone once the night of the procedure. Has since only been taking ibuprofen prn. She notes bruising is improving. She has been continuing to ice eyes. She denies any visual complaints, blurry vision, or dry eyes. Denies fevers, chills, or drainage.       Past Medical History:   Diagnosis Date    Abnormal electrocardiogram     Last assessed 10/04/13    Acute respiratory failure with hypoxia ([facility]) 02/06/2024    Allergic 1990    Penicillin - anaphalaxis    Anemia     pt states hypogammaglobulinemia, needs washed RBCs if transfused - Rx by Dr Wolfe    Anxiety     \"controlled with zoloft\"    Arthritis 2005    Breast cancer (HCC) 7/7/22    Cancer (HCC)     DCIS Right breast-had mastectomy    Cataract 01/06/2023    COVID 01/2022    CTS (carpal tunnel syndrome)     Dental crowns present     Depression     Disease of thyroid gland     Diverticulitis of colon     GERD (gastroesophageal reflux disease)     Hyperlipidemia     Hypertension     Hypogammaglobulinemia (HCC)     Hypoglobulinemia     IgA deficiency (HCC)     per pt stable -\"no current need to see Dr Wolfe Hematology unless an issue\"    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HCC)     IgM deficiency (HCC)     Limb alert care status     No BP/IV Right Arm    Obesity     PONV (postoperative nausea and vomiting)     per pt \"patch behind ear works\"    Prediabetes     Slow transit constipation     Wears glasses     Wound dehiscence           Review of Systems  Constitutional: Denies fevers, chills " or pain.  Eyes: + bruising/swelling. Denies visual problems, blurry vision, or dry eyes.   Skin: Denies any warmth, erythema, edema or mucopurulent drainage.     Physical Exam  General: AAOx3, NAD, well appearing  Eyes: Expected amount of post op edema and ecchymosis present of upper and lower lids bilaterally extending into midface. L eye with small amount subconjunctival hemorrhage medially. Suture ends present, removed. See media  Breast: Surgical incisions are clean and dry with intact steri strips.     Assessment and Plan:  The patient is an 64 y.o.  year-old female who presents to the office for 1 week post op. Patient is 6 days s/p Blepharoplasty Upper - Bilateral, Blepharoplasty Lower - Bilateral, and Revision Bilateral Medial Breast Scars - Bilateral  on 3/13/2025 by Dr. Craig.    Dr. Craig present at today's visit and assisted with the assessment and plan.    -At today's visit non absorbable and absorbable sutures were removed from bilateral blepharoplasty incisions  -Start using Aquaphor ointment over bilateral upper and lower blepharoplasty incisions  -Wash incisions daily with baby shampoo or gentle cleanser, pat dry, apply Aquaphor to incisions  -Begin gentle massage of lateral eyes  -May start oral arnica or topical arnica cream, do not apply over incisions, to speed up healing of bruising   -Continue to ice as needed, not to apply directly to skin  -The patient is to return 1 week for follow up and suture end removal from bilateral breast incisions.  -The patient is to call the office with any questions or concerns. All of the patient's questions were answered at this time and they agree with the plan of care.      Qing Putnam PA-C  [facility] Plastic and Reconstructive Surgery

## 2025-03-24 DIAGNOSIS — E03.9 HYPOTHYROIDISM, UNSPECIFIED TYPE: ICD-10-CM

## 2025-03-24 RX ORDER — LEVOTHYROXINE SODIUM 75 UG/1
75 TABLET ORAL DAILY
Qty: 90 TABLET | Refills: 1 | Status: SHIPPED | OUTPATIENT
Start: 2025-03-24

## 2025-03-26 ENCOUNTER — OFFICE VISIT (OUTPATIENT)
Age: 65
End: 2025-03-26

## 2025-03-26 DIAGNOSIS — H02.834 DERMATOCHALASIS OF BOTH UPPER EYELIDS: ICD-10-CM

## 2025-03-26 DIAGNOSIS — H02.403 ACQUIRED PTOSIS OF EYELID, BILATERAL: ICD-10-CM

## 2025-03-26 DIAGNOSIS — Z41.1 ELECTIVE PROCEDURE FOR UNACCEPTABLE COSMETIC APPEARANCE: Primary | ICD-10-CM

## 2025-03-26 DIAGNOSIS — H02.831 DERMATOCHALASIS OF BOTH UPPER EYELIDS: ICD-10-CM

## 2025-03-26 PROCEDURE — RECHECK: Performed by: PHYSICIAN ASSISTANT

## 2025-03-26 NOTE — PROGRESS NOTES
"POST-OP EVALUATION      Subjective   Keshia Carias is a 64 y.o. female is here today for routine post-operative follow up.  She is s/p Blepharoplasty Upper - Bilateral, Blepharoplasty Lower - Bilateral, and Revision Bilateral Medial Breast Scars - Bilateral on 3/13/2025 by Dr. Craig.   She is very happy with her blepharoplasty results.     Past Medical History:   Diagnosis Date    Abnormal electrocardiogram     Last assessed 10/04/13    Acute respiratory failure with hypoxia (HCC) 02/06/2024    Allergic 1990    Penicillin - anaphalaxis    Anemia     pt states hypogammaglobulinemia, needs washed RBCs if transfused - Rx by Dr Wolfe    Anxiety     \"controlled with zoloft\"    Arthritis 2005    Breast cancer (HCC) 7/7/22    Cancer (HCC)     DCIS Right breast-had mastectomy    Cataract 01/06/2023    COVID 01/2022    CTS (carpal tunnel syndrome)     Dental crowns present     Depression     Disease of thyroid gland     Diverticulitis of colon     GERD (gastroesophageal reflux disease)     Hyperlipidemia     Hypertension     Hypogammaglobulinemia (HCC)     Hypoglobulinemia     IgA deficiency (HCC)     per pt stable -\"no current need to see Dr Wolfe Hematology unless an issue\"    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HCC)     IgM deficiency (HCC)     Limb alert care status     No BP/IV Right Arm    Obesity     PONV (postoperative nausea and vomiting)     per pt \"patch behind ear works\"    Prediabetes     Slow transit constipation     Wears glasses     Wound dehiscence      Past Surgical History:   Procedure Laterality Date    APPENDECTOMY      AUGMENTATION MAMMAPLASTY  12/15/22    BOWEL RESECTION      \"for diverticulitis\"    BREAST BIOPSY Right 07/07/2022    stereo x 2    BREAST BIOPSY Right 07/26/2022    stereo    BREAST RECONSTRUCTION Right 12/15/2022    Procedure: RIGHT BREAST RECONSTRUCTION AND REMOVAL OF TISSUE EXPANDER WITH PLACEMENT OF IMPLANT. CAPSULOTOMY.;  Surgeon: Daniela Craig DO;  Location:  MAIN OR;  Service: " Plastics    CARPAL TUNNEL RELEASE      CATARACT EXTRACTION Bilateral     COLECTOMY      Resolved 08/27/09    COLONOSCOPY      DENTAL IMPLANT      EYE SURGERY  2007    Lasik , Cataracts-2023    INSERTION OF BREAST TISSUE EXPANDER Right 09/09/2022    Procedure: IMMEDIATE BREAST RECONSTRUCTION WITH TISSUE EXPANDER AND  GALAFLEX;  Surgeon: Daniela Craig DO;  [facility];  Service: Plastics    JOINT REPLACEMENT Bilateral 2013,2017    TKR    LASIK      LYMPH NODE BIOPSY  9/22    MAMMO STEREOTACTIC BREAST BIOPSY RIGHT (ALL INC) Right 07/07/2022    MAMMO STEREOTACTIC BREAST BIOPSY RIGHT (ALL INC) Right 07/26/2022    MAMMO STEREOTACTIC BREAST BIOPSY RIGHT (ALL INC) EACH ADD Right 07/07/2022    MASTECTOMY Right     MASTECTOMY W/ SENTINEL NODE BIOPSY Right 09/09/2022    Procedure: MASTECTOMY WITH BIOPSY LYMPH NODE SENTINEL,Lymphoscintigraphy,Lymphatic Mapping; [address];  Surgeon: Anuradha Kelly MD;  [facility];  Service: Surgical Oncology    OTHER SURGICAL HISTORY      Biopsy Vulvar    WV ARTHRP KNE CONDYLE&PLATU MEDIAL&LAT COMPARTMENTS Left 02/08/2017    Procedure: TOTAL KNEE REPLACEMENT ARTHROPLASTY;  Surgeon: Rakesh Melton MD;  Location: [facility];  Service: Orthopedics    WV BLEPHAROPLASTY LOWER EYELID Bilateral 3/13/2025    Procedure: BLEPHAROPLASTY LOWER;  Surgeon: Daniela Craig DO;  Location:  MAIN OR;  Service: Plastics    WV BLEPHAROPLASTY UPPER EYELID W/EXCESSIVE SKIN Bilateral 3/13/2025    Procedure: BLEPHAROPLASTY UPPER;  Surgeon: Daniela Craig DO;  Location:  MAIN OR;  Service: Plastics    WV COLONOSCOPY FLX DX W/COLLJ SPEC WHEN PFRMD N/A 05/06/2019    Procedure: COLONOSCOPY;  Surgeon: Alan Shirley MD;  Location: [facility];  Service: General    WV EXCISION EXCESSIVE SKIN & SUBQ TISSUE ARM Bilateral 11/28/2023    Procedure: BRACHIOPLASTY;  Surgeon: Daniela Craig DO;  Location:  MAIN OR;  Service: Plastics    WV INSJ/RPLCMT BREAST IMPLANT SEP DAY  MASTECTOMY Right 03/14/2023    Procedure: RIGHT BREAST RECON REVISION WITH IMPLANT EXCHANGE AND CAPSULOTOMY;  Surgeon: Daniela Craig DO;  [facility];  Service: Plastics    TX MASTOPEXY Left 12/15/2022    Procedure: LEFT MASTOPEXY FOR SYMMETRY AND  GALAFLEX;  Surgeon: Daniela Craig DO;  Location:  MAIN OR;  Service: Plastics    TX REVISION OF RECONSTRUCTED BREAST Right 11/28/2023    Procedure: REVISION OF RIGHT BREAST RECON WITH REPLACEMENT OF IMPLANT, CAPULOTOMY, VIRA FLAP, LEFT MEDIAL SCAR REVISION;  Surgeon: Daniela Craig DO;  Location:  MAIN OR;  Service: Plastics    REFRACTIVE SURGERY      lasik    REPLACEMENT TOTAL KNEE Bilateral 10/2013    REVISION OF SCAR ON TORSO Bilateral 3/13/2025    Procedure: REVISION BILATERAL MEDIAL BREAST SCARS;  Surgeon: Daniela Craig DO;  Location:  MAIN OR;  Service: Plastics    TONSILLECTOMY      With Adenoidectomy    TOOTH EXTRACTION      UPPER GASTROINTESTINAL ENDOSCOPY  11/2018    VAGINA SURGERY      mesh        Current Outpatient Medications:     diphenhydrAMINE (BENADRYL) 25 mg tablet, Take 25 mg by mouth daily at bedtime as needed for itching, Disp: , Rfl:     estradiol (ESTRACE) 0.1 mg/g vaginal cream, Insert one quarter of an applicator intravaginally once weekly, Disp: 42.5 g, Rfl: 0    famotidine (PEPCID) 20 mg tablet, Take 1 tablet (20 mg total) by mouth 2 (two) times a day, Disp: 180 tablet, Rfl: 3    levothyroxine 75 mcg tablet, TAKE 1 TABLET DAILY, Disp: 90 tablet, Rfl: 1    potassium chloride (Klor-Con M10) 10 mEq tablet, Take 1 tablet (10 mEq total) by mouth 2 (two) times a day, Disp: 180 tablet, Rfl: 3    Psyllium (METAMUCIL PO), Take 1 Dose by mouth in the morning., Disp: , Rfl:     rosuvastatin (CRESTOR) 5 mg tablet, Take 1 tablet (5 mg total) by mouth daily, Disp: 90 tablet, Rfl: 3    senna (SENOKOT) 8.6 MG tablet, Take 2 tablets by mouth in the morning., Disp: , Rfl:     sertraline (ZOLOFT) 100 mg tablet,  Take 1 tablet (100 mg total) by mouth daily, Disp: 90 tablet, Rfl: 3    traZODone (DESYREL) 50 mg tablet, Take 1 tablet (50 mg total) by mouth daily at bedtime, Disp: 90 tablet, Rfl: 3    triamterene-hydrochlorothiazide (DYAZIDE) 37.5-25 mg per capsule, Take 1 capsule by mouth every morning, Disp: 90 capsule, Rfl: 3  Allergies: Penicillins, Acetazolamide, and Flagyl [metronidazole]    Objective      Upper and lower Blepharoplasty incisions healing well. (See photo)     Assessment & Plan   Diagnoses and all orders for this visit:    Elective procedure for unacceptable cosmetic appearance    Acquired ptosis of eyelid, bilateral    Dermatochalasis of both upper eyelids      Continue massage of lateral blepharoplasty scars.  Apply silicone tape to left lateral lower eye lid.     Monitor for any concerning changes.  Call with any concerns.  Followup in 2 weeks.    Floyd Orozco PA-C

## 2025-04-02 ENCOUNTER — OFFICE VISIT (OUTPATIENT)
Age: 65
End: 2025-04-02

## 2025-04-02 DIAGNOSIS — H02.403 ACQUIRED PTOSIS OF EYELID, BILATERAL: ICD-10-CM

## 2025-04-02 DIAGNOSIS — H02.834 DERMATOCHALASIS OF BOTH UPPER EYELIDS: ICD-10-CM

## 2025-04-02 DIAGNOSIS — H02.831 DERMATOCHALASIS OF BOTH UPPER EYELIDS: ICD-10-CM

## 2025-04-02 DIAGNOSIS — H02.403 ACQUIRED PTOSIS OF EYELID, BILATERAL: Primary | ICD-10-CM

## 2025-04-02 DIAGNOSIS — Z41.1 ELECTIVE PROCEDURE FOR UNACCEPTABLE COSMETIC APPEARANCE: Primary | ICD-10-CM

## 2025-04-02 PROCEDURE — 99024 POSTOP FOLLOW-UP VISIT: CPT | Performed by: PHYSICIAN ASSISTANT

## 2025-04-02 NOTE — PROGRESS NOTES
"POST-OP EVALUATION  4/2/2025    Subjective   Keshia Carias is a 64 y.o. female is here today for routine post-operative follow up.  She is s/p Blepharoplasty Upper - Bilateral, Blepharoplasty Lower - Bilateral, and Revision Bilateral Medial Breast Scars - Bilateral on 3/13/2025 by Dr. Craig.     Keshia feels a suture end in her chest incision.  She is very happy with her blepharoplasty results.    Past Medical History:   Diagnosis Date    Abnormal electrocardiogram     Last assessed 10/04/13    Acute respiratory failure with hypoxia (HCC) 02/06/2024    Allergic 1990    Penicillin - anaphalaxis    Anemia     pt states hypogammaglobulinemia, needs washed RBCs if transfused - Rx by Dr Wolfe    Anxiety     \"controlled with zoloft\"    Arthritis 2005    Breast cancer (HCC) 7/7/22    Cancer (HCC)     DCIS Right breast-had mastectomy    Cataract 01/06/2023    COVID 01/2022    CTS (carpal tunnel syndrome)     Dental crowns present     Depression     Disease of thyroid gland     Diverticulitis of colon     GERD (gastroesophageal reflux disease)     Hyperlipidemia     Hypertension     Hypogammaglobulinemia (HCC)     Hypoglobulinemia     IgA deficiency (HCC)     per pt stable -\"no current need to see Dr Wolfe Hematology unless an issue\"    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HCC)     IgM deficiency (HCC)     Limb alert care status     No BP/IV Right Arm    Obesity     PONV (postoperative nausea and vomiting)     per pt \"patch behind ear works\"    Prediabetes     Slow transit constipation     Wears glasses     Wound dehiscence      Past Surgical History:   Procedure Laterality Date    APPENDECTOMY      AUGMENTATION MAMMAPLASTY  12/15/22    BOWEL RESECTION      \"for diverticulitis\"    BREAST BIOPSY Right 07/07/2022    stereo x 2    BREAST BIOPSY Right 07/26/2022    stereo    BREAST RECONSTRUCTION Right 12/15/2022    Procedure: RIGHT BREAST RECONSTRUCTION AND REMOVAL OF TISSUE EXPANDER WITH PLACEMENT OF IMPLANT. CAPSULOTOMY.;  Surgeon: " Daniela Craig DO;  Location:  MAIN OR;  Service: Plastics    CARPAL TUNNEL RELEASE      CATARACT EXTRACTION Bilateral     COLECTOMY      Resolved 08/27/09    COLONOSCOPY      DENTAL IMPLANT      EYE SURGERY  2007    Lasik , Cataracts-2023    INSERTION OF BREAST TISSUE EXPANDER Right 09/09/2022    Procedure: IMMEDIATE BREAST RECONSTRUCTION WITH TISSUE EXPANDER AND  GALAFLEX;  Surgeon: Daniela Craig DO;  [facility];  Service: Plastics    JOINT REPLACEMENT Bilateral 2013,2017    TKR    LASIK      LYMPH NODE BIOPSY  9/22    MAMMO STEREOTACTIC BREAST BIOPSY RIGHT (ALL INC) Right 07/07/2022    MAMMO STEREOTACTIC BREAST BIOPSY RIGHT (ALL INC) Right 07/26/2022    MAMMO STEREOTACTIC BREAST BIOPSY RIGHT (ALL INC) EACH ADD Right 07/07/2022    MASTECTOMY Right     MASTECTOMY W/ SENTINEL NODE BIOPSY Right 09/09/2022    Procedure: MASTECTOMY WITH BIOPSY LYMPH NODE SENTINEL,Lymphoscintigraphy,Lymphatic Mapping; [address];  Surgeon: Anuradha Kelly MD;  [facility];  Service: Surgical Oncology    OTHER SURGICAL HISTORY      Biopsy Vulvar    CA ARTHRP KNE CONDYLE&PLATU MEDIAL&LAT COMPARTMENTS Left 02/08/2017    Procedure: TOTAL KNEE REPLACEMENT ARTHROPLASTY;  Surgeon: Rakesh Melton MD;  Location:  MAIN OR;  Service: Orthopedics    CA BLEPHAROPLASTY LOWER EYELID Bilateral 3/13/2025    Procedure: BLEPHAROPLASTY LOWER;  Surgeon: Daniela Craig DO;  Location:  MAIN OR;  Service: Plastics    CA BLEPHAROPLASTY UPPER EYELID W/EXCESSIVE SKIN Bilateral 3/13/2025    Procedure: BLEPHAROPLASTY UPPER;  Surgeon: Daniela Craig DO;  Location:  MAIN OR;  Service: Plastics    CA COLONOSCOPY FLX DX W/COLLJ SPEC WHEN PFRMD N/A 05/06/2019    Procedure: COLONOSCOPY;  Surgeon: Alan Shirley MD;  Location:  GI LAB;  Service: General    CA EXCISION EXCESSIVE SKIN & SUBQ TISSUE ARM Bilateral 11/28/2023    Procedure: BRACHIOPLASTY;  Surgeon: Daniela Craig DO;  Location:  MAIN OR;   Service: Plastics    DC INSJ/RPLCMT BREAST IMPLANT SEP DAY MASTECTOMY Right 03/14/2023    Procedure: RIGHT BREAST RECON REVISION WITH IMPLANT EXCHANGE AND CAPSULOTOMY;  Surgeon: Daniela Craig DO;  [facility];  Service: Plastics    DC MASTOPEXY Left 12/15/2022    Procedure: LEFT MASTOPEXY FOR SYMMETRY AND  GALAFLEX;  Surgeon: Daniela Craig DO;  Location: [facility];  Service: Plastics    DC REVISION OF RECONSTRUCTED BREAST Right 11/28/2023    Procedure: REVISION OF RIGHT BREAST RECON WITH REPLACEMENT OF IMPLANT, CAPULOTOMY, VIRA FLAP, LEFT MEDIAL SCAR REVISION;  Surgeon: Daniela Craig DO;  [facility]: [facility];  Service: Plastics    REFRACTIVE SURGERY      lasik    REPLACEMENT TOTAL KNEE Bilateral 10/2013    REVISION OF SCAR ON TORSO Bilateral 3/13/2025    Procedure: REVISION BILATERAL MEDIAL BREAST SCARS;  Surgeon: Daniela Craig DO;  [facility]: [facility];  Service: Plastics    TONSILLECTOMY      With Adenoidectomy    TOOTH EXTRACTION      UPPER GASTROINTESTINAL ENDOSCOPY  11/2018    VAGINA SURGERY      mesh        Current Outpatient Medications:     diphenhydrAMINE (BENADRYL) 25 mg tablet, Take 25 mg by mouth daily at bedtime as needed for itching, Disp: , Rfl:     estradiol (ESTRACE) 0.1 mg/g vaginal cream, Insert one quarter of an applicator intravaginally once weekly, Disp: 42.5 g, Rfl: 0    famotidine (PEPCID) 20 mg tablet, Take 1 tablet (20 mg total) by mouth 2 (two) times a day, Disp: 180 tablet, Rfl: 3    gabapentin (Neurontin) 300 mg capsule, Take 1 capsule (300 mg total) by mouth 3 (three) times a day for 5 days (Patient not taking: Reported on 3/10/2025), Disp: 15 capsule, Rfl: 0    ibuprofen (MOTRIN) 800 mg tablet, Take 1 tablet (800 mg total) by mouth every 8 (eight) hours for 5 days Please begin 24 hours after surgery. (Patient not taking: Reported on 3/10/2025), Disp: 15 tablet, Rfl: 0    levothyroxine 75 mcg tablet, TAKE 1 TABLET DAILY, Disp: 90 tablet,  Rfl: 1    oxyCODONE (Roxicodone) 5 immediate release tablet, Take 1 tablet (5 mg total) by mouth every 6 (six) hours as needed for severe pain Max Daily Amount: 20 mg, Disp: 10 tablet, Rfl: 0    potassium chloride (Klor-Con M10) 10 mEq tablet, Take 1 tablet (10 mEq total) by mouth 2 (two) times a day, Disp: 180 tablet, Rfl: 3    Psyllium (METAMUCIL PO), Take 1 Dose by mouth daily  , Disp: , Rfl:     rosuvastatin (CRESTOR) 5 mg tablet, Take 1 tablet (5 mg total) by mouth daily (Patient taking differently: Take 5 mg by mouth every morning), Disp: 90 tablet, Rfl: 3    senna (SENOKOT) 8.6 MG tablet, Take 2 tablets by mouth daily, Disp: , Rfl:     sertraline (ZOLOFT) 100 mg tablet, Take 1 tablet (100 mg total) by mouth daily (Patient taking differently: Take 100 mg by mouth every morning), Disp: 90 tablet, Rfl: 3    traMADol (Ultram) 50 mg tablet, Take 1 tablet (50 mg total) by mouth every 6 (six) hours as needed for moderate pain, Disp: 10 tablet, Rfl: 0    traZODone (DESYREL) 50 mg tablet, Take 1 tablet (50 mg total) by mouth daily at bedtime, Disp: 90 tablet, Rfl: 3    triamterene-hydrochlorothiazide (DYAZIDE) 37.5-25 mg per capsule, Take 1 capsule by mouth every morning, Disp: 90 capsule, Rfl: 3  Allergies: Penicillins, Acetazolamide, and Flagyl [metronidazole]    Objective      Suture end removed from chest incision.  Upper and lower Blepharoplasty incisions healing well. (See photo)      Assessment & Plan   Diagnoses and all orders for this visit:    Elective procedure for unacceptable cosmetic appearance    Acquired ptosis of eyelid, bilateral    Dermatochalasis of both upper eyelids    Continue massage of lateral blepharoplasty scars.  Apply silicone tape to left lateral lower eye lid.    Monitor for any concerning changes.  Followup in 9 weeks (3 months postop)  Call with any concerns.    Floyd Orozco PA-C

## 2025-04-02 NOTE — PROGRESS NOTES
"POST-OP EVALUATION  4/2/2025    Subjective   Keshia Carias is a 64 y.o. female is here today for routine post-operative follow up.  She is s/p Blepharoplasty Upper - Bilateral, Blepharoplasty Lower - Bilateral, and Revision Bilateral Medial Breast Scars - Bilateral on 3/13/2025 by Dr. Craig.     Keshia feels a suture end in her chest incision.  She is very happy with her blepharoplasty results.    Past Medical History:   Diagnosis Date    Abnormal electrocardiogram     Last assessed 10/04/13    Acute respiratory failure with hypoxia (HCC) 02/06/2024    Allergic 1990    Penicillin - anaphalaxis    Anemia     pt states hypogammaglobulinemia, needs washed RBCs if transfused - Rx by Dr Wolfe    Anxiety     \"controlled with zoloft\"    Arthritis 2005    Breast cancer (HCC) 7/7/22    Cancer (HCC)     DCIS Right breast-had mastectomy    Cataract 01/06/2023    COVID 01/2022    CTS (carpal tunnel syndrome)     Dental crowns present     Depression     Disease of thyroid gland     Diverticulitis of colon     GERD (gastroesophageal reflux disease)     Hyperlipidemia     Hypertension     Hypogammaglobulinemia (HCC)     Hypoglobulinemia     IgA deficiency (HCC)     per pt stable -\"no current need to see Dr Wolfe Hematology unless an issue\"    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HCC)     IgM deficiency (HCC)     Limb alert care status     No BP/IV Right Arm    Obesity     PONV (postoperative nausea and vomiting)     per pt \"patch behind ear works\"    Prediabetes     Slow transit constipation     Wears glasses     Wound dehiscence      Past Surgical History:   Procedure Laterality Date    APPENDECTOMY      AUGMENTATION MAMMAPLASTY  12/15/22    BOWEL RESECTION      \"for diverticulitis\"    BREAST BIOPSY Right 07/07/2022    stereo x 2    BREAST BIOPSY Right 07/26/2022    stereo    BREAST RECONSTRUCTION Right 12/15/2022    Procedure: RIGHT BREAST RECONSTRUCTION AND REMOVAL OF TISSUE EXPANDER WITH PLACEMENT OF IMPLANT. CAPSULOTOMY.;  Surgeon: " Daniela Craig DO;  Location:  MAIN OR;  Service: Plastics    CARPAL TUNNEL RELEASE      CATARACT EXTRACTION Bilateral     COLECTOMY      Resolved 08/27/09    COLONOSCOPY      DENTAL IMPLANT      EYE SURGERY  2007    Lasik , Cataracts-2023    INSERTION OF BREAST TISSUE EXPANDER Right 09/09/2022    Procedure: IMMEDIATE BREAST RECONSTRUCTION WITH TISSUE EXPANDER AND  GALAFLEX;  Surgeon: Daniela Craig DO;  [facility];  Service: Plastics    JOINT REPLACEMENT Bilateral 2013,2017    TKR    LASIK      LYMPH NODE BIOPSY  9/22    MAMMO STEREOTACTIC BREAST BIOPSY RIGHT (ALL INC) Right 07/07/2022    MAMMO STEREOTACTIC BREAST BIOPSY RIGHT (ALL INC) Right 07/26/2022    MAMMO STEREOTACTIC BREAST BIOPSY RIGHT (ALL INC) EACH ADD Right 07/07/2022    MASTECTOMY Right     MASTECTOMY W/ SENTINEL NODE BIOPSY Right 09/09/2022    Procedure: MASTECTOMY WITH BIOPSY LYMPH NODE SENTINEL,Lymphoscintigraphy,Lymphatic Mapping; [address];  Surgeon: Anuradha Kelly MD;  [facility];  Service: Surgical Oncology    OTHER SURGICAL HISTORY      Biopsy Vulvar    MD ARTHRP KNE CONDYLE&PLATU MEDIAL&LAT COMPARTMENTS Left 02/08/2017    Procedure: TOTAL KNEE REPLACEMENT ARTHROPLASTY;  Surgeon: Rakesh Melton MD;  Location:  MAIN OR;  Service: Orthopedics    MD BLEPHAROPLASTY LOWER EYELID Bilateral 3/13/2025    Procedure: BLEPHAROPLASTY LOWER;  Surgeon: Daniela Craig DO;  Location:  MAIN OR;  Service: Plastics    MD BLEPHAROPLASTY UPPER EYELID W/EXCESSIVE SKIN Bilateral 3/13/2025    Procedure: BLEPHAROPLASTY UPPER;  Surgeon: Daniela Craig DO;  Location:  MAIN OR;  Service: Plastics    MD COLONOSCOPY FLX DX W/COLLJ SPEC WHEN PFRMD N/A 05/06/2019    Procedure: COLONOSCOPY;  Surgeon: Alan Shirley MD;  Location:  GI LAB;  Service: General    MD EXCISION EXCESSIVE SKIN & SUBQ TISSUE ARM Bilateral 11/28/2023    Procedure: BRACHIOPLASTY;  Surgeon: Daniela Craig DO;  Location:  MAIN OR;   Service: Plastics    SC INSJ/RPLCMT BREAST IMPLANT SEP DAY MASTECTOMY Right 03/14/2023    Procedure: RIGHT BREAST RECON REVISION WITH IMPLANT EXCHANGE AND CAPSULOTOMY;  Surgeon: Daniela Craig DO;  [facility];  Service: Plastics    SC MASTOPEXY Left 12/15/2022    Procedure: LEFT MASTOPEXY FOR SYMMETRY AND  GALAFLEX;  Surgeon: Daniela Craig DO;  [facility];  Service: Plastics    SC REVISION OF RECONSTRUCTED BREAST Right 11/28/2023    Procedure: REVISION OF RIGHT BREAST RECON WITH REPLACEMENT OF IMPLANT, CAPULOTOMY, VIRA FLAP, LEFT MEDIAL SCAR REVISION;  Surgeon: Daniela Craig DO;  [facility];  Service: Plastics    REFRACTIVE SURGERY      lasik    REPLACEMENT TOTAL KNEE Bilateral 10/2013    REVISION OF SCAR ON TORSO Bilateral 3/13/2025    Procedure: REVISION BILATERAL MEDIAL BREAST SCARS;  Surgeon: Daniela Craig DO;  [facility];  Service: Plastics    TONSILLECTOMY      With Adenoidectomy    TOOTH EXTRACTION      UPPER GASTROINTESTINAL ENDOSCOPY  11/2018    VAGINA SURGERY      mesh        Current Outpatient Medications:     diphenhydrAMINE (BENADRYL) 25 mg tablet, Take 25 mg by mouth daily at bedtime as needed for itching, Disp: , Rfl:     estradiol (ESTRACE) 0.1 mg/g vaginal cream, Insert one quarter of an applicator intravaginally once weekly, Disp: 42.5 g, Rfl: 0    famotidine (PEPCID) 20 mg tablet, Take 1 tablet (20 mg total) by mouth 2 (two) times a day, Disp: 180 tablet, Rfl: 3    gabapentin (Neurontin) 300 mg capsule, Take 1 capsule (300 mg total) by mouth 3 (three) times a day for 5 days (Patient not taking: Reported on 3/10/2025), Disp: 15 capsule, Rfl: 0    ibuprofen (MOTRIN) 800 mg tablet, Take 1 tablet (800 mg total) by mouth every 8 (eight) hours for 5 days Please begin 24 hours after surgery. (Patient not taking: Reported on 3/10/2025), Disp: 15 tablet, Rfl: 0    levothyroxine 75 mcg tablet, TAKE 1 TABLET DAILY, Disp: 90 tablet,  Rfl: 1    oxyCODONE (Roxicodone) 5 immediate release tablet, Take 1 tablet (5 mg total) by mouth every 6 (six) hours as needed for severe pain Max Daily Amount: 20 mg, Disp: 10 tablet, Rfl: 0    potassium chloride (Klor-Con M10) 10 mEq tablet, Take 1 tablet (10 mEq total) by mouth 2 (two) times a day, Disp: 180 tablet, Rfl: 3    Psyllium (METAMUCIL PO), Take 1 Dose by mouth daily  , Disp: , Rfl:     rosuvastatin (CRESTOR) 5 mg tablet, Take 1 tablet (5 mg total) by mouth daily (Patient taking differently: Take 5 mg by mouth every morning), Disp: 90 tablet, Rfl: 3    senna (SENOKOT) 8.6 MG tablet, Take 2 tablets by mouth daily, Disp: , Rfl:     sertraline (ZOLOFT) 100 mg tablet, Take 1 tablet (100 mg total) by mouth daily (Patient taking differently: Take 100 mg by mouth every morning), Disp: 90 tablet, Rfl: 3    traMADol (Ultram) 50 mg tablet, Take 1 tablet (50 mg total) by mouth every 6 (six) hours as needed for moderate pain, Disp: 10 tablet, Rfl: 0    traZODone (DESYREL) 50 mg tablet, Take 1 tablet (50 mg total) by mouth daily at bedtime, Disp: 90 tablet, Rfl: 3    triamterene-hydrochlorothiazide (DYAZIDE) 37.5-25 mg per capsule, Take 1 capsule by mouth every morning, Disp: 90 capsule, Rfl: 3  Allergies: Penicillins, Acetazolamide, and Flagyl [metronidazole]    Objective      Suture end removed from chest incision.  Upper and lower Blepharoplasty incisions healing well. (See photo)      Assessment & Plan   Diagnoses and all orders for this visit:    Elective procedure for unacceptable cosmetic appearance    Acquired ptosis of eyelid, bilateral    Dermatochalasis of both upper eyelids    Continue massage of lateral blepharoplasty scars.    Monitor for any concerning changes.  Followup in 9 weeks (3 months postop)  Call with any concerns.    Floyd Orozco PA-C

## 2025-04-09 PROBLEM — Z12.11 SCREENING FOR COLON CANCER: Status: RESOLVED | Noted: 2023-01-08 | Resolved: 2025-04-09

## 2025-04-16 ENCOUNTER — COSMETIC (OUTPATIENT)
Age: 65
End: 2025-04-16

## 2025-04-16 ENCOUNTER — OFFICE VISIT (OUTPATIENT)
Age: 65
End: 2025-04-16

## 2025-04-16 DIAGNOSIS — H02.834 DERMATOCHALASIS OF BOTH UPPER EYELIDS: Primary | ICD-10-CM

## 2025-04-16 DIAGNOSIS — H02.831 DERMATOCHALASIS OF BOTH UPPER EYELIDS: Primary | ICD-10-CM

## 2025-04-16 PROCEDURE — 99024 POSTOP FOLLOW-UP VISIT: CPT | Performed by: PHYSICIAN ASSISTANT

## 2025-04-16 NOTE — PROGRESS NOTES
"POST-OP EVALUATION  4/16/2025    Subjective   Keshiagilson Carias is a 64 y.o. female is here today for routine post-operative follow up.  Pt feels well. She is s/p Blepharoplasty Upper - Bilateral, Blepharoplasty Lower - Bilateral, and Revision Bilateral Medial Breast Scars - Bilateral on 3/13/2025 by Dr. Craig.   Past Medical History:   Diagnosis Date    Abnormal electrocardiogram     Last assessed 10/04/13    Acute respiratory failure with hypoxia (HCC) 02/06/2024    Allergic 1990    Penicillin - anaphalaxis    Anemia     pt states hypogammaglobulinemia, needs washed RBCs if transfused - Rx by Dr Wolfe    Anxiety     \"controlled with zoloft\"    Arthritis 2005    Breast cancer (HCC) 7/7/22    Cancer (HCC)     DCIS Right breast-had mastectomy    Cataract 01/06/2023    COVID 01/2022    CTS (carpal tunnel syndrome)     Dental crowns present     Depression     Disease of thyroid gland     Diverticulitis of colon     GERD (gastroesophageal reflux disease)     Hyperlipidemia     Hypertension     Hypogammaglobulinemia (HCC)     Hypoglobulinemia     IgA deficiency (HCC)     per pt stable -\"no current need to see Dr Wolfe Hematology unless an issue\"    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HCC)     IgM deficiency (HCC)     Limb alert care status     No BP/IV Right Arm    Obesity     PONV (postoperative nausea and vomiting)     per pt \"patch behind ear works\"    Prediabetes     Slow transit constipation     Wears glasses     Wound dehiscence      Past Surgical History:   Procedure Laterality Date    APPENDECTOMY      AUGMENTATION MAMMAPLASTY  12/15/22    BOWEL RESECTION      \"for diverticulitis\"    BREAST BIOPSY Right 07/07/2022    stereo x 2    BREAST BIOPSY Right 07/26/2022    stereo    BREAST RECONSTRUCTION Right 12/15/2022    Procedure: RIGHT BREAST RECONSTRUCTION AND REMOVAL OF TISSUE EXPANDER WITH PLACEMENT OF IMPLANT. CAPSULOTOMY.;  Surgeon: Daniela Craig DO;  Location:  MAIN OR;  Service: Plastics    CARPAL TUNNEL RELEASE   "    CATARACT EXTRACTION Bilateral     COLECTOMY      Resolved 08/27/09    COLONOSCOPY      DENTAL IMPLANT      EYE SURGERY  2007    Lasik , Cataracts-2023    INSERTION OF BREAST TISSUE EXPANDER Right 09/09/2022    Procedure: IMMEDIATE BREAST RECONSTRUCTION WITH TISSUE EXPANDER AND  GALAFLEX;  Surgeon: Daniela Craig DO;  [facility];  Service: Plastics    JOINT REPLACEMENT Bilateral 2013,2017    TKR    LASIK      LYMPH NODE BIOPSY  9/22    MAMMO STEREOTACTIC BREAST BIOPSY RIGHT (ALL INC) Right 07/07/2022    MAMMO STEREOTACTIC BREAST BIOPSY RIGHT (ALL INC) Right 07/26/2022    MAMMO STEREOTACTIC BREAST BIOPSY RIGHT (ALL INC) EACH ADD Right 07/07/2022    MASTECTOMY Right     MASTECTOMY W/ SENTINEL NODE BIOPSY Right 09/09/2022    Procedure: MASTECTOMY WITH BIOPSY LYMPH NODE SENTINEL,Lymphoscintigraphy,Lymphatic Mapping; [address];  Surgeon: Anuradha Kelly MD;  [facility];  Service: Surgical Oncology    OTHER SURGICAL HISTORY      Biopsy Vulvar    WY ARTHRP KNE CONDYLE&PLATU MEDIAL&LAT COMPARTMENTS Left 02/08/2017    Procedure: TOTAL KNEE REPLACEMENT ARTHROPLASTY;  Surgeon: Rakesh Melton MD;  Location:  [facility] OR;  Service: Orthopedics    WY BLEPHAROPLASTY LOWER EYELID Bilateral 3/13/2025    Procedure: BLEPHAROPLASTY LOWER;  Surgeon: Daniela Craig DO;  Location:  [facility];  Service: Plastics    WY BLEPHAROPLASTY UPPER EYELID W/EXCESSIVE SKIN Bilateral 3/13/2025    Procedure: BLEPHAROPLASTY UPPER;  Surgeon: Daniela Craig DO;  Location:  [facility];  Service: Plastics    WY COLONOSCOPY FLX DX W/COLLJ SPEC WHEN PFRMD N/A 05/06/2019    Procedure: COLONOSCOPY;  Surgeon: Alan Shirley MD;  [facility];  Service: General    WY EXCISION EXCESSIVE SKIN & SUBQ TISSUE ARM Bilateral 11/28/2023    Procedure: BRACHIOPLASTY;  Surgeon: Daniela Craig DO;  Location:  [facility];  Service: Plastics    WY INSJ/RPLCMT BREAST IMPLANT SEP DAY MASTECTOMY Right 03/14/2023     Procedure: RIGHT BREAST RECON REVISION WITH IMPLANT EXCHANGE AND CAPSULOTOMY;  Surgeon: Daniela Craig DO;  [facility];  Service: Plastics    NM MASTOPEXY Left 12/15/2022    Procedure: LEFT MASTOPEXY FOR SYMMETRY AND  GALAFLEX;  Surgeon: Daniela Craig DO;  Location:  [facility];  Service: Plastics    NM REVISION OF RECONSTRUCTED BREAST Right 11/28/2023    Procedure: REVISION OF RIGHT BREAST RECON WITH REPLACEMENT OF IMPLANT, CAPULOTOMY, VIRA FLAP, LEFT MEDIAL SCAR REVISION;  Surgeon: Daniela Craig DO;  Location:  [facility];  Service: Plastics    REFRACTIVE SURGERY      lasik    REPLACEMENT TOTAL KNEE Bilateral 10/2013    REVISION OF SCAR ON TORSO Bilateral 3/13/2025    Procedure: REVISION BILATERAL MEDIAL BREAST SCARS;  Surgeon: Daniela Craig DO;  Location:  [facility];  Service: Plastics    TONSILLECTOMY      With Adenoidectomy    TOOTH EXTRACTION      UPPER GASTROINTESTINAL ENDOSCOPY  11/2018    VAGINA SURGERY      mesh        Current Outpatient Medications:     diphenhydrAMINE (BENADRYL) 25 mg tablet, Take 25 mg by mouth daily at bedtime as needed for itching, Disp: , Rfl:     estradiol (ESTRACE) 0.1 mg/g vaginal cream, Insert one quarter of an applicator intravaginally once weekly, Disp: 42.5 g, Rfl: 0    famotidine (PEPCID) 20 mg tablet, Take 1 tablet (20 mg total) by mouth 2 (two) times a day, Disp: 180 tablet, Rfl: 3    gabapentin (Neurontin) 300 mg capsule, Take 1 capsule (300 mg total) by mouth 3 (three) times a day for 5 days (Patient not taking: Reported on 3/10/2025), Disp: 15 capsule, Rfl: 0    ibuprofen (MOTRIN) 800 mg tablet, Take 1 tablet (800 mg total) by mouth every 8 (eight) hours for 5 days Please begin 24 hours after surgery. (Patient not taking: Reported on 3/10/2025), Disp: 15 tablet, Rfl: 0    levothyroxine 75 mcg tablet, TAKE 1 TABLET DAILY, Disp: 90 tablet, Rfl: 1    oxyCODONE (Roxicodone) 5 immediate release tablet, Take 1 tablet (5 mg total) by  mouth every 6 (six) hours as needed for severe pain Max Daily Amount: 20 mg, Disp: 10 tablet, Rfl: 0    potassium chloride (Klor-Con M10) 10 mEq tablet, Take 1 tablet (10 mEq total) by mouth 2 (two) times a day, Disp: 180 tablet, Rfl: 3    Psyllium (METAMUCIL PO), Take 1 Dose by mouth daily  , Disp: , Rfl:     rosuvastatin (CRESTOR) 5 mg tablet, Take 1 tablet (5 mg total) by mouth daily (Patient taking differently: Take 5 mg by mouth every morning), Disp: 90 tablet, Rfl: 3    senna (SENOKOT) 8.6 MG tablet, Take 2 tablets by mouth daily, Disp: , Rfl:     sertraline (ZOLOFT) 100 mg tablet, Take 1 tablet (100 mg total) by mouth daily (Patient taking differently: Take 100 mg by mouth every morning), Disp: 90 tablet, Rfl: 3    traMADol (Ultram) 50 mg tablet, Take 1 tablet (50 mg total) by mouth every 6 (six) hours as needed for moderate pain, Disp: 10 tablet, Rfl: 0    traZODone (DESYREL) 50 mg tablet, Take 1 tablet (50 mg total) by mouth daily at bedtime, Disp: 90 tablet, Rfl: 3    triamterene-hydrochlorothiazide (DYAZIDE) 37.5-25 mg per capsule, Take 1 capsule by mouth every morning, Disp: 90 capsule, Rfl: 3  Allergies: Penicillins, Acetazolamide, and Flagyl [metronidazole]    Objective    Upper blepharoplasty with well healing scars. Mild edema.  Ectropion left > right. Incisions healing appropriately.     Assessment & Plan   Diagnoses and all orders for this visit:    Dermatochalasis of both upper eyelids    Dr. Craig examined patient also today. She instructed Keshia in using steristrips to support her lateral lower lids.  She is to apply these at home and/or at bedtime.  We will see her in two weeks.    Floyd Orozco PA-C

## 2025-04-16 NOTE — PROGRESS NOTES
"POST-OP EVALUATION  4/16/2025    Subjective   Keshiagilson Carias is a 64 y.o. female is here today for routine post-operative follow up.  Pt feels well. She is s/p Blepharoplasty Upper - Bilateral, Blepharoplasty Lower - Bilateral, and Revision Bilateral Medial Breast Scars - Bilateral on 3/13/2025 by Dr. Craig.   Past Medical History:   Diagnosis Date    Abnormal electrocardiogram     Last assessed 10/04/13    Acute respiratory failure with hypoxia (HCC) 02/06/2024    Allergic 1990    Penicillin - anaphalaxis    Anemia     pt states hypogammaglobulinemia, needs washed RBCs if transfused - Rx by Dr Wolfe    Anxiety     \"controlled with zoloft\"    Arthritis 2005    Breast cancer (HCC) 7/7/22    Cancer (HCC)     DCIS Right breast-had mastectomy    Cataract 01/06/2023    COVID 01/2022    CTS (carpal tunnel syndrome)     Dental crowns present     Depression     Disease of thyroid gland     Diverticulitis of colon     GERD (gastroesophageal reflux disease)     Hyperlipidemia     Hypertension     Hypogammaglobulinemia (HCC)     Hypoglobulinemia     IgA deficiency (HCC)     per pt stable -\"no current need to see Dr Wolfe Hematology unless an issue\"    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HCC)     IgM deficiency (HCC)     Limb alert care status     No BP/IV Right Arm    Obesity     PONV (postoperative nausea and vomiting)     per pt \"patch behind ear works\"    Prediabetes     Slow transit constipation     Wears glasses     Wound dehiscence      Past Surgical History:   Procedure Laterality Date    APPENDECTOMY      AUGMENTATION MAMMAPLASTY  12/15/22    BOWEL RESECTION      \"for diverticulitis\"    BREAST BIOPSY Right 07/07/2022    stereo x 2    BREAST BIOPSY Right 07/26/2022    stereo    BREAST RECONSTRUCTION Right 12/15/2022    Procedure: RIGHT BREAST RECONSTRUCTION AND REMOVAL OF TISSUE EXPANDER WITH PLACEMENT OF IMPLANT. CAPSULOTOMY.;  Surgeon: Daniela Craig DO;  Location:  [facility] OR;  Service: Plastics    CARPAL TUNNEL RELEASE   "    CATARACT EXTRACTION Bilateral     COLECTOMY      Resolved 08/27/09    COLONOSCOPY      DENTAL IMPLANT      EYE SURGERY  2007    Lasik , Cataracts-2023    INSERTION OF BREAST TISSUE EXPANDER Right 09/09/2022    Procedure: IMMEDIATE BREAST RECONSTRUCTION WITH TISSUE EXPANDER AND  GALAFLEX;  Surgeon: Daniela Craig DO;  [facility];  Service: Plastics    JOINT REPLACEMENT Bilateral 2013,2017    TKR    LASIK      LYMPH NODE BIOPSY  9/22    MAMMO STEREOTACTIC BREAST BIOPSY RIGHT (ALL INC) Right 07/07/2022    MAMMO STEREOTACTIC BREAST BIOPSY RIGHT (ALL INC) Right 07/26/2022    MAMMO STEREOTACTIC BREAST BIOPSY RIGHT (ALL INC) EACH ADD Right 07/07/2022    MASTECTOMY Right     MASTECTOMY W/ SENTINEL NODE BIOPSY Right 09/09/2022    Procedure: MASTECTOMY WITH BIOPSY LYMPH NODE SENTINEL,Lymphoscintigraphy,Lymphatic Mapping; [address];  Surgeon: Anuradha Kelly MD;  [facility];  Service: Surgical Oncology    OTHER SURGICAL HISTORY      Biopsy Vulvar    LA ARTHRP KNE CONDYLE&PLATU MEDIAL&LAT COMPARTMENTS Left 02/08/2017    Procedure: TOTAL KNEE REPLACEMENT ARTHROPLASTY;  Surgeon: Rakesh Melton MD;  Location:  [facility];  Service: Orthopedics    LA BLEPHAROPLASTY LOWER EYELID Bilateral 3/13/2025    Procedure: BLEPHAROPLASTY LOWER;  Surgeon: Daniela Craig DO;  Location:  [facility];  Service: Plastics    LA BLEPHAROPLASTY UPPER EYELID W/EXCESSIVE SKIN Bilateral 3/13/2025    Procedure: BLEPHAROPLASTY UPPER;  Surgeon: Daniela Craig DO;  Location:  [facility];  Service: Plastics    LA COLONOSCOPY FLX DX W/COLLJ SPEC WHEN PFRMD N/A 05/06/2019    Procedure: COLONOSCOPY;  Surgeon: Alan Shirley MD;  [facility];  Service: General    LA EXCISION EXCESSIVE SKIN & SUBQ TISSUE ARM Bilateral 11/28/2023    Procedure: BRACHIOPLASTY;  Surgeon: Daneila Craig DO;  Location:  [facility];  Service: Plastics    LA INSJ/RPLCMT BREAST IMPLANT SEP DAY MASTECTOMY Right 03/14/2023     Procedure: RIGHT BREAST RECON REVISION WITH IMPLANT EXCHANGE AND CAPSULOTOMY;  Surgeon: Daniela Craig DO;  [facility];  Service: Plastics    FL MASTOPEXY Left 12/15/2022    Procedure: LEFT MASTOPEXY FOR SYMMETRY AND  GALAFLEX;  Surgeon: Daniela Craig DO;  Location:  [facility];  Service: Plastics    FL REVISION OF RECONSTRUCTED BREAST Right 11/28/2023    Procedure: REVISION OF RIGHT BREAST RECON WITH REPLACEMENT OF IMPLANT, CAPULOTOMY, VIRA FLAP, LEFT MEDIAL SCAR REVISION;  Surgeon: Daniela Craig DO;  Location:  [facility];  Service: Plastics    REFRACTIVE SURGERY      lasik    REPLACEMENT TOTAL KNEE Bilateral 10/2013    REVISION OF SCAR ON TORSO Bilateral 3/13/2025    Procedure: REVISION BILATERAL MEDIAL BREAST SCARS;  Surgeon: Daniela Craig DO;  Location:  [facility];  Service: Plastics    TONSILLECTOMY      With Adenoidectomy    TOOTH EXTRACTION      UPPER GASTROINTESTINAL ENDOSCOPY  11/2018    VAGINA SURGERY      mesh        Current Outpatient Medications:     diphenhydrAMINE (BENADRYL) 25 mg tablet, Take 25 mg by mouth daily at bedtime as needed for itching, Disp: , Rfl:     estradiol (ESTRACE) 0.1 mg/g vaginal cream, Insert one quarter of an applicator intravaginally once weekly, Disp: 42.5 g, Rfl: 0    famotidine (PEPCID) 20 mg tablet, Take 1 tablet (20 mg total) by mouth 2 (two) times a day, Disp: 180 tablet, Rfl: 3    gabapentin (Neurontin) 300 mg capsule, Take 1 capsule (300 mg total) by mouth 3 (three) times a day for 5 days (Patient not taking: Reported on 3/10/2025), Disp: 15 capsule, Rfl: 0    ibuprofen (MOTRIN) 800 mg tablet, Take 1 tablet (800 mg total) by mouth every 8 (eight) hours for 5 days Please begin 24 hours after surgery. (Patient not taking: Reported on 3/10/2025), Disp: 15 tablet, Rfl: 0    levothyroxine 75 mcg tablet, TAKE 1 TABLET DAILY, Disp: 90 tablet, Rfl: 1    oxyCODONE (Roxicodone) 5 immediate release tablet, Take 1 tablet (5 mg total) by  mouth every 6 (six) hours as needed for severe pain Max Daily Amount: 20 mg, Disp: 10 tablet, Rfl: 0    potassium chloride (Klor-Con M10) 10 mEq tablet, Take 1 tablet (10 mEq total) by mouth 2 (two) times a day, Disp: 180 tablet, Rfl: 3    Psyllium (METAMUCIL PO), Take 1 Dose by mouth daily  , Disp: , Rfl:     rosuvastatin (CRESTOR) 5 mg tablet, Take 1 tablet (5 mg total) by mouth daily (Patient taking differently: Take 5 mg by mouth every morning), Disp: 90 tablet, Rfl: 3    senna (SENOKOT) 8.6 MG tablet, Take 2 tablets by mouth daily, Disp: , Rfl:     sertraline (ZOLOFT) 100 mg tablet, Take 1 tablet (100 mg total) by mouth daily (Patient taking differently: Take 100 mg by mouth every morning), Disp: 90 tablet, Rfl: 3    traMADol (Ultram) 50 mg tablet, Take 1 tablet (50 mg total) by mouth every 6 (six) hours as needed for moderate pain, Disp: 10 tablet, Rfl: 0    traZODone (DESYREL) 50 mg tablet, Take 1 tablet (50 mg total) by mouth daily at bedtime, Disp: 90 tablet, Rfl: 3    triamterene-hydrochlorothiazide (DYAZIDE) 37.5-25 mg per capsule, Take 1 capsule by mouth every morning, Disp: 90 capsule, Rfl: 3  Allergies: Penicillins, Acetazolamide, and Flagyl [metronidazole]    Objective    Upper blepharoplasty with well healing scars. Mild edema.  Ectropion left > right. Incisions healing appropriately.     Assessment & Plan   Diagnoses and all orders for this visit:    Dermatochalasis of both upper eyelids    Dr. Craig examined patient also today. She instructed Keshia in using steristrips to support her lateral lower lids.  She is to apply these at home and/or at bedtime.  We will see her in two weeks.    Floyd Orozco PA-C

## 2025-04-28 ENCOUNTER — PREP FOR PROCEDURE (OUTPATIENT)
Dept: SURGERY | Facility: CLINIC | Age: 65
End: 2025-04-28

## 2025-04-28 ENCOUNTER — TELEPHONE (OUTPATIENT)
Age: 65
End: 2025-04-28

## 2025-04-28 DIAGNOSIS — Z12.11 ENCOUNTER FOR SCREENING COLONOSCOPY: Primary | ICD-10-CM

## 2025-05-05 ENCOUNTER — TELEPHONE (OUTPATIENT)
Dept: OBGYN CLINIC | Facility: OTHER | Age: 65
End: 2025-05-05

## 2025-05-05 NOTE — TELEPHONE ENCOUNTER
Patient placed order for 2 more bras: quin size: xl x1  Valletta Nude size: 14 x1    I also scheduled fitting for 107/25 @11am.

## 2025-05-07 ENCOUNTER — OFFICE VISIT (OUTPATIENT)
Age: 65
End: 2025-05-07

## 2025-05-07 ENCOUNTER — COSMETIC (OUTPATIENT)
Age: 65
End: 2025-05-07

## 2025-05-07 DIAGNOSIS — H02.831 DERMATOCHALASIS OF BOTH UPPER EYELIDS: Primary | ICD-10-CM

## 2025-05-07 DIAGNOSIS — Z41.1 ELECTIVE PROCEDURE FOR UNACCEPTABLE COSMETIC APPEARANCE: Primary | ICD-10-CM

## 2025-05-07 DIAGNOSIS — Z41.1 ELECTIVE PROCEDURE FOR UNACCEPTABLE COSMETIC APPEARANCE: ICD-10-CM

## 2025-05-07 DIAGNOSIS — H02.834 DERMATOCHALASIS OF BOTH UPPER EYELIDS: Primary | ICD-10-CM

## 2025-05-07 PROCEDURE — 99024 POSTOP FOLLOW-UP VISIT: CPT | Performed by: PHYSICIAN ASSISTANT

## 2025-05-07 NOTE — PROGRESS NOTES
"POST-OP EVALUATION  5/7/2025    Subjective   Keshia Carias is a 64 y.o. female is here today for routine post-operative follow up.  She is s/p Blepharoplasty Upper - Bilateral, Blepharoplasty Lower - Bilateral, and Revision Bilateral Medial Breast Scars - Bilateral on 3/13/2025 by Dr. Craig.     She denies any visual complaints, blurry vision, or dry eyes. Denies fevers, chills, or drainage.     Past Medical History:   Diagnosis Date    Abnormal electrocardiogram     Last assessed 10/04/13    Acute respiratory failure with hypoxia (HCC) 02/06/2024    Allergic 1990    Penicillin - anaphalaxis    Anemia     pt states hypogammaglobulinemia, needs washed RBCs if transfused - Rx by Dr Wolfe    Anxiety     \"controlled with zoloft\"    Arthritis 2005    Breast cancer (HCC) 7/7/22    Cancer (HCC)     DCIS Right breast-had mastectomy    Cataract 01/06/2023    COVID 01/2022    CTS (carpal tunnel syndrome)     Dental crowns present     Depression     Disease of thyroid gland     Diverticulitis of colon     GERD (gastroesophageal reflux disease)     Hyperlipidemia     Hypertension     Hypogammaglobulinemia (HCC)     Hypoglobulinemia     IgA deficiency (HCC)     per pt stable -\"no current need to see Dr Wolfe Hematology unless an issue\"    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HCC)     IgM deficiency (HCC)     Limb alert care status     No BP/IV Right Arm    Obesity     PONV (postoperative nausea and vomiting)     per pt \"patch behind ear works\"    Prediabetes     Slow transit constipation     Wears glasses     Wound dehiscence      Past Surgical History:   Procedure Laterality Date    APPENDECTOMY      AUGMENTATION MAMMAPLASTY  12/15/22    BOWEL RESECTION      \"for diverticulitis\"    BREAST BIOPSY Right 07/07/2022    stereo x 2    BREAST BIOPSY Right 07/26/2022    stereo    BREAST RECONSTRUCTION Right 12/15/2022    Procedure: RIGHT BREAST RECONSTRUCTION AND REMOVAL OF TISSUE EXPANDER WITH PLACEMENT OF IMPLANT. CAPSULOTOMY.;  Surgeon: Daniela " Lisset Craig DO;  Location:  MAIN OR;  Service: Plastics    CARPAL TUNNEL RELEASE      CATARACT EXTRACTION Bilateral     COLECTOMY      Resolved 08/27/09    COLONOSCOPY      DENTAL IMPLANT      EYE SURGERY  2007    Lasik , Cataracts-2023    INSERTION OF BREAST TISSUE EXPANDER Right 09/09/2022    Procedure: IMMEDIATE BREAST RECONSTRUCTION WITH TISSUE EXPANDER AND  GALAFLEX;  Surgeon: Daniela Craig DO;  [facility];  Service: Plastics    JOINT REPLACEMENT Bilateral 2013,2017    TKR    LASIK      LYMPH NODE BIOPSY  9/22    MAMMO STEREOTACTIC BREAST BIOPSY RIGHT (ALL INC) Right 07/07/2022    MAMMO STEREOTACTIC BREAST BIOPSY RIGHT (ALL INC) Right 07/26/2022    MAMMO STEREOTACTIC BREAST BIOPSY RIGHT (ALL INC) EACH ADD Right 07/07/2022    MASTECTOMY Right     MASTECTOMY W/ SENTINEL NODE BIOPSY Right 09/09/2022    Procedure: MASTECTOMY WITH BIOPSY LYMPH NODE SENTINEL,Lymphoscintigraphy,Lymphatic Mapping; [address];  Surgeon: Anuradha Kelly MD;  [facility];  Service: Surgical Oncology    OTHER SURGICAL HISTORY      Biopsy Vulvar    DE ARTHRP KNE CONDYLE&PLATU MEDIAL&LAT COMPARTMENTS Left 02/08/2017    Procedure: TOTAL KNEE REPLACEMENT ARTHROPLASTY;  Surgeon: Rakesh Melton MD;  Location:  MAIN OR;  Service: Orthopedics    DE BLEPHAROPLASTY LOWER EYELID Bilateral 3/13/2025    Procedure: BLEPHAROPLASTY LOWER;  Surgeon: Daniela Craig DO;  Location:  MAIN OR;  Service: Plastics    DE BLEPHAROPLASTY UPPER EYELID W/EXCESSIVE SKIN Bilateral 3/13/2025    Procedure: BLEPHAROPLASTY UPPER;  Surgeon: Daniela Craig DO;  Location:  MAIN OR;  Service: Plastics    DE COLONOSCOPY FLX DX W/COLLJ SPEC WHEN PFRMD N/A 05/06/2019    Procedure: COLONOSCOPY;  Surgeon: Aaln Shirley MD;  Location:  GI LAB;  Service: General    DE EXCISION EXCESSIVE SKIN & SUBQ TISSUE ARM Bilateral 11/28/2023    Procedure: BRACHIOPLASTY;  Surgeon: Daniela Craig DO;  Location:  MAIN OR;  Service:  Plastics    NH INSJ/RPLCMT BREAST IMPLANT SEP DAY MASTECTOMY Right 03/14/2023    Procedure: RIGHT BREAST RECON REVISION WITH IMPLANT EXCHANGE AND CAPSULOTOMY;  Surgeon: Daniela Craig DO;  [facility];  Service: Plastics    NH MASTOPEXY Left 12/15/2022    Procedure: LEFT MASTOPEXY FOR SYMMETRY AND  GALAFLEX;  Surgeon: Daniela Craig DO;  [facility];  Service: Plastics    NH REVISION OF RECONSTRUCTED BREAST Right 11/28/2023    Procedure: REVISION OF RIGHT BREAST RECON WITH REPLACEMENT OF IMPLANT, CAPULOTOMY, VIRA FLAP, LEFT MEDIAL SCAR REVISION;  Surgeon: Daniela Craig DO;  [facility];  Service: Plastics    REFRACTIVE SURGERY      lasik    REPLACEMENT TOTAL KNEE Bilateral 10/2013    REVISION OF SCAR ON TORSO Bilateral 3/13/2025    Procedure: REVISION BILATERAL MEDIAL BREAST SCARS;  Surgeon: Daniela Craig DO;  [facility];  Service: Plastics    TONSILLECTOMY      With Adenoidectomy    TOOTH EXTRACTION      UPPER GASTROINTESTINAL ENDOSCOPY  11/2018    VAGINA SURGERY      mesh        Current Outpatient Medications:     diphenhydrAMINE (BENADRYL) 25 mg tablet, Take 25 mg by mouth daily at bedtime as needed for itching, Disp: , Rfl:     estradiol (ESTRACE) 0.1 mg/g vaginal cream, Insert one quarter of an applicator intravaginally once weekly, Disp: 42.5 g, Rfl: 0    famotidine (PEPCID) 20 mg tablet, Take 1 tablet (20 mg total) by mouth 2 (two) times a day, Disp: 180 tablet, Rfl: 3    gabapentin (Neurontin) 300 mg capsule, Take 1 capsule (300 mg total) by mouth 3 (three) times a day for 5 days (Patient not taking: Reported on 3/10/2025), Disp: 15 capsule, Rfl: 0    ibuprofen (MOTRIN) 800 mg tablet, Take 1 tablet (800 mg total) by mouth every 8 (eight) hours for 5 days Please begin 24 hours after surgery. (Patient not taking: Reported on 3/10/2025), Disp: 15 tablet, Rfl: 0    levothyroxine 75 mcg tablet, TAKE 1 TABLET DAILY, Disp: 90 tablet, Rfl: 1     oxyCODONE (Roxicodone) 5 immediate release tablet, Take 1 tablet (5 mg total) by mouth every 6 (six) hours as needed for severe pain Max Daily Amount: 20 mg, Disp: 10 tablet, Rfl: 0    potassium chloride (Klor-Con M10) 10 mEq tablet, Take 1 tablet (10 mEq total) by mouth 2 (two) times a day, Disp: 180 tablet, Rfl: 3    Psyllium (METAMUCIL PO), Take 1 Dose by mouth daily  , Disp: , Rfl:     rosuvastatin (CRESTOR) 5 mg tablet, Take 1 tablet (5 mg total) by mouth daily (Patient taking differently: Take 5 mg by mouth every morning), Disp: 90 tablet, Rfl: 3    senna (SENOKOT) 8.6 MG tablet, Take 2 tablets by mouth daily, Disp: , Rfl:     sertraline (ZOLOFT) 100 mg tablet, Take 1 tablet (100 mg total) by mouth daily (Patient taking differently: Take 100 mg by mouth every morning), Disp: 90 tablet, Rfl: 3    traMADol (Ultram) 50 mg tablet, Take 1 tablet (50 mg total) by mouth every 6 (six) hours as needed for moderate pain, Disp: 10 tablet, Rfl: 0    traZODone (DESYREL) 50 mg tablet, Take 1 tablet (50 mg total) by mouth daily at bedtime, Disp: 90 tablet, Rfl: 3    triamterene-hydrochlorothiazide (DYAZIDE) 37.5-25 mg per capsule, Take 1 capsule by mouth every morning, Disp: 90 capsule, Rfl: 3  Allergies: Penicillins, Acetazolamide, and Flagyl [metronidazole]    Objective      Incisions well healed. Mild chemosis laterally. (See photos)    Assessment & Plan   Diagnoses and all orders for this visit:    Dermatochalasis of both upper eyelids    Elective procedure for unacceptable cosmetic appearance      Dr. Craig performed bilateral lateral canthopexies.     She used a 6-0 vicryl to the orbital rim and a 5-0 fast absorbing gut.  Incisions were then covered with steristrips.  Pt tolerated this well.    She will return in one week.    Floyd Orozco PA-C

## 2025-05-07 NOTE — PROGRESS NOTES
"POST-OP EVALUATION  5/7/2025    Subjective   Keshia Carias is a 64 y.o. female is here today for routine post-operative follow up.  She is s/p Blepharoplasty Upper - Bilateral, Blepharoplasty Lower - Bilateral, and Revision Bilateral Medial Breast Scars - Bilateral on 3/13/2025 by Dr. Craig.     She denies any visual complaints, blurry vision, or dry eyes. Denies fevers, chills, or drainage.     Past Medical History:   Diagnosis Date    Abnormal electrocardiogram     Last assessed 10/04/13    Acute respiratory failure with hypoxia (HCC) 02/06/2024    Allergic 1990    Penicillin - anaphalaxis    Anemia     pt states hypogammaglobulinemia, needs washed RBCs if transfused - Rx by Dr Wolfe    Anxiety     \"controlled with zoloft\"    Arthritis 2005    Breast cancer (HCC) 7/7/22    Cancer (HCC)     DCIS Right breast-had mastectomy    Cataract 01/06/2023    COVID 01/2022    CTS (carpal tunnel syndrome)     Dental crowns present     Depression     Disease of thyroid gland     Diverticulitis of colon     GERD (gastroesophageal reflux disease)     Hyperlipidemia     Hypertension     Hypogammaglobulinemia (HCC)     Hypoglobulinemia     IgA deficiency (HCC)     per pt stable -\"no current need to see Dr Wolfe Hematology unless an issue\"    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HCC)     IgM deficiency (HCC)     Limb alert care status     No BP/IV Right Arm    Obesity     PONV (postoperative nausea and vomiting)     per pt \"patch behind ear works\"    Prediabetes     Slow transit constipation     Wears glasses     Wound dehiscence      Past Surgical History:   Procedure Laterality Date    APPENDECTOMY      AUGMENTATION MAMMAPLASTY  12/15/22    BOWEL RESECTION      \"for diverticulitis\"    BREAST BIOPSY Right 07/07/2022    stereo x 2    BREAST BIOPSY Right 07/26/2022    stereo    BREAST RECONSTRUCTION Right 12/15/2022    Procedure: RIGHT BREAST RECONSTRUCTION AND REMOVAL OF TISSUE EXPANDER WITH PLACEMENT OF IMPLANT. CAPSULOTOMY.;  Surgeon: Daniela " Lisset Craig DO;  Location:  MAIN OR;  Service: Plastics    CARPAL TUNNEL RELEASE      CATARACT EXTRACTION Bilateral     COLECTOMY      Resolved 08/27/09    COLONOSCOPY      DENTAL IMPLANT      EYE SURGERY  2007    Lasik , Cataracts-2023    INSERTION OF BREAST TISSUE EXPANDER Right 09/09/2022    Procedure: IMMEDIATE BREAST RECONSTRUCTION WITH TISSUE EXPANDER AND  GALAFLEX;  Surgeon: Daniela Craig DO;  [facility];  Service: Plastics    JOINT REPLACEMENT Bilateral 2013,2017    TKR    LASIK      LYMPH NODE BIOPSY  9/22    MAMMO STEREOTACTIC BREAST BIOPSY RIGHT (ALL INC) Right 07/07/2022    MAMMO STEREOTACTIC BREAST BIOPSY RIGHT (ALL INC) Right 07/26/2022    MAMMO STEREOTACTIC BREAST BIOPSY RIGHT (ALL INC) EACH ADD Right 07/07/2022    MASTECTOMY Right     MASTECTOMY W/ SENTINEL NODE BIOPSY Right 09/09/2022    Procedure: MASTECTOMY WITH BIOPSY LYMPH NODE SENTINEL,Lymphoscintigraphy,Lymphatic Mapping; [address];  Surgeon: Anuradha Kelly MD;  [facility];  Service: Surgical Oncology    OTHER SURGICAL HISTORY      Biopsy Vulvar    VA ARTHRP KNE CONDYLE&PLATU MEDIAL&LAT COMPARTMENTS Left 02/08/2017    Procedure: TOTAL KNEE REPLACEMENT ARTHROPLASTY;  Surgeon: Rakesh Melton MD;  Location:  MAIN OR;  Service: Orthopedics    VA BLEPHAROPLASTY LOWER EYELID Bilateral 3/13/2025    Procedure: BLEPHAROPLASTY LOWER;  Surgeon: Daniela Craig DO;  Location:  MAIN OR;  Service: Plastics    VA BLEPHAROPLASTY UPPER EYELID W/EXCESSIVE SKIN Bilateral 3/13/2025    Procedure: BLEPHAROPLASTY UPPER;  Surgeon: Daniela Craig DO;  Location:  MAIN OR;  Service: Plastics    VA COLONOSCOPY FLX DX W/COLLJ SPEC WHEN PFRMD N/A 05/06/2019    Procedure: COLONOSCOPY;  Surgeon: Alan Shirley MD;  Location:  GI LAB;  Service: General    VA EXCISION EXCESSIVE SKIN & SUBQ TISSUE ARM Bilateral 11/28/2023    Procedure: BRACHIOPLASTY;  Surgeon: Daniela Craig DO;  Location:  MAIN OR;  Service:  Plastics    ND INSJ/RPLCMT BREAST IMPLANT SEP DAY MASTECTOMY Right 03/14/2023    Procedure: RIGHT BREAST RECON REVISION WITH IMPLANT EXCHANGE AND CAPSULOTOMY;  Surgeon: Daniela Craig DO;  [facility];  Service: Plastics    ND MASTOPEXY Left 12/15/2022    Procedure: LEFT MASTOPEXY FOR SYMMETRY AND  GALAFLEX;  Surgeon: Daniela Craig DO;  [facility];  Service: Plastics    ND REVISION OF RECONSTRUCTED BREAST Right 11/28/2023    Procedure: REVISION OF RIGHT BREAST RECON WITH REPLACEMENT OF IMPLANT, CAPULOTOMY, VIRA FLAP, LEFT MEDIAL SCAR REVISION;  Surgeon: Daniela Craig DO;  [facility];  Service: Plastics    REFRACTIVE SURGERY      lasik    REPLACEMENT TOTAL KNEE Bilateral 10/2013    REVISION OF SCAR ON TORSO Bilateral 3/13/2025    Procedure: REVISION BILATERAL MEDIAL BREAST SCARS;  Surgeon: Daniela Craig DO;  [facility];  Service: Plastics    TONSILLECTOMY      With Adenoidectomy    TOOTH EXTRACTION      UPPER GASTROINTESTINAL ENDOSCOPY  11/2018    VAGINA SURGERY      mesh        Current Outpatient Medications:     diphenhydrAMINE (BENADRYL) 25 mg tablet, Take 25 mg by mouth daily at bedtime as needed for itching, Disp: , Rfl:     estradiol (ESTRACE) 0.1 mg/g vaginal cream, Insert one quarter of an applicator intravaginally once weekly, Disp: 42.5 g, Rfl: 0    famotidine (PEPCID) 20 mg tablet, Take 1 tablet (20 mg total) by mouth 2 (two) times a day, Disp: 180 tablet, Rfl: 3    gabapentin (Neurontin) 300 mg capsule, Take 1 capsule (300 mg total) by mouth 3 (three) times a day for 5 days (Patient not taking: Reported on 3/10/2025), Disp: 15 capsule, Rfl: 0    ibuprofen (MOTRIN) 800 mg tablet, Take 1 tablet (800 mg total) by mouth every 8 (eight) hours for 5 days Please begin 24 hours after surgery. (Patient not taking: Reported on 3/10/2025), Disp: 15 tablet, Rfl: 0    levothyroxine 75 mcg tablet, TAKE 1 TABLET DAILY, Disp: 90 tablet, Rfl: 1     oxyCODONE (Roxicodone) 5 immediate release tablet, Take 1 tablet (5 mg total) by mouth every 6 (six) hours as needed for severe pain Max Daily Amount: 20 mg, Disp: 10 tablet, Rfl: 0    potassium chloride (Klor-Con M10) 10 mEq tablet, Take 1 tablet (10 mEq total) by mouth 2 (two) times a day, Disp: 180 tablet, Rfl: 3    Psyllium (METAMUCIL PO), Take 1 Dose by mouth daily  , Disp: , Rfl:     rosuvastatin (CRESTOR) 5 mg tablet, Take 1 tablet (5 mg total) by mouth daily (Patient taking differently: Take 5 mg by mouth every morning), Disp: 90 tablet, Rfl: 3    senna (SENOKOT) 8.6 MG tablet, Take 2 tablets by mouth daily, Disp: , Rfl:     sertraline (ZOLOFT) 100 mg tablet, Take 1 tablet (100 mg total) by mouth daily (Patient taking differently: Take 100 mg by mouth every morning), Disp: 90 tablet, Rfl: 3    traMADol (Ultram) 50 mg tablet, Take 1 tablet (50 mg total) by mouth every 6 (six) hours as needed for moderate pain, Disp: 10 tablet, Rfl: 0    traZODone (DESYREL) 50 mg tablet, Take 1 tablet (50 mg total) by mouth daily at bedtime, Disp: 90 tablet, Rfl: 3    triamterene-hydrochlorothiazide (DYAZIDE) 37.5-25 mg per capsule, Take 1 capsule by mouth every morning, Disp: 90 capsule, Rfl: 3  Allergies: Penicillins, Acetazolamide, and Flagyl [metronidazole]    Objective      Incisions well healed. Mild chemosis laterally. (See photos)    Assessment & Plan   Diagnoses and all orders for this visit:    Dermatochalasis of both upper eyelids    Elective procedure for unacceptable cosmetic appearance      Dr. Craig performed bilateral lateral canthopexies.     She used a 6-0 vicryl to the orbital rim and a 5-0 fast absorbing gut.  Incisions were then covered with steristrips.  Pt tolerated this well.    She will return in one week.    Floyd Orozco PA-C

## 2025-05-14 ENCOUNTER — OFFICE VISIT (OUTPATIENT)
Age: 65
End: 2025-05-14

## 2025-05-14 ENCOUNTER — COSMETIC (OUTPATIENT)
Age: 65
End: 2025-05-14

## 2025-05-14 DIAGNOSIS — Z41.1 ELECTIVE PROCEDURE FOR UNACCEPTABLE COSMETIC APPEARANCE: Primary | ICD-10-CM

## 2025-05-14 DIAGNOSIS — H02.831 DERMATOCHALASIS OF BOTH UPPER EYELIDS: Primary | ICD-10-CM

## 2025-05-14 DIAGNOSIS — H02.834 DERMATOCHALASIS OF BOTH UPPER EYELIDS: Primary | ICD-10-CM

## 2025-05-14 PROCEDURE — 99024 POSTOP FOLLOW-UP VISIT: CPT

## 2025-05-14 PROCEDURE — RECHECK

## 2025-05-14 NOTE — PROGRESS NOTES
"[facility] Plastic and Reconstructive Surgery  [phone]    Patient Identification: Keshia Carias is a 64 y.o. female     History of Present Illness: The patient is a 64 y.o.  year-old female  who presents to the office for post op. Patient is 62 days s/p Blepharoplasty Upper - Bilateral, Blepharoplasty Lower - Bilateral, and Revision Bilateral Medial Breast Scars - Bilateral  on 3/13/2025 by Dr. Craig. Patient had bilateral lateral canthopexies performed in office on 5/7/25 by Dr. Craig. Patient reports noticeable improvement in her appearance over the last week which she is very happy about. She notes suture ends are extremely irritating at sites  of lateal canthopexies. She denies any visual complaints.     Past Medical History:   Diagnosis Date    Abnormal electrocardiogram     Last assessed 10/04/13    Acute respiratory failure with hypoxia (HCC) 02/06/2024    Allergic 1990    Penicillin - anaphalaxis    Anemia     pt states hypogammaglobulinemia, needs washed RBCs if transfused - Rx by Dr Wolfe    Anxiety     \"controlled with zoloft\"    Arthritis 2005    Breast cancer (HCC) 7/7/22    Cancer (HCC)     DCIS Right breast-had mastectomy    Cataract 01/06/2023    COVID 01/2022    CTS (carpal tunnel syndrome)     Dental crowns present     Depression     Disease of thyroid gland     Diverticulitis of colon     GERD (gastroesophageal reflux disease)     Hyperlipidemia     Hypertension     Hypogammaglobulinemia (HCC)     Hypoglobulinemia     IgA deficiency (HCC)     per pt stable -\"no current need to see Dr Wolfe Hematology unless an issue\"    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HCC)     IgM deficiency (HCC)     Limb alert care status     No BP/IV Right Arm    Obesity     PONV (postoperative nausea and vomiting)     per pt \"patch behind ear works\"    Prediabetes     Slow transit constipation     Wears glasses     Wound dehiscence           Review of Systems  Constitutional: Denies fevers, chills or pain.  Eyes: denies visual " complaints.  Skin: Denies any warmth, erythema, edema or drainage.     Physical Exam  General: AAOx3, NAD, well appearing  Eyes: Suture ends present at sites of lateral canthopexies, removed. No surrounding erythema. EOMI. See media    Assessment and Plan:  The patient is an 64 y.o.  year-old female who presents to the office for post op. Patient is 62 days s/p Blepharoplasty Upper - Bilateral, Blepharoplasty Lower - Bilateral, and Revision Bilateral Medial Breast Scars - Bilateral  on 3/13/2025 by Dr. Craig. Patient had an in office bilateral lateral canthopexy performed in office on 5/7/25 by Dr. Craig.     Dr. Craig present at today's visit and in agreement with the assessment and plan.    -At today's visit suture ends cut at site of canthopexies. Silicone scar tape applied to help support bilateral lateral canthus. Patient provided with extra silicone tape to perform daily at home. Iff any reactions to tape patient is to reach out.   -The patient is to return in 10 days for follow up  -The patient is to call the office with any questions or concerns. All of the patient's questions were answered at this time and they agree with the plan of care.      Qing Putnam PA-C  [facility] Plastic and Reconstructive Surgery

## 2025-05-14 NOTE — PROGRESS NOTES
"[facility] Plastic and Reconstructive Surgery  [phone]    Patient Identification: Keshia Carias is a 64 y.o. female     History of Present Illness: The patient is a 64 y.o.  year-old female  who presents to the office for post op. Patient is 62 days s/p Blepharoplasty Upper - Bilateral, Blepharoplasty Lower - Bilateral, and Revision Bilateral Medial Breast Scars - Bilateral  on 3/13/2025 by Dr. Craig. Patient had bilateral lateral canthopexies performed in office on 5/7/25 by Dr. Craig. Patient reports noticeable improvement in her appearance over the last week which she is very happy about. She notes suture ends are extremely irritating at sites  of lateal canthopexies. She denies any visual complaints.     Past Medical History:   Diagnosis Date    Abnormal electrocardiogram     Last assessed 10/04/13    Acute respiratory failure with hypoxia (HCC) 02/06/2024    Allergic 1990    Penicillin - anaphalaxis    Anemia     pt states hypogammaglobulinemia, needs washed RBCs if transfused - Rx by Dr Wolfe    Anxiety     \"controlled with zoloft\"    Arthritis 2005    Breast cancer (HCC) 7/7/22    Cancer (HCC)     DCIS Right breast-had mastectomy    Cataract 01/06/2023    COVID 01/2022    CTS (carpal tunnel syndrome)     Dental crowns present     Depression     Disease of thyroid gland     Diverticulitis of colon     GERD (gastroesophageal reflux disease)     Hyperlipidemia     Hypertension     Hypogammaglobulinemia (HCC)     Hypoglobulinemia     IgA deficiency (HCC)     per pt stable -\"no current need to see Dr Wolfe Hematology unless an issue\"    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HCC)     IgM deficiency (HCC)     Limb alert care status     No BP/IV Right Arm    Obesity     PONV (postoperative nausea and vomiting)     per pt \"patch behind ear works\"    Prediabetes     Slow transit constipation     Wears glasses     Wound dehiscence           Review of Systems  Constitutional: Denies fevers, chills or pain.  Eyes: denies visual " complaints.  Skin: Denies any warmth, erythema, edema or drainage.     Physical Exam  General: AAOx3, NAD, well appearing  Eyes: Suture ends present at sites of lateral canthopexies, removed. No surrounding erythema. EOMI. See media    Assessment and Plan:  The patient is an 64 y.o.  year-old female who presents to the office for post op. Patient is 62 days s/p Blepharoplasty Upper - Bilateral, Blepharoplasty Lower - Bilateral, and Revision Bilateral Medial Breast Scars - Bilateral  on 3/13/2025 by Dr. Craig. Patient had an in office bilateral lateral canthopexy performed in office on 5/7/25 by Dr. Craig.     Dr. Craig present at today's visit and in agreement with the assessment and plan.    -At today's visit suture ends cut at site of canthopexies. Silicone scar tape applied to help support bilateral lateral canthus. Patient provided with extra silicone tape to perform daily at home. Iff any reactions to tape patient is to reach out.   -The patient is to return in 10 days for follow up  -The patient is to call the office with any questions or concerns. All of the patient's questions were answered at this time and they agree with the plan of care.      Qing Putnam PA-C  [facility] Plastic and Reconstructive Surgery

## 2025-05-22 ENCOUNTER — OFFICE VISIT (OUTPATIENT)
Age: 65
End: 2025-05-22

## 2025-05-22 ENCOUNTER — COSMETIC (OUTPATIENT)
Age: 65
End: 2025-05-22

## 2025-05-22 DIAGNOSIS — H02.834 DERMATOCHALASIS OF BOTH UPPER EYELIDS: Primary | ICD-10-CM

## 2025-05-22 DIAGNOSIS — Z41.1 ELECTIVE PROCEDURE FOR UNACCEPTABLE COSMETIC APPEARANCE: Primary | ICD-10-CM

## 2025-05-22 DIAGNOSIS — H02.831 DERMATOCHALASIS OF BOTH UPPER EYELIDS: Primary | ICD-10-CM

## 2025-05-22 PROCEDURE — RECHECK

## 2025-05-22 PROCEDURE — 99024 POSTOP FOLLOW-UP VISIT: CPT

## 2025-05-22 NOTE — PROGRESS NOTES
"[facility] Plastic and Reconstructive Surgery  [phone]    Patient Identification: Keshia Carias is a 64 y.o. female     History of Present Illness: The patient is a 64 y.o.  year-old female  who presents to the office for routine post op. Patient is 70 days s/p Blepharoplasty Upper - Bilateral, Blepharoplasty Lower - Bilateral, and Revision Bilateral Medial Breast Scars - Bilateral  on 3/13/2025 by Dr. Craig.  Patient had bilateral lateral canthopexies performed in office on 5/7/25 by Dr. Craig. She is very pleased with her results. She notes small bumps at sites of lateral canthopexies that feel like deep stitches. She has been massaging the areas. She tried using the silicone scar strips provided by the office but noted some itching and irritation with their use. She has tried another brand of silicone scar tape as well which was also irritating.     She is noting some mild blurry vision in her right eye but states that eye has always been slightly worse than her left. She is also wondering if it is more psychological that she is noticing some blurriness as she is constantly being asked about it when she comes for appointments. She has an annual appointment with her eye doctor coming up in July. She denies any other eye complaints. No dry eyes.     [Past Medical History]     [Past Medical History]  Diagnosis Date    Abnormal electrocardiogram     Last assessed 10/04/13    Acute respiratory failure with hypoxia (HCC) 02/06/2024    Allergic 1990    Penicillin - anaphalaxis    Anemia     pt states hypogammaglobulinemia, needs washed RBCs if transfused - Rx by Dr Wolfe    Anxiety     \"controlled with zoloft\"    Arthritis 2005    Breast cancer (HCC) 7/7/22    Cancer (HCC)     DCIS Right breast-had mastectomy    Cataract 01/06/2023    COVID 01/2022    CTS (carpal tunnel syndrome)     Dental crowns present     Depression     Disease of thyroid gland     Diverticulitis of colon     GERD (gastroesophageal " "reflux disease)     Hyperlipidemia     Hypertension     Hypogammaglobulinemia (HCC)     Hypoglobulinemia     IgA deficiency (HCC)     per pt stable -\"no current need to see Dr Wolfe Hematology unless an issue\"    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HCC)     IgM deficiency (HCC)     Limb alert care status     No BP/IV Right Arm    Obesity     PONV (postoperative nausea and vomiting)     per pt \"patch behind ear works\"    Prediabetes     Slow transit constipation     Wears glasses     Wound dehiscence          Review of Systems  Constitutional: Denies fevers, chills or pain.  Eyes: + right eye mild blurry vision.   Skin: Denies any warmth, erythema, edema or drainage.     Physical Exam  General: AAOx3, well appearing, pleasant mood  Eyes: Small raised area at sites of lateral canthopexies. No surrounding erythema. Small round, firm mass along left upper blepharoplasty incision, likely milia. EOMI. See media    Assessment and Plan:  The patient is an 64 y.o.  year-old female who presents to the office for routine post op. Patient is 70 days s/p Blepharoplasty Upper - Bilateral, Blepharoplasty Lower - Bilateral, and Revision Bilateral Medial Breast Scars - Bilateral  on 3/13/2025 by Dr. Craig.  Patient had bilateral lateral canthopexies performed in office on 5/7/25 by Dr. Craig.       -At today's visit patient examined, healing well. Demonstrated scar massage to be performed at sites of lateral canthopexies. Discussed it will take time for those sites to flatten but scar massage should help speed that up.  -Discussed trying a silicone scar cream if patient desires  -Continue to use Aquaphor to moisturize sites  -The patient is to return in 2-4 weeks for follow up  -The patient is to call the office with any questions or concerns. All of the patient's questions were answered at this time and they agree with the plan of care.        Qing Putnam PA-C  [facility]astic and Re[facility]      "

## 2025-05-22 NOTE — PROGRESS NOTES
[facility] Plastic and Reconstructive Surgery  [phone]    Patient Identification: Keshia Carias is a 64 y.o. female     History of Present Illness: The patient is a 64 y.o.  year-old female  who presents to the office for routine post op. Patient is 70 days s/p Blepharoplasty Upper - Bilateral, Blepharoplasty Lower - Bilateral, and Revision Bilateral Medial Breast Scars - Bilateral  on 3/13/2025 by Dr. Craig.  Patient had bilateral lateral canthopexies performed in office on 5/7/25 by Dr. Craig. She is very pleased with her results. She notes small bumps at sites of lateral canthopexies that feel like deep stitches. She has been massaging the areas. She tried using the silicone scar strips provided by the office but noted some itching and irritation with their use. She has tried another brand of silicone scar tape as well which was also irritating.     She is noting some mild blurry vision in her right eye but states that eye has always been slightly worse than her left. She is also wondering if it is more psychological that she is noticing some blurriness as she is constantly being asked about it when she comes for appointments. She has an annual appointment with her eye doctor coming up in July. She denies any other eye complaints. No dry eyes.     Past Medical History[1]       Review of Systems  Constitutional: Denies fevers, chills or pain.  Eyes: + right eye mild blurry vision.   Skin: Denies any warmth, erythema, edema or drainage.     Physical Exam  General: AAOx3, well appearing, pleasant mood  Eyes: Small raised area at sites of lateral canthopexies. No surrounding erythema. Small round, firm mass along left upper blepharoplasty incision, likely milia. EOMI. See media    Assessment and Plan:  The patient is an 64 y.o.  year-old female who presents to the office for routine post op. Patient is 70 days s/p Blepharoplasty Upper - Bilateral, Blepharoplasty Lower - Bilateral, and Revision Bilateral  "Medial Breast Scars - Bilateral  on 3/13/2025 by Dr. Craig.  Patient had bilateral lateral canthopexies performed in office on 5/7/25 by Dr. Craig.       -At today's visit patient examined, healing well. Demonstrated scar massage to be performed at sites of lateral canthopexies. Discussed it will take time for those sites to flatten but scar massage should help speed that up.  -Discussed trying a silicone scar cream if patient desires  -Continue to use Aquaphor to moisturize sites  -The patient is to return in 2-4 weeks for follow up  -The patient is to call the office with any questions or concerns. All of the patient's questions were answered at this time and they agree with the plan of care.        Qing Putnam PA-C  [facility] Plastic and Reconstructive Surgery           [1]   Past Medical History:  Diagnosis Date    Abnormal electrocardiogram     Last assessed 10/04/13    Acute respiratory failure with hypoxia (HCC) 02/06/2024    Allergic 1990    Penicillin - anaphalaxis    Anemia     pt states hypogammaglobulinemia, needs washed RBCs if transfused - Rx by Dr Wolfe    Anxiety     \"controlled with zoloft\"    Arthritis 2005    Breast cancer (HCC) 7/7/22    Cancer (HCC)     DCIS Right breast-had mastectomy    Cataract 01/06/2023    COVID 01/2022    CTS (carpal tunnel syndrome)     Dental crowns present     Depression     Disease of thyroid gland     Diverticulitis of colon     GERD (gastroesophageal reflux disease)     Hyperlipidemia     Hypertension     Hypogammaglobulinemia (HCC)     Hypoglobulinemia     IgA deficiency (HCC)     per pt stable -\"no current need to see Dr Wolfe Hematology unless an issue\"    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HCC)     IgM deficiency (HCC)     Limb alert care status     No BP/IV Right Arm    Obesity     PONV (postoperative nausea and vomiting)     per pt \"patch behind ear works\"    Prediabetes     Slow transit constipation     Wears glasses     Wound dehiscence      "

## 2025-05-28 ENCOUNTER — TELEPHONE (OUTPATIENT)
Dept: DERMATOLOGY | Facility: CLINIC | Age: 65
End: 2025-05-28

## 2025-05-28 NOTE — TELEPHONE ENCOUNTER
yearly skin check/// R/S from 7/23 Yoan qianat, LVM advising of new appt date & time & to callback to confirm or R/S

## 2025-06-03 ENCOUNTER — TELEPHONE (OUTPATIENT)
Dept: SURGICAL ONCOLOGY | Facility: CLINIC | Age: 65
End: 2025-06-03

## 2025-06-03 NOTE — TELEPHONE ENCOUNTER
Tried to leave a voicemail for this patient but the mailbox has not been set up yet. I am calling the patient regarding her appointment with  on 6/25. This appointment needs to be cancelled due to 's schedule changing. Put a message in the patient's MyChart with the hopeline number to give us a call back to reschedule. Please put this patient with a NP.

## 2025-06-04 ENCOUNTER — COSMETIC (OUTPATIENT)
Age: 65
End: 2025-06-04

## 2025-06-04 ENCOUNTER — OFFICE VISIT (OUTPATIENT)
Age: 65
End: 2025-06-04

## 2025-06-04 DIAGNOSIS — Z41.1 ELECTIVE PROCEDURE FOR UNACCEPTABLE COSMETIC APPEARANCE: Primary | ICD-10-CM

## 2025-06-04 DIAGNOSIS — Z90.11 ACQUIRED ABSENCE OF RIGHT BREAST AND NIPPLE: ICD-10-CM

## 2025-06-04 DIAGNOSIS — H02.834 DERMATOCHALASIS OF BOTH UPPER EYELIDS: Primary | ICD-10-CM

## 2025-06-04 DIAGNOSIS — H02.831 DERMATOCHALASIS OF BOTH UPPER EYELIDS: Primary | ICD-10-CM

## 2025-06-04 PROCEDURE — 99024 POSTOP FOLLOW-UP VISIT: CPT

## 2025-06-04 PROCEDURE — RECHECK

## 2025-06-04 NOTE — PROGRESS NOTES
"[facility] Plastic and Reconstructive Surgery  [phone]    Patient Identification: Keshia Carias is a 64 y.o. female     History of Present Illness: The patient is a 64 y.o.  year-old female  who presents to the office for 3 month post op. Patient is 83 days s/p Blepharoplasty Upper - Bilateral, Blepharoplasty Lower - Bilateral, and Revision Bilateral Medial Breast Scars - Bilateral  on 3/13/2025 by Dr. Craig. Patient had bilateral lateral canthopexies performed in office on 5/7/25 by Dr. Craig. She reports overall doing well. She is very pleased with her results. She denies any visual disturbances or eye complaints.       [Past Medical History]     [Past Medical History]  Diagnosis Date    Abnormal electrocardiogram     Last assessed 10/04/13    Acute respiratory failure with hypoxia (HCC) 02/06/2024    Allergic 1990    Penicillin - anaphalaxis    Anemia     pt states hypogammaglobulinemia, needs washed RBCs if transfused - Rx by Dr Wolfe    Anxiety     \"controlled with zoloft\"    Arthritis 2005    Breast cancer (HCC) 7/7/22    Cancer (HCC)     DCIS Right breast-had mastectomy    Cataract 01/06/2023    COVID 01/2022    CTS (carpal tunnel syndrome)     Dental crowns present     Depression     Disease of thyroid gland     Diverticulitis of colon     GERD (gastroesophageal reflux disease)     Hyperlipidemia     Hypertension     Hypogammaglobulinemia (HCC)     Hypoglobulinemia     IgA deficiency (HCC)     per pt stable -\"no current need to see Dr Wolfe Hematology unless an issue\"    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HCC)     IgM deficiency (HCC)     Limb alert care status     No BP/IV Right Arm    Obesity     PONV (postoperative nausea and vomiting)     per pt \"patch behind ear works\"    Prediabetes     Slow transit constipation     Wears glasses     Wound dehiscence          Review of Systems  Constitutional: Denies fevers, chills or pain.  Eyes: Denies any visual disturbances or eye complaints.   Skin: Denies any warmth, " erythema, edema or drainage.     Physical Exam  General:  Eyes: Left lateral eye with small, firm mass at site of lateral canthopexy likely deep suture. Right side has flattened at site of lateral canthopexy. See media  Breast: Right medial breast scar slightly hypertrophic, soft. See media    Assessment and Plan:  The patient is an 64 y.o.  year-old female who presents to the office for 3 month post op. Patient is 83 days s/p Blepharoplasty Upper - Bilateral, Blepharoplasty Lower - Bilateral, and Revision Bilateral Medial Breast Scars - Bilateral  on 3/13/2025 by Dr. Craig.      -At today's visit patient examined, well healed surgical incisions of breast and eyelids.  -Continue daily scar massage  -Continue to moisturize   -The patient is to return in 3 months for a 6 month post op with Dr. Craig.  -The patient is to call the office with any questions or concerns. All of the patient's questions were answered at this time and they agree with the plan of care.      Qing Putnam PA-C  [facility] Plastic and Reconstructive Surgery

## 2025-06-04 NOTE — PROGRESS NOTES
[facility] Plastic and Reconstructive Surgery  [phone]    Patient Identification: Keshia Carias is a 64 y.o. female     History of Present Illness: The patient is a 64 y.o.  year-old female  who presents to the office for 3 month post op. Patient is 83 days s/p Blepharoplasty Upper - Bilateral, Blepharoplasty Lower - Bilateral, and Revision Bilateral Medial Breast Scars - Bilateral  on 3/13/2025 by Dr. Craig. Patient had bilateral lateral canthopexies performed in office on 5/7/25 by Dr. Craig. She reports overall doing well. She is very pleased with her results. She denies any visual disturbances or eye complaints.       Past Medical History[1]       Review of Systems  Constitutional: Denies fevers, chills or pain.  Eyes: Denies any visual disturbances or eye complaints.   Skin: Denies any warmth, erythema, edema or drainage.     Physical Exam  General:  Eyes: Left lateral eye with small, firm mass at site of lateral canthopexy likely deep suture. Right side has flattened at site of lateral canthopexy. See media  Breast: Right medial breast scar slightly hypertrophic, soft. See media    Assessment and Plan:  The patient is an 64 y.o.  year-old female who presents to the office for 3 month post op. Patient is 83 days s/p Blepharoplasty Upper - Bilateral, Blepharoplasty Lower - Bilateral, and Revision Bilateral Medial Breast Scars - Bilateral  on 3/13/2025 by Dr. Craig.      -At today's visit patient examined, well healed surgical incisions of breast and eyelids.  -Continue daily scar massage  -Continue to moisturize   -The patient is to return in 3 months for a 6 month post op with Dr. Craig.  -The patient is to call the office with any questions or concerns. All of the patient's questions were answered at this time and they agree with the plan of care.      Qing Putnam PA-C  [facility] Plastic and Reconstructive Surgery          [1]   Past Medical History:  Diagnosis Date    Abnormal  "electrocardiogram     Last assessed 10/04/13    Acute respiratory failure with hypoxia (HCC) 02/06/2024    Allergic 1990    Penicillin - anaphalaxis    Anemia     pt states hypogammaglobulinemia, needs washed RBCs if transfused - Rx by Dr Wolfe    Anxiety     \"controlled with zoloft\"    Arthritis 2005    Breast cancer (HCC) 7/7/22    Cancer (HCC)     DCIS Right breast-had mastectomy    Cataract 01/06/2023    COVID 01/2022    CTS (carpal tunnel syndrome)     Dental crowns present     Depression     Disease of thyroid gland     Diverticulitis of colon     GERD (gastroesophageal reflux disease)     Hyperlipidemia     Hypertension     Hypogammaglobulinemia (HCC)     Hypoglobulinemia     IgA deficiency (HCC)     per pt stable -\"no current need to see Dr Wolfe Hematology unless an issue\"    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HCC)     IgM deficiency (HCC)     Limb alert care status     No BP/IV Right Arm    Obesity     PONV (postoperative nausea and vomiting)     per pt \"patch behind ear works\"    Prediabetes     Slow transit constipation     Wears glasses     Wound dehiscence      "

## 2025-06-11 ENCOUNTER — HOSPITAL ENCOUNTER (OUTPATIENT)
Dept: RADIOLOGY | Age: 65
Discharge: HOME | End: 2025-06-11
Payer: COMMERCIAL

## 2025-06-11 VITALS — BODY MASS INDEX: 31.65 KG/M2 | WEIGHT: 172 LBS | HEIGHT: 62 IN

## 2025-06-11 DIAGNOSIS — Z12.31 VISIT FOR SCREENING MAMMOGRAM: ICD-10-CM

## 2025-06-11 PROCEDURE — 77067 SCR MAMMO BI INCL CAD: CPT

## 2025-06-11 PROCEDURE — 77063 BREAST TOMOSYNTHESIS BI: CPT

## 2025-06-16 ENCOUNTER — ANNUAL EXAM (OUTPATIENT)
Dept: OBGYN CLINIC | Facility: CLINIC | Age: 65
End: 2025-06-16
Payer: COMMERCIAL

## 2025-06-16 VITALS
BODY MASS INDEX: 31.94 KG/M2 | WEIGHT: 173.6 LBS | HEIGHT: 62 IN | DIASTOLIC BLOOD PRESSURE: 78 MMHG | SYSTOLIC BLOOD PRESSURE: 130 MMHG

## 2025-06-16 DIAGNOSIS — Z01.419 ENCOUNTER FOR ANNUAL ROUTINE GYNECOLOGICAL EXAMINATION: ICD-10-CM

## 2025-06-16 DIAGNOSIS — Z12.31 ENCOUNTER FOR SCREENING MAMMOGRAM FOR MALIGNANT NEOPLASM OF BREAST: Primary | ICD-10-CM

## 2025-06-16 DIAGNOSIS — N90.7 INCLUSION CYST OF VULVA: ICD-10-CM

## 2025-06-16 PROCEDURE — G0101 CA SCREEN;PELVIC/BREAST EXAM: HCPCS | Performed by: OBSTETRICS & GYNECOLOGY

## 2025-06-16 NOTE — H&P (VIEW-ONLY)
Assessment/Plan:    No problem-specific Assessment & Plan notes found for this encounter.       Diagnoses and all orders for this visit:    Encounter for screening mammogram for malignant neoplasm of breast    Encounter for annual routine gynecological examination    Inclusion cyst of vulva          Normal gynecological physical examination.  Self-breast examination stressed.  Mammogram ordered.  Discussed regular exercise, healthy diet, importance of vitamin D and calcium supplements.  Discussed importance of sun block use during periods of prolonged sun exposure.  Patient will be seen in 1 year for routine gynecologic and medical examination.  Patient will call office for any problems, concerns, or issues which may arise during the interim.     Subjective:          Svitlana Carias is a 64 yo F with PMH sig for HTN and DCIS of R breast (s/p mastectomy and reconstruction in 2022) presenting to the office for her yearly exam. Pt reports that she feels well and has no current complaints. UTD on mammograms. Colonoscopy scheduled for next week. BP, blood sugar, and cholesterol are all well controlled and she regularly sees her PCP.     The patient endorses vaginal dryness secondary to postmenopausal status. She requests refill of Estrace cream.    Pt denies vaginal bleeding, spotting, discharge, abdominal pain, pelvic pain, n/v, diarrhea, constipation, dysuria, hematuria, hematochezia, chest pain, sob, hot/cold intolerances, fevers, chills, unintentional weight loss/gain, breast tenderness, masses, insomnia, irritability, hot flashes.    Informed pt that her physical exam revealed no abnormalitles.    Follow up in one year for next annual exam.    Call office with any questions, comments, or concerns in the interim.     Gynecologic Exam  She reports no pelvic pain or vaginal discharge. Pertinent negatives include no chills, constipation, diarrhea, dysuria, fever, flank pain, frequency, hematuria, nausea, urgency or  vomiting.       Patient ID: Keshia Carias is a 65 y.o. female who presents today for her annual gynecologic and medical examination    Menstrual status: Postmenopausal.    Vasomotor symptoms: None.    Patient reports normal appetite    Patient reports normal bowel and bladder habits    Patient denies any significant pelvic or abdominal pain    Patient denies any headaches, chest pain, shortness of breath fever shakes or chills    Patient denies any COVID 19 symptoms including cough or loss of taste or smell    COVID vaccine status: Patient aware of COVID vaccination protocols.     Medical problems: HTN, DCIS s/p mastectomy and BL breast reconstruction in 2022.    Colonoscopy status: Due for colonoscopy. Procedure scheduled in 06/23/2025.    Mammogram status: Patient does regular self breast exams and is up-to-date with screening mammography. Appropriate arrangements for her annual screening mammogram are placed into the EMR system at this visit.      The following portions of the patient's history were reviewed and updated as appropriate: allergies, current medications, past family history, past medical history, past social history, past surgical history and problem list.    Review of Systems   Constitutional: Negative.  Negative for appetite change, chills, diaphoresis, fatigue, fever and unexpected weight change.   HENT: Negative.     Eyes: Negative.    Respiratory: Negative.  Negative for shortness of breath.    Cardiovascular: Negative.  Negative for chest pain, palpitations and leg swelling.        Followed for HTN.   Gastrointestinal: Negative.  Negative for blood in stool, constipation, diarrhea, nausea and vomiting.   Endocrine: Negative.  Negative for cold intolerance and heat intolerance.   Genitourinary: Negative.  Negative for decreased urine volume, difficulty urinating, dyspareunia, dysuria, flank pain, frequency, genital sores, hematuria, pelvic pain, urgency, vaginal bleeding, vaginal discharge  "and vaginal pain.        Followed for DCIS of R breast in 2022.   Musculoskeletal: Negative.    Skin: Negative.    Allergic/Immunologic: Negative.    Neurological: Negative.    All other systems reviewed and are negative.        Objective:      /78   Ht 5' 2\" (1.575 m)   Wt 78.7 kg (173 lb 9.6 oz)   BMI 31.75 kg/m²          Physical Exam  Vitals reviewed. Exam conducted with a chaperone present.   Constitutional:       General: She is not in acute distress.     Appearance: Normal appearance.   HENT:      Head: Normocephalic and atraumatic.     Eyes:      Pupils: Pupils are equal, round, and reactive to light.       Cardiovascular:      Rate and Rhythm: Normal rate and regular rhythm.      Heart sounds: No murmur heard.  Pulmonary:      Effort: Pulmonary effort is normal. No respiratory distress.      Breath sounds: Normal breath sounds.   Chest:   Breasts:     Right: No mass or tenderness.      Left: Normal. No inverted nipple, mass, nipple discharge or tenderness.      Comments: S/p R breast mastectomy and BL reconstruction. R nipple surgically absent, replaced by tattoo. Surgical scars are well-healed.   Abdominal:      Palpations: Abdomen is soft.   Genitourinary:     General: Normal vulva.      Exam position: Supine.      Labia:         Right: Lesion present. No rash.         Left: Lesion present. No rash.       Vagina: Normal. No vaginal discharge, erythema, tenderness or lesions.      Cervix: Normal. No discharge, friability or lesion.      Uterus: Normal. Not enlarged and not tender.       Adnexa: Right adnexa normal and left adnexa normal.        Right: No mass, tenderness or fullness.          Left: No mass, tenderness or fullness.        Rectum: Normal. Guaiac result negative.      Comments: Inclusion cysts noted on BL labia. Mild atrophic findings on exam. Stable with Estrace therapy. Good pelvic support confirmed.    Musculoskeletal:         General: No swelling. Normal range of motion.      " Cervical back: Neck supple.   Lymphadenopathy:      Cervical: No cervical adenopathy.      Upper Body:      Right upper body: No supraclavicular adenopathy.      Left upper body: No supraclavicular adenopathy.     Skin:     General: Skin is warm and dry.     Neurological:      General: No focal deficit present.     Psychiatric:         Mood and Affect: Mood normal.         Behavior: Behavior normal.         Thought Content: Thought content normal.         Judgment: Judgment normal.

## 2025-06-20 ENCOUNTER — TELEPHONE (OUTPATIENT)
Age: 65
End: 2025-06-20

## 2025-06-20 NOTE — TELEPHONE ENCOUNTER
PT called to verify the time of the colonoscopy- advised the PT to have the phone by her as someone from the hospital will be calling her from 2-7 pm today about what time to arrive.

## 2025-06-23 ENCOUNTER — ANESTHESIA EVENT (OUTPATIENT)
Dept: GASTROENTEROLOGY | Facility: HOSPITAL | Age: 65
End: 2025-06-23
Payer: COMMERCIAL

## 2025-06-23 ENCOUNTER — ANESTHESIA (OUTPATIENT)
Dept: GASTROENTEROLOGY | Facility: HOSPITAL | Age: 65
End: 2025-06-23
Payer: COMMERCIAL

## 2025-06-23 ENCOUNTER — HOSPITAL ENCOUNTER (OUTPATIENT)
Dept: GASTROENTEROLOGY | Facility: HOSPITAL | Age: 65
Setting detail: OUTPATIENT SURGERY
Discharge: HOME | End: 2025-06-23
Attending: SURGERY
Payer: COMMERCIAL

## 2025-06-23 VITALS
TEMPERATURE: 97 F | DIASTOLIC BLOOD PRESSURE: 64 MMHG | OXYGEN SATURATION: 96 % | SYSTOLIC BLOOD PRESSURE: 111 MMHG | HEART RATE: 68 BPM | BODY MASS INDEX: 31.65 KG/M2 | WEIGHT: 172 LBS | RESPIRATION RATE: 16 BRPM | HEIGHT: 62 IN

## 2025-06-23 DIAGNOSIS — Z12.11 ENCOUNTER FOR SCREENING COLONOSCOPY: ICD-10-CM

## 2025-06-23 PROCEDURE — 45380 COLONOSCOPY AND BIOPSY: CPT | Performed by: SURGERY

## 2025-06-23 PROCEDURE — 88305 TISSUE EXAM BY PATHOLOGIST: CPT | Performed by: STUDENT IN AN ORGANIZED HEALTH CARE EDUCATION/TRAINING PROGRAM

## 2025-06-23 RX ORDER — LIDOCAINE HYDROCHLORIDE 20 MG/ML
INJECTION, SOLUTION EPIDURAL; INFILTRATION; INTRACAUDAL; PERINEURAL AS NEEDED
Status: DISCONTINUED | OUTPATIENT
Start: 2025-06-23 | End: 2025-06-23

## 2025-06-23 RX ORDER — SODIUM CHLORIDE 9 MG/ML
INJECTION, SOLUTION INTRAVENOUS CONTINUOUS PRN
Status: DISCONTINUED | OUTPATIENT
Start: 2025-06-23 | End: 2025-06-23

## 2025-06-23 RX ORDER — PROPOFOL 10 MG/ML
INJECTION, EMULSION INTRAVENOUS AS NEEDED
Status: DISCONTINUED | OUTPATIENT
Start: 2025-06-23 | End: 2025-06-23

## 2025-06-23 RX ADMIN — PROPOFOL 130 MCG/KG/MIN: 10 INJECTION, EMULSION INTRAVENOUS at 08:12

## 2025-06-23 RX ADMIN — PROPOFOL 50 MG: 10 INJECTION, EMULSION INTRAVENOUS at 08:13

## 2025-06-23 RX ADMIN — SODIUM CHLORIDE: 0.9 INJECTION, SOLUTION INTRAVENOUS at 08:05

## 2025-06-23 RX ADMIN — PROPOFOL 150 MG: 10 INJECTION, EMULSION INTRAVENOUS at 08:10

## 2025-06-23 RX ADMIN — LIDOCAINE HYDROCHLORIDE 100 MG: 20 INJECTION, SOLUTION EPIDURAL; INFILTRATION; INTRACAUDAL at 08:10

## 2025-06-23 NOTE — ANESTHESIA PREPROCEDURE EVALUATION
Procedure:  COLONOSCOPY    Relevant Problems   ANESTHESIA   (+) PONV (postoperative nausea and vomiting)      CARDIO   (+) Hyperlipidemia   (+) Hypertension   (+) Varicose veins of lower extremity   (+) Varicose veins of lower extremity, unspecified laterality, unspecified whether complicated      ENDO   (+) Hypothyroidism      GI/HEPATIC   (+) Gastroesophageal reflux disease without esophagitis      HEMATOLOGY   (+) Thrombocytopenia (HCC)      MUSCULOSKELETAL   (+) Low back pain   (+) TMJ arthritis      NEURO/PSYCH   (+) Anxiety   (+) Depression   (+) Tension type headache        Physical Exam    Airway     Mallampati score: II  TM Distance: >3 FB  Neck ROM: full      Cardiovascular      Dental       Pulmonary      Neurological      Other Findings  post-pubertal.      Anesthesia Plan  ASA Score- 2     Anesthesia Type- IV sedation with anesthesia with ASA Monitors.         Additional Monitors:     Airway Plan:            Plan Factors-Exercise tolerance (METS): >4 METS.    Chart reviewed.                      Induction- intravenous.    Postoperative Plan- .   Monitoring Plan - Monitoring plan - standard ASA monitoring  Post Operative Pain Plan - multimodal analgesia    Perioperative Resuscitation Plan - Level 1 - Full Code.       Informed Consent- Anesthetic plan and risks discussed with patient.  I personally reviewed this patient with the CRNA. Discussed and agreed on the Anesthesia Plan with the CRNA..      NPO Status:  Vitals Value Taken Time   Date of last liquid 06/22/25 06/23/25 07:29   Time of last liquid 1900 06/23/25 07:29   Date of last solid 06/21/25 06/23/25 07:29   Time of last solid 1600 06/23/25 07:29       NPO appropriate. Discussed benefits/risks of monitored anesthetic care and discussed providing a dynamic level of mild to deep sedation. Risks include awareness, airway obstruction, aspiration which may necessitate conversion to general anesthesia. All questions answered. Patient understands and  wishes to proceed.    Anesthesia plan and consent discussed with Svitlana who expressed understanding and agreement. Risks/benefits and alternatives discussed with patient including possible PONV, sore throat, damage to teeth/lips/gums and possibility of rare anesthetic and surgical emergencies.

## 2025-06-23 NOTE — ANESTHESIA POSTPROCEDURE EVALUATION
Post-Op Assessment Note    CV Status:  Stable  Pain Score: 0    Pain management: adequate       Mental Status:  Sleepy and arousable   Hydration Status:  Euvolemic and stable   PONV Controlled:  Controlled   Airway Patency:  Patent     Post Op Vitals Reviewed: Yes    No anethesia notable event occurred.    Staff: Anesthesiologist, CRNA           Last Filed PACU Vitals:  Vitals Value Taken Time   Temp 97 °F (36.1 °C) 06/23/25 08:28   Pulse 76 06/23/25 08:28   /66 06/23/25 08:28   Resp 16 06/23/25 08:28   SpO2 96 % 06/23/25 08:28       Modified Som:     Vitals Value Taken Time   Activity 2 06/23/25 08:28   Respiration 2 06/23/25 08:28   Circulation 2 06/23/25 08:28   Consciousness 1 06/23/25 08:28   Oxygen Saturation 2 06/23/25 08:28     Modified Som Score: 9

## 2025-06-23 NOTE — INTERVAL H&P NOTE
H&P reviewed. After examining the patient I find no changes in the patients condition since the H&P had been written.  Screening colonoscopy-  hx breast ca    Vitals:    06/23/25 0743   BP: 118/58   Pulse: 74   Resp: 20   Temp: 98.8 °F (37.1 °C)   SpO2: 98%

## 2025-06-26 PROCEDURE — 88305 TISSUE EXAM BY PATHOLOGIST: CPT | Performed by: STUDENT IN AN ORGANIZED HEALTH CARE EDUCATION/TRAINING PROGRAM

## 2025-07-05 DIAGNOSIS — N95.2 ATROPHIC VAGINITIS: ICD-10-CM

## 2025-07-07 ENCOUNTER — OFFICE VISIT (OUTPATIENT)
Dept: SURGICAL ONCOLOGY | Facility: CLINIC | Age: 65
End: 2025-07-07
Payer: COMMERCIAL

## 2025-07-07 VITALS
HEIGHT: 62 IN | WEIGHT: 174.5 LBS | HEART RATE: 68 BPM | OXYGEN SATURATION: 98 % | RESPIRATION RATE: 16 BRPM | BODY MASS INDEX: 32.11 KG/M2 | TEMPERATURE: 98.1 F | DIASTOLIC BLOOD PRESSURE: 84 MMHG | SYSTOLIC BLOOD PRESSURE: 128 MMHG

## 2025-07-07 DIAGNOSIS — Z12.31 ENCOUNTER FOR SCREENING MAMMOGRAM FOR MALIGNANT NEOPLASM OF BREAST: ICD-10-CM

## 2025-07-07 DIAGNOSIS — Z85.3 HISTORY OF BREAST CANCER: ICD-10-CM

## 2025-07-07 DIAGNOSIS — Z08 ENCOUNTER FOR FOLLOW-UP EXAMINATION AFTER COMPLETED TREATMENT FOR MALIGNANT NEOPLASM: Primary | ICD-10-CM

## 2025-07-07 DIAGNOSIS — R22.31 AXILLARY LUMP, RIGHT: ICD-10-CM

## 2025-07-07 PROCEDURE — 99203 OFFICE O/P NEW LOW 30 MIN: CPT

## 2025-07-07 PROCEDURE — G2211 COMPLEX E/M VISIT ADD ON: HCPCS

## 2025-07-07 RX ORDER — ESTRADIOL 0.1 MG/G
CREAM VAGINAL
Qty: 42.5 G | Refills: 0 | Status: SHIPPED | OUTPATIENT
Start: 2025-07-07

## 2025-07-07 NOTE — PROGRESS NOTES
Name: Keshia Carias      : 1960      MRN: [id number]  Encounter Provider: GISSELLE Cee  Encounter Date: 2025   Encounter department: [facility]  :  Assessment & Plan  Encounter for follow-up examination after completed treatment for malignant neoplasm  Ms. Keshia Carias is a 65 y.o. female presenting today for 6 month follow up of right breast cancer, diagnosed in 2022. She is s/p right mastectomy with SLNB and right breast expander placement on 2022 with Dr. Anuradha Kelly and Dr. Craig. 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 She did not require adjuvant therapy. Pt has underwent several revisions as well as left breast reduction d/t asymmetry. Genetic testing was negative for pathogenic variants, though 1 VUS was identified in BARD 1 p.Y651C (c.1952A>G) . Her last left screening mammogram was on 2025, which demonstrated BI-RADS 1, category 3 density (Heterogeneously dense).    Today, she reports no new breast concerns. We discussed topical estradiol cream, OK to utilize given minimal systemic absorption despite prior breast cancer hx.     Clinvar was referenced at the time of visit for BARD1 VUS. There are no new classifications that exist. I advised Ms. Carias that this remains a VUS and we will cont to monitor.    Script provided for  left screening mammogram. I will see the patient back in 6 months or sooner should the need arise. She was instructed to call with any questions or concerns prior to this time. All questions were answered today.   Orders:  •  Mammo screening left w 3d and cad; Future  •  US breast right limited (diagnostic); Future    Axillary lump, right  Upon CBE today, I did appreciate a small round palpable lump in the right axilla at the medial aspect of the right SLNB incision site. We discussed possible scar tissue / fat necrosis /  lymphadenopathy vs malignancy. Script provided for further evaluation with right dx US. I will contact her with results. No other concerning findings. Dense breast tissue appreciated throughout left breast.  Orders:  •  US breast right limited (diagnostic); Future        History of Present Illness     Keshia Carias is a 65 y.o. year old female who presents for today for 6 month follow up of right breast cancer, diagnosed in July 2022. Her last left screening mammogram was on 06/11/2025, which demonstrated BI-RADS 1, category 3 density (Heterogeneously dense).    She offers no new breast concerns today. She denies breast changes, persistent cough, SOB, headaches, as well as any new or persistent back, bone, or abdominal pain.    She reports use of estradiol vaginal cream. She had recent colonoscopy with 1 polyp removed. Will f/u with GI regarding instructions for f/u.      Oncology History   Cancer Staging   History of breast cancer  Staging form: Breast, AJCC 8th Edition  - Clinical stage from 8/2/2022: Stage 0 (cTis (DCIS), cN0, cM0, ER+, IA+, HER2: Not Assessed) - Signed by Anuradha Kelly MD on 8/2/2022  Stage prefix: Initial diagnosis  Method of lymph node assessment: Clinical  Nuclear grade: G2  - Pathologic stage from 9/26/2022: Stage 0 (pTis (DCIS), pN0(sn), cM0, ER+, IA+, HER2: Not Assessed) - Signed by Anuradha Kelly MD on 9/26/2022  Stage prefix: Initial diagnosis  Method of lymph node assessment: [place] lymph node biopsy  Nuclear grade: G3  Oncology History   History of breast cancer   7/7/2022 Biopsy    Right breast biopsy:  - 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, Grade 2  ER 90%  IA 10%     7/26/2022 Genetic Testing    [facility] CancerNext (36 genes): APC, AMY, AXIN2 BARD1, BRCA1, BRCA2, BRIP1, BMPR1A, CDH1, CDK4, CDKN2A, CHEK2, DICER1, EPCAM, GREM1, HOXB13, MLH1, MSH2, MSH3, MSH6, MUTYH, NBN, NF1, NTHL1, PALB2, PMS2, POLD1, POLE, PTEN, RAD51C, RAD51D, RECQL SMAD4, SMARCA4, STK11, TP53     Result: Variant of  "uncertain significance     Variant  BARD1 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 Craig     9/26/2022 -  Cancer Staged    Staging form: Breast, AJCC 8th Edition  - Pathologic stage from 9/26/2022: Stage 0 (pTis (DCIS), pN0(sn), cM0, ER+, NC+, HER2: Not Assessed) - Signed by Anuradha Kelly MD on 9/26/2022  Stage prefix: Initial diagnosis  Method of lymph node assessment: [place] lymph node biopsy  Nuclear grade: G3       12/15/2022 Surgery    Right expander exchange to implant     3/14/2023 Surgery    Right breast revision, exchange impant        Review of Systems   Constitutional:  Negative for activity change, appetite change, fatigue, fever and unexpected weight change.   Respiratory:  Negative for cough and shortness of breath.    Gastrointestinal:  Negative for abdominal pain.   Musculoskeletal:  Negative for back pain.   Skin: Negative.    Neurological: Negative.    Psychiatric/Behavioral:  Negative for confusion.     A complete review of systems is negative other than that noted above in the HPI.       Objective   /84 (Patient Position: Sitting, Cuff Size: Large)   Pulse 68   Temp 98.1 °F (36.7 °C) (Temporal)   Resp 16   Ht 5' 2\" (1.575 m)   Wt 79.2 kg (174 lb 8 oz)   SpO2 98%   BMI 31.92 kg/m²          Physical Exam  Vitals and nursing note reviewed.   Constitutional:       Appearance: Normal appearance. She is normal weight.     Eyes:      General: No scleral icterus.     Conjunctiva/sclera: Conjunctivae " normal.     Chest:      Chest wall: No mass.   Breasts:     Right: Mass (small round firm lump at medial aspect of right SLNB incisions site) and skin change present. No swelling, bleeding, inverted nipple, nipple discharge or tenderness.      Left: Skin change present. No swelling, bleeding, inverted nipple, mass, nipple discharge or tenderness.        Comments: Pt examined in both sitting and supine positioning. No other dominant masses, nodularities, enlarged lymph nodes, skin changes, or other concerning findings.    Lymphadenopathy:      Upper Body:      Right upper body: No supraclavicular or axillary adenopathy.      Left upper body: No supraclavicular or axillary adenopathy.     Skin:     General: Skin is warm and dry.     Neurological:      General: No focal deficit present.      Mental Status: She is alert and oriented to person, place, and time. Mental status is at baseline.     Psychiatric:         Mood and Affect: Mood normal.         Behavior: Behavior normal.         Thought Content: Thought content normal.         Judgment: Judgment normal.          Pathology: I have reviewed pathology reports described above.  I have reviewed radiology report from Ms. Keshia GOMEZ Zackerybobby's last mammogram, on 06/11/2025

## 2025-07-07 NOTE — ASSESSMENT & PLAN NOTE
Ms. Keshia Carias is a 65 y.o. female presenting today for 6 month follow up of right breast cancer, diagnosed in July 2022. She is s/p right mastectomy with SLNB and right breast expander placement on 09/09/2022 with Dr. Anuradha Kelly and Dr. Craig. 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 She did not require adjuvant therapy. Pt has underwent several revisions as well as left breast reduction d/t asymmetry. Genetic testing was negative for pathogenic variants, though 1 VUS was identified in BARD 1 p.Y651C (c.1952A>G) . Her last left screening mammogram was on 06/11/2025, which demonstrated BI-RADS 1, category 3 density (Heterogeneously dense).    Today, she reports no new breast concerns. We discussed topical estradiol cream, OK to utilize given minimal systemic absorption despite prior breast cancer hx.     Clinvar was referenced at the time of visit for BARD1 VUS. There are no new classifications that exist. I advised Ms. Carias that this remains a VUS and we will cont to monitor.    Script provided for 2026 left screening mammogram. I will see the patient back in 6 months or sooner should the need arise. She was instructed to call with any questions or concerns prior to this time. All questions were answered today.   Orders:  •  Mammo screening left w 3d and cad; Future  •  US breast right limited (diagnostic); Future

## 2025-07-07 NOTE — Clinical Note
Good morning, Please see right breast dx US for scheduling. Pt is with a hx of breast cancer with a new palpable lump.  Thank you!  Xochilt

## 2025-07-08 ENCOUNTER — TELEPHONE (OUTPATIENT)
Dept: RADIOLOGY | Facility: HOSPITAL | Age: 65
End: 2025-07-08

## 2025-07-11 ENCOUNTER — APPOINTMENT (OUTPATIENT)
Dept: LAB | Age: 65
End: 2025-07-11
Attending: INTERNAL MEDICINE
Payer: COMMERCIAL

## 2025-07-11 DIAGNOSIS — Z13.6 SCREENING FOR CARDIOVASCULAR CONDITION: ICD-10-CM

## 2025-07-11 DIAGNOSIS — R73.03 PREDIABETES: ICD-10-CM

## 2025-07-11 DIAGNOSIS — D80.3 IGG DEFICIENCY (HCC): ICD-10-CM

## 2025-07-11 DIAGNOSIS — E03.9 HYPOTHYROIDISM, UNSPECIFIED TYPE: ICD-10-CM

## 2025-07-11 LAB
ALBUMIN SERPL BCG-MCNC: 4.2 G/DL (ref 3.5–5)
ALP SERPL-CCNC: 43 U/L (ref 34–104)
ALT SERPL W P-5'-P-CCNC: 9 U/L (ref 7–52)
ANION GAP SERPL CALCULATED.3IONS-SCNC: 11 MMOL/L (ref 4–13)
AST SERPL W P-5'-P-CCNC: 16 U/L (ref 13–39)
BILIRUB SERPL-MCNC: 0.75 MG/DL (ref 0.2–1)
BUN SERPL-MCNC: 16 MG/DL (ref 5–25)
CALCIUM SERPL-MCNC: 9.2 MG/DL (ref 8.4–10.2)
CHLORIDE SERPL-SCNC: 100 MMOL/L (ref 96–108)
CHOLEST SERPL-MCNC: 156 MG/DL (ref ?–200)
CO2 SERPL-SCNC: 30 MMOL/L (ref 21–32)
CREAT SERPL-MCNC: 0.89 MG/DL (ref 0.6–1.3)
EST. AVERAGE GLUCOSE BLD GHB EST-MCNC: 111 MG/DL
GFR SERPL CREATININE-BSD FRML MDRD: 67 ML/MIN/1.73SQ M
GLUCOSE P FAST SERPL-MCNC: 89 MG/DL (ref 65–99)
HBA1C MFR BLD: 5.5 %
HDLC SERPL-MCNC: 66 MG/DL
IGA SERPL-MCNC: 39 MG/DL (ref 66–433)
IGG SERPL-MCNC: 577 MG/DL (ref 635–1741)
IGM SERPL-MCNC: 34 MG/DL (ref 45–281)
LDLC SERPL CALC-MCNC: 76 MG/DL (ref 0–100)
POTASSIUM SERPL-SCNC: 3.8 MMOL/L (ref 3.5–5.3)
PROT SERPL-MCNC: 6.4 G/DL (ref 6.4–8.4)
SODIUM SERPL-SCNC: 141 MMOL/L (ref 135–147)
TRIGL SERPL-MCNC: 72 MG/DL (ref ?–150)
TSH SERPL DL<=0.05 MIU/L-ACNC: 1.51 UIU/ML (ref 0.45–4.5)

## 2025-07-11 PROCEDURE — 36415 COLL VENOUS BLD VENIPUNCTURE: CPT

## 2025-07-11 PROCEDURE — 82784 ASSAY IGA/IGD/IGG/IGM EACH: CPT

## 2025-07-11 PROCEDURE — 83036 HEMOGLOBIN GLYCOSYLATED A1C: CPT

## 2025-07-11 PROCEDURE — 80053 COMPREHEN METABOLIC PANEL: CPT

## 2025-07-11 PROCEDURE — 84443 ASSAY THYROID STIM HORMONE: CPT

## 2025-07-11 PROCEDURE — 80061 LIPID PANEL: CPT

## 2025-07-18 ENCOUNTER — HOSPITAL ENCOUNTER (OUTPATIENT)
Dept: ULTRASOUND IMAGING | Facility: CLINIC | Age: 65
Discharge: HOME | End: 2025-07-18
Payer: COMMERCIAL

## 2025-07-18 DIAGNOSIS — Z08 ENCOUNTER FOR FOLLOW-UP EXAMINATION AFTER COMPLETED TREATMENT FOR MALIGNANT NEOPLASM: ICD-10-CM

## 2025-07-18 DIAGNOSIS — R22.31 AXILLARY LUMP, RIGHT: ICD-10-CM

## 2025-07-18 DIAGNOSIS — Z85.3 HISTORY OF BREAST CANCER: ICD-10-CM

## 2025-07-18 PROCEDURE — 76642 ULTRASOUND BREAST LIMITED: CPT

## 2025-08-15 ENCOUNTER — OFFICE VISIT (OUTPATIENT)
Dept: DERMATOLOGY | Facility: CLINIC | Age: 65
End: 2025-08-15
Payer: COMMERCIAL

## 2025-08-15 PROCEDURE — 88342 IMHCHEM/IMCYTCHM 1ST ANTB: CPT | Performed by: STUDENT IN AN ORGANIZED HEALTH CARE EDUCATION/TRAINING PROGRAM

## 2025-08-15 PROCEDURE — 88305 TISSUE EXAM BY PATHOLOGIST: CPT | Performed by: STUDENT IN AN ORGANIZED HEALTH CARE EDUCATION/TRAINING PROGRAM

## 2025-08-22 ENCOUNTER — TELEPHONE (OUTPATIENT)
Dept: DERMATOLOGY | Facility: CLINIC | Age: 65
End: 2025-08-22

## (undated) DEVICE — DRAPE SHEET THREE QUARTER

## (undated) DEVICE — BRA SURGICAL SZ LGE (36-39)

## (undated) DEVICE — SUT ETHILON 2-0 FS 18 IN 664H

## (undated) DEVICE — GLOVE SRG BIOGEL 6

## (undated) DEVICE — SPINNING CEMENT MIXING BOWL

## (undated) DEVICE — SUT MONOCRYL 4-0 PS-2 18 IN Y496G

## (undated) DEVICE — STERILE POLYISOPRENE POWDER-FREE SURGICAL GLOVES: Brand: PROTEXIS

## (undated) DEVICE — ACE WRAP 6 IN UNSTERILE

## (undated) DEVICE — ELECTRODE BLADE MOD E-Z CLEAN  2.75IN 7CM -0012AM

## (undated) DEVICE — PREMIUM DRY TRAY LF: Brand: MEDLINE INDUSTRIES, INC.

## (undated) DEVICE — INTENDED FOR TISSUE SEPARATION, AND OTHER PROCEDURES THAT REQUIRE A SHARP SURGICAL BLADE TO PUNCTURE OR CUT.: Brand: BARD-PARKER ® SAFETYLOCK CARBON RIB-BACK BLADES

## (undated) DEVICE — 3M™ STERI-STRIP™ REINFORCED ADHESIVE SKIN CLOSURES, R1546, 1/4 IN X 4 IN (6 MM X 100 MM), 10 STRIPS/ENVELOPE: Brand: 3M™ STERI-STRIP™

## (undated) DEVICE — DISPOSABLE OR TOWEL: Brand: CARDINAL HEALTH

## (undated) DEVICE — INTENDED FOR TISSUE SEPARATION, AND OTHER PROCEDURES THAT REQUIRE A SHARP SURGICAL BLADE TO PUNCTURE OR CUT.: Brand: BARD-PARKER ® CARBON RIB-BACK BLADES

## (undated) DEVICE — SUT CHROMIC 6-0 790G

## (undated) DEVICE — PREP PAD BNS: Brand: CONVERTORS

## (undated) DEVICE — GLOVE INDICATOR PI UNDERGLOVE SZ 8.5 BLUE

## (undated) DEVICE — PADDING CAST 4 IN  COTTON STRL

## (undated) DEVICE — CUFF TOURNIQUET 34 X 4 IN QUICK CONNECT DISP 1BLA

## (undated) DEVICE — SPONGE LAP 18 X 18 IN STRL RFD

## (undated) DEVICE — NEPTUNE E-SEP SMOKE EVACUATION PENCIL, COATED, 70MM BLADE, PUSH BUTTON SWITCH: Brand: NEPTUNE E-SEP

## (undated) DEVICE — SUT MONOCRYL 3-0 PS-2 27 IN Y427H

## (undated) DEVICE — SKIN MARKER DUAL TIP WITH RULER CAP, FLEXIBLE RULER AND LABELS: Brand: DEVON

## (undated) DEVICE — SUT PDS II 4-0 PS-2 18 IN Z496G

## (undated) DEVICE — PACK TOTAL KNEE PBDS

## (undated) DEVICE — STERILE POLYISOPRENE POWDER-FREE SURGICAL GLOVES WITH EMOLLIENT COATING: Brand: PROTEXIS

## (undated) DEVICE — BOWL: 16OZ PEELPOUCH 75/CS 16/PLT: Brand: MEDEGEN MEDICAL PRODUCTS, LLC

## (undated) DEVICE — STOCKINETTE REGULAR

## (undated) DEVICE — Device

## (undated) DEVICE — SUT CHROMIC 5-0 P-3 18 IN 687G

## (undated) DEVICE — ELECTRODE NEEDLE MOD E-Z CLEAN 2.75IN 7CM -0013M

## (undated) DEVICE — TUBING SUCTION 5MM X 12 FT

## (undated) DEVICE — 450 ML BOTTLE OF 0.05% CHLORHEXIDINE GLUCONATE IN 99.95% STERILE WATER FOR IRRIGATION, USP AND APPLICATOR.: Brand: IRRISEPT ANTIMICROBIAL WOUND LAVAGE

## (undated) DEVICE — ACE WRAP 4 IN UNSTERILE

## (undated) DEVICE — INSTRUMENT POUCH: Brand: CONVERTORS

## (undated) DEVICE — BAG DECANTER

## (undated) DEVICE — COOL TEMP PAD

## (undated) DEVICE — TRAY FOLEY 16FR URIMETER SURESTEP

## (undated) DEVICE — SUT MONOCRYL 3-0 SH 27 IN Y416H

## (undated) DEVICE — PLUMEPEN PRO 10FT

## (undated) DEVICE — SCD SEQUENTIAL COMPRESSION COMFORT SLEEVE MEDIUM KNEE LENGTH: Brand: KENDALL SCD

## (undated) DEVICE — MEDI-VAC YANK SUCT HNDL W/TPRD BULBOUS TIP: Brand: CARDINAL HEALTH

## (undated) DEVICE — RECIPROCATING BLADE, DOUBLE SIDED (70.0 X 0.96 X 12.5MM)

## (undated) DEVICE — SYRINGE 30ML LL

## (undated) DEVICE — 3M™ STERI-STRIP™ REINFORCED ADHESIVE SKIN CLOSURES, R1547, 1/2 IN X 4 IN (12 MM X 100 MM), 6 STRIPS/ENVELOPE: Brand: 3M™ STERI-STRIP™

## (undated) DEVICE — BETHLEHEM UNIVERSAL OUTPATIENT: Brand: CARDINAL HEALTH

## (undated) DEVICE — INTENDED FOR TISSUE SEPARATION, AND OTHER PROCEDURES THAT REQUIRE A SHARP SURGICAL BLADE TO PUNCTURE OR CUT.: Brand: BARD-PARKER SAFETY BLADES SIZE 15, STERILE

## (undated) DEVICE — DRAIN JACKSON PRATT 10FR 7MM: Brand: CARDINAL HEALTH

## (undated) DEVICE — BETHLEHEM UNIVERSAL MINOR GEN: Brand: CARDINAL HEALTH

## (undated) DEVICE — COBAN 4 IN STERILE

## (undated) DEVICE — NEEDLE 25G X 1 1/2

## (undated) DEVICE — DRESSING BIOPATCH ANTIMICROBIAL 1 IN DISC

## (undated) DEVICE — STAPLER INSORB SUBCUTICULAR 30 SINGLE USE

## (undated) DEVICE — PROVE COVER: Brand: UNBRANDED

## (undated) DEVICE — GLOVE INDICATOR PI UNDERGLOVE SZ 6.5 BLUE

## (undated) DEVICE — PACK UNIVERSAL DRAPES SUB-Q ICD

## (undated) DEVICE — SPONGE GAUZE 4 X 9

## (undated) DEVICE — EXOFIN PRECISION PEN HIGH VISCOSITY TOPICAL SKIN ADHESIVE: Brand: EXOFIN PRECISION PEN, 1G

## (undated) DEVICE — ADHESIVE SKIN HIGH VISCOSITY EXOFIN 1ML

## (undated) DEVICE — POV-IOD SOLUTION 4OZ BT

## (undated) DEVICE — JP 3-SPRING RES W/10FR PVC DRAIN/TR: Brand: CARDINAL HEALTH

## (undated) DEVICE — PROXIMATE SKIN STAPLERS (35 WIDE) CONTAINS 35 STAINLESS STEEL STAPLES (FIXED HEAD): Brand: PROXIMATE

## (undated) DEVICE — SUT PROLENE 2-0 SH 30 IN 8833H

## (undated) DEVICE — ABDOMINAL PAD: Brand: DERMACEA

## (undated) DEVICE — NEEDLE 21 G X 1 1/2 SAFETY

## (undated) DEVICE — SUT MONOCRYL 4-0 P-3 18 IN Y494G

## (undated) DEVICE — JP CHAN DRN SIL HUBLESS 15FR W/TRO: Brand: CARDINAL HEALTH

## (undated) DEVICE — SUT SILK 2-0 SH 30 IN K833H

## (undated) DEVICE — HOOD: Brand: FLYTE, SURGICOOL

## (undated) DEVICE — SYRINGE 10ML LL

## (undated) DEVICE — TUBING INFILTRATION 9FT

## (undated) DEVICE — CHEST/BREAST DRAPE: Brand: CONVERTORS

## (undated) DEVICE — TELFA NON-ADHERENT ABSORBENT DRESSING: Brand: TELFA

## (undated) DEVICE — STOCKINETTE 2P PREROLLD 6X60

## (undated) DEVICE — COTTON TIP APPLICTOR 2 PK

## (undated) DEVICE — ALL PURPOSE SPONGES,NON-WOVEN, 4 PLY: Brand: CURITY

## (undated) DEVICE — BULB SYRINGE,IRRIGATION WITH PROTECTIVE CAP: Brand: DOVER

## (undated) DEVICE — SUT PDS II 2-0 CT-1 27 IN Z339H

## (undated) DEVICE — MINOR PROCEDURE DRAPE: Brand: CONVERTORS

## (undated) DEVICE — FUNNEL E KELLER 2 DELIVERY DEVICE

## (undated) DEVICE — SUT VICRYL PLUS 2-0 CTB-1 27 IN VCPB259H

## (undated) DEVICE — GAUZE SPONGES,16 PLY: Brand: CURITY

## (undated) DEVICE — GLOVE SRG BIOGEL ECLIPSE 6

## (undated) DEVICE — DRAPE EQUIPMENT RF WAND

## (undated) DEVICE — KERLIX BANDAGE ROLL: Brand: KERLIX

## (undated) DEVICE — BETHLEHEM UNIVERSAL LAPAROTOMY: Brand: CARDINAL HEALTH

## (undated) DEVICE — NO-SCRATCH ™ SMALL WHITNEY CURETTE ™ IS A SINGLE-USE, PLASTIC CURETTE FOR QUICKLY APPLYING, MANIPULATING AND REMOVING BONE CEMENT DURING HIP AND KNEE REPLACEMENT SURGERY. THE PLASTIC IS SOFTER THAN STEEL INSTRUMENTS, REDUCING THE RISK OF DAMAGING THE PROSTHESIS WITH METAL INSTRUMENTS.  THE CURETTE’S 6MM TIP REMOVES EXCESS CEMENT FROM REPLACEMENT HIPS AND KNEES. EASY-TO-MANEUVER, THE SMALL BLUE CURETTE LETS YOU REMOVE CEMENT FROM ALL EDGES OF THE PROSTHESIS.NO-SCRATCH WHITNEY SMALL CURETTE FEATURES:SAFER THAN STEEL- MADE OF PLASTIC - STURDY YET SOFTER THAN SURGICAL STEEL.HANDIER- EACH TOOL HAS A MOLDED-IN THUMB INDENTATION INSTANTLY ORIENTING THE TOOL.- EASIER TO MANEUVER IN HARD TO SEE PLACES.- COLOR-CODED FOR EASY IDENTIFICATION.FASTER- COMES INDIVIDUALLY PACKAGED IN STERILE, PEEL OPEN POUCH, READY TO GO.- APPLIES, MANIPULATES, OR REMOVES CEMENT WITH FINGERTIP PRECISION.ECONOMICAL- THE COST OF A SINGLE REVISION DWARFS THE COST OF A SINGLE-USE CURETTE. - DISPOSABLE – THERE’S NO NEED TO WASTE TIME REMOVING HARDENED CEMENT OR RE-STERILIZING TOOLS.- LESS EXPENSIVE TO BUY AND INVENTORY - ORDER ONLY THE TOOL YOU USE.- PACKAGED 25 INDIVIDUALLY WRAPPED TOOLS TO A CARTON FOR CONVENIENT SHELF STORAGE.: Brand: WHITNEY NO-SCRATCH CURETTE (SMALL)

## (undated) DEVICE — SUT SILK 2-0 FS 18 IN 685G

## (undated) DEVICE — ACE WRAP 6 IN STERILE

## (undated) DEVICE — STANDARD SURGICAL GOWN, L: Brand: CONVERTORS

## (undated) DEVICE — SUT MONOCRYL 5-0 P-3 18 IN Y493G

## (undated) DEVICE — 3M™ STERI-STRIP™ REINFORCED ADHESIVE SKIN CLOSURES, R1542, 1/4 IN X 1-1/2 IN (6 MM X 38 MM), 6 STRIPS/ENVELOPE: Brand: 3M™ STERI-STRIP™

## (undated) DEVICE — MASTISOL LIQ ADHESIVE 2/3ML

## (undated) DEVICE — ADHESIVE SKIN HIGH VISCOSITY EXOFIN PRECISION PEN

## (undated) DEVICE — SUT MONOCRYL PLUS 5-0 PC12 18 IN MCP834G

## (undated) DEVICE — 1820 FOAM BLOCK NEEDLE COUNTER: Brand: DEVON

## (undated) DEVICE — ARGYLE YANKAUER BULB TIP, NO VENT WITH TUBING 1/4” X 6’ (6 MM X 1.8 M): Brand: ARGYLE

## (undated) DEVICE — DUAL CUT SAGITTAL BLADE

## (undated) DEVICE — CAPIT KNEE ATTUNE FB CEMENT - DEPUY

## (undated) DEVICE — GLOVE SRG BIOGEL 8

## (undated) DEVICE — SUPER SPONGES,MEDIUM: Brand: KERLIX

## (undated) DEVICE — SUT PROLENE 5-0 P-3 18 IN 8698G

## (undated) DEVICE — NEEDLE BLUNT 18 G X 1 1/2IN

## (undated) DEVICE — SUT VICRYL PLUS 1 CTB-1 36 IN VCPB947H

## (undated) DEVICE — ELECTRODE EZ CLEAN BLADE -0012

## (undated) DEVICE — CHLORAPREP HI-LITE 26ML ORANGE

## (undated) DEVICE — TOWEL SURG XR DETECT GREEN STRL RFD

## (undated) DEVICE — JACKSON-PRATT 100CC BULB RESERVOIR: Brand: CARDINAL HEALTH

## (undated) DEVICE — STOPCOCK 3-WAY

## (undated) DEVICE — ELECTRODE BLADE MOD E-Z CLEAN 2.5IN 6.4CM -0012M

## (undated) DEVICE — SINGLE PORT MANIFOLD: Brand: NEPTUNE 2

## (undated) DEVICE — SMOKE EVAC FLUID SUCTION HEPA FILTER

## (undated) DEVICE — NEEDLE 22 G X 1 1/2 SAFETY

## (undated) DEVICE — 3M™ TEGADERM™ TRANSPARENT FILM DRESSING FRAME STYLE, 1624W, 2-3/8 IN X 2-3/4 IN (6 CM X 7 CM), 100/CT 4CT/CASE: Brand: 3M™ TEGADERM™

## (undated) DEVICE — GLOVE PI ULTRA TOUCH SZ.7.0

## (undated) DEVICE — CANNISTER WASTE IMPLOSION PROOF

## (undated) DEVICE — SURGICEL 2 X 14

## (undated) DEVICE — 2000CC GUARDIAN II: Brand: GUARDIAN

## (undated) DEVICE — TUBING LIPOSUCTION ASPIRATION 12FT STERILE

## (undated) DEVICE — LIGHT GLOVE GREEN

## (undated) DEVICE — DECANTER: Brand: UNBRANDED

## (undated) DEVICE — SYRINGE 50ML LL

## (undated) DEVICE — MEDI-VAC YANKAUER SUCTION HANDLE W/BULBOUS AND CONTROL VENT: Brand: CARDINAL HEALTH

## (undated) DEVICE — NEEDLE 30 G X 1/2

## (undated) DEVICE — SURGICEL 4 X 8

## (undated) DEVICE — ICE PACK EYE

## (undated) DEVICE — PAD GROUNDING ADULT

## (undated) DEVICE — CANNULA 3MM MERCEDES 22CM 8MM PORT

## (undated) DEVICE — FOAM WOUND DRESSING: Brand: DUAL-DRESS 50 11X12

## (undated) DEVICE — SPONGE PVP SCRUB WING STERILE

## (undated) DEVICE — BASIC SINGLE BASIN-LF: Brand: MEDLINE INDUSTRIES, INC.

## (undated) DEVICE — SPONGE STICK WITH PVP-I: Brand: KENDALL

## (undated) DEVICE — 3M™ IOBAN™ 2 ANTIMICROBIAL INCISE DRAPE 6650EZ: Brand: IOBAN™ 2

## (undated) DEVICE — SUT PROLENE 6-0 P-3 18 IN 8695G

## (undated) DEVICE — 3M™ TEGADERM™ TRANSPARENT FILM DRESSING FRAME STYLE, 1626W, 4 IN X 4-3/4 IN (10 CM X 12 CM), 50/CT 4CT/CASE: Brand: 3M™ TEGADERM™

## (undated) DEVICE — 3000CC GUARDIAN II: Brand: GUARDIAN

## (undated) DEVICE — BRA SURGICAL SZ MED (33-36)

## (undated) DEVICE — GLOVE PI ULTRA TOUCH SZ 6

## (undated) DEVICE — SILVER-COATED ANTIMICROBIAL BARRIER DRESSING: Brand: ACTICOAT   4" X 8"

## (undated) DEVICE — TIBURON SPLIT SHEET: Brand: CONVERTORS

## (undated) DEVICE — DRAPE SHEET X-LG

## (undated) DEVICE — THE SIMPULSE SOLO SYSTEM WITH ULTREX RETRACTABLE SPLASH SHIELD TIP: Brand: SIMPULSE SOLO

## (undated) DEVICE — GLOVE INDICATOR PI UNDERGLOVE SZ 7 BLUE

## (undated) DEVICE — INTENDED FOR TISSUE SEPARATION, AND OTHER PROCEDURES THAT REQUIRE A SHARP SURGICAL BLADE TO PUNCTURE OR CUT.: Brand: BARD-PARKER SAFETY BLADES SIZE 10, STERILE

## (undated) DEVICE — SUT PROLENE 2-0 SH 36 IN 8523H